# Patient Record
Sex: FEMALE | Race: WHITE | NOT HISPANIC OR LATINO | Employment: FULL TIME | URBAN - METROPOLITAN AREA
[De-identification: names, ages, dates, MRNs, and addresses within clinical notes are randomized per-mention and may not be internally consistent; named-entity substitution may affect disease eponyms.]

---

## 2017-01-20 ENCOUNTER — ALLSCRIPTS OFFICE VISIT (OUTPATIENT)
Dept: OTHER | Facility: OTHER | Age: 57
End: 2017-01-20

## 2017-06-02 ENCOUNTER — APPOINTMENT (OUTPATIENT)
Dept: LAB | Facility: CLINIC | Age: 57
End: 2017-06-02
Payer: COMMERCIAL

## 2017-06-02 ENCOUNTER — TRANSCRIBE ORDERS (OUTPATIENT)
Dept: LAB | Facility: CLINIC | Age: 57
End: 2017-06-02

## 2017-06-02 DIAGNOSIS — Z00.8 HEALTH EXAMINATION IN POPULATION SURVEY: Primary | ICD-10-CM

## 2017-06-02 DIAGNOSIS — Z00.8 HEALTH EXAMINATION IN POPULATION SURVEY: ICD-10-CM

## 2017-06-02 LAB
CHOLEST SERPL-MCNC: 197 MG/DL (ref 50–200)
EST. AVERAGE GLUCOSE BLD GHB EST-MCNC: 117 MG/DL
HBA1C MFR BLD: 5.7 % (ref 4.2–6.3)
HDLC SERPL-MCNC: 48 MG/DL (ref 40–60)
LDLC SERPL CALC-MCNC: 107 MG/DL (ref 0–100)
TRIGL SERPL-MCNC: 210 MG/DL

## 2017-06-02 PROCEDURE — 83036 HEMOGLOBIN GLYCOSYLATED A1C: CPT

## 2017-06-02 PROCEDURE — 36415 COLL VENOUS BLD VENIPUNCTURE: CPT

## 2017-06-02 PROCEDURE — 80061 LIPID PANEL: CPT

## 2017-07-26 ENCOUNTER — ALLSCRIPTS OFFICE VISIT (OUTPATIENT)
Dept: OTHER | Facility: OTHER | Age: 57
End: 2017-07-26

## 2018-01-12 VITALS
HEART RATE: 60 BPM | OXYGEN SATURATION: 98 % | BODY MASS INDEX: 25.56 KG/M2 | WEIGHT: 135.38 LBS | RESPIRATION RATE: 16 BRPM | TEMPERATURE: 97.6 F | SYSTOLIC BLOOD PRESSURE: 124 MMHG | HEIGHT: 61 IN | DIASTOLIC BLOOD PRESSURE: 82 MMHG

## 2018-01-14 VITALS
HEIGHT: 61 IN | BODY MASS INDEX: 25.54 KG/M2 | WEIGHT: 135.25 LBS | SYSTOLIC BLOOD PRESSURE: 138 MMHG | DIASTOLIC BLOOD PRESSURE: 68 MMHG | TEMPERATURE: 98.6 F | RESPIRATION RATE: 16 BRPM | HEART RATE: 76 BPM | OXYGEN SATURATION: 98 %

## 2018-01-15 NOTE — RESULT NOTES
Message    Liver enzymes and pancreatic enzymes are normal     Called pt and left message  ph         Verified Results  (1) HEPATIC FUNCTION PANEL 20Dec2016 08:02AM Marely Door   TW Order Number: [id number]     Test Name Result Flag Reference   ALBUMIN 4 1 g/dL  3 5-5 0   - Patient Instructions: This is a non fasting blood test  Please drink two glasses of water the morning of test     - Patient Instructions: This is a non fasting blood test  Please drink two glasses of water the morning of test    ALK PHOSPHATAS 100 U/L     ALT (SGPT) 16 U/L  12-78   AST(SGOT) 16 U/L  5-45   BILI, DIRECT 0 10 mg/dL  0 00-0 20   BILI, TOTAL 0 50 mg/dL  0 20-1 00   TOTAL PROTEIN 7 8 g/dL  6 4-8 2     (1) AMYLASE 20Dec2016 08:02AM Marely Door   TW Order Number: [id number]     Test Name Result Flag Reference   AMYLASE 64 IU/L     - Patient Instructions: This is a non fasting blood test  Please drink two glasses of water the morning of test      (1) LIPASE 20Dec2016 08:02AM Marely Door   TW Order Number: [id number]     Test Name Result Flag Reference   LIPASE 339 u/L     - Patient Instructions:  This is a non fasting blood test  Please drink two glasses of water the morning of test

## 2018-01-17 NOTE — RESULT NOTES
Message   shoulder x-ray results are back and show arthritis of right shoulder near rotator cuff area(acromioclavicular joint)  Recommend physical therapy but if shoulder is not improving, would like Zina De Anda to see orthopedics     Verified Results  * XR SHOULDER 2+ VIEW RIGHT [id number] 02:44PM [facility] [place]   TW Order Number: [id number]     Test Name Result Flag Reference   XR SHOULDER 2+ VW RIGHT (Report)     RIGHT SHOULDER     INDICATION: Neck and right shoulder pain     COMPARISON: None     VIEWS: 3; 3 images     FINDINGS:     There is no acute fracture or dislocation  There is osteoarthritis of the right acromioclavicular joint with joint space narrowing and mild marginal spurring  There is minimal spurring associated with the inferior margin of the glenohumeral joint without significant narrowing     No lytic or blastic lesions are seen  Soft tissues are unremarkable  IMPRESSION:     Osteoarthritis of the right acromioclavicular joint  Minimal glenohumeral joint spurring   No acute osseous abnormality       Workstation performed: [id number]     Signed by:   Martha Dunn MD   12/21/16

## 2018-01-18 NOTE — MISCELLANEOUS
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

## 2018-03-28 DIAGNOSIS — R00.0 TACHYCARDIA: Primary | ICD-10-CM

## 2018-03-28 PROBLEM — F41.9 ANXIETY: Status: ACTIVE | Noted: 2017-01-20

## 2018-03-28 RX ORDER — PANTOPRAZOLE SODIUM 40 MG/1
TABLET, DELAYED RELEASE ORAL
COMMUNITY
End: 2018-07-03 | Stop reason: SDUPTHER

## 2018-03-28 RX ORDER — CHOLECALCIFEROL (VITAMIN D3) 125 MCG
1 CAPSULE ORAL DAILY
COMMUNITY
End: 2020-09-17 | Stop reason: ALTCHOICE

## 2018-03-28 RX ORDER — ATENOLOL 25 MG/1
TABLET ORAL
COMMUNITY
End: 2018-03-28 | Stop reason: SDUPTHER

## 2018-03-28 RX ORDER — BIOTIN 1 MG
1 TABLET ORAL DAILY
COMMUNITY
End: 2020-03-20 | Stop reason: ALTCHOICE

## 2018-03-29 RX ORDER — ATENOLOL 25 MG/1
12.5 TABLET ORAL DAILY
Qty: 45 TABLET | Refills: 1 | Status: SHIPPED | OUTPATIENT
Start: 2018-03-29 | End: 2018-09-24 | Stop reason: SDUPTHER

## 2018-03-29 NOTE — TELEPHONE ENCOUNTER
Pt called  Back   Would like her atenolol sent to [facility] at [facility]

## 2018-07-03 DIAGNOSIS — K21.9 GASTROESOPHAGEAL REFLUX DISEASE, ESOPHAGITIS PRESENCE NOT SPECIFIED: Primary | ICD-10-CM

## 2018-07-03 RX ORDER — PANTOPRAZOLE SODIUM 40 MG/1
40 TABLET, DELAYED RELEASE ORAL DAILY
Qty: 28 TABLET | Refills: 1 | Status: SHIPPED | OUTPATIENT
Start: 2018-07-03 | End: 2018-07-27

## 2018-07-17 ENCOUNTER — TRANSCRIBE ORDERS (OUTPATIENT)
Dept: LAB | Facility: AMBULARY SURGERY CENTER | Age: 58
End: 2018-07-17

## 2018-07-17 ENCOUNTER — APPOINTMENT (OUTPATIENT)
Dept: LAB | Facility: AMBULARY SURGERY CENTER | Age: 58
End: 2018-07-17
Payer: COMMERCIAL

## 2018-07-17 DIAGNOSIS — Z00.8 HEALTH EXAMINATION IN POPULATION SURVEY: Primary | ICD-10-CM

## 2018-07-17 DIAGNOSIS — Z00.8 HEALTH EXAMINATION IN POPULATION SURVEY: ICD-10-CM

## 2018-07-17 LAB
CHOLEST SERPL-MCNC: 201 MG/DL (ref 50–200)
EST. AVERAGE GLUCOSE BLD GHB EST-MCNC: 111 MG/DL
HBA1C MFR BLD: 5.5 % (ref 4.2–6.3)
HDLC SERPL-MCNC: 40 MG/DL (ref 40–60)
LDLC SERPL CALC-MCNC: 107 MG/DL (ref 0–100)
NONHDLC SERPL-MCNC: 161 MG/DL
TRIGL SERPL-MCNC: 269 MG/DL

## 2018-07-17 PROCEDURE — 36415 COLL VENOUS BLD VENIPUNCTURE: CPT

## 2018-07-17 PROCEDURE — 80061 LIPID PANEL: CPT

## 2018-07-17 PROCEDURE — 83036 HEMOGLOBIN GLYCOSYLATED A1C: CPT

## 2018-07-27 ENCOUNTER — OFFICE VISIT (OUTPATIENT)
Dept: FAMILY MEDICINE CLINIC | Facility: CLINIC | Age: 58
End: 2018-07-27
Payer: COMMERCIAL

## 2018-07-27 VITALS
DIASTOLIC BLOOD PRESSURE: 70 MMHG | RESPIRATION RATE: 14 BRPM | BODY MASS INDEX: 23.93 KG/M2 | SYSTOLIC BLOOD PRESSURE: 130 MMHG | HEART RATE: 46 BPM | WEIGHT: 140.2 LBS | HEIGHT: 64 IN

## 2018-07-27 DIAGNOSIS — Z00.00 ENCOUNTER FOR WELLNESS EXAMINATION: Primary | ICD-10-CM

## 2018-07-27 DIAGNOSIS — R00.0 TACHYCARDIA: ICD-10-CM

## 2018-07-27 DIAGNOSIS — R00.2 PALPITATION: ICD-10-CM

## 2018-07-27 DIAGNOSIS — K21.9 GASTROESOPHAGEAL REFLUX DISEASE WITHOUT ESOPHAGITIS: ICD-10-CM

## 2018-07-27 PROCEDURE — 93000 ELECTROCARDIOGRAM COMPLETE: CPT | Performed by: NURSE PRACTITIONER

## 2018-07-27 PROCEDURE — 99386 PREV VISIT NEW AGE 40-64: CPT | Performed by: NURSE PRACTITIONER

## 2018-07-27 RX ORDER — RABEPRAZOLE SODIUM 20 MG/1
20 TABLET, DELAYED RELEASE ORAL DAILY
Qty: 90 TABLET | Refills: 1 | Status: SHIPPED | OUTPATIENT
Start: 2018-07-27 | End: 2019-01-30 | Stop reason: SDUPTHER

## 2018-07-27 NOTE — PROGRESS NOTES
Assessment/Plan     Healthy female exam   Wellness exam       Tachycardia:  Ekg, echo, labs, 48 hour holter, refer to cardiology    Palpitations[de-identified] cont meds as ordered, as above    GERD:  D/c protonix and start aciphex as ordered    2  Patient Counseling:  --Nutrition: Stressed importance of moderation in sodium/caffeine intake, saturated fat and cholesterol, caloric balance, sufficient intake of fresh fruits, vegetables, fiber, calcium, iron, and 1 mg of folate supplement per day (for females capable of pregnancy)  --Discussed the issue of estrogen replacement, calcium supplement, and the daily use of baby aspirin  --Exercise: Stressed the importance of regular exercise  --Substance Abuse: Discussed cessation/primary prevention of tobacco, alcohol, or other drug use; driving or other dangerous activities under the influence; availability of treatment for abuse  --Sexuality: Discussed sexually transmitted diseases, partner selection, use of condoms, avoidance of unintended pregnancy  and contraceptive alternatives  --Injury prevention: Discussed safety belts, safety helmets, smoke detector, smoking near bedding or upholstery  --Dental health: Discussed importance of regular tooth brushing, flossing, and dental visits  --Immunizations reviewed  --Discussed benefits of screening colonoscopy  --After hours service discussed with patient    3  Discussed the patient's BMI with her  The BMI is in the acceptable range  4  Follow up in one year  Carolee     Elizabet Wilder is a 60 y o  female and is here for a comprehensive physical exam  The patient reports problems - palpitations  Do you take any herbs or supplements that were not prescribed by a doctor? no  Are you taking calcium supplements? no  Are you taking aspirin daily?  no     History:    Last pap date: not needed, total hysterectomy    : 3  Para: 2    The following portions of the patient's history were reviewed and updated as appropriate: allergies, current medications, past family history, past medical history, past social history, past surgical history and problem list     Review of Systems  Do you have pain that bothers you in your daily life? no  Constitutional: negative  Eyes: negative  Ears, nose, mouth, throat, and face: negative  Respiratory: negative  Cardiovascular: positive for palpitations  Gastrointestinal: negative  Genitourinary:negative  Integument/breast: negative  Hematologic/lymphatic: negative  Musculoskeletal:negative  Neurological: negative  Behavioral/Psych: negative  Endocrine: negative  Allergic/Immunologic: negative      Objective     /70   Pulse (!) 46   Resp 14   Ht 5' 4" (1 626 m)   Wt 63 6 kg (140 lb 3 2 oz)   BMI 24 07 kg/m²   Family History   Problem Relation Age of Onset    Anxiety disorder Mother         Anxiety    Depression Mother     Anxiety disorder Sister         Anxiety    Depression Sister     Anxiety disorder Daughter         Anxiety    Depression Daughter     Heart disease Maternal Aunt     Heart disease Maternal Uncle      Past Medical History:   Diagnosis Date    Adjustment disorder with anxiety     last assessed - [id number]    History of total hysterectomy     Ingrown toenail     last assessed - [id number]     Social History     Social History    Marital status: /Civil Union     Spouse name: N/A    Number of children: N/A    Years of education: N/A     Occupational History     - [employer]      Social History Main Topics    Smoking status: Never Smoker    Smokeless tobacco: Never Used    Alcohol use Yes      Comment: occasional alcohol use    Drug use: No    Sexual activity: Yes     Partners: Male     Other Topics Concern    Not on file     Social History Narrative    Coffee           Current Outpatient Prescriptions:     atenolol (TENORMIN) 25 mg tablet, Take 0 5 tablets (12 5 mg total) by mouth daily, Disp: 45 tablet, Rfl: 1    Biotin 1000 MCG tablet, Take 1 tablet by mouth daily, Disp: , Rfl:     Cholecalciferol (VITAMIN D3) 2000 units TABS, Take 1 capsule by mouth daily, Disp: , Rfl:     RABEprazole (ACIPHEX) 20 MG tablet, Take 1 tablet (20 mg total) by mouth daily, Disp: 90 tablet, Rfl: 1  No Known Allergies  General Appearance:    Alert, cooperative, no distress, appears stated age   Head:    Normocephalic, without obvious abnormality, atraumatic   Eyes:    PERRL, conjunctiva/corneas clear, EOM's intact, fundi     benign, both eyes   Ears:    Normal TM's and external ear canals, both ears   Nose:   Nares normal, septum midline, mucosa normal, no drainage    or sinus tenderness   Throat:   Lips, mucosa, and tongue normal; teeth and gums normal   Neck:   Supple, symmetrical, trachea midline, no adenopathy;     thyroid:  no enlargement/tenderness/nodules; no carotid    bruit or JVD   Back:     Symmetric, no curvature, ROM normal, no CVA tenderness   Lungs:     Clear to auscultation bilaterally, respirations unlabored   Chest Wall:    No tenderness or deformity    Heart:    Regular rate and rhythm, S1 and S2 normal, no murmur, rub   or gallop   Breast Exam:    No tenderness, masses, or nipple abnormality   Abdomen:     Soft, non-tender, bowel sounds active all four quadrants,     no masses, no organomegaly   Genitalia:    Normal female without lesion, discharge or tenderness   Rectal:    Normal tone, no masses or tenderness; guaiac negative stool   Extremities:   Extremities normal, atraumatic, no cyanosis or edema   Pulses:   2+ and symmetric all extremities   Skin:   Skin color, texture, turgor normal, no rashes or lesions   Lymph nodes:   Cervical, supraclavicular, and axillary nodes normal   Neurologic:   CNII-XII intact, normal strength, sensation and reflexes     throughout

## 2018-08-29 ENCOUNTER — APPOINTMENT (OUTPATIENT)
Dept: LAB | Facility: AMBULARY SURGERY CENTER | Age: 58
End: 2018-08-29
Payer: COMMERCIAL

## 2018-08-29 DIAGNOSIS — R00.0 TACHYCARDIA: ICD-10-CM

## 2018-08-29 DIAGNOSIS — K21.9 GASTROESOPHAGEAL REFLUX DISEASE WITHOUT ESOPHAGITIS: ICD-10-CM

## 2018-08-29 DIAGNOSIS — R00.2 PALPITATION: ICD-10-CM

## 2018-08-29 LAB
ALBUMIN SERPL BCP-MCNC: 3.9 G/DL (ref 3.5–5)
ALP SERPL-CCNC: 101 U/L (ref 46–116)
ALT SERPL W P-5'-P-CCNC: 27 U/L (ref 12–78)
ANION GAP SERPL CALCULATED.3IONS-SCNC: 7 MMOL/L (ref 4–13)
AST SERPL W P-5'-P-CCNC: 22 U/L (ref 5–45)
BASOPHILS # BLD AUTO: 0.08 THOUSANDS/ΜL (ref 0–0.1)
BASOPHILS NFR BLD AUTO: 1 % (ref 0–1)
BILIRUB SERPL-MCNC: 0.7 MG/DL (ref 0.2–1)
BUN SERPL-MCNC: 11 MG/DL (ref 5–25)
CALCIUM SERPL-MCNC: 9.2 MG/DL (ref 8.3–10.1)
CHLORIDE SERPL-SCNC: 100 MMOL/L (ref 100–108)
CO2 SERPL-SCNC: 29 MMOL/L (ref 21–32)
CREAT SERPL-MCNC: 1.02 MG/DL (ref 0.6–1.3)
EOSINOPHIL # BLD AUTO: 0.46 THOUSAND/ΜL (ref 0–0.61)
EOSINOPHIL NFR BLD AUTO: 6 % (ref 0–6)
ERYTHROCYTE [DISTWIDTH] IN BLOOD BY AUTOMATED COUNT: 15.2 % (ref 11.6–15.1)
GFR SERPL CREATININE-BSD FRML MDRD: 60 ML/MIN/1.73SQ M
GLUCOSE P FAST SERPL-MCNC: 104 MG/DL (ref 65–99)
HCT VFR BLD AUTO: 40.9 % (ref 34.8–46.1)
HGB BLD-MCNC: 12.3 G/DL (ref 11.5–15.4)
IMM GRANULOCYTES # BLD AUTO: 0.02 THOUSAND/UL (ref 0–0.2)
IMM GRANULOCYTES NFR BLD AUTO: 0 % (ref 0–2)
LYMPHOCYTES # BLD AUTO: 2.01 THOUSANDS/ΜL (ref 0.6–4.47)
LYMPHOCYTES NFR BLD AUTO: 25 % (ref 14–44)
MCH RBC QN AUTO: 20.6 PG (ref 26.8–34.3)
MCHC RBC AUTO-ENTMCNC: 30.1 G/DL (ref 31.4–37.4)
MCV RBC AUTO: 68 FL (ref 82–98)
MONOCYTES # BLD AUTO: 0.77 THOUSAND/ΜL (ref 0.17–1.22)
MONOCYTES NFR BLD AUTO: 10 % (ref 4–12)
NEUTROPHILS # BLD AUTO: 4.69 THOUSANDS/ΜL (ref 1.85–7.62)
NEUTS SEG NFR BLD AUTO: 58 % (ref 43–75)
NRBC BLD AUTO-RTO: 0 /100 WBCS
PLATELET # BLD AUTO: 391 THOUSANDS/UL (ref 149–390)
PMV BLD AUTO: 9.9 FL (ref 8.9–12.7)
POTASSIUM SERPL-SCNC: 4.4 MMOL/L (ref 3.5–5.3)
PROT SERPL-MCNC: 7.8 G/DL (ref 6.4–8.2)
RBC # BLD AUTO: 5.98 MILLION/UL (ref 3.81–5.12)
SODIUM SERPL-SCNC: 136 MMOL/L (ref 136–145)
TSH SERPL DL<=0.05 MIU/L-ACNC: 2.29 UIU/ML (ref 0.36–3.74)
WBC # BLD AUTO: 8.03 THOUSAND/UL (ref 4.31–10.16)

## 2018-08-29 PROCEDURE — 80053 COMPREHEN METABOLIC PANEL: CPT

## 2018-08-29 PROCEDURE — 85025 COMPLETE CBC W/AUTO DIFF WBC: CPT

## 2018-08-29 PROCEDURE — 84443 ASSAY THYROID STIM HORMONE: CPT

## 2018-08-29 PROCEDURE — 36415 COLL VENOUS BLD VENIPUNCTURE: CPT

## 2018-09-24 DIAGNOSIS — R00.0 TACHYCARDIA: ICD-10-CM

## 2018-09-24 RX ORDER — ATENOLOL 25 MG/1
12.5 TABLET ORAL DAILY
Qty: 45 TABLET | Refills: 5 | Status: SHIPPED | OUTPATIENT
Start: 2018-09-24 | End: 2019-11-13 | Stop reason: SDUPTHER

## 2018-09-27 ENCOUNTER — HOSPITAL ENCOUNTER (OUTPATIENT)
Dept: NON INVASIVE DIAGNOSTICS | Facility: CLINIC | Age: 58
Discharge: HOME | End: 2018-09-27
Payer: COMMERCIAL

## 2018-09-27 DIAGNOSIS — R00.2 PALPITATION: ICD-10-CM

## 2018-09-27 DIAGNOSIS — R00.0 TACHYCARDIA: ICD-10-CM

## 2018-09-27 PROCEDURE — 93306 TTE W/DOPPLER COMPLETE: CPT | Performed by: INTERNAL MEDICINE

## 2018-09-27 PROCEDURE — 93306 TTE W/DOPPLER COMPLETE: CPT

## 2018-10-05 ENCOUNTER — OFFICE VISIT (OUTPATIENT)
Dept: FAMILY MEDICINE CLINIC | Facility: CLINIC | Age: 58
End: 2018-10-05
Payer: COMMERCIAL

## 2018-10-05 VITALS
DIASTOLIC BLOOD PRESSURE: 70 MMHG | BODY MASS INDEX: 23.49 KG/M2 | SYSTOLIC BLOOD PRESSURE: 120 MMHG | RESPIRATION RATE: 12 BRPM | OXYGEN SATURATION: 98 % | HEIGHT: 64 IN | WEIGHT: 137.6 LBS | HEART RATE: 52 BPM

## 2018-10-05 DIAGNOSIS — R00.0 TACHYCARDIA: ICD-10-CM

## 2018-10-05 DIAGNOSIS — L98.9 SCALP LESION: Primary | ICD-10-CM

## 2018-10-05 DIAGNOSIS — Z23 NEED FOR INFLUENZA VACCINATION: ICD-10-CM

## 2018-10-05 DIAGNOSIS — K21.9 GASTROESOPHAGEAL REFLUX DISEASE WITHOUT ESOPHAGITIS: ICD-10-CM

## 2018-10-05 PROCEDURE — 90682 RIV4 VACC RECOMBINANT DNA IM: CPT

## 2018-10-05 PROCEDURE — 1036F TOBACCO NON-USER: CPT | Performed by: NURSE PRACTITIONER

## 2018-10-05 PROCEDURE — 99214 OFFICE O/P EST MOD 30 MIN: CPT | Performed by: NURSE PRACTITIONER

## 2018-10-05 PROCEDURE — 90471 IMMUNIZATION ADMIN: CPT

## 2018-10-05 NOTE — PROGRESS NOTES
Assessment/Plan:           Problem List Items Addressed This Visit        Digestive    Gastroesophageal reflux disease     Stable  Cont current meds              Musculoskeletal and Integument    Scalp lesion - Primary    Relevant Orders    Ambulatory referral to Dermatology       Other    Tachycardia     Cont current meds             Other Visit Diagnoses     Need for influenza vaccination        Relevant Orders    influenza vaccine, [id number], quadrivalent, recombinant, PF, 0 5 mL, for patients 18 yr+ (FLUBLOK) (Completed)            Subjective:      Patient ID: Sera Myers is a 60 y o  female      Here for f/u to palpitations and labs  Feeling well  Did not get holter done due to no recent palpitations  Will get done if they restart  Reviewed labs and echo          The following portions of the patient's history were reviewed and updated as appropriate: allergies, current medications, past family history, past medical history, past social history, past surgical history and problem list     Review of Systems      Objective:      /70 (BP Location: Left arm, Patient Position: Sitting, Cuff Size: Standard)   Pulse (!) 52   Resp 12   Ht 5' 4" (1 626 m)   Wt 62 4 kg (137 lb 9 6 oz)   SpO2 98%   BMI 23 62 kg/m²     Family History   Problem Relation Age of Onset    Anxiety disorder Mother         Anxiety    Depression Mother     Anxiety disorder Sister         Anxiety    Depression Sister     Anxiety disorder Daughter         Anxiety    Depression Daughter     Heart disease Maternal Aunt     Heart disease Maternal Uncle      Past Medical History:   Diagnosis Date    Adjustment disorder with anxiety     last assessed - [id number]    History of total hysterectomy     Ingrown toenail     last assessed - [id number]     Social History     Social History    Marital status: /Civil Union     Spouse name: N/A    Number of children: N/A    Years of education: N/A     Occupational History     - [employer]      Social History Main Topics    Smoking status: Never Smoker    Smokeless tobacco: Never Used    Alcohol use Yes      Comment: occasional alcohol use    Drug use: No    Sexual activity: Yes     Partners: Male     Other Topics Concern    Not on file     Social History Narrative    Coffee           Current Outpatient Prescriptions:     atenolol (TENORMIN) 25 mg tablet, Take 0 5 tablets (12 5 mg total) by mouth daily, Disp: 45 tablet, Rfl: 5    Biotin 1000 MCG tablet, Take 1 tablet by mouth daily, Disp: , Rfl:     Cholecalciferol (VITAMIN D3) 2000 units TABS, Take 1 capsule by mouth daily, Disp: , Rfl:     RABEprazole (ACIPHEX) 20 MG tablet, Take 1 tablet (20 mg total) by mouth daily, Disp: 90 tablet, Rfl: 1  No Known Allergies     Physical Exam   Constitutional: She is oriented to person, place, and time  She appears well-developed and well-nourished  HENT:   Head: Normocephalic  Right Ear: External ear normal    Left Ear: External ear normal    Nose: Nose normal    Mouth/Throat: Oropharynx is clear and moist    Eyes: Conjunctivae are normal    Neck: Normal range of motion  Neck supple  No thyromegaly present  Cardiovascular: Normal rate, regular rhythm and normal heart sounds  Pulmonary/Chest: Effort normal and breath sounds normal    Abdominal: Soft  Bowel sounds are normal    Musculoskeletal: Normal range of motion  Neurological: She is alert and oriented to person, place, and time  Skin: Skin is warm and dry  Lesion and rash noted  Mobile 3mm firm non tender lesion on scalp     Psychiatric: She has a normal mood and affect  Her behavior is normal  Judgment and thought content normal    Nursing note and vitals reviewed

## 2018-10-23 DIAGNOSIS — Z12.31 SCREENING MAMMOGRAM, ENCOUNTER FOR: Primary | ICD-10-CM

## 2018-11-30 DIAGNOSIS — F41.9 ANXIETY: Primary | ICD-10-CM

## 2018-11-30 NOTE — TELEPHONE ENCOUNTER
Patient is feeling anxiety  Not sleeping, agitated  , tearing up at work  Stress at work      She would to know if you could please send something to [facility]    In the past she has taken Xanax with improvement      Please advise

## 2018-12-02 RX ORDER — ALPRAZOLAM 0.25 MG/1
0.25 TABLET ORAL
Qty: 20 TABLET | Refills: 0 | Status: SHIPPED | OUTPATIENT
Start: 2018-12-02 | End: 2018-12-28 | Stop reason: SDUPTHER

## 2018-12-12 ENCOUNTER — HOSPITAL ENCOUNTER (OUTPATIENT)
Dept: MAMMOGRAPHY | Facility: MEDICAL CENTER | Age: 58
Discharge: HOME | End: 2018-12-12
Payer: COMMERCIAL

## 2018-12-12 VITALS — HEIGHT: 64 IN | WEIGHT: 137 LBS | BODY MASS INDEX: 23.39 KG/M2

## 2018-12-12 DIAGNOSIS — Z12.31 SCREENING MAMMOGRAM, ENCOUNTER FOR: ICD-10-CM

## 2018-12-12 PROCEDURE — 77063 BREAST TOMOSYNTHESIS BI: CPT

## 2018-12-12 PROCEDURE — 77067 SCR MAMMO BI INCL CAD: CPT

## 2018-12-28 DIAGNOSIS — F41.9 ANXIETY: ICD-10-CM

## 2018-12-28 RX ORDER — ALPRAZOLAM 0.25 MG/1
0.25 TABLET ORAL
Qty: 20 TABLET | Refills: 0 | OUTPATIENT
Start: 2018-12-28 | End: 2019-01-22 | Stop reason: SDUPTHER

## 2019-01-22 DIAGNOSIS — F41.9 ANXIETY: ICD-10-CM

## 2019-01-22 RX ORDER — ALPRAZOLAM 0.25 MG/1
0.25 TABLET ORAL
Qty: 20 TABLET | Refills: 0 | Status: SHIPPED | OUTPATIENT
Start: 2019-01-22 | End: 2019-02-19 | Stop reason: SDUPTHER

## 2019-01-30 DIAGNOSIS — K21.9 GASTROESOPHAGEAL REFLUX DISEASE WITHOUT ESOPHAGITIS: ICD-10-CM

## 2019-01-30 RX ORDER — RABEPRAZOLE SODIUM 20 MG/1
TABLET, DELAYED RELEASE ORAL
Qty: 90 TABLET | Refills: 3 | Status: SHIPPED | OUTPATIENT
Start: 2019-01-30 | End: 2019-05-24

## 2019-01-30 RX ORDER — RABEPRAZOLE SODIUM 20 MG/1
20 TABLET, DELAYED RELEASE ORAL DAILY
Qty: 90 TABLET | Refills: 3 | Status: SHIPPED | OUTPATIENT
Start: 2019-01-30 | End: 2019-07-10

## 2019-01-30 NOTE — TELEPHONE ENCOUNTER
From: Corazon Hull  Sent: 1/30/2019 9:59 AM EST  Subject: Medication Renewal Request    Corazon Hull would like a refill of the following medications:     RABEprazole (ACIPHEX) 20 MG tablet KATIANA Orosco]    [facility]    Comment:

## 2019-02-01 DIAGNOSIS — K21.9 GASTROESOPHAGEAL REFLUX DISEASE WITHOUT ESOPHAGITIS: ICD-10-CM

## 2019-02-01 DIAGNOSIS — K21.9 GASTROESOPHAGEAL REFLUX DISEASE WITHOUT ESOPHAGITIS: Primary | ICD-10-CM

## 2019-02-01 RX ORDER — OMEPRAZOLE 40 MG/1
40 CAPSULE, DELAYED RELEASE ORAL DAILY
Qty: 90 CAPSULE | Refills: 1 | Status: SHIPPED | OUTPATIENT
Start: 2019-02-01 | End: 2019-02-01 | Stop reason: SDUPTHER

## 2019-02-01 RX ORDER — OMEPRAZOLE 40 MG/1
40 CAPSULE, DELAYED RELEASE ORAL DAILY
Qty: 90 CAPSULE | Refills: 1 | Status: SHIPPED | OUTPATIENT
Start: 2019-02-01 | End: 2019-07-30 | Stop reason: SDUPTHER

## 2019-02-07 ENCOUNTER — OFFICE VISIT (OUTPATIENT)
Dept: FAMILY MEDICINE CLINIC | Facility: CLINIC | Age: 59
End: 2019-02-07
Payer: COMMERCIAL

## 2019-02-07 VITALS
SYSTOLIC BLOOD PRESSURE: 136 MMHG | WEIGHT: 138.6 LBS | BODY MASS INDEX: 23.66 KG/M2 | RESPIRATION RATE: 12 BRPM | OXYGEN SATURATION: 93 % | HEART RATE: 58 BPM | HEIGHT: 64 IN | DIASTOLIC BLOOD PRESSURE: 90 MMHG

## 2019-02-07 DIAGNOSIS — M25.512 ACUTE PAIN OF LEFT SHOULDER: Primary | ICD-10-CM

## 2019-02-07 DIAGNOSIS — R20.0 NUMBNESS AND TINGLING IN LEFT ARM: ICD-10-CM

## 2019-02-07 DIAGNOSIS — R20.2 NUMBNESS AND TINGLING IN LEFT ARM: ICD-10-CM

## 2019-02-07 PROCEDURE — 99213 OFFICE O/P EST LOW 20 MIN: CPT | Performed by: NURSE PRACTITIONER

## 2019-02-07 NOTE — PROGRESS NOTES
Assessment/Plan:           Problem List Items Addressed This Visit     None      Visit Diagnoses     Acute pain of left shoulder    -  Primary    Relevant Orders    Ambulatory referral to Orthopedic Surgery    XR shoulder 2+ vw left    Numbness and tingling in left arm        Relevant Orders    Ambulatory referral to Orthopedic Surgery    XR shoulder 2+ vw left            Subjective:      Patient ID: Rudi Cummings is a 60 y o  female  Here for c/o left upper arm numbness and tingling radiating down to her fingers  Started one month ago with left scapula pain , then left shoulder pain  Able to move left shoulder normally  No known trauma  No new exercising  Full rom  Unknown reason for this  Cannot understand reason for this change  Happens daily  Took no meds for this  Right shoulder with "spurs"          The following portions of the patient's history were reviewed and updated as appropriate: allergies, current medications, past family history, past medical history, past social history, past surgical history and problem list     Review of Systems   Constitutional: Negative for fatigue and fever  Respiratory: Negative for cough, shortness of breath and wheezing  Cardiovascular: Negative for chest pain and palpitations  Musculoskeletal: Negative for back pain, gait problem, joint swelling, myalgias and neck pain  Skin: Negative for rash  Neurological: Negative for dizziness, light-headedness and headaches  Hematological: Negative for adenopathy  Psychiatric/Behavioral: Negative for dysphoric mood and sleep disturbance  The patient is not nervous/anxious            Objective:      /90 (BP Location: Left arm, Patient Position: Sitting, Cuff Size: Standard)   Pulse 58   Resp 12   Ht 5' 4" (1 626 m)   Wt 62 9 kg (138 lb 9 6 oz)   SpO2 93%   BMI 23 79 kg/m²     Family History   Problem Relation Age of Onset    Anxiety disorder Mother         Anxiety    Depression Mother     Anxiety disorder Sister         Anxiety    Depression Sister     Anxiety disorder Daughter         Anxiety    Depression Daughter     Heart disease Maternal Aunt     Heart disease Maternal Uncle      Past Medical History:   Diagnosis Date    Adjustment disorder with anxiety     last assessed - [id number]    History of total hysterectomy     Ingrown toenail     last assessed - [id number]     Social History     Social History    Marital status: /Civil Union     Spouse name: N/A    Number of children: N/A    Years of education: N/A     Occupational History     - [employer]      Social History Main Topics    Smoking status: Never Smoker    Smokeless tobacco: Never Used    Alcohol use Yes      Comment: occasional alcohol use    Drug use: No    Sexual activity: Yes     Partners: Male     Other Topics Concern    Not on file     Social History Narrative    Coffee           Current Outpatient Prescriptions:     ALPRAZolam (XANAX) 0 25 mg tablet, Take 1 tablet (0 25 mg total) by mouth daily at bedtime as needed for anxiety, Disp: 20 tablet, Rfl: 0    atenolol (TENORMIN) 25 mg tablet, Take 0 5 tablets (12 5 mg total) by mouth daily, Disp: 45 tablet, Rfl: 5    Biotin 1000 MCG tablet, Take 1 tablet by mouth daily, Disp: , Rfl:     Cholecalciferol (VITAMIN D3) 2000 units TABS, Take 1 capsule by mouth daily, Disp: , Rfl:     omeprazole (PriLOSEC) 40 MG capsule, Take 1 capsule (40 mg total) by mouth daily for 90 days, Disp: 90 capsule, Rfl: 1    RABEprazole (ACIPHEX) 20 MG tablet, TAKE ONE TABLET BY MOUTH EVERY DAY, Disp: 90 tablet, Rfl: 3    RABEprazole (ACIPHEX) 20 MG tablet, Take 1 tablet (20 mg total) by mouth daily, Disp: 90 tablet, Rfl: 3  No Known Allergies     Physical Exam   Constitutional: She is oriented to person, place, and time  She appears well-developed and well-nourished  Eyes: Conjunctivae are normal    Neck: Normal range of motion  Neck supple  No thyromegaly present  Cardiovascular: Normal rate, regular rhythm and normal heart sounds  Pulmonary/Chest: Effort normal and breath sounds normal    Musculoskeletal: Normal range of motion  Left shoulder: Normal    Neg tinels left  Neg crossarm  Neg arc  Neg neers  Neg scratch     Neurological: She is alert and oriented to person, place, and time  Skin: Skin is warm and dry  Psychiatric: She has a normal mood and affect  Her behavior is normal  Judgment and thought content normal    Nursing note and vitals reviewed

## 2019-02-13 ENCOUNTER — APPOINTMENT (OUTPATIENT)
Dept: RADIOLOGY | Facility: CLINIC | Age: 59
End: 2019-02-13
Payer: COMMERCIAL

## 2019-02-13 DIAGNOSIS — M25.512 ACUTE PAIN OF LEFT SHOULDER: ICD-10-CM

## 2019-02-13 DIAGNOSIS — R20.0 NUMBNESS AND TINGLING IN LEFT ARM: ICD-10-CM

## 2019-02-13 DIAGNOSIS — R20.2 NUMBNESS AND TINGLING IN LEFT ARM: ICD-10-CM

## 2019-02-13 PROCEDURE — 73030 X-RAY EXAM OF SHOULDER: CPT

## 2019-02-19 DIAGNOSIS — F41.9 ANXIETY: ICD-10-CM

## 2019-02-19 RX ORDER — ALPRAZOLAM 0.25 MG/1
0.25 TABLET ORAL
Qty: 20 TABLET | Refills: 0 | Status: SHIPPED | OUTPATIENT
Start: 2019-02-19 | End: 2019-03-19 | Stop reason: SDUPTHER

## 2019-03-08 ENCOUNTER — OFFICE VISIT (OUTPATIENT)
Dept: OBGYN CLINIC | Facility: HOSPITAL | Age: 59
End: 2019-03-08
Payer: COMMERCIAL

## 2019-03-08 VITALS
HEIGHT: 64 IN | BODY MASS INDEX: 23.56 KG/M2 | DIASTOLIC BLOOD PRESSURE: 79 MMHG | SYSTOLIC BLOOD PRESSURE: 153 MMHG | WEIGHT: 138 LBS | HEART RATE: 55 BPM

## 2019-03-08 DIAGNOSIS — M62.838 TRAPEZIUS MUSCLE SPASM: Primary | ICD-10-CM

## 2019-03-08 DIAGNOSIS — R20.0 NUMBNESS AND TINGLING IN LEFT ARM: ICD-10-CM

## 2019-03-08 DIAGNOSIS — M25.512 ACUTE PAIN OF LEFT SHOULDER: ICD-10-CM

## 2019-03-08 DIAGNOSIS — R20.2 NUMBNESS AND TINGLING IN LEFT ARM: ICD-10-CM

## 2019-03-08 PROCEDURE — 99243 OFF/OP CNSLTJ NEW/EST LOW 30: CPT | Performed by: ORTHOPAEDIC SURGERY

## 2019-03-08 RX ORDER — MELOXICAM 15 MG/1
15 TABLET ORAL DAILY
Qty: 30 TABLET | Refills: 2 | Status: SHIPPED | OUTPATIENT
Start: 2019-03-08 | End: 2019-07-10

## 2019-03-08 NOTE — PATIENT INSTRUCTIONS
Plan :  I told the patient I think that most of her problem started in her  left shoulder blade and that pain is  radiating down her arm  It could also irritate the nerves as they pas through this area  I do not think that she has more distal  disease now  For that reason I showed her a strong home exercise program emphasizing trap stretching and flexibility only  She tried heat  Which made it worse and in general I use ice anyway and I like ice in a large trash bag for 15 minutes 4 times a day if needed for pain or swelling  I placed her on Mobic (meloxicam) 15 mg 1 tablet once daily after breakfast to decrease both pain and inflammation  She was given strict instructions to stop this medicine immediately if she develops any heartburn, nausea, vomiting, or diarrhea    I want to see her back again in 6 weeks for clinical re-evaluation and further treatment as indicated

## 2019-03-08 NOTE — PROGRESS NOTES
Chief Complaint   Patient presents with    Left Shoulder - Pain           Assessment:  Trapezius muscle spasm-left side    Plan :  I told the patient I think that most of her problem started in her  left shoulder blade and that pain is  radiating down her arm  It could also irritate the nerves as they pas through this area  I do not think that she has more distal  disease now  For that reason I showed her a strong home exercise program emphasizing trap stretching and flexibility only  She tried heat  Which made it worse and in general I use ice anyway and I like ice in a large trash bag for 15 minutes 4 times a day if needed for pain or swelling  I placed her on Mobic (meloxicam) 15 mg 1 tablet once daily after breakfast to decrease both pain and inflammation  She was given strict instructions to stop this medicine immediately if she develops any heartburn, nausea, vomiting, or diarrhea  I want to see her back again in 6 weeks for clinical re-evaluation and further treatment as indicated    HPI:   This is a RHD 60 y o  white female presenting today for orthopedic consultation referred by her PCP  This is regarding her LUE pain for about one month  This began about a month ago with pain in the shoulder/scapula that has since progressed to the hand  She complains of painful spasms in the left forearm with numbness in the index and long finger  She also mentions chronic neck pain  This does not wake her from sleeping  She has not have previous issues with this arm  There have been no treatment attempts to date for this issue  She has a history of GERD, tachycardia, and anxiety which may influence the treatment and prognosis of today's complaint       PMHx:         Past Medical History:   Diagnosis Date    Adjustment disorder with anxiety     last assessed - [id number]    History of total hysterectomy     Ingrown toenail     last assessed - [id number]       Past Surgical History:   Procedure Laterality Date    APPENDECTOMY      GALLBLADDER SURGERY      OOPHORECTOMY      TONSILLECTOMY AND ADENOIDECTOMY      TOTAL ABDOMINAL HYSTERECTOMY         Family History   Problem Relation Age of Onset    Anxiety disorder Mother         Anxiety    Depression Mother     Anxiety disorder Sister         Anxiety    Depression Sister     Anxiety disorder Daughter         Anxiety    Depression Daughter     Heart disease Maternal Aunt     Heart disease Maternal Uncle        Social History     Socioeconomic History    Marital status: /Civil Union     Spouse name: Not on file    Number of children: Not on file    Years of education: Not on file    Highest education level: Not on file   Occupational History    Occupation:  - [employer]   Social Needs    Financial resource strain: Not on file    Food insecurity:     Worry: Not on file     Inability: Not on file    Transportation needs:     Medical: Not on file     Non-medical: Not on file   Tobacco Use    Smoking status: Never Smoker    Smokeless tobacco: Never Used   Substance and Sexual Activity    Alcohol use: Yes     Comment: occasional alcohol use    Drug use: No    Sexual activity: Yes     Partners: Male   Lifestyle    Physical activity:     Days per week: Not on file     Minutes per session: Not on file    Stress: Not on file   Relationships    Social connections:     Talks on phone: Not on file     Gets together: Not on file     Attends [employer] service: Not on file     Active member of club or organization: Not on file     Attends meetings of clubs or organizations: Not on file     Relationship status: Not on file    Intimate partner violence:     Fear of current or ex partner: Not on file     Emotionally abused: Not on file     Physically abused: Not on file     Forced sexual activity: Not on file   Other Topics Concern    Not on file   Social History Narrative    Coffee       Current Outpatient Medications   Medication Sig Dispense Refill    ALPRAZolam (XANAX) 0 25 mg tablet Take 1 tablet (0 25 mg total) by mouth daily at bedtime as needed for anxiety 20 tablet 0    atenolol (TENORMIN) 25 mg tablet Take 0 5 tablets (12 5 mg total) by mouth daily 45 tablet 5    Biotin 1000 MCG tablet Take 1 tablet by mouth daily      Cholecalciferol (VITAMIN D3) 2000 units TABS Take 1 capsule by mouth daily      omeprazole (PriLOSEC) 40 MG capsule Take 1 capsule (40 mg total) by mouth daily for 90 days 90 capsule 1    RABEprazole (ACIPHEX) 20 MG tablet TAKE ONE TABLET BY MOUTH EVERY DAY 90 tablet 3    RABEprazole (ACIPHEX) 20 MG tablet Take 1 tablet (20 mg total) by mouth daily 90 tablet 3     No current facility-administered medications for this visit  Allergies: Patient has no known allergies  ROS:  Positive for anxiety and orthopedic complaints as noted above  The remaining 10/12 systems on the intake sheet that I reviewed were negative  PE:  /79   Pulse 55   Ht 5' 4" (1 626 m)   Wt 62 6 kg (138 lb)   BMI 23 69 kg/m²   Constitutional: The patient was  oriented to person, place, and time  Well-developed and well-nourished  In no acute distress  HEENT: Vision intact  Hearing normal  Swallowing normal   Head: Normocephalic  Cardiovascular: Intact distal pulses  Pulse regular  Pulmonary/Chest: Effort normal  No respiratory distress  Neurological: Alert and oriented to person, place, and time  Skin: Skin is warm  Psychiatric: Normal mood and affect  Ortho Exam:  On today's exam of the left forearm and hand, there was no swelling, ecchymosis, redness or signs of infection  The skin was warm, dry and well perfused  She was tender to palpation over the left trapezius  She had normal shoulder, elbow and wrist motion compared bilaterally  Strength of the left wrist was 5/5 with flexion, extension, ulnar and radial deviation  Key pinch  was 5/5 bilaterally  There was a positive Phalen's but negative Tinel's   1+ biceps, triceps, brachioradialis reflexes which was equal bilaterally  She has normal sensation to light pin prick with brisk capillary refill to all 5 fingers of that left hand  2+ distal radial pulse  There was no antecubital adenopathy or cellulitis noted  Studies reviewed:  I personally reviewed left shoulder x-rays, as well as the radiologist's report  There is no fracture, dislocation, degenerative change  There is no soft tissue calcification adjacent greater tuberosity  The glenohumeral joint remains well preserved      Scribe Attestation    I,:   Luigi Elias MA am acting as a scribe while in the presence of the attending physician :        I,:   Janett Estes MD personally performed the services described in this documentation    as scribed in my presence :

## 2019-03-08 NOTE — LETTER
March 8, 2019     Jeanne Cowden, [address] [facility]  [id number]    Patient: Brenda Bowden   YOB: 1960   Date of Visit: 3/8/2019       Dear Ms Rios:    Thank you for referring Brenda Bowden to me for evaluation  Below are my notes for this consultation  If you have questions, please do not hesitate to call me  I look forward to following your patient along with you           Sincerely,        Stan Long MD        CC: No Recipients

## 2019-03-18 DIAGNOSIS — F41.9 ANXIETY: ICD-10-CM

## 2019-03-18 RX ORDER — ALPRAZOLAM 0.25 MG/1
0.25 TABLET ORAL
Qty: 20 TABLET | Refills: 0 | Status: CANCELLED | OUTPATIENT
Start: 2019-03-18

## 2019-03-19 DIAGNOSIS — F41.9 ANXIETY: ICD-10-CM

## 2019-03-19 RX ORDER — ALPRAZOLAM 0.25 MG/1
0.25 TABLET ORAL
Qty: 20 TABLET | Refills: 0 | Status: SHIPPED | OUTPATIENT
Start: 2019-03-19 | End: 2019-04-11 | Stop reason: SDUPTHER

## 2019-04-11 DIAGNOSIS — F41.9 ANXIETY: ICD-10-CM

## 2019-04-11 RX ORDER — ALPRAZOLAM 0.25 MG/1
0.25 TABLET ORAL
Qty: 20 TABLET | Refills: 0 | Status: SHIPPED | OUTPATIENT
Start: 2019-04-11 | End: 2019-05-10 | Stop reason: SDUPTHER

## 2019-04-12 DIAGNOSIS — F41.9 ANXIETY: ICD-10-CM

## 2019-04-12 RX ORDER — ALPRAZOLAM 0.25 MG/1
0.25 TABLET ORAL
Qty: 20 TABLET | Refills: 0 | OUTPATIENT
Start: 2019-04-12 | End: 2019-05-02

## 2019-04-12 RX ORDER — ALPRAZOLAM 0.25 MG/1
0.25 TABLET ORAL
Qty: 20 TABLET | Refills: 0 | OUTPATIENT
Start: 2019-04-12

## 2019-05-10 DIAGNOSIS — F41.9 ANXIETY: ICD-10-CM

## 2019-05-10 RX ORDER — ALPRAZOLAM 0.25 MG/1
0.25 TABLET ORAL
Qty: 20 TABLET | Refills: 0 | Status: SHIPPED | OUTPATIENT
Start: 2019-05-10 | End: 2019-05-13 | Stop reason: SDUPTHER

## 2019-05-13 DIAGNOSIS — F41.9 ANXIETY: ICD-10-CM

## 2019-05-13 RX ORDER — ALPRAZOLAM 0.25 MG/1
0.25 TABLET ORAL
Qty: 20 TABLET | Refills: 0 | Status: SHIPPED | OUTPATIENT
Start: 2019-05-13 | End: 2019-06-10 | Stop reason: SDUPTHER

## 2019-05-24 ENCOUNTER — OFFICE VISIT (OUTPATIENT)
Dept: FAMILY MEDICINE CLINIC | Facility: CLINIC | Age: 59
End: 2019-05-24
Payer: COMMERCIAL

## 2019-05-24 VITALS
WEIGHT: 138 LBS | BODY MASS INDEX: 23.56 KG/M2 | DIASTOLIC BLOOD PRESSURE: 70 MMHG | SYSTOLIC BLOOD PRESSURE: 110 MMHG | HEIGHT: 64 IN | OXYGEN SATURATION: 98 % | HEART RATE: 60 BPM | RESPIRATION RATE: 12 BRPM

## 2019-05-24 DIAGNOSIS — F41.9 ANXIETY: ICD-10-CM

## 2019-05-24 DIAGNOSIS — K21.9 GASTROESOPHAGEAL REFLUX DISEASE WITHOUT ESOPHAGITIS: Primary | ICD-10-CM

## 2019-05-24 DIAGNOSIS — Z11.59 ENCOUNTER FOR HEPATITIS C SCREENING TEST FOR LOW RISK PATIENT: ICD-10-CM

## 2019-05-24 DIAGNOSIS — N95.2 VAGINAL ATROPHY: ICD-10-CM

## 2019-05-24 PROCEDURE — 1036F TOBACCO NON-USER: CPT | Performed by: NURSE PRACTITIONER

## 2019-05-24 PROCEDURE — 3008F BODY MASS INDEX DOCD: CPT | Performed by: NURSE PRACTITIONER

## 2019-05-24 PROCEDURE — 99214 OFFICE O/P EST MOD 30 MIN: CPT | Performed by: NURSE PRACTITIONER

## 2019-06-08 ENCOUNTER — APPOINTMENT (OUTPATIENT)
Dept: LAB | Age: 59
End: 2019-06-08
Payer: COMMERCIAL

## 2019-06-08 DIAGNOSIS — K21.9 GASTROESOPHAGEAL REFLUX DISEASE WITHOUT ESOPHAGITIS: ICD-10-CM

## 2019-06-08 DIAGNOSIS — F41.9 ANXIETY: ICD-10-CM

## 2019-06-08 DIAGNOSIS — Z11.59 ENCOUNTER FOR HEPATITIS C SCREENING TEST FOR LOW RISK PATIENT: ICD-10-CM

## 2019-06-08 DIAGNOSIS — N95.2 VAGINAL ATROPHY: ICD-10-CM

## 2019-06-08 LAB
ALBUMIN SERPL BCP-MCNC: 4 G/DL (ref 3.5–5)
ALP SERPL-CCNC: 97 U/L (ref 46–116)
ALT SERPL W P-5'-P-CCNC: 28 U/L (ref 12–78)
ANION GAP SERPL CALCULATED.3IONS-SCNC: 3 MMOL/L (ref 4–13)
AST SERPL W P-5'-P-CCNC: 16 U/L (ref 5–45)
BASOPHILS # BLD AUTO: 0.09 THOUSANDS/ΜL (ref 0–0.1)
BASOPHILS NFR BLD AUTO: 1 % (ref 0–1)
BILIRUB SERPL-MCNC: 0.43 MG/DL (ref 0.2–1)
BUN SERPL-MCNC: 13 MG/DL (ref 5–25)
CALCIUM SERPL-MCNC: 9.2 MG/DL (ref 8.3–10.1)
CHLORIDE SERPL-SCNC: 102 MMOL/L (ref 100–108)
CHOLEST SERPL-MCNC: 188 MG/DL (ref 50–200)
CO2 SERPL-SCNC: 30 MMOL/L (ref 21–32)
CREAT SERPL-MCNC: 1 MG/DL (ref 0.6–1.3)
EOSINOPHIL # BLD AUTO: 0.52 THOUSAND/ΜL (ref 0–0.61)
EOSINOPHIL NFR BLD AUTO: 6 % (ref 0–6)
ERYTHROCYTE [DISTWIDTH] IN BLOOD BY AUTOMATED COUNT: 16.3 % (ref 11.6–15.1)
GFR SERPL CREATININE-BSD FRML MDRD: 61 ML/MIN/1.73SQ M
GLUCOSE P FAST SERPL-MCNC: 91 MG/DL (ref 65–99)
HCT VFR BLD AUTO: 41.4 % (ref 34.8–46.1)
HDLC SERPL-MCNC: 40 MG/DL (ref 40–60)
HGB BLD-MCNC: 12.3 G/DL (ref 11.5–15.4)
IMM GRANULOCYTES # BLD AUTO: 0.04 THOUSAND/UL (ref 0–0.2)
IMM GRANULOCYTES NFR BLD AUTO: 1 % (ref 0–2)
LDLC SERPL CALC-MCNC: 100 MG/DL (ref 0–100)
LYMPHOCYTES # BLD AUTO: 2.36 THOUSANDS/ΜL (ref 0.6–4.47)
LYMPHOCYTES NFR BLD AUTO: 28 % (ref 14–44)
MCH RBC QN AUTO: 20.2 PG (ref 26.8–34.3)
MCHC RBC AUTO-ENTMCNC: 29.7 G/DL (ref 31.4–37.4)
MCV RBC AUTO: 68 FL (ref 82–98)
MONOCYTES # BLD AUTO: 0.74 THOUSAND/ΜL (ref 0.17–1.22)
MONOCYTES NFR BLD AUTO: 9 % (ref 4–12)
NEUTROPHILS # BLD AUTO: 4.77 THOUSANDS/ΜL (ref 1.85–7.62)
NEUTS SEG NFR BLD AUTO: 55 % (ref 43–75)
NRBC BLD AUTO-RTO: 0 /100 WBCS
PLATELET # BLD AUTO: 356 THOUSANDS/UL (ref 149–390)
PMV BLD AUTO: 9.7 FL (ref 8.9–12.7)
POTASSIUM SERPL-SCNC: 4.4 MMOL/L (ref 3.5–5.3)
PROT SERPL-MCNC: 7.7 G/DL (ref 6.4–8.2)
RBC # BLD AUTO: 6.08 MILLION/UL (ref 3.81–5.12)
SODIUM SERPL-SCNC: 135 MMOL/L (ref 136–145)
TRIGL SERPL-MCNC: 239 MG/DL
TSH SERPL DL<=0.05 MIU/L-ACNC: 1.66 UIU/ML (ref 0.36–3.74)
WBC # BLD AUTO: 8.52 THOUSAND/UL (ref 4.31–10.16)

## 2019-06-08 PROCEDURE — 85025 COMPLETE CBC W/AUTO DIFF WBC: CPT

## 2019-06-08 PROCEDURE — 80053 COMPREHEN METABOLIC PANEL: CPT

## 2019-06-08 PROCEDURE — 36415 COLL VENOUS BLD VENIPUNCTURE: CPT

## 2019-06-08 PROCEDURE — 86803 HEPATITIS C AB TEST: CPT

## 2019-06-08 PROCEDURE — 80061 LIPID PANEL: CPT

## 2019-06-08 PROCEDURE — 84443 ASSAY THYROID STIM HORMONE: CPT

## 2019-06-09 DIAGNOSIS — F41.9 ANXIETY: ICD-10-CM

## 2019-06-09 LAB — HCV AB SER QL: NORMAL

## 2019-06-09 RX ORDER — ALPRAZOLAM 0.25 MG/1
0.25 TABLET ORAL
Qty: 20 TABLET | Refills: 0 | Status: CANCELLED | OUTPATIENT
Start: 2019-06-09

## 2019-06-10 DIAGNOSIS — F41.9 ANXIETY: ICD-10-CM

## 2019-06-10 RX ORDER — ALPRAZOLAM 0.25 MG/1
0.25 TABLET ORAL
Qty: 20 TABLET | Refills: 0 | Status: SHIPPED | OUTPATIENT
Start: 2019-06-10 | End: 2019-07-05 | Stop reason: SDUPTHER

## 2019-07-05 DIAGNOSIS — F41.9 ANXIETY: ICD-10-CM

## 2019-07-05 RX ORDER — ALPRAZOLAM 0.25 MG/1
0.25 TABLET ORAL
Qty: 20 TABLET | Refills: 0 | Status: SHIPPED | OUTPATIENT
Start: 2019-07-05 | End: 2019-08-05 | Stop reason: SDUPTHER

## 2019-07-10 ENCOUNTER — OFFICE VISIT (OUTPATIENT)
Dept: URGENT CARE | Age: 59
End: 2019-07-10
Payer: COMMERCIAL

## 2019-07-10 ENCOUNTER — APPOINTMENT (OUTPATIENT)
Dept: RADIOLOGY | Age: 59
End: 2019-07-10
Payer: COMMERCIAL

## 2019-07-10 VITALS
SYSTOLIC BLOOD PRESSURE: 140 MMHG | OXYGEN SATURATION: 97 % | HEIGHT: 62 IN | BODY MASS INDEX: 25.4 KG/M2 | DIASTOLIC BLOOD PRESSURE: 84 MMHG | HEART RATE: 52 BPM | RESPIRATION RATE: 18 BRPM | WEIGHT: 138 LBS | TEMPERATURE: 98.2 F

## 2019-07-10 DIAGNOSIS — S89.92XA INJURY OF LEFT KNEE, INITIAL ENCOUNTER: Primary | ICD-10-CM

## 2019-07-10 DIAGNOSIS — S80.212A ABRASION OF LEFT KNEE, INITIAL ENCOUNTER: ICD-10-CM

## 2019-07-10 DIAGNOSIS — S80.02XA CONTUSION OF LEFT KNEE, INITIAL ENCOUNTER: ICD-10-CM

## 2019-07-10 DIAGNOSIS — S89.92XA INJURY OF LEFT KNEE, INITIAL ENCOUNTER: ICD-10-CM

## 2019-07-10 PROCEDURE — S9088 SERVICES PROVIDED IN URGENT: HCPCS | Performed by: NURSE PRACTITIONER

## 2019-07-10 PROCEDURE — 73564 X-RAY EXAM KNEE 4 OR MORE: CPT

## 2019-07-10 PROCEDURE — 99213 OFFICE O/P EST LOW 20 MIN: CPT | Performed by: NURSE PRACTITIONER

## 2019-07-10 NOTE — PROGRESS NOTES
NAME: Anel Rangel is a 60 y o  female  : 1960    MRN: [id number]      Assessment and Plan   Injury of left knee, initial encounter [S89 [id number]]  1  Injury of left knee, initial encounter  XR knee 4+ vw left injury    Elastic Bandages & Supports (ELASTIC BANDAGE 3") MISC   2  Contusion of left knee, initial encounter  Elastic Bandages & Supports (ELASTIC BANDAGE 3") MISC   3  Abrasion of left knee, initial encounter  Elastic Bandages & Supports (ELASTIC BANDAGE 3") MISC       Chilango Vaughan was seen today for knee pain  Diagnoses and all orders for this visit:    Injury of left knee, initial encounter  -     XR knee 4+ vw left injury; Future  -     Elastic Bandages & Supports (ELASTIC BANDAGE 3") MISC; by Does not apply route once for 1 dose Apply to the left knee prior to leaving the office  Contusion of left knee, initial encounter  -     Elastic Bandages & Supports (ELASTIC BANDAGE 3") MISC; by Does not apply route once for 1 dose Apply to the left knee prior to leaving the office  Abrasion of left knee, initial encounter  -     Elastic Bandages & Supports (ELASTIC BANDAGE 3") MISC; by Does not apply route once for 1 dose Apply to the left knee prior to leaving the office  ace wrap applied by me in the office, pt tolerated well  Patient Instructions   Patient Instructions   Follow up with pcp   Take med as directed, tylenol motrin or aleve  dont sleep with the ace wrap on the knee  Elevate the left leg  If symptoms worsen go to the ER  Continue topical antibiotic ointment to the left knee for care of the abrasion  Xray done and discussed with pt, no obvious fx or dislocations noted  Symptoms continue will f/u with pcp or ortho    Make an appt with PCP or ortho for follow if symptoms dont improve  Knee Pain   WHAT YOU NEED TO KNOW:   Knee pain may start suddenly, or it may be a long-term problem  You may have pain on the side, front, or back of your knee   You may have knee stiffness and swelling  You may hear popping sounds or feel like your knee is giving way or locking up as you walk  You may feel pain when you sit, stand, walk, or climb up and down stairs  Knee pain can be caused by conditions such as obesity, inflammation, or strains or tears in ligaments or tendons  DISCHARGE INSTRUCTIONS:   Follow up with your healthcare provider within 24 hours or as directed: You may need follow-up treatments, such as steroid injections to decrease pain  Write down your questions so you remember to ask them during your visits  Self-care:   · Rest  your knee so it can heal  Limit activities that increase your pain  · Ice  can help reduce swelling  Wrap ice in a towel and put it on your knee for as long and as often as directed  · Compression  with a brace or bandage can help reduce swelling  Use a brace or bandage only as directed  · Elevation  helps decrease pain and swelling  Elevate your knee while you are sitting or lying down  Prop your leg on pillows to keep your knee above the level of your heart  Medicines:   · NSAIDs  help decrease swelling and pain or fever  This medicine is available with or without a doctor's order  NSAIDs can cause stomach bleeding or kidney problems in certain people  If you take blood thinner medicine, always ask your healthcare provider if NSAIDs are safe for you  Always read the medicine label and follow directions  · Acetaminophen  decreases pain and fever  It is available without a doctor's order  Ask how much to take and when to take it  Follow directions  Acetaminophen can cause liver damage if not taken correctly  · Take your medicine as directed  Contact your healthcare provider if you think your medicine is not helping or if you have side effects  Tell him or her if you are allergic to any medicine  Keep a list of the medicines, vitamins, and herbs you take  Include the amounts, and when and why you take them   Bring the list or the pill bottles to follow-up visits  Carry your medicine list with you in case of an emergency  Exercise as directed: You may need to see a physical therapist or do recommended exercises to improve movement and decrease your pain  You may be directed to walk, swim, or ride a bike  Follow your exercise plan exactly as directed to avoid further injury  Contact your healthcare provider if:   · You have questions or concerns about your condition or care  Return to the emergency department if:   · Your pain is worse, even after treatment  · You cannot bend or straighten your leg completely  · The swelling around your knee does not go down even with treatment  · Your knee is painful and hot to the touch  © 2017 [address] Information is for End User's use only and may not be sold, redistributed or otherwise used for commercial purposes  All illustrations and images included in CareNotes® are the copyrighted property of A D A M [employer] Inc  or Anoop Curran  The above information is an  only  It is not intended as medical advice for individual conditions or treatments  Talk to your doctor, nurse or pharmacist before following any medical regimen to see if it is safe and effective for you  Proceed to ER if symptoms worsen  Chief Complaint     Chief Complaint   Patient presents with    Knee Pain     Pt states she fell Saturday onto her left knee when she missed a step  Pt c/o throbbing, pins & needles left knee pain and has some abrasions in the area  Pain is worse with pressure  History of Present Illness     Patient is a 60year old female here today with left knee pain  Patient has had left knee pain after she has fallen up 1 concrete step on Saturday at home  She was wearing flip-flops when the tip of her flip-flop was stuck on the concrete step and landed on the left knee    Patient has no history knee pain the past however does have a history of falls like this yearly around this time  She has no numbness or tingling to the left leg and has been putting ice and taking ibuprofen for the pain  She has no numbness or tingling  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 sits mainly  She has pain when going up and down steps and when going to stand  X-ray will be ordered to RO any fractures however if symptoms continue patient will follow her family doctor Ortho for ongoing symptoms  Review of Systems   Review of Systems   Musculoskeletal: Positive for arthralgias ( left knee), gait problem ( walks with a limp) and joint swelling (Left knee)  Skin: Positive for wound ( abrasion to the left knee)  Current Medications       Current Outpatient Medications:     ALPRAZolam (XANAX) 0 25 mg tablet, Take 1 tablet (0 25 mg total) by mouth daily at bedtime as needed for anxiety, Disp: 20 tablet, Rfl: 0    atenolol (TENORMIN) 25 mg tablet, Take 0 5 tablets (12 5 mg total) by mouth daily, Disp: 45 tablet, Rfl: 5    Biotin 1000 MCG tablet, Take 1 tablet by mouth daily, Disp: , Rfl:     Cholecalciferol (VITAMIN D3) 2000 units TABS, Take 1 capsule by mouth daily, Disp: , Rfl:     Elastic Bandages & Supports (ELASTIC BANDAGE 3") MISC, by Does not apply route once for 1 dose Apply to the left knee prior to leaving the office  , Disp: 1 each, Rfl: 0    omeprazole (PriLOSEC) 40 MG capsule, Take 1 capsule (40 mg total) by mouth daily for 90 days, Disp: 90 capsule, Rfl: 1    Current Allergies     Allergies as of 07/10/2019    (No Known Allergies)              Past Medical History:   Diagnosis Date    Adjustment disorder with anxiety     last assessed - [id number]    History of total hysterectomy     Ingrown toenail     last assessed - [id number]       Past Surgical History:   Procedure Laterality Date    APPENDECTOMY      GALLBLADDER SURGERY      OOPHORECTOMY      TONSILLECTOMY AND ADENOIDECTOMY      TOTAL ABDOMINAL HYSTERECTOMY Family History   Problem Relation Age of Onset    Anxiety disorder Mother         Anxiety    Depression Mother     Anxiety disorder Sister         Anxiety    Depression Sister     Anxiety disorder Daughter         Anxiety    Depression Daughter     Heart disease Maternal Aunt     Heart disease Maternal Uncle          Medications have been verified  The following portions of the patient's history were reviewed and updated as appropriate: allergies, current medications, past family history, past medical history, past social history, past surgical history and problem list     Objective   /84   Pulse (!) 52   Temp 98 2 °F (36 8 °C)   Resp 18   Ht 5' 1 5" (1 562 m)   Wt 62 6 kg (138 lb)   SpO2 97%   BMI 25 65 kg/m²      Physical Exam     Physical Exam   Constitutional: She appears well-developed  Musculoskeletal: Normal range of motion  Left knee: She exhibits swelling and erythema  She exhibits no ecchymosis          Legs:      KATIANA Avina

## 2019-07-10 NOTE — PATIENT INSTRUCTIONS
Follow up with pcp   Take med as directed, tylenol motrin or aleve  dont sleep with the ace wrap on the knee  Elevate the left leg  If symptoms worsen go to the ER  Continue topical antibiotic ointment to the left knee for care of the abrasion  Xray done and discussed with pt, no obvious fx or dislocations noted  Symptoms continue will f/u with pcp or ortho    Make an appt with PCP or ortho for follow if symptoms dont improve  Knee Pain   WHAT YOU NEED TO KNOW:   Knee pain may start suddenly, or it may be a long-term problem  You may have pain on the side, front, or back of your knee  You may have knee stiffness and swelling  You may hear popping sounds or feel like your knee is giving way or locking up as you walk  You may feel pain when you sit, stand, walk, or climb up and down stairs  Knee pain can be caused by conditions such as obesity, inflammation, or strains or tears in ligaments or tendons  DISCHARGE INSTRUCTIONS:   Follow up with your healthcare provider within 24 hours or as directed: You may need follow-up treatments, such as steroid injections to decrease pain  Write down your questions so you remember to ask them during your visits  Self-care:   · Rest  your knee so it can heal  Limit activities that increase your pain  · Ice  can help reduce swelling  Wrap ice in a towel and put it on your knee for as long and as often as directed  · Compression  with a brace or bandage can help reduce swelling  Use a brace or bandage only as directed  · Elevation  helps decrease pain and swelling  Elevate your knee while you are sitting or lying down  Prop your leg on pillows to keep your knee above the level of your heart  Medicines:   · NSAIDs  help decrease swelling and pain or fever  This medicine is available with or without a doctor's order  NSAIDs can cause stomach bleeding or kidney problems in certain people   If you take blood thinner medicine, always ask your healthcare provider if NSAIDs are safe for you  Always read the medicine label and follow directions  · Acetaminophen  decreases pain and fever  It is available without a doctor's order  Ask how much to take and when to take it  Follow directions  Acetaminophen can cause liver damage if not taken correctly  · Take your medicine as directed  Contact your healthcare provider if you think your medicine is not helping or if you have side effects  Tell him or her if you are allergic to any medicine  Keep a list of the medicines, vitamins, and herbs you take  Include the amounts, and when and why you take them  Bring the list or the pill bottles to follow-up visits  Carry your medicine list with you in case of an emergency  Exercise as directed: You may need to see a physical therapist or do recommended exercises to improve movement and decrease your pain  You may be directed to walk, swim, or ride a bike  Follow your exercise plan exactly as directed to avoid further injury  Contact your healthcare provider if:   · You have questions or concerns about your condition or care  Return to the emergency department if:   · Your pain is worse, even after treatment  · You cannot bend or straighten your leg completely  · The swelling around your knee does not go down even with treatment  · Your knee is painful and hot to the touch  © 2017 [address] Information is for End User's use only and may not be sold, redistributed or otherwise used for commercial purposes  All illustrations and images included in CareNotes® are the copyrighted property of A D A M , Inc  or Anoop Curran  The above information is an  only  It is not intended as medical advice for individual conditions or treatments  Talk to your doctor, nurse or pharmacist before following any medical regimen to see if it is safe and effective for you

## 2019-07-30 DIAGNOSIS — K21.9 GASTROESOPHAGEAL REFLUX DISEASE WITHOUT ESOPHAGITIS: ICD-10-CM

## 2019-07-30 RX ORDER — OMEPRAZOLE 40 MG/1
40 CAPSULE, DELAYED RELEASE ORAL DAILY
Qty: 90 CAPSULE | Refills: 3 | Status: SHIPPED | OUTPATIENT
Start: 2019-07-30 | End: 2020-01-16 | Stop reason: SDUPTHER

## 2019-08-05 DIAGNOSIS — F41.9 ANXIETY: ICD-10-CM

## 2019-08-07 RX ORDER — ALPRAZOLAM 0.25 MG/1
0.25 TABLET ORAL
Qty: 20 TABLET | Refills: 0 | Status: SHIPPED | OUTPATIENT
Start: 2019-08-07 | End: 2019-08-26 | Stop reason: SDUPTHER

## 2019-08-25 DIAGNOSIS — F41.9 ANXIETY: ICD-10-CM

## 2019-08-25 RX ORDER — ALPRAZOLAM 0.25 MG/1
0.25 TABLET ORAL
Qty: 20 TABLET | Refills: 0 | Status: CANCELLED | OUTPATIENT
Start: 2019-08-25

## 2019-08-26 DIAGNOSIS — F41.9 ANXIETY: ICD-10-CM

## 2019-08-26 RX ORDER — ALPRAZOLAM 0.25 MG/1
0.25 TABLET ORAL
Qty: 20 TABLET | Refills: 0 | Status: SHIPPED | OUTPATIENT
Start: 2019-08-26 | End: 2019-09-23 | Stop reason: SDUPTHER

## 2019-09-22 DIAGNOSIS — F41.9 ANXIETY: ICD-10-CM

## 2019-09-22 RX ORDER — ALPRAZOLAM 0.25 MG/1
0.25 TABLET ORAL
Qty: 20 TABLET | Refills: 0 | Status: CANCELLED | OUTPATIENT
Start: 2019-09-22

## 2019-09-23 DIAGNOSIS — F41.9 ANXIETY: ICD-10-CM

## 2019-09-23 RX ORDER — ALPRAZOLAM 0.25 MG/1
0.25 TABLET ORAL
Qty: 20 TABLET | Refills: 0 | Status: SHIPPED | OUTPATIENT
Start: 2019-09-23 | End: 2019-10-18 | Stop reason: SDUPTHER

## 2019-10-18 DIAGNOSIS — F41.9 ANXIETY: ICD-10-CM

## 2019-10-19 RX ORDER — ALPRAZOLAM 0.25 MG/1
0.25 TABLET ORAL
Qty: 20 TABLET | Refills: 0 | Status: SHIPPED | OUTPATIENT
Start: 2019-10-19 | End: 2019-11-13 | Stop reason: SDUPTHER

## 2019-10-19 RX ORDER — ALPRAZOLAM 0.25 MG/1
0.25 TABLET ORAL
Qty: 20 TABLET | Refills: 0 | OUTPATIENT
Start: 2019-10-19

## 2019-11-13 DIAGNOSIS — R00.0 TACHYCARDIA: ICD-10-CM

## 2019-11-13 DIAGNOSIS — F41.9 ANXIETY: ICD-10-CM

## 2019-11-13 RX ORDER — ATENOLOL 25 MG/1
12.5 TABLET ORAL DAILY
Qty: 45 TABLET | Refills: 5 | Status: SHIPPED | OUTPATIENT
Start: 2019-11-13 | End: 2019-12-18 | Stop reason: SDUPTHER

## 2019-11-13 RX ORDER — ATENOLOL 25 MG/1
12.5 TABLET ORAL DAILY
Qty: 45 TABLET | Refills: 0 | Status: SHIPPED | OUTPATIENT
Start: 2019-11-13 | End: 2020-05-24 | Stop reason: CLARIF

## 2019-11-13 RX ORDER — ALPRAZOLAM 0.25 MG/1
0.25 TABLET ORAL
Qty: 20 TABLET | Refills: 0 | Status: SHIPPED | OUTPATIENT
Start: 2019-11-13 | End: 2019-12-15 | Stop reason: SDUPTHER

## 2019-12-15 DIAGNOSIS — F41.9 ANXIETY: ICD-10-CM

## 2019-12-16 DIAGNOSIS — F41.9 ANXIETY: ICD-10-CM

## 2019-12-16 RX ORDER — ALPRAZOLAM 0.25 MG/1
0.25 TABLET ORAL
Qty: 20 TABLET | Refills: 0 | Status: SHIPPED | OUTPATIENT
Start: 2019-12-16 | End: 2020-01-16 | Stop reason: SDUPTHER

## 2019-12-16 RX ORDER — ALPRAZOLAM 0.25 MG/1
0.25 TABLET ORAL
Qty: 20 TABLET | Refills: 0 | Status: SHIPPED | OUTPATIENT
Start: 2019-12-16 | End: 2020-04-06 | Stop reason: SDUPTHER

## 2019-12-16 RX ORDER — ALPRAZOLAM 0.25 MG/1
0.25 TABLET ORAL
Qty: 20 TABLET | Refills: 0 | OUTPATIENT
Start: 2019-12-16

## 2019-12-18 DIAGNOSIS — R00.0 TACHYCARDIA: ICD-10-CM

## 2019-12-18 RX ORDER — ATENOLOL 25 MG/1
12.5 TABLET ORAL DAILY
Qty: 45 TABLET | Refills: 5 | Status: SHIPPED | OUTPATIENT
Start: 2019-12-18 | End: 2020-03-16 | Stop reason: SDUPTHER

## 2020-01-02 ENCOUNTER — TELEPHONE (OUTPATIENT)
Dept: FAMILY MEDICINE CLINIC | Facility: CLINIC | Age: 60
End: 2020-01-02

## 2020-01-02 ENCOUNTER — OFFICE VISIT (OUTPATIENT)
Dept: FAMILY MEDICINE CLINIC | Facility: CLINIC | Age: 60
End: 2020-01-02
Payer: COMMERCIAL

## 2020-01-02 VITALS
BODY MASS INDEX: 24.59 KG/M2 | OXYGEN SATURATION: 98 % | HEIGHT: 62 IN | RESPIRATION RATE: 18 BRPM | WEIGHT: 133.6 LBS | DIASTOLIC BLOOD PRESSURE: 80 MMHG | SYSTOLIC BLOOD PRESSURE: 122 MMHG | HEART RATE: 66 BPM | TEMPERATURE: 98.7 F

## 2020-01-02 DIAGNOSIS — J06.9 ACUTE UPPER RESPIRATORY INFECTION: Primary | ICD-10-CM

## 2020-01-02 PROCEDURE — 99213 OFFICE O/P EST LOW 20 MIN: CPT | Performed by: NURSE PRACTITIONER

## 2020-01-02 RX ORDER — PROMETHAZINE HYDROCHLORIDE AND CODEINE PHOSPHATE 6.25; 1 MG/5ML; MG/5ML
5 SYRUP ORAL EVERY 4 HOURS PRN
Qty: 120 ML | Refills: 0 | Status: SHIPPED | OUTPATIENT
Start: 2020-01-02 | End: 2020-03-20 | Stop reason: ALTCHOICE

## 2020-01-02 RX ORDER — AZITHROMYCIN 250 MG/1
TABLET, FILM COATED ORAL
Qty: 6 TABLET | Refills: 0 | Status: SHIPPED | OUTPATIENT
Start: 2020-01-02 | End: 2020-01-06

## 2020-01-02 NOTE — TELEPHONE ENCOUNTER
Yas Sweeney has congestion in head, throat, etc    Is there any place you can add her in? Thank you!

## 2020-01-02 NOTE — PROGRESS NOTES
Assessment/Plan:           Problem List Items Addressed This Visit     None      Visit Diagnoses     Acute upper respiratory infection    -  Primary    Relevant Medications    promethazine-codeine (PHENERGAN WITH CODEINE) 6 25-10 mg/5 mL syrup    azithromycin (ZITHROMAX) 250 mg tablet            Subjective:      Patient ID: Chris Barlow is a 60 y o  female  Here for 3 week hx of illness  Pnd, congestion  Now with cough  otc tried allergy meds, claritin d  Did help a little        The following portions of the patient's history were reviewed and updated as appropriate: allergies, current medications, past family history, past medical history, past social history, past surgical history and problem list     Review of Systems   Constitutional: Positive for fatigue and fever  HENT: Positive for congestion, postnasal drip and rhinorrhea  Respiratory: Negative for cough, shortness of breath and wheezing  Cardiovascular: Negative for chest pain and palpitations  Gastrointestinal: Negative for constipation and diarrhea  Genitourinary: Negative for dysuria and hematuria  Musculoskeletal: Negative for arthralgias and myalgias  Skin: Negative for rash  Neurological: Negative for dizziness, light-headedness and headaches  Hematological: Negative for adenopathy  Psychiatric/Behavioral: Negative for dysphoric mood and sleep disturbance  The patient is not nervous/anxious            Objective:      /80   Pulse 66   Temp 98 7 °F (37 1 °C) (Tympanic)   Resp 18   Ht 5' 1 5" (1 562 m)   Wt 60 6 kg (133 lb 9 6 oz)   SpO2 98%   BMI 24 83 kg/m²       Family History   Problem Relation Age of Onset    Anxiety disorder Mother         Anxiety    Depression Mother     Anxiety disorder Sister         Anxiety    Depression Sister     Anxiety disorder Daughter         Anxiety    Depression Daughter     Heart disease Maternal Aunt     Heart disease Maternal Uncle      Past Medical History: Diagnosis Date    Adjustment disorder with anxiety     last assessed - [id number]    History of total hysterectomy     Ingrown toenail     last assessed - [id number]     Social History     Socioeconomic History    Marital status: /Civil Union     Spouse name: Not on file    Number of children: Not on file    Years of education: Not on file    Highest education level: Not on file   Occupational History    Occupation:  - [employer]   Social Needs    Financial resource strain: Not on file    Food insecurity:     Worry: Not on file     Inability: Not on file    Transportation needs:     Medical: Not on file     Non-medical: Not on file   Tobacco Use    Smoking status: Never Smoker    Smokeless tobacco: Never Used   Substance and Sexual Activity    Alcohol use: Yes     Comment: occasional alcohol use    Drug use: No    Sexual activity: Yes     Partners: Male   Lifestyle    Physical activity:     Days per week: Not on file     Minutes per session: Not on file    Stress: Not on file   Relationships    Social connections:     Talks on phone: Not on file     Gets together: Not on file     Attends Zoroastrianism service: Not on file     Active member of club or organization: Not on file     Attends meetings of clubs or organizations: Not on file     Relationship status: Not on file    Intimate partner violence:     Fear of current or ex partner: Not on file     Emotionally abused: Not on file     Physically abused: Not on file     Forced sexual activity: Not on file   Other Topics Concern    Not on file   Social History Narrative    Coffee       Current Outpatient Medications:     ALPRAZolam (XANAX) 0 25 mg tablet, Take 1 tablet (0 25 mg total) by mouth daily at bedtime as needed for anxiety, Disp: 20 tablet, Rfl: 0    ALPRAZolam (XANAX) 0 25 mg tablet, Take 1 tablet (0 25 mg total) by mouth daily at bedtime as needed for anxiety, Disp: 20 tablet, Rfl: 0    atenolol (TENORMIN) 25 mg tablet, Take 0 5 tablets (12 5 mg total) by mouth daily, Disp: 45 tablet, Rfl: 0    atenolol (TENORMIN) 25 mg tablet, Take 0 5 tablets (12 5 mg total) by mouth daily, Disp: 45 tablet, Rfl: 5    Biotin 1000 MCG tablet, Take 1 tablet by mouth daily, Disp: , Rfl:     Cholecalciferol (VITAMIN D3) 2000 units TABS, Take 1 capsule by mouth daily, Disp: , Rfl:     omeprazole (PriLOSEC) 40 MG capsule, Take 1 capsule (40 mg total) by mouth daily for 112 days, Disp: 90 capsule, Rfl: 3  No Known Allergies        Physical Exam   Constitutional: She is oriented to person, place, and time  She appears well-developed and well-nourished  HENT:   Head: Normocephalic  Right Ear: External ear normal    Left Ear: External ear normal    Nose: Mucosal edema and rhinorrhea present  Mouth/Throat: Uvula is midline and mucous membranes are normal  Posterior oropharyngeal erythema present  Eyes: Conjunctivae are normal    Neck: Normal range of motion  Neck supple  No thyromegaly present  Cardiovascular: Normal rate, regular rhythm and normal heart sounds  Pulmonary/Chest: Effort normal and breath sounds normal    Abdominal: Soft  Bowel sounds are normal    Musculoskeletal: Normal range of motion  Neurological: She is alert and oriented to person, place, and time  Skin: Skin is warm and dry  Capillary refill takes less than 2 seconds  Psychiatric: She has a normal mood and affect  Her behavior is normal  Judgment and thought content normal    Nursing note and vitals reviewed  BMI Counseling: Body mass index is 24 83 kg/m²   The BMI is within normal limits

## 2020-01-16 DIAGNOSIS — F41.9 ANXIETY: ICD-10-CM

## 2020-01-16 DIAGNOSIS — K21.9 GASTROESOPHAGEAL REFLUX DISEASE WITHOUT ESOPHAGITIS: ICD-10-CM

## 2020-01-16 RX ORDER — ALPRAZOLAM 0.25 MG/1
0.25 TABLET ORAL
Qty: 20 TABLET | Refills: 0 | Status: SHIPPED | OUTPATIENT
Start: 2020-01-16 | End: 2020-02-12 | Stop reason: SDUPTHER

## 2020-01-16 RX ORDER — OMEPRAZOLE 40 MG/1
40 CAPSULE, DELAYED RELEASE ORAL DAILY
Qty: 90 CAPSULE | Refills: 3 | Status: SHIPPED | OUTPATIENT
Start: 2020-01-16 | End: 2021-02-02 | Stop reason: SDUPTHER

## 2020-02-12 DIAGNOSIS — F41.9 ANXIETY: ICD-10-CM

## 2020-02-12 RX ORDER — ALPRAZOLAM 0.25 MG/1
0.25 TABLET ORAL
Qty: 20 TABLET | Refills: 0 | OUTPATIENT
Start: 2020-02-12

## 2020-02-12 RX ORDER — ALPRAZOLAM 0.25 MG/1
0.25 TABLET ORAL
Qty: 20 TABLET | Refills: 0 | Status: SHIPPED | OUTPATIENT
Start: 2020-02-12 | End: 2020-03-09 | Stop reason: SDUPTHER

## 2020-02-24 DIAGNOSIS — Z12.31 SCREENING MAMMOGRAM, ENCOUNTER FOR: Primary | ICD-10-CM

## 2020-03-08 DIAGNOSIS — F41.9 ANXIETY: ICD-10-CM

## 2020-03-09 DIAGNOSIS — F41.9 ANXIETY: ICD-10-CM

## 2020-03-09 RX ORDER — ALPRAZOLAM 0.25 MG/1
0.25 TABLET ORAL
Qty: 20 TABLET | Refills: 0 | Status: SHIPPED | OUTPATIENT
Start: 2020-03-09 | End: 2020-04-04 | Stop reason: SDUPTHER

## 2020-03-09 RX ORDER — ALPRAZOLAM 0.25 MG/1
0.25 TABLET ORAL
Qty: 20 TABLET | Refills: 0 | OUTPATIENT
Start: 2020-03-09

## 2020-03-09 NOTE — TELEPHONE ENCOUNTER
Medication:  PDMP   02/13/2020  1  02/12/2020  ALPRAZOLAM 0 25 MG TABLET  20 0  20  DO MEN       Active agreement on file -Yes

## 2020-03-15 DIAGNOSIS — R00.0 TACHYCARDIA: ICD-10-CM

## 2020-03-16 DIAGNOSIS — R00.0 TACHYCARDIA: ICD-10-CM

## 2020-03-16 RX ORDER — ATENOLOL 25 MG/1
12.5 TABLET ORAL DAILY
Qty: 45 TABLET | Refills: 0 | OUTPATIENT
Start: 2020-03-16

## 2020-03-16 RX ORDER — ATENOLOL 25 MG/1
12.5 TABLET ORAL DAILY
Qty: 45 TABLET | Refills: 5 | Status: SHIPPED | OUTPATIENT
Start: 2020-03-16 | End: 2020-06-15 | Stop reason: SDUPTHER

## 2020-03-20 ENCOUNTER — ANNUAL EXAM (OUTPATIENT)
Dept: OBGYN CLINIC | Facility: CLINIC | Age: 60
End: 2020-03-20
Payer: COMMERCIAL

## 2020-03-20 VITALS
WEIGHT: 138 LBS | BODY MASS INDEX: 25.4 KG/M2 | DIASTOLIC BLOOD PRESSURE: 82 MMHG | SYSTOLIC BLOOD PRESSURE: 140 MMHG | HEIGHT: 62 IN

## 2020-03-20 DIAGNOSIS — M85.9 LOW BONE DENSITY: ICD-10-CM

## 2020-03-20 DIAGNOSIS — Z01.419 WOMEN'S ANNUAL ROUTINE GYNECOLOGICAL EXAMINATION: Primary | ICD-10-CM

## 2020-03-20 PROCEDURE — 99396 PREV VISIT EST AGE 40-64: CPT | Performed by: OBSTETRICS & GYNECOLOGY

## 2020-03-20 NOTE — PROGRESS NOTES
ASSESSMENT & PLAN: Linda Woodruff is a 60 y o   with normal gynecologic exam     1   Routine well woman exam done today  2    Pap and HPV:Pap with HPV was not done today  Current ASCCP Guidelines reviewed  History hysterectomy for benign reasons  Paps no longer indicated  3  Mammogram ordered  Recommend yearly mammography  4   Colonoscopy recommended, and scheduled  5  Low bone density-dexa ordered  6  The patient is not sexually active  7  The following were reviewed in today's visit: breast self exam, mammography screening ordered, use and side effects of HRT, menopause, osteoporosis, exercise and healthy diet  8  Patient to return to office in 12 months for annual      All questions have been answered to her satisfaction  CC:  Annual Gynecologic Examination    HPI: Linda Woodruff is a 60 y o  [place] who presents for annual gynecologic examination  She has the following concerns:  None    Health Maintenance:    She exercises 1 days per week  She wears her seatbelt routinely  She does perform regular monthly self breast exams  She feels safe at home  Patients does follow a regular diet  Past Medical History:   Diagnosis Date    Adjustment disorder with anxiety     last assessed - 2016    History of total hysterectomy     Ingrown toenail     last assessed -        Past Surgical History:   Procedure Laterality Date    APPENDECTOMY      GALLBLADDER SURGERY      OOPHORECTOMY      TONSILLECTOMY AND ADENOIDECTOMY      TOTAL ABDOMINAL HYSTERECTOMY         Past OB/Gyn History:   No LMP recorded  Patient has had a hysterectomy  Menstrual History:  OB History        3    Para   2    Term   2       0    AB   1    Living           SAB   1    TAB        Ectopic        Multiple        Live Births   2                  Menstrual history: Patient is post menopausal    History of sexually transmitted infection No  Patient is not currently sexually active  heterosexual Birth control: postmenopausal      Family History   Problem Relation Age of Onset    Anxiety disorder Mother         Anxiety    Depression Mother     Anxiety disorder Sister         Anxiety    Depression Sister     Anxiety disorder Daughter         Anxiety    Depression Daughter     Heart disease Maternal Aunt     Heart disease Maternal Uncle        Social History:  Social History     Socioeconomic History    Marital status: /Civil Union     Spouse name: Not on file    Number of children: Not on file    Years of education: Not on file    Highest education level: Not on file   Occupational History    Occupation:  - [employer]   Social Needs    Financial resource strain: Not on file    Food insecurity:     Worry: Not on file     Inability: Not on file    Transportation needs:     Medical: Not on file     Non-medical: Not on file   Tobacco Use    Smoking status: Never Smoker    Smokeless tobacco: Never Used   Substance and Sexual Activity    Alcohol use: Yes     Comment: occasional alcohol use    Drug use: No    Sexual activity: Yes     Partners: Male   Lifestyle    Physical activity:     Days per week: Not on file     Minutes per session: Not on file    Stress: Not on file   Relationships    Social connections:     Talks on phone: Not on file     Gets together: Not on file     Attends Lutheran service: Not on file     Active member of club or organization: Not on file     Attends meetings of clubs or organizations: Not on file     Relationship status: Not on file    Intimate partner violence:     Fear of current or ex partner: Not on file     Emotionally abused: Not on file     Physically abused: Not on file     Forced sexual activity: Not on file   Other Topics Concern    Not on file   Social History Narrative    Coffee     Presently lives with her   Patient is     Patient is currently employed   No Known Allergies    Current Outpatient Medications:     ALPRAZolam (XANAX) 0 25 mg tablet, Take 1 tablet (0 25 mg total) by mouth daily at bedtime as needed for anxiety, Disp: 20 tablet, Rfl: 0    ALPRAZolam (XANAX) 0 25 mg tablet, Take 1 tablet (0 25 mg total) by mouth daily at bedtime as needed for anxiety, Disp: 20 tablet, Rfl: 0    atenolol (TENORMIN) 25 mg tablet, Take 0 5 tablets (12 5 mg total) by mouth daily, Disp: 45 tablet, Rfl: 0    atenolol (TENORMIN) 25 mg tablet, Take 0 5 tablets (12 5 mg total) by mouth daily, Disp: 45 tablet, Rfl: 5    Cholecalciferol (VITAMIN D3) 2000 units TABS, Take 1 capsule by mouth daily, Disp: , Rfl:     omeprazole (PriLOSEC) 40 MG capsule, Take 1 capsule (40 mg total) by mouth daily, Disp: 90 capsule, Rfl: 3    Review of Systems:  Review of Systems   Constitutional: Negative  HENT: Negative  Respiratory: Negative  Cardiovascular: Negative  Gastrointestinal: Negative  Genitourinary: Negative  Neurological: Negative  Psychiatric/Behavioral: Negative  Physical Exam:  /82 (BP Location: Right arm, Patient Position: Sitting, Cuff Size: Standard)   Ht 5' 2" (1 575 m)   Wt 62 6 kg (138 lb)   BMI 25 24 kg/m²    Physical Exam   Constitutional: She is oriented to person, place, and time  She appears well-developed and well-nourished  No distress  Genitourinary: Vagina normal  There is no rash, tenderness, lesion or injury on the right labia  There is no rash, tenderness, lesion or injury on the left labia  Vagina exhibits no rugosity  No erythema, tenderness or bleeding in the vagina  No vaginal discharge found  Right adnexum does not display mass, does not display tenderness and does not display fullness  Left adnexum does not display mass, does not display tenderness and does not display fullness  Genitourinary Comments: No bladder tenderness  Urethral meatus midline, no masses  Absent uterus and cervix  Cuff intact  No palpable masses  No lesions visualized   No bladder tenderness  HENT:   Head: Normocephalic and atraumatic  Nose: Nose normal    Eyes: Pupils are equal, round, and reactive to light  Conjunctivae and EOM are normal  No scleral icterus  Neck: Normal range of motion  Neck supple  Cardiovascular: Normal rate, regular rhythm and normal heart sounds  No murmur heard  Pulmonary/Chest: Effort normal and breath sounds normal  No stridor  No respiratory distress  She has no wheezes  Right breast exhibits no inverted nipple, no mass, no nipple discharge, no skin change and no tenderness  Left breast exhibits no inverted nipple, no mass, no nipple discharge, no skin change and no tenderness  Abdominal: Soft  She exhibits no distension  There is no tenderness  There is no guarding  Neurological: She is alert and oriented to person, place, and time  Skin: Skin is warm and dry  She is not diaphoretic  No erythema  No pallor  Nursing note and vitals reviewed

## 2020-04-04 DIAGNOSIS — F41.9 ANXIETY: ICD-10-CM

## 2020-04-05 DIAGNOSIS — F41.9 ANXIETY: ICD-10-CM

## 2020-04-06 DIAGNOSIS — F41.9 ANXIETY: ICD-10-CM

## 2020-04-07 RX ORDER — ALPRAZOLAM 0.25 MG/1
0.25 TABLET ORAL
Qty: 20 TABLET | Refills: 0 | Status: SHIPPED | OUTPATIENT
Start: 2020-04-07 | End: 2020-05-06 | Stop reason: SDUPTHER

## 2020-04-07 RX ORDER — ALPRAZOLAM 0.25 MG/1
0.25 TABLET ORAL
Qty: 20 TABLET | Refills: 0 | OUTPATIENT
Start: 2020-04-07

## 2020-04-07 RX ORDER — ALPRAZOLAM 0.25 MG/1
0.25 TABLET ORAL
Qty: 20 TABLET | Refills: 0 | Status: SHIPPED | OUTPATIENT
Start: 2020-04-07 | End: 2020-05-24 | Stop reason: CLARIF

## 2020-05-06 DIAGNOSIS — F41.9 ANXIETY: ICD-10-CM

## 2020-05-06 RX ORDER — ALPRAZOLAM 0.25 MG/1
0.25 TABLET ORAL
Qty: 20 TABLET | Refills: 0 | Status: SHIPPED | OUTPATIENT
Start: 2020-05-06 | End: 2020-06-07 | Stop reason: SDUPTHER

## 2020-05-24 ENCOUNTER — APPOINTMENT (EMERGENCY)
Dept: CT IMAGING | Facility: HOSPITAL | Age: 60
DRG: 392 | End: 2020-05-24
Payer: COMMERCIAL

## 2020-05-24 ENCOUNTER — HOSPITAL ENCOUNTER (INPATIENT)
Facility: HOSPITAL | Age: 60
LOS: 2 days | Discharge: HOME | DRG: 392 | End: 2020-05-26
Attending: EMERGENCY MEDICINE | Admitting: INTERNAL MEDICINE
Payer: COMMERCIAL

## 2020-05-24 DIAGNOSIS — K57.80 DIVERTICULITIS OF INTESTINE WITH ABSCESS: Primary | ICD-10-CM

## 2020-05-24 DIAGNOSIS — K57.20 DIVERTICULITIS OF LARGE INTESTINE WITH ABSCESS WITHOUT BLEEDING: ICD-10-CM

## 2020-05-24 PROBLEM — D35.00 ADRENAL ADENOMA: Status: ACTIVE | Noted: 2020-05-24

## 2020-05-24 LAB
ALBUMIN SERPL BCP-MCNC: 3.7 G/DL (ref 3.5–5)
ALP SERPL-CCNC: 164 U/L (ref 46–116)
ALT SERPL W P-5'-P-CCNC: 31 U/L (ref 12–78)
ANION GAP SERPL CALCULATED.3IONS-SCNC: 5 MMOL/L (ref 4–13)
AST SERPL W P-5'-P-CCNC: 28 U/L (ref 5–45)
BACTERIA UR QL AUTO: ABNORMAL /HPF
BASOPHILS # BLD AUTO: 0.07 THOUSANDS/ΜL (ref 0–0.1)
BASOPHILS NFR BLD AUTO: 1 % (ref 0–1)
BILIRUB SERPL-MCNC: 0.48 MG/DL (ref 0.2–1)
BILIRUB UR QL STRIP: NEGATIVE
BUN SERPL-MCNC: 13 MG/DL (ref 5–25)
CALCIUM SERPL-MCNC: 9.1 MG/DL (ref 8.3–10.1)
CHLORIDE SERPL-SCNC: 101 MMOL/L (ref 100–108)
CLARITY UR: CLEAR
CO2 SERPL-SCNC: 30 MMOL/L (ref 21–32)
COLOR UR: YELLOW
CREAT SERPL-MCNC: 1.04 MG/DL (ref 0.6–1.3)
EOSINOPHIL # BLD AUTO: 0.34 THOUSAND/ΜL (ref 0–0.61)
EOSINOPHIL NFR BLD AUTO: 4 % (ref 0–6)
ERYTHROCYTE [DISTWIDTH] IN BLOOD BY AUTOMATED COUNT: 15.5 % (ref 11.6–15.1)
GFR SERPL CREATININE-BSD FRML MDRD: 58 ML/MIN/1.73SQ M
GLUCOSE SERPL-MCNC: 91 MG/DL (ref 65–140)
GLUCOSE UR STRIP-MCNC: NEGATIVE MG/DL
HCT VFR BLD AUTO: 38.4 % (ref 34.8–46.1)
HGB BLD-MCNC: 11.5 G/DL (ref 11.5–15.4)
HGB UR QL STRIP.AUTO: ABNORMAL
IMM GRANULOCYTES # BLD AUTO: 0.02 THOUSAND/UL (ref 0–0.2)
IMM GRANULOCYTES NFR BLD AUTO: 0 % (ref 0–2)
KETONES UR STRIP-MCNC: NEGATIVE MG/DL
LEUKOCYTE ESTERASE UR QL STRIP: NEGATIVE
LIPASE SERPL-CCNC: 238 U/L (ref 73–393)
LYMPHOCYTES # BLD AUTO: 2.29 THOUSANDS/ΜL (ref 0.6–4.47)
LYMPHOCYTES NFR BLD AUTO: 26 % (ref 14–44)
MCH RBC QN AUTO: 20.3 PG (ref 26.8–34.3)
MCHC RBC AUTO-ENTMCNC: 29.9 G/DL (ref 31.4–37.4)
MCV RBC AUTO: 68 FL (ref 82–98)
MONOCYTES # BLD AUTO: 0.86 THOUSAND/ΜL (ref 0.17–1.22)
MONOCYTES NFR BLD AUTO: 10 % (ref 4–12)
NEUTROPHILS # BLD AUTO: 5.16 THOUSANDS/ΜL (ref 1.85–7.62)
NEUTS SEG NFR BLD AUTO: 59 % (ref 43–75)
NITRITE UR QL STRIP: NEGATIVE
NON-SQ EPI CELLS URNS QL MICRO: ABNORMAL /HPF
NRBC BLD AUTO-RTO: 0 /100 WBCS
PH UR STRIP.AUTO: 7 [PH] (ref 4.5–8)
PLATELET # BLD AUTO: 328 THOUSANDS/UL (ref 149–390)
PMV BLD AUTO: 8.7 FL (ref 8.9–12.7)
POTASSIUM SERPL-SCNC: 4.2 MMOL/L (ref 3.5–5.3)
PROT SERPL-MCNC: 8 G/DL (ref 6.4–8.2)
PROT UR STRIP-MCNC: NEGATIVE MG/DL
RBC # BLD AUTO: 5.66 MILLION/UL (ref 3.81–5.12)
RBC #/AREA URNS AUTO: ABNORMAL /HPF
SODIUM SERPL-SCNC: 136 MMOL/L (ref 136–145)
SP GR UR STRIP.AUTO: 1.01 (ref 1–1.03)
UROBILINOGEN UR QL STRIP.AUTO: 1 E.U./DL
WBC # BLD AUTO: 8.74 THOUSAND/UL (ref 4.31–10.16)
WBC #/AREA URNS AUTO: ABNORMAL /HPF

## 2020-05-24 PROCEDURE — 83690 ASSAY OF LIPASE: CPT | Performed by: EMERGENCY MEDICINE

## 2020-05-24 PROCEDURE — 96374 THER/PROPH/DIAG INJ IV PUSH: CPT

## 2020-05-24 PROCEDURE — 99285 EMERGENCY DEPT VISIT HI MDM: CPT | Performed by: EMERGENCY MEDICINE

## 2020-05-24 PROCEDURE — 74177 CT ABD & PELVIS W/CONTRAST: CPT

## 2020-05-24 PROCEDURE — 36415 COLL VENOUS BLD VENIPUNCTURE: CPT | Performed by: EMERGENCY MEDICINE

## 2020-05-24 PROCEDURE — 80053 COMPREHEN METABOLIC PANEL: CPT | Performed by: EMERGENCY MEDICINE

## 2020-05-24 PROCEDURE — 81001 URINALYSIS AUTO W/SCOPE: CPT

## 2020-05-24 PROCEDURE — 85025 COMPLETE CBC W/AUTO DIFF WBC: CPT | Performed by: EMERGENCY MEDICINE

## 2020-05-24 PROCEDURE — 99223 1ST HOSP IP/OBS HIGH 75: CPT | Performed by: PHYSICIAN ASSISTANT

## 2020-05-24 PROCEDURE — 99285 EMERGENCY DEPT VISIT HI MDM: CPT

## 2020-05-24 RX ORDER — PANTOPRAZOLE SODIUM 40 MG/1
40 TABLET, DELAYED RELEASE ORAL
Status: DISCONTINUED | OUTPATIENT
Start: 2020-05-25 | End: 2020-05-26 | Stop reason: HOSPADM

## 2020-05-24 RX ORDER — SODIUM CHLORIDE 9 MG/ML
125 INJECTION, SOLUTION INTRAVENOUS CONTINUOUS
Status: DISCONTINUED | OUTPATIENT
Start: 2020-05-24 | End: 2020-05-26

## 2020-05-24 RX ORDER — ATENOLOL 25 MG/1
12.5 TABLET ORAL DAILY
Status: DISCONTINUED | OUTPATIENT
Start: 2020-05-24 | End: 2020-05-26 | Stop reason: HOSPADM

## 2020-05-24 RX ORDER — ALPRAZOLAM 0.25 MG/1
0.25 TABLET ORAL
Status: DISCONTINUED | OUTPATIENT
Start: 2020-05-24 | End: 2020-05-26 | Stop reason: HOSPADM

## 2020-05-24 RX ORDER — ONDANSETRON 2 MG/ML
4 INJECTION INTRAMUSCULAR; INTRAVENOUS EVERY 6 HOURS PRN
Status: DISCONTINUED | OUTPATIENT
Start: 2020-05-24 | End: 2020-05-26 | Stop reason: HOSPADM

## 2020-05-24 RX ORDER — MELATONIN
2000 DAILY
Status: DISCONTINUED | OUTPATIENT
Start: 2020-05-24 | End: 2020-05-26 | Stop reason: HOSPADM

## 2020-05-24 RX ORDER — MORPHINE SULFATE 4 MG/ML
4 INJECTION, SOLUTION INTRAMUSCULAR; INTRAVENOUS ONCE
Status: COMPLETED | OUTPATIENT
Start: 2020-05-24 | End: 2020-05-24

## 2020-05-24 RX ORDER — ACETAMINOPHEN 325 MG/1
650 TABLET ORAL EVERY 6 HOURS PRN
Status: DISCONTINUED | OUTPATIENT
Start: 2020-05-24 | End: 2020-05-26 | Stop reason: HOSPADM

## 2020-05-24 RX ORDER — OXYCODONE HYDROCHLORIDE 10 MG/1
10 TABLET ORAL EVERY 4 HOURS PRN
Status: DISCONTINUED | OUTPATIENT
Start: 2020-05-24 | End: 2020-05-26 | Stop reason: HOSPADM

## 2020-05-24 RX ORDER — MORPHINE SULFATE 4 MG/ML
4 INJECTION, SOLUTION INTRAMUSCULAR; INTRAVENOUS EVERY 4 HOURS PRN
Status: DISCONTINUED | OUTPATIENT
Start: 2020-05-24 | End: 2020-05-26 | Stop reason: HOSPADM

## 2020-05-24 RX ORDER — OXYCODONE HYDROCHLORIDE 5 MG/1
5 TABLET ORAL EVERY 4 HOURS PRN
Status: DISCONTINUED | OUTPATIENT
Start: 2020-05-24 | End: 2020-05-26 | Stop reason: HOSPADM

## 2020-05-24 RX ADMIN — OXYCODONE HYDROCHLORIDE 10 MG: 10 TABLET ORAL at 22:53

## 2020-05-24 RX ADMIN — IOHEXOL 100 ML: 350 INJECTION, SOLUTION INTRAVENOUS at 13:59

## 2020-05-24 RX ADMIN — CEFTRIAXONE 2000 MG: 2 INJECTION, POWDER, FOR SOLUTION INTRAMUSCULAR; INTRAVENOUS at 18:14

## 2020-05-24 RX ADMIN — MORPHINE SULFATE 4 MG: 4 INJECTION INTRAVENOUS at 13:45

## 2020-05-24 RX ADMIN — SODIUM CHLORIDE 125 ML/HR: 0.9 INJECTION, SOLUTION INTRAVENOUS at 17:05

## 2020-05-24 RX ADMIN — METRONIDAZOLE 500 MG: 500 INJECTION, SOLUTION INTRAVENOUS at 17:06

## 2020-05-24 RX ADMIN — OXYCODONE HYDROCHLORIDE 5 MG: 5 TABLET ORAL at 17:17

## 2020-05-25 LAB
ANION GAP SERPL CALCULATED.3IONS-SCNC: 8 MMOL/L (ref 4–13)
BASOPHILS # BLD AUTO: 0.05 THOUSANDS/ΜL (ref 0–0.1)
BASOPHILS NFR BLD AUTO: 1 % (ref 0–1)
BUN SERPL-MCNC: 9 MG/DL (ref 5–25)
CALCIUM SERPL-MCNC: 8.6 MG/DL (ref 8.3–10.1)
CHLORIDE SERPL-SCNC: 104 MMOL/L (ref 100–108)
CO2 SERPL-SCNC: 28 MMOL/L (ref 21–32)
CREAT SERPL-MCNC: 0.81 MG/DL (ref 0.6–1.3)
EOSINOPHIL # BLD AUTO: 0.32 THOUSAND/ΜL (ref 0–0.61)
EOSINOPHIL NFR BLD AUTO: 5 % (ref 0–6)
ERYTHROCYTE [DISTWIDTH] IN BLOOD BY AUTOMATED COUNT: 15.1 % (ref 11.6–15.1)
GFR SERPL CREATININE-BSD FRML MDRD: 79 ML/MIN/1.73SQ M
GLUCOSE SERPL-MCNC: 94 MG/DL (ref 65–140)
HCT VFR BLD AUTO: 33.3 % (ref 34.8–46.1)
HGB BLD-MCNC: 10.2 G/DL (ref 11.5–15.4)
IMM GRANULOCYTES # BLD AUTO: 0.02 THOUSAND/UL (ref 0–0.2)
IMM GRANULOCYTES NFR BLD AUTO: 0 % (ref 0–2)
LYMPHOCYTES # BLD AUTO: 1.49 THOUSANDS/ΜL (ref 0.6–4.47)
LYMPHOCYTES NFR BLD AUTO: 25 % (ref 14–44)
MCH RBC QN AUTO: 20.8 PG (ref 26.8–34.3)
MCHC RBC AUTO-ENTMCNC: 30.6 G/DL (ref 31.4–37.4)
MCV RBC AUTO: 68 FL (ref 82–98)
MONOCYTES # BLD AUTO: 0.67 THOUSAND/ΜL (ref 0.17–1.22)
MONOCYTES NFR BLD AUTO: 11 % (ref 4–12)
NEUTROPHILS # BLD AUTO: 3.39 THOUSANDS/ΜL (ref 1.85–7.62)
NEUTS SEG NFR BLD AUTO: 58 % (ref 43–75)
NRBC BLD AUTO-RTO: 0 /100 WBCS
PLATELET # BLD AUTO: 284 THOUSANDS/UL (ref 149–390)
PMV BLD AUTO: 9.4 FL (ref 8.9–12.7)
POTASSIUM SERPL-SCNC: 4 MMOL/L (ref 3.5–5.3)
RBC # BLD AUTO: 4.9 MILLION/UL (ref 3.81–5.12)
SODIUM SERPL-SCNC: 140 MMOL/L (ref 136–145)
WBC # BLD AUTO: 5.94 THOUSAND/UL (ref 4.31–10.16)

## 2020-05-25 PROCEDURE — 80048 BASIC METABOLIC PNL TOTAL CA: CPT | Performed by: PHYSICIAN ASSISTANT

## 2020-05-25 PROCEDURE — 99254 IP/OBS CNSLTJ NEW/EST MOD 60: CPT | Performed by: SURGERY

## 2020-05-25 PROCEDURE — 99232 SBSQ HOSP IP/OBS MODERATE 35: CPT | Performed by: INTERNAL MEDICINE

## 2020-05-25 PROCEDURE — 85025 COMPLETE CBC W/AUTO DIFF WBC: CPT | Performed by: PHYSICIAN ASSISTANT

## 2020-05-25 RX ORDER — METOCLOPRAMIDE HYDROCHLORIDE 5 MG/ML
10 INJECTION INTRAMUSCULAR; INTRAVENOUS EVERY 6 HOURS PRN
Status: DISCONTINUED | OUTPATIENT
Start: 2020-05-25 | End: 2020-05-26 | Stop reason: HOSPADM

## 2020-05-25 RX ADMIN — METRONIDAZOLE 500 MG: 500 INJECTION, SOLUTION INTRAVENOUS at 16:34

## 2020-05-25 RX ADMIN — OXYCODONE HYDROCHLORIDE 5 MG: 5 TABLET ORAL at 08:50

## 2020-05-25 RX ADMIN — METRONIDAZOLE 500 MG: 500 INJECTION, SOLUTION INTRAVENOUS at 08:10

## 2020-05-25 RX ADMIN — ATENOLOL 12.5 MG: 25 TABLET ORAL at 08:11

## 2020-05-25 RX ADMIN — SODIUM CHLORIDE 125 ML/HR: 0.9 INJECTION, SOLUTION INTRAVENOUS at 02:36

## 2020-05-25 RX ADMIN — ONDANSETRON 4 MG: 2 INJECTION INTRAMUSCULAR; INTRAVENOUS at 08:08

## 2020-05-25 RX ADMIN — CEFTRIAXONE 2000 MG: 2 INJECTION, POWDER, FOR SOLUTION INTRAMUSCULAR; INTRAVENOUS at 17:26

## 2020-05-25 RX ADMIN — ONDANSETRON 4 MG: 2 INJECTION INTRAMUSCULAR; INTRAVENOUS at 16:36

## 2020-05-25 RX ADMIN — ACETAMINOPHEN 650 MG: 325 TABLET, FILM COATED ORAL at 19:28

## 2020-05-25 RX ADMIN — METRONIDAZOLE 500 MG: 500 INJECTION, SOLUTION INTRAVENOUS at 00:26

## 2020-05-25 RX ADMIN — ENOXAPARIN SODIUM 40 MG: 40 INJECTION SUBCUTANEOUS at 08:47

## 2020-05-25 RX ADMIN — SODIUM CHLORIDE 125 ML/HR: 0.9 INJECTION, SOLUTION INTRAVENOUS at 19:27

## 2020-05-25 RX ADMIN — PANTOPRAZOLE SODIUM 40 MG: 40 TABLET, DELAYED RELEASE ORAL at 05:07

## 2020-05-26 VITALS
TEMPERATURE: 96.9 F | DIASTOLIC BLOOD PRESSURE: 78 MMHG | SYSTOLIC BLOOD PRESSURE: 157 MMHG | BODY MASS INDEX: 24.42 KG/M2 | WEIGHT: 132.72 LBS | HEART RATE: 61 BPM | RESPIRATION RATE: 20 BRPM | OXYGEN SATURATION: 98 % | HEIGHT: 62 IN

## 2020-05-26 LAB
ANION GAP SERPL CALCULATED.3IONS-SCNC: 9 MMOL/L (ref 4–13)
BASOPHILS # BLD AUTO: 0.05 THOUSANDS/ΜL (ref 0–0.1)
BASOPHILS NFR BLD AUTO: 1 % (ref 0–1)
BUN SERPL-MCNC: 6 MG/DL (ref 5–25)
CALCIUM SERPL-MCNC: 8.6 MG/DL (ref 8.3–10.1)
CHLORIDE SERPL-SCNC: 107 MMOL/L (ref 100–108)
CO2 SERPL-SCNC: 24 MMOL/L (ref 21–32)
CORTIS SERPL-MCNC: 10.6 UG/DL
CREAT SERPL-MCNC: 0.78 MG/DL (ref 0.6–1.3)
EOSINOPHIL # BLD AUTO: 0.26 THOUSAND/ΜL (ref 0–0.61)
EOSINOPHIL NFR BLD AUTO: 5 % (ref 0–6)
ERYTHROCYTE [DISTWIDTH] IN BLOOD BY AUTOMATED COUNT: 15.3 % (ref 11.6–15.1)
GFR SERPL CREATININE-BSD FRML MDRD: 82 ML/MIN/1.73SQ M
GLUCOSE SERPL-MCNC: 95 MG/DL (ref 65–140)
HCT VFR BLD AUTO: 33 % (ref 34.8–46.1)
HGB BLD-MCNC: 9.9 G/DL (ref 11.5–15.4)
IMM GRANULOCYTES # BLD AUTO: 0.02 THOUSAND/UL (ref 0–0.2)
IMM GRANULOCYTES NFR BLD AUTO: 0 % (ref 0–2)
LYMPHOCYTES # BLD AUTO: 1.53 THOUSANDS/ΜL (ref 0.6–4.47)
LYMPHOCYTES NFR BLD AUTO: 28 % (ref 14–44)
MCH RBC QN AUTO: 20.6 PG (ref 26.8–34.3)
MCHC RBC AUTO-ENTMCNC: 30 G/DL (ref 31.4–37.4)
MCV RBC AUTO: 69 FL (ref 82–98)
MONOCYTES # BLD AUTO: 0.55 THOUSAND/ΜL (ref 0.17–1.22)
MONOCYTES NFR BLD AUTO: 10 % (ref 4–12)
NEUTROPHILS # BLD AUTO: 2.99 THOUSANDS/ΜL (ref 1.85–7.62)
NEUTS SEG NFR BLD AUTO: 56 % (ref 43–75)
NRBC BLD AUTO-RTO: 0 /100 WBCS
PLATELET # BLD AUTO: 293 THOUSANDS/UL (ref 149–390)
PMV BLD AUTO: 9.2 FL (ref 8.9–12.7)
POTASSIUM SERPL-SCNC: 4.1 MMOL/L (ref 3.5–5.3)
RBC # BLD AUTO: 4.81 MILLION/UL (ref 3.81–5.12)
SODIUM SERPL-SCNC: 140 MMOL/L (ref 136–145)
WBC # BLD AUTO: 5.4 THOUSAND/UL (ref 4.31–10.16)

## 2020-05-26 PROCEDURE — 80048 BASIC METABOLIC PNL TOTAL CA: CPT | Performed by: PHYSICIAN ASSISTANT

## 2020-05-26 PROCEDURE — 85025 COMPLETE CBC W/AUTO DIFF WBC: CPT | Performed by: PHYSICIAN ASSISTANT

## 2020-05-26 PROCEDURE — 82533 TOTAL CORTISOL: CPT | Performed by: PHYSICIAN ASSISTANT

## 2020-05-26 PROCEDURE — 83835 ASSAY OF METANEPHRINES: CPT | Performed by: PHYSICIAN ASSISTANT

## 2020-05-26 PROCEDURE — 99239 HOSP IP/OBS DSCHRG MGMT >30: CPT | Performed by: INTERNAL MEDICINE

## 2020-05-26 PROCEDURE — 99232 SBSQ HOSP IP/OBS MODERATE 35: CPT | Performed by: PHYSICIAN ASSISTANT

## 2020-05-26 RX ORDER — AMOXICILLIN AND CLAVULANATE POTASSIUM 875; 125 MG/1; MG/1
1 TABLET, FILM COATED ORAL EVERY 12 HOURS SCHEDULED
Qty: 16 TABLET | Refills: 0 | Status: SHIPPED | OUTPATIENT
Start: 2020-05-26 | End: 2020-06-03

## 2020-05-26 RX ORDER — AMOXICILLIN AND CLAVULANATE POTASSIUM 875; 125 MG/1; MG/1
1 TABLET, FILM COATED ORAL EVERY 12 HOURS SCHEDULED
Status: DISCONTINUED | OUTPATIENT
Start: 2020-05-26 | End: 2020-05-26 | Stop reason: HOSPADM

## 2020-05-26 RX ADMIN — ENOXAPARIN SODIUM 40 MG: 40 INJECTION SUBCUTANEOUS at 08:16

## 2020-05-26 RX ADMIN — PANTOPRAZOLE SODIUM 40 MG: 40 TABLET, DELAYED RELEASE ORAL at 05:15

## 2020-05-26 RX ADMIN — VITAMIN D, TAB 1000IU (100/BT) 2000 UNITS: 25 TAB at 08:16

## 2020-05-26 RX ADMIN — ATENOLOL 12.5 MG: 25 TABLET ORAL at 08:16

## 2020-05-26 RX ADMIN — METRONIDAZOLE 500 MG: 500 INJECTION, SOLUTION INTRAVENOUS at 00:14

## 2020-05-28 ENCOUNTER — TRANSITIONAL CARE MANAGEMENT (OUTPATIENT)
Dept: FAMILY MEDICINE CLINIC | Facility: CLINIC | Age: 60
End: 2020-05-28

## 2020-06-01 LAB
METANEPH FREE SERPL-MCNC: 13 PG/ML (ref 0–62)
NORMETANEPHRINE SERPL-MCNC: 144 PG/ML (ref 0–145)

## 2020-06-03 ENCOUNTER — OFFICE VISIT (OUTPATIENT)
Dept: FAMILY MEDICINE CLINIC | Facility: CLINIC | Age: 60
End: 2020-06-03
Payer: COMMERCIAL

## 2020-06-03 DIAGNOSIS — K57.20 DIVERTICULITIS OF LARGE INTESTINE WITH ABSCESS WITHOUT BLEEDING: Primary | ICD-10-CM

## 2020-06-03 PROCEDURE — 99495 TRANSJ CARE MGMT MOD F2F 14D: CPT | Performed by: NURSE PRACTITIONER

## 2020-06-03 RX ORDER — MELOXICAM 15 MG/1
TABLET ORAL DAILY
COMMUNITY
End: 2021-03-12

## 2020-06-07 DIAGNOSIS — F41.9 ANXIETY: ICD-10-CM

## 2020-06-08 RX ORDER — ALPRAZOLAM 0.25 MG/1
0.25 TABLET ORAL
Qty: 20 TABLET | Refills: 0 | Status: SHIPPED | OUTPATIENT
Start: 2020-06-08 | End: 2020-07-09 | Stop reason: SDUPTHER

## 2020-06-08 RX ORDER — ALPRAZOLAM 0.25 MG/1
0.25 TABLET ORAL
Qty: 20 TABLET | Refills: 0 | OUTPATIENT
Start: 2020-06-08

## 2020-06-10 ENCOUNTER — OFFICE VISIT (OUTPATIENT)
Dept: GASTROENTEROLOGY | Facility: AMBULARY SURGERY CENTER | Age: 60
End: 2020-06-10
Payer: COMMERCIAL

## 2020-06-10 ENCOUNTER — HOSPITAL ENCOUNTER (OUTPATIENT)
Dept: MAMMOGRAPHY | Facility: MEDICAL CENTER | Age: 60
Discharge: HOME | End: 2020-06-10
Payer: COMMERCIAL

## 2020-06-10 VITALS
BODY MASS INDEX: 25.11 KG/M2 | WEIGHT: 133 LBS | TEMPERATURE: 98.1 F | HEIGHT: 61 IN | HEART RATE: 62 BPM | RESPIRATION RATE: 16 BRPM

## 2020-06-10 VITALS — WEIGHT: 133 LBS | BODY MASS INDEX: 25.11 KG/M2 | HEIGHT: 61 IN

## 2020-06-10 DIAGNOSIS — Z12.31 SCREENING MAMMOGRAM, ENCOUNTER FOR: ICD-10-CM

## 2020-06-10 DIAGNOSIS — R63.4 WEIGHT LOSS: ICD-10-CM

## 2020-06-10 DIAGNOSIS — K57.90 DIVERTICULAR DISEASE: Primary | ICD-10-CM

## 2020-06-10 DIAGNOSIS — K21.9 GASTROESOPHAGEAL REFLUX DISEASE, ESOPHAGITIS PRESENCE NOT SPECIFIED: ICD-10-CM

## 2020-06-10 DIAGNOSIS — R63.0 POOR APPETITE: ICD-10-CM

## 2020-06-10 DIAGNOSIS — R68.81 EARLY SATIETY: ICD-10-CM

## 2020-06-10 PROCEDURE — 99203 OFFICE O/P NEW LOW 30 MIN: CPT | Performed by: INTERNAL MEDICINE

## 2020-06-10 PROCEDURE — 1111F DSCHRG MED/CURRENT MED MERGE: CPT | Performed by: INTERNAL MEDICINE

## 2020-06-10 PROCEDURE — 77063 BREAST TOMOSYNTHESIS BI: CPT

## 2020-06-10 PROCEDURE — 77067 SCR MAMMO BI INCL CAD: CPT

## 2020-06-10 PROCEDURE — 1036F TOBACCO NON-USER: CPT | Performed by: INTERNAL MEDICINE

## 2020-06-10 RX ORDER — FAMOTIDINE 20 MG/1
20 TABLET, FILM COATED ORAL 2 TIMES DAILY
Qty: 60 TABLET | Refills: 2 | Status: SHIPPED | OUTPATIENT
Start: 2020-06-10 | End: 2021-05-07

## 2020-06-15 DIAGNOSIS — R00.0 TACHYCARDIA: ICD-10-CM

## 2020-06-15 NOTE — TELEPHONE ENCOUNTER
Requested medication(s) are due for refill today: Yes  Patient has already received a courtesy refill: No  Other reason request has been forwarded to provider:    Per Protocol

## 2020-06-16 RX ORDER — ATENOLOL 25 MG/1
12.5 TABLET ORAL DAILY
Qty: 45 TABLET | Refills: 5 | Status: SHIPPED | OUTPATIENT
Start: 2020-06-16 | End: 2021-02-15 | Stop reason: SDUPTHER

## 2020-07-01 ENCOUNTER — OFFICE VISIT (OUTPATIENT)
Dept: FAMILY MEDICINE CLINIC | Facility: CLINIC | Age: 60
End: 2020-07-01
Payer: COMMERCIAL

## 2020-07-01 VITALS
OXYGEN SATURATION: 98 % | WEIGHT: 133.4 LBS | DIASTOLIC BLOOD PRESSURE: 78 MMHG | RESPIRATION RATE: 16 BRPM | SYSTOLIC BLOOD PRESSURE: 120 MMHG | TEMPERATURE: 98.4 F | HEIGHT: 61 IN | HEART RATE: 62 BPM | BODY MASS INDEX: 25.19 KG/M2

## 2020-07-01 DIAGNOSIS — Z00.00 ANNUAL PHYSICAL EXAM: Primary | ICD-10-CM

## 2020-07-01 DIAGNOSIS — Z13.220 SCREENING, LIPID: ICD-10-CM

## 2020-07-01 DIAGNOSIS — K57.20 DIVERTICULITIS OF LARGE INTESTINE WITH ABSCESS WITHOUT BLEEDING: ICD-10-CM

## 2020-07-01 DIAGNOSIS — Z13.29 SCREENING FOR THYROID DISORDER: ICD-10-CM

## 2020-07-01 PROCEDURE — 99396 PREV VISIT EST AGE 40-64: CPT | Performed by: NURSE PRACTITIONER

## 2020-07-01 RX ORDER — CIPROFLOXACIN 500 MG/1
500 TABLET, FILM COATED ORAL EVERY 12 HOURS SCHEDULED
Qty: 20 TABLET | Refills: 0 | Status: SHIPPED | OUTPATIENT
Start: 2020-07-01 | End: 2020-07-11

## 2020-07-01 NOTE — PROGRESS NOTES
[address] [facility]    NAME: Jimmy Cooper  AGE: 63 y o  SEX: female  : 1960     DATE: 2020     Assessment and Plan:     Problem List Items Addressed This Visit        Digestive    Diverticulitis of large intestine with abscess without bleeding    Relevant Medications    ciprofloxacin (CIPRO) 500 mg tablet      Other Visit Diagnoses     Annual physical exam    -  Primary    Screening, lipid        Relevant Orders    Lipid Panel with Direct LDL reflex    Screening for thyroid disorder        Relevant Orders    TSH, 3rd generation with Free T4 reflex          Immunizations and preventive care screenings were discussed with patient today  Appropriate education was printed on patient's after visit summary  Counseling:  Alcohol/drug use: discussed moderation in alcohol intake, the recommendations for healthy alcohol use, and avoidance of illicit drug use  Dental Health: discussed importance of regular tooth brushing, flossing, and dental visits  Injury prevention: discussed safety/seat belts, safety helmets, smoke detectors, carbon dioxide detectors, and smoking near bedding or upholstery  Sexual health: discussed sexually transmitted diseases, partner selection, use of condoms, avoidance of unintended pregnancy, and contraceptive alternatives  · Exercise: the importance of regular exercise/physical activity was discussed  Recommend exercise 3-5 times per week for at least 30 minutes  BMI Counseling: Body mass index is 25 21 kg/m²  The BMI is above normal  Nutrition recommendations include decreasing portion sizes, encouraging healthy choices of fruits and vegetables, decreasing fast food intake, consuming healthier snacks, limiting drinks that contain sugar, moderation in carbohydrate intake, increasing intake of lean protein, reducing intake of saturated and trans fat and reducing intake of cholesterol   Exercise recommendations include moderate physical activity 150 minutes/week, exercising 3-5 times per week and strength training exercises  No pharmacotherapy was ordered  No follow-ups on file  Chief Complaint:     Chief Complaint   Patient presents with    Annual Exam      History of Present Illness:     Adult Annual Physical   Patient here for a comprehensive physical exam  The patient reports no problems  Diet and Physical Activity  · Diet/Nutrition: well balanced diet  · Exercise: 1-2 times a week on average  Depression Screening  PHQ-9 Depression Screening    PHQ-9:    Frequency of the following problems over the past two weeks:            General Health  · Sleep: sleeps well  · Hearing: normal - bilateral   · Vision: no vision problems  · Dental: regular dental visits  /GYN Health  · Patient is: postmenopausal  ·      Review of Systems:     Review of Systems   Constitutional: Negative for fatigue and fever  HENT: Negative for congestion and postnasal drip  Eyes: Negative for photophobia and visual disturbance  Respiratory: Negative for cough and shortness of breath  Cardiovascular: Negative for chest pain and palpitations  Gastrointestinal: Negative for constipation and diarrhea  Genitourinary: Negative for dysuria and frequency  Musculoskeletal: Negative for arthralgias and myalgias  Skin: Negative for rash  Neurological: Negative for dizziness and headaches  Hematological: Negative for adenopathy  Psychiatric/Behavioral: Negative for decreased concentration, dysphoric mood and sleep disturbance  The patient is not nervous/anxious         Past Medical History:     Past Medical History:   Diagnosis Date    Adjustment disorder with anxiety     last assessed - 01Sep2016    History of total hysterectomy     Ingrown toenail     last assessed - [id number]      Past Surgical History:     Past Surgical History:   Procedure Laterality Date    APPENDECTOMY      GALLBLADDER SURGERY      OOPHORECTOMY      TONSILLECTOMY AND ADENOIDECTOMY      TOTAL ABDOMINAL HYSTERECTOMY        Social History:     E-Cigarette/Vaping    E-Cigarette Use Never User      E-Cigarette/Vaping Substances    Nicotine No     THC No     CBD No     Flavoring No     Other No     Unknown No      Social History     Socioeconomic History    Marital status: /Civil Union     Spouse name: Not on file    Number of children: Not on file    Years of education: Not on file    Highest education level: Not on file   Occupational History    Occupation:  - [employer]   Social Needs    Financial resource strain: Not on file    Food insecurity:     Worry: Not on file     Inability: Not on file    Transportation needs:     Medical: Not on file     Non-medical: Not on file   Tobacco Use    Smoking status: Never Smoker    Smokeless tobacco: Never Used   Substance and Sexual Activity    Alcohol use: Yes     Comment: occasional alcohol use    Drug use: No    Sexual activity: Yes     Partners: Male   Lifestyle    Physical activity:     Days per week: Not on file     Minutes per session: Not on file    Stress: Not on file   Relationships    Social connections:     Talks on phone: Not on file     Gets together: Not on file     Attends Hindu service: Not on file     Active member of club or organization: Not on file     Attends meetings of clubs or organizations: Not on file     Relationship status: Not on file    Intimate partner violence:     Fear of current or ex partner: Not on file     Emotionally abused: Not on file     Physically abused: Not on file     Forced sexual activity: Not on file   Other Topics Concern    Not on file   Social History Narrative    Coffee      Family History:     Family History   Problem Relation Age of Onset    Anxiety disorder Mother         Anxiety    Depression Mother     Anxiety disorder Sister         Anxiety    Depression Sister    Wash Caller Anxiety disorder Daughter         Anxiety    Depression Daughter     Heart disease Maternal Aunt     Heart disease Maternal Uncle     No Known Problems Sister     No Known Problems Sister     No Known Problems Sister       Current Medications:     Current Outpatient Medications   Medication Sig Dispense Refill    ALPRAZolam (XANAX) 0 25 mg tablet Take 1 tablet (0 25 mg total) by mouth daily at bedtime as needed for anxiety 20 tablet 0    atenolol (TENORMIN) 25 mg tablet Take 0 5 tablets (12 5 mg total) by mouth daily 45 tablet 5    Cholecalciferol (VITAMIN D3) 2000 units TABS Take 1 capsule by mouth daily      ciprofloxacin (CIPRO) 500 mg tablet Take 1 tablet (500 mg total) by mouth every 12 (twelve) hours for 10 days 20 tablet 0    famotidine (PEPCID) 20 mg tablet Take 1 tablet (20 mg total) by mouth 2 (two) times a day (Patient not taking: Reported on 7/1/2020) 60 tablet 2    meloxicam (MOBIC) 15 mg tablet Take by mouth Daily      Na Sulfate-K Sulfate-Mg Sulf 17 5-3 13-1 6 GM/177ML SOLN Take 2 Bottles by mouth once for 1 dose 2 Bottle 0    omeprazole (PriLOSEC) 40 MG capsule Take 1 capsule (40 mg total) by mouth daily 90 capsule 3     No current facility-administered medications for this visit         Allergies:     No Known Allergies   Physical Exam:     /78 (BP Location: Left arm, Patient Position: Sitting, Cuff Size: Standard)   Pulse 62   Temp 98 4 °F (36 9 °C) (Tympanic)   Resp 16   Ht 5' 1" (1 549 m)   Wt 60 5 kg (133 lb 6 4 oz)   SpO2 98%   BMI 25 21 kg/m²       Family History   Problem Relation Age of Onset    Anxiety disorder Mother         Anxiety    Depression Mother     Anxiety disorder Sister         Anxiety    Depression Sister     Anxiety disorder Daughter         Anxiety    Depression Daughter     Heart disease Maternal Aunt     Heart disease Maternal Uncle     No Known Problems Sister     No Known Problems Sister     No Known Problems Sister      Past Medical History:   Diagnosis Date    Adjustment disorder with anxiety     last assessed - [id number]    History of total hysterectomy     Ingrown toenail     last assessed - [id number]     Social History     Socioeconomic History    Marital status: /Civil Union     Spouse name: Not on file    Number of children: Not on file    Years of education: Not on file    Highest education level: Not on file   Occupational History    Occupation:  - [employer]   Social Needs    Financial resource strain: Not on file    Food insecurity:     Worry: Not on file     Inability: Not on file    Transportation needs:     Medical: Not on file     Non-medical: Not on file   Tobacco Use    Smoking status: Never Smoker    Smokeless tobacco: Never Used   Substance and Sexual Activity    Alcohol use: Yes     Comment: occasional alcohol use    Drug use: No    Sexual activity: Yes     Partners: Male   Lifestyle    Physical activity:     Days per week: Not on file     Minutes per session: Not on file    Stress: Not on file   Relationships    Social connections:     Talks on phone: Not on file     Gets together: Not on file     Attends Religion service: Not on file     Active member of club or organization: Not on file     Attends meetings of clubs or organizations: Not on file     Relationship status: Not on file    Intimate partner violence:     Fear of current or ex partner: Not on file     Emotionally abused: Not on file     Physically abused: Not on file     Forced sexual activity: Not on file   Other Topics Concern    Not on file   Social History Narrative    Coffee       Current Outpatient Medications:     ALPRAZolam (XANAX) 0 25 mg tablet, Take 1 tablet (0 25 mg total) by mouth daily at bedtime as needed for anxiety, Disp: 20 tablet, Rfl: 0    atenolol (TENORMIN) 25 mg tablet, Take 0 5 tablets (12 5 mg total) by mouth daily, Disp: 45 tablet, Rfl: 5    Cholecalciferol (VITAMIN D3) 2000 units TABS, Take 1 capsule by mouth daily, Disp: , Rfl:     ciprofloxacin (CIPRO) 500 mg tablet, Take 1 tablet (500 mg total) by mouth every 12 (twelve) hours for 10 days, Disp: 20 tablet, Rfl: 0    famotidine (PEPCID) 20 mg tablet, Take 1 tablet (20 mg total) by mouth 2 (two) times a day (Patient not taking: Reported on 7/1/2020), Disp: 60 tablet, Rfl: 2    meloxicam (MOBIC) 15 mg tablet, Take by mouth Daily, Disp: , Rfl:     Na Sulfate-K Sulfate-Mg Sulf 17 5-3 13-1 6 GM/177ML SOLN, Take 2 Bottles by mouth once for 1 dose, Disp: 2 Bottle, Rfl: 0    omeprazole (PriLOSEC) 40 MG capsule, Take 1 capsule (40 mg total) by mouth daily, Disp: 90 capsule, Rfl: 3  No Known Allergies  Physical Exam   Constitutional: She is oriented to person, place, and time  She appears well-developed and well-nourished  HENT:   Head: Normocephalic and atraumatic  Right Ear: External ear normal    Left Ear: External ear normal    Nose: Nose normal    Mouth/Throat: Oropharynx is clear and moist    Eyes: Pupils are equal, round, and reactive to light  Conjunctivae and EOM are normal    Neck: Normal range of motion  Neck supple  No thyromegaly present  Cardiovascular: Normal rate, regular rhythm, normal heart sounds and intact distal pulses  Pulmonary/Chest: Effort normal and breath sounds normal    Abdominal: Soft  Bowel sounds are normal    Musculoskeletal: Normal range of motion  Neurological: She is alert and oriented to person, place, and time  Skin: Skin is warm and dry  Capillary refill takes less than 2 seconds  Psychiatric: She has a normal mood and affect  Her behavior is normal  Judgment and thought content normal    Nursing note and vitals reviewed        Gopi Bahena, [address] [facility], [address]

## 2020-07-01 NOTE — PATIENT INSTRUCTIONS
Wellness Visit for Adults   AMBULATORY CARE:   A wellness visit  is when you see your healthcare provider to get screened for health problems  You can also get advice on how to stay healthy  Write down your questions so you remember to ask them  Ask your healthcare provider how often you should have a wellness visit  What happens at a wellness visit:  Your healthcare provider will ask about your health, and your family history of health problems  This includes high blood pressure, heart disease, and cancer  He or she will ask if you have symptoms that concern you, if you smoke, and about your mood  You may also be asked about your intake of medicines, supplements, food, and alcohol  Any of the following may be done:  · Your weight  will be checked  Your height may also be checked so your body mass index (BMI) can be calculated  Your BMI shows if you are at a healthy weight  · Your blood pressure  and heart rate will be checked  Your temperature may also be checked  · Blood and urine tests  may be done  Blood tests may be done to check your cholesterol levels  Abnormal cholesterol levels increase your risk for heart disease and stroke  You may also need a blood or urine test to check for diabetes if you are at increased risk  Urine tests may be done to look for signs of an infection or kidney disease  · A physical exam  includes checking your heartbeat and lungs with a stethoscope  Your healthcare provider may also check your skin to look for sun damage  · Screening tests  may be recommended  A screening test is done to check for diseases that may not cause symptoms  The screening tests you may need depend on your age, gender, family history, and lifestyle habits  For example, colorectal screening may be recommended if you are 48years old or older  Screening tests you need if you are a woman:   · A Pap smear  is used to screen for cervical cancer   Pap smears are usually done every 3 to 5 years depending on your age  You may need them more often if you have had abnormal Pap smear test results in the past  Ask your healthcare provider how often you should have a Pap smear  · A mammogram  is an x-ray of your breasts to screen for breast cancer  Experts recommend mammograms every 2 years starting at age 48 years  You may need a mammogram at age 52 years or younger if you have an increased risk for breast cancer  Talk to your healthcare provider about when you should start having mammograms and how often you need them  Vaccines you may need:   · Get an influenza vaccine  every year  The influenza vaccine protects you from the flu  Several types of viruses cause the flu  The viruses change over time, so new vaccines are made each year  · Get a tetanus-diphtheria (Td) booster vaccine  every 10 years  This vaccine protects you against tetanus and diphtheria  Tetanus is a severe infection that may cause painful muscle spasms and lockjaw  Diphtheria is a severe bacterial infection that causes a thick covering in the back of your mouth and throat  · Get a human papillomavirus (HPV) vaccine  if you are female and aged 23 to 32 or male 23 to 24 and never received it  This vaccine protects you from HPV infection  HPV is the most common infection spread by sexual contact  HPV may also cause vaginal, penile, and anal cancers  · Get a pneumococcal vaccine  if you are aged 72 years or older  The pneumococcal vaccine is an injection given to protect you from pneumococcal disease  Pneumococcal disease is an infection caused by pneumococcal bacteria  The infection may cause pneumonia, meningitis, or an ear infection  · Get a shingles vaccine  if you are aged 61 or older, even if you have had shingles before  The shingles vaccine is an injection to protect you from the varicella-zoster virus  This is the same virus that causes chickenpox   Shingles is a painful rash that develops in people who had chickenpox or have been exposed to the virus  How to eat healthy:  My Plate is a model for planning healthy meals  It shows the types and amounts of foods that should go on your plate  Fruits and vegetables make up about half of your plate, and grains and protein make up the other half  A serving of dairy is included on the side of your plate  The amount of calories and serving sizes you need depends on your age, gender, weight, and height  Examples of healthy foods are listed below:  · Eat a variety of vegetables  such as dark green, red, and orange vegetables  You can also include canned vegetables low in sodium (salt) and frozen vegetables without added butter or sauces  · Eat a variety of fresh fruits , canned fruit in 100% juice, frozen fruit, and dried fruit  · Include whole grains  At least half of the grains you eat should be whole grains  Examples include whole-wheat bread, wheat pasta, brown rice, and whole-grain cereals such as oatmeal     · Eat a variety of protein foods such as seafood (fish and shellfish), lean meat, and poultry without skin (turkey and chicken)  Examples of lean meats include pork leg, shoulder, or tenderloin, and beef round, sirloin, tenderloin, and extra lean ground beef  Other protein foods include eggs and egg substitutes, beans, peas, soy products, nuts, and seeds  · Choose low-fat dairy products such as skim or 1% milk or low-fat yogurt, cheese, and cottage cheese  · Limit unhealthy fats  such as butter, hard margarine, and shortening  Exercise:  Exercise at least 30 minutes per day on most days of the week  Some examples of exercise include walking, biking, dancing, and swimming  You can also fit in more physical activity by taking the stairs instead of the elevator or parking farther away from stores  Include muscle strengthening activities 2 days each week  Regular exercise provides many health benefits   It helps you manage your weight, and decreases your risk for type 2 diabetes, heart disease, stroke, and high blood pressure  Exercise can also help improve your mood  Ask your healthcare provider about the best exercise plan for you  General health and safety guidelines:   · Do not smoke  Nicotine and other chemicals in cigarettes and cigars can cause lung damage  Ask your healthcare provider for information if you currently smoke and need help to quit  E-cigarettes or smokeless tobacco still contain nicotine  Talk to your healthcare provider before you use these products  · Limit alcohol  A drink of alcohol is 12 ounces of beer, 5 ounces of wine, or 1½ ounces of liquor  · Lose weight, if needed  Being overweight increases your risk of certain health conditions  These include heart disease, high blood pressure, type 2 diabetes, and certain types of cancer  · Protect your skin  Do not sunbathe or use tanning beds  Use sunscreen with a SPF 15 or higher  Apply sunscreen at least 15 minutes before you go outside  Reapply sunscreen every 2 hours  Wear protective clothing, hats, and sunglasses when you are outside  · Drive safely  Always wear your seatbelt  Make sure everyone in your car wears a seatbelt  A seatbelt can save your life if you are in an accident  Do not use your cell phone when you are driving  This could distract you and cause an accident  Pull over if you need to make a call or send a text message  · Practice safe sex  Use latex condoms if are sexually active and have more than one partner  Your healthcare provider may recommend screening tests for sexually transmitted infections (STIs)  · Wear helmets, lifejackets, and protective gear  Always wear a helmet when you ride a bike or motorcycle, go skiing, or play sports that could cause a head injury  Wear protective equipment when you play sports  Wear a lifejacket when you are on a boat or doing water sports    © 2017 [employer] Information is for End User's use only and may not be sold, redistributed or otherwise used for commercial purposes  All illustrations and images included in CareNotes® are the copyrighted property of [employer]  or Anoop Curran  The above information is an  only  It is not intended as medical advice for individual conditions or treatments  Talk to your doctor, nurse or pharmacist before following any medical regimen to see if it is safe and effective for you  Weight Management   AMBULATORY CARE:   Why it is important to manage your weight:  Being overweight increases your risk of health conditions such as heart disease, high blood pressure, type 2 diabetes, and certain types of cancer  It can also increase your risk for osteoarthritis, sleep apnea, and other respiratory problems  Aim for a slow, steady weight loss  Even a small amount of weight loss can lower your risk of health problems  How to lose weight safely:  A safe and healthy way to lose weight is to eat fewer calories and get regular exercise  You can lose up about 1 pound a week by decreasing the number of calories you eat by 500 calories each day  You can decrease calories by eating smaller portion sizes or by cutting out high-calorie foods  Read labels to find out how many calories are in the foods you eat  You can also burn calories with exercise such as walking, swimming, or biking  You will be more likely to keep weight off if you make these changes part of your lifestyle  Healthy meal plan for weight management:  A healthy meal plan includes a variety of foods, contains fewer calories, and helps you stay healthy  A healthy meal plan includes the following:  · Eat whole-grain foods more often  A healthy meal plan should contain fiber  Fiber is the part of grains, fruits, and vegetables that is not broken down by your body  Whole-grain foods are healthy and provide extra fiber in your diet   Some examples of whole-grain foods are whole-wheat breads and pastas, oatmeal, brown rice, and bulgur  · Eat a variety of vegetables every day  Include dark, leafy greens such as spinach, kale, primo greens, and mustard greens  Eat yellow and orange vegetables such as carrots, sweet potatoes, and winter squash  · Eat a variety of fruits every day  Choose fresh or canned fruit (canned in its own juice or light syrup) instead of juice  Fruit juice has very little or no fiber  · Eat low-fat dairy foods  Drink fat-free (skim) milk or 1% milk  Eat fat-free yogurt and low-fat cottage cheese  Try low-fat cheeses such as mozzarella and other reduced-fat cheeses  · Choose meat and other protein foods that are low in fat  Choose beans or other legumes such as split peas or lentils  Choose fish, skinless poultry (chicken or turkey), or lean cuts of red meat (beef or pork)  Before you cook meat or poultry, cut off any visible fat  · Use less fat and oil  Try baking foods instead of frying them  Add less fat, such as margarine, sour cream, regular salad dressing and mayonnaise to foods  Eat fewer high-fat foods  Some examples of high-fat foods include french fries, doughnuts, ice cream, and cakes  · Eat fewer sweets  Limit foods and drinks that are high in sugar  This includes candy, cookies, regular soda, and sweetened drinks  Ways to decrease calories:   · Eat smaller portions  ¨ Use a small plate with smaller servings  ¨ Do not eat second helpings  ¨ When you eat at a restaurant, ask for a box and place half of your meal in the box before you eat  ¨ Share an entrée with someone else  · Replace high-calorie snacks with healthy, low-calorie snacks  ¨ Choose fresh fruit, vegetables, fat-free rice cakes, or air-popped popcorn instead of potato chips, nuts, or chocolate  ¨ Choose water or calorie-free drinks instead of soda or sweetened drinks  · Eat regular meals  Skipping meals can lead to overeating later in the day   Eat a healthy snack in place of a meal if you do not have time to eat a regular meal      · Do not shop for groceries when you are hungry  You may be more likely to make unhealthy food choices  Take a grocery list of healthy foods and shop after you have eaten  Exercise:  Exercise at least 30 minutes per day on most days of the week  Some examples of exercise include walking, biking, dancing, and swimming  You can also fit in more physical activity by taking the stairs instead of the elevator or parking farther away from stores  Ask your healthcare provider about the best exercise plan for you  Other things to consider as you try to lose weight:   · Be aware of situations that may give you the urge to overeat, such as eating while watching television  Find ways to avoid these situations  For example, read a book, go for a walk, or do crafts  · Meet with a weight loss support group or friends who are also trying to lose weight  This may help you stay motivated to continue working on your weight loss goals  © 2017 [employer] Information is for End User's use only and may not be sold, redistributed or otherwise used for commercial purposes  All illustrations and images included in CareNotes® are the copyrighted property of [employer]  or Anoop Curran  The above information is an  only  It is not intended as medical advice for individual conditions or treatments  Talk to your doctor, nurse or pharmacist before following any medical regimen to see if it is safe and effective for you

## 2020-07-08 ENCOUNTER — TELEPHONE (OUTPATIENT)
Dept: DERMATOLOGY | Facility: CLINIC | Age: 60
End: 2020-07-08

## 2020-07-08 NOTE — TELEPHONE ENCOUNTER
Patient called to schedule an appointment for skin tag removal and for a price estimate  Patient is aware it can be $150 00 for procedure  Patient is aware if she has other concerns patient may need to pay for her copay for the visit encounter   Patient understands and she will like to follow up after for a regular OV with a female dermatologist

## 2020-07-09 DIAGNOSIS — F41.9 ANXIETY: ICD-10-CM

## 2020-07-09 RX ORDER — ALPRAZOLAM 0.25 MG/1
0.25 TABLET ORAL
Qty: 20 TABLET | Refills: 0 | Status: SHIPPED | OUTPATIENT
Start: 2020-07-09 | End: 2020-08-10 | Stop reason: SDUPTHER

## 2020-08-01 ENCOUNTER — TELEPHONE (OUTPATIENT)
Dept: GASTROENTEROLOGY | Facility: AMBULARY SURGERY CENTER | Age: 60
End: 2020-08-01

## 2020-08-01 NOTE — TELEPHONE ENCOUNTER
----- Message from Lynne Arechiga MD sent at 6/22/2020  2:02 PM EDT -----  Schedule EGD and colonoscopy in the next 6 weeks       Thank you,  Nirmala Ellis

## 2020-08-06 ENCOUNTER — COSMETIC (OUTPATIENT)
Dept: DERMATOLOGY | Facility: CLINIC | Age: 60
End: 2020-08-06

## 2020-08-06 VITALS — WEIGHT: 131 LBS | TEMPERATURE: 97.5 F | HEIGHT: 61 IN | BODY MASS INDEX: 24.73 KG/M2

## 2020-08-06 DIAGNOSIS — L91.8 ACROCHORDON: Primary | ICD-10-CM

## 2020-08-06 PROCEDURE — 11200 RMVL SKIN TAGS UP TO&INC 15: CPT | Performed by: STUDENT IN AN ORGANIZED HEALTH CARE EDUCATION/TRAINING PROGRAM

## 2020-08-06 NOTE — PROGRESS NOTES
[facility]at[facility] Note     Patient Name: Salvador Bridges  Encounter Date: 8/6/2020     Have you been cared for by a [facility] Dermatologist in the last 3 years and, if so, which one? No    · Have you traveled outside of the [address] [place] [address] in the past 3 months or outside of the [facility] area in the last 2 weeks? No     May we call your Preferred Phone number to discuss your specific medical information? Yes     May we leave a detailed message that includes your specific medical information? Yes      Today's Chief Concerns:   Concern #1:  Skin Tag Removal   Concern #2:      Past Medical History:  Have you personally ever had or currently have any of the following? · Skin cancer (such as Melanoma, Basal Cell Carcinoma, Squamous Cell Carcinoma? (If Yes, please provide more detail)- No  · Eczema: No  · Psoriasis: No  · HIV/AIDS: No  · Hepatitis B or C: No  · Tuberculosis: No  · Systemic Immunosuppression such as Diabetes, Biologic or Immunotherapy, Chemotherapy, Organ Transplantation, Bone Marrow Transplantation (If YES, please provide more detail): No  · Radiation Treatment (If YES, please provide more detail): No  · Any other major medical conditions/concerns? (If Yes, which types)- No    Social History:     What is/was your primary occupation?      What are your hobbies/past-times? Family History:  Have any of your "first degree relatives" (parent, brother, sister, or child) had any of the following       · Skin cancer such as Melanoma or Merkel Cell Carcinoma or Pancreatic Cancer? No  · Eczema, Asthma, Hay Fever or Seasonal Allergies: No  · Psoriasis or Psoriatic Arthritis: No  · Do any other medical conditions seem to run in your family? If Yes, what condition and which relatives?   No    Current Medications:   (please update all dermatological medications before printing patient's AVS!)      Current Outpatient Medications:    ALPRAZolam (XANAX) 0 25 mg tablet, Take 1 tablet (0 25 mg total) by mouth daily at bedtime as needed for anxiety, Disp: 20 tablet, Rfl: 0    atenolol (TENORMIN) 25 mg tablet, Take 0 5 tablets (12 5 mg total) by mouth daily, Disp: 45 tablet, Rfl: 5    famotidine (PEPCID) 20 mg tablet, Take 1 tablet (20 mg total) by mouth 2 (two) times a day, Disp: 60 tablet, Rfl: 2    Cholecalciferol (VITAMIN D3) 2000 units TABS, Take 1 capsule by mouth daily, Disp: , Rfl:     meloxicam (MOBIC) 15 mg tablet, Take by mouth Daily, Disp: , Rfl:     Na Sulfate-K Sulfate-Mg Sulf 17 5-3 13-1 6 GM/177ML SOLN, Take 2 Bottles by mouth once for 1 dose, Disp: 2 Bottle, Rfl: 0    omeprazole (PriLOSEC) 40 MG capsule, Take 1 capsule (40 mg total) by mouth daily, Disp: 90 capsule, Rfl: 3      Review of Systems:  Have you recently had or currently have any of the following? If YES, what are you doing for the problem? · Fever, chills or unintended weight loss: No  · Sudden loss or change in your vision: No  · Nausea, vomiting or blood in your stool: No  · Painful or swollen joints: No  · Wheezing or cough: No  · Changing mole or non-healing wound: No  · Nosebleeds: No  · Excessive sweating: No  · Easy or prolonged bleeding? No  · Over the last 2 weeks, how often have you been bothered by the following problems? · Taking little interest or pleasure in doing things: 1 - Not at All  · Feeling down, depressed, or hopeless: 1 - Not at All  · Rapid heartbeat with epinephrine:  No    · FEMALES ONLY:    · Are you pregnant or planning to become pregnant? No  · Are you currently or planning to be nursing or breast feeding? No    · Any known allergies? · No Known Allergies      Physical Exam:     Was a chaperone (Derm Clinical Assistant) present throughout the entire Physical Exam? Yes     Did the Dermatology Team specifically  the patient on the importance of a Full Skin Exam to be sure that nothing is missed clinically?  Yes}  o Did the patient ultimately request or accept a Full Skin Exam? No  o Did the patient specifically refuse to have the areas "under-the-bra" examined by the Dermatologist? No  o Did the patient specifically refuse to have the areas "under-the-underwear" examined by the Dermatologist? No    CONSTITUTIONAL:   Vitals:    20 0811   Temp: 97 5 °F (36 4 °C)   Weight: 59 4 kg (131 lb)   Height: 5' 1" (1 549 m)       PSYCH: Normal mood and affect  EYES: Normal conjunctiva  ENT: Normal lips and oral mucosa  CARDIOVASCULAR: No edema  RESPIRATORY: Normal respirations  HEME/LYMPH/IMMUNO:  No ostensible subQ swelling except as noted below in "ASSESSMENT AND PLAN BY DIAGNOSIS"    SKIN:  FULL ORGAN SYSTEM EXAM   Hair, Face Normal except as noted below in Assessment   Neck Normal except as noted below in Assessment           Upper chest Viewed areas Normal except as noted below in Assessment                            Assessment and Plan by Diagnosis:    History of Present Condition:     Duration:  How long has this been an issue for you?    o  years   Location Affected:  Where on the body is this affecting you?    o  Neck, shoulder   Quality:  Is there any bleeding, pain, itch, burning/irritation, or redness associated with the skin lesion?    o  Denies   Severity:  Describe any bleeding, pain, itch, burning/irritation, or redness on a scale of 1 to 10 (with 10 being the worst)  o  N/A   Timing:  Does this condition seem to be there pretty constantly or do you notice it more at specific times throughout the day?    o  Denies   Context:  Have you ever noticed that this condition seems to be associated with specific activities you do?    o  Denies   Modifying Factors:    o Anything that seems to make the condition worse?    -  Denies  o What have you tried to do to make the condition better? -  Denies   Associated Signs and Symptoms:  Does this skin lesion seem to be associated with any of the followin  JHON ("SKIN TAG")    Physical Exam:   Anatomic Location Affected: Anterior and posterior neck and upper chest   Morphological Description:  Minute <1 mm brown papules   Pertinent Positives:   Pertinent Negatives: No Lymphadenopathy    Additional History of Present Condition:  Patient states she has had skin tags on her neck for years that she would like removed  Assessment and Plan:  Based on a thorough discussion of this condition and the management approach to it (including a comprehensive discussion of the known risks, side effects and potential benefits of treatment), the patient (family) agrees to implement the following specific plan:   Patient chose to have minute skin tags removed in office   Skin tags removed via snip removal with scissors   Pt understands this is cosmetic procedure  Discussed risks of infection, scarring  Discussed we will not be sending for pathology given cosmetic nature and fact that lesions are pathognomic for skin tags clinically  Pt signed consent   Wound care extensively discussed, including minimizing scar formation and how to stop bleeding, if occurs   Instructed on how to keep clean to prevent infection    Skin tags are common, soft, harmless skin lesions that are also called, in the appropriate settings, papillomas, fibroepithelial polyps, and soft fibromas  They are made up of loosely arranged collagen fibers and blood vessels surrounded by a thickened or thinned-out epidermis  Skin tags tend to develop in both men and women as we grow older  They are usually found on the skin folds (neck, armpits, groin)  It is not known what specifically causes skin tags    Certain factors, though, do appear to play a role:   Chaffing and irritation from skin rubbing together   High levels of growth factors (as seen, for example, in pregnancy or in acromegaly/gigantism)   Insulin resistance   Human papillomavirus (wart virus)    We discussed that most skin tags do not need to be treated unless they are specifically causing the patient physical distress or limitation or pose a risk for a larger problem such as an infection that forms secondary to excoriation or chronic irritation  We had a thorough discussion of treatment options and specific risks (including that any procedural treatment may not be covered by insurance and would then be the patient's responsibility) and benefits/alternatives including but not limited to the following:     Snip removal with scissors         S: The patient complains of asymptomatic skin tags on the anterior and posterior neck  These are irritated by clothing, jewelry and rubbing  O: Patient appears well  Several benign skin tags are noted on the anterior and posterior neck  A: Skin tags - more than 10 removed    P: Skin tags are snipped off using Betadine for cleansing and sterile iris scissors  Local anesthesia was Lidocaine with Epinephrine used  These pathognomonic lesions are not sent for pathology        Scribe Attestation    I,:   Dayana Rock am acting as a scribe while in the presence of the attending physician :        I,:   Sathish Ritchie MD personally performed the services described in this documentation    as scribed in my presence :

## 2020-08-06 NOTE — PATIENT INSTRUCTIONS
1  ACROCHORDON ("SKIN TAG")    Assessment and Plan:  Based on a thorough discussion of this condition and the management approach to it (including a comprehensive discussion of the known risks, side effects and potential benefits of treatment), the patient (family) agrees to implement the following specific plan:   Patient chose to have skin tags removed in office   Skin tags removed via snip excision with scissors   Skin tags not sent for pathology  Skin tags are common, soft, harmless skin lesions that are also called, in the appropriate settings, papillomas, fibroepithelial polyps, and soft fibromas  They are made up of loosely arranged collagen fibers and blood vessels surrounded by a thickened or thinned-out epidermis  Skin tags tend to develop in both men and women as we grow older  They are usually found on the skin folds (neck, armpits, groin)  It is not known what specifically causes skin tags  Certain factors, though, do appear to play a role:   Chaffing and irritation from skin rubbing together   High levels of growth factors (as seen, for example, in pregnancy or in acromegaly/gigantism)   Insulin resistance   Human papillomavirus (wart virus)    We discussed that most skin tags do not need to be treated unless they are specifically causing the patient physical distress or limitation or pose a risk for a larger problem such as an infection that forms secondary to excoriation or chronic irritation  We had a thorough discussion of treatment options and specific risks (including that any procedural treatment may not be covered by insurance and would then be the patient's responsibility) and benefits/alternatives including but not limited to the following:     Surgical excision (snip excision with scissors)        S: The patient complains of symptomatic skin tags on the anterior and posterior neck  These are irritated by clothing, jewelry and rubbing  O: Patient appears well  Several benign skin tags are noted on the anterior and posterior neck  A: Skin tags - more than 10 removed    P: Skin tags are snipped off using Betadine for cleansing and sterile iris scissors  Local anesthesia was Lidocaine with Epinephrine used  These pathognomonic lesions are not sent for pathology

## 2020-08-10 ENCOUNTER — PREP FOR PROCEDURE (OUTPATIENT)
Dept: GASTROENTEROLOGY | Facility: CLINIC | Age: 60
End: 2020-08-10

## 2020-08-10 DIAGNOSIS — K57.90 DIVERTICULAR DISEASE: Primary | ICD-10-CM

## 2020-08-10 DIAGNOSIS — R68.81 EARLY SATIETY: ICD-10-CM

## 2020-08-10 DIAGNOSIS — K21.9 GASTROESOPHAGEAL REFLUX DISEASE, ESOPHAGITIS PRESENCE NOT SPECIFIED: ICD-10-CM

## 2020-08-10 DIAGNOSIS — F41.9 ANXIETY: ICD-10-CM

## 2020-08-10 DIAGNOSIS — R63.0 POOR APPETITE: ICD-10-CM

## 2020-08-10 DIAGNOSIS — R63.4 WEIGHT LOSS: ICD-10-CM

## 2020-08-10 RX ORDER — ALPRAZOLAM 0.25 MG/1
0.25 TABLET ORAL
Qty: 20 TABLET | Refills: 0 | Status: SHIPPED | OUTPATIENT
Start: 2020-08-10 | End: 2020-09-09 | Stop reason: SDUPTHER

## 2020-08-12 DIAGNOSIS — R10.32 LEFT LOWER QUADRANT ABDOMINAL PAIN: Primary | ICD-10-CM

## 2020-08-26 ENCOUNTER — HOSPITAL ENCOUNTER (OUTPATIENT)
Dept: CT IMAGING | Facility: HOSPITAL | Age: 60
Discharge: HOME | End: 2020-08-26
Payer: COMMERCIAL

## 2020-08-26 DIAGNOSIS — R10.32 LEFT LOWER QUADRANT ABDOMINAL PAIN: ICD-10-CM

## 2020-08-26 PROCEDURE — G1004 CDSM NDSC: HCPCS

## 2020-08-26 PROCEDURE — 74177 CT ABD & PELVIS W/CONTRAST: CPT

## 2020-08-26 RX ADMIN — IOHEXOL 100 ML: 350 INJECTION, SOLUTION INTRAVENOUS at 16:47

## 2020-09-09 DIAGNOSIS — F41.9 ANXIETY: ICD-10-CM

## 2020-09-09 RX ORDER — ALPRAZOLAM 0.25 MG/1
0.25 TABLET ORAL
Qty: 20 TABLET | Refills: 0 | Status: SHIPPED | OUTPATIENT
Start: 2020-09-09 | End: 2020-10-07 | Stop reason: SDUPTHER

## 2020-09-16 ENCOUNTER — APPOINTMENT (OUTPATIENT)
Dept: LAB | Facility: AMBULARY SURGERY CENTER | Age: 60
End: 2020-09-16
Payer: COMMERCIAL

## 2020-09-16 ENCOUNTER — ANESTHESIA EVENT (OUTPATIENT)
Dept: GASTROENTEROLOGY | Facility: AMBULARY SURGERY CENTER | Age: 60
End: 2020-09-16

## 2020-09-16 DIAGNOSIS — Z13.29 SCREENING FOR THYROID DISORDER: ICD-10-CM

## 2020-09-16 DIAGNOSIS — Z13.220 SCREENING, LIPID: ICD-10-CM

## 2020-09-16 LAB
CHOLEST SERPL-MCNC: 250 MG/DL (ref 50–200)
HDLC SERPL-MCNC: 55 MG/DL
LDLC SERPL CALC-MCNC: 153 MG/DL (ref 0–100)
TRIGL SERPL-MCNC: 211 MG/DL
TSH SERPL DL<=0.05 MIU/L-ACNC: 1.57 UIU/ML (ref 0.36–3.74)

## 2020-09-16 PROCEDURE — 36415 COLL VENOUS BLD VENIPUNCTURE: CPT

## 2020-09-16 PROCEDURE — 80061 LIPID PANEL: CPT

## 2020-09-16 PROCEDURE — 84443 ASSAY THYROID STIM HORMONE: CPT

## 2020-09-17 ENCOUNTER — HOSPITAL ENCOUNTER (OUTPATIENT)
Dept: GASTROENTEROLOGY | Facility: AMBULARY SURGERY CENTER | Age: 60
Setting detail: OUTPATIENT SURGERY
Discharge: HOME | End: 2020-09-17
Attending: INTERNAL MEDICINE | Admitting: INTERNAL MEDICINE
Payer: COMMERCIAL

## 2020-09-17 ENCOUNTER — ANESTHESIA (OUTPATIENT)
Dept: GASTROENTEROLOGY | Facility: AMBULARY SURGERY CENTER | Age: 60
End: 2020-09-17

## 2020-09-17 VITALS
RESPIRATION RATE: 20 BRPM | BODY MASS INDEX: 23.6 KG/M2 | SYSTOLIC BLOOD PRESSURE: 136 MMHG | HEART RATE: 57 BPM | DIASTOLIC BLOOD PRESSURE: 65 MMHG | OXYGEN SATURATION: 100 % | TEMPERATURE: 98.7 F | WEIGHT: 125 LBS | HEIGHT: 61 IN

## 2020-09-17 VITALS — HEART RATE: 63 BPM

## 2020-09-17 DIAGNOSIS — R63.4 WEIGHT LOSS: ICD-10-CM

## 2020-09-17 DIAGNOSIS — R63.0 POOR APPETITE: ICD-10-CM

## 2020-09-17 DIAGNOSIS — K57.90 DIVERTICULAR DISEASE: ICD-10-CM

## 2020-09-17 DIAGNOSIS — K21.9 GASTROESOPHAGEAL REFLUX DISEASE, ESOPHAGITIS PRESENCE NOT SPECIFIED: ICD-10-CM

## 2020-09-17 DIAGNOSIS — R68.81 EARLY SATIETY: ICD-10-CM

## 2020-09-17 PROBLEM — Z90.710 HISTORY OF HYSTERECTOMY: Status: ACTIVE | Noted: 2020-09-17

## 2020-09-17 PROCEDURE — 45380 COLONOSCOPY AND BIOPSY: CPT | Performed by: INTERNAL MEDICINE

## 2020-09-17 PROCEDURE — NC001 PR NO CHARGE: Performed by: INTERNAL MEDICINE

## 2020-09-17 PROCEDURE — 88305 TISSUE EXAM BY PATHOLOGIST: CPT | Performed by: PATHOLOGY

## 2020-09-17 PROCEDURE — 43239 EGD BIOPSY SINGLE/MULTIPLE: CPT | Performed by: INTERNAL MEDICINE

## 2020-09-17 RX ORDER — LIDOCAINE HYDROCHLORIDE 10 MG/ML
INJECTION, SOLUTION EPIDURAL; INFILTRATION; INTRACAUDAL; PERINEURAL AS NEEDED
Status: DISCONTINUED | OUTPATIENT
Start: 2020-09-17 | End: 2020-09-17

## 2020-09-17 RX ORDER — PROPOFOL 10 MG/ML
INJECTION, EMULSION INTRAVENOUS AS NEEDED
Status: DISCONTINUED | OUTPATIENT
Start: 2020-09-17 | End: 2020-09-17

## 2020-09-17 RX ORDER — PROPOFOL 10 MG/ML
INJECTION, EMULSION INTRAVENOUS CONTINUOUS PRN
Status: DISCONTINUED | OUTPATIENT
Start: 2020-09-17 | End: 2020-09-17

## 2020-09-17 RX ORDER — SODIUM CHLORIDE, SODIUM LACTATE, POTASSIUM CHLORIDE, CALCIUM CHLORIDE 600; 310; 30; 20 MG/100ML; MG/100ML; MG/100ML; MG/100ML
100 INJECTION, SOLUTION INTRAVENOUS CONTINUOUS
Status: DISCONTINUED | OUTPATIENT
Start: 2020-09-17 | End: 2020-09-21 | Stop reason: HOSPADM

## 2020-09-17 RX ADMIN — PROPOFOL 100 MCG/KG/MIN: 10 INJECTION, EMULSION INTRAVENOUS at 09:56

## 2020-09-17 RX ADMIN — PROPOFOL 20 MG: 10 INJECTION, EMULSION INTRAVENOUS at 09:54

## 2020-09-17 RX ADMIN — SODIUM CHLORIDE, SODIUM LACTATE, POTASSIUM CHLORIDE, AND CALCIUM CHLORIDE: .6; .31; .03; .02 INJECTION, SOLUTION INTRAVENOUS at 09:35

## 2020-09-17 RX ADMIN — PROPOFOL 50 MG: 10 INJECTION, EMULSION INTRAVENOUS at 10:00

## 2020-09-17 RX ADMIN — PROPOFOL 80 MG: 10 INJECTION, EMULSION INTRAVENOUS at 09:47

## 2020-09-17 RX ADMIN — PROPOFOL 50 MG: 10 INJECTION, EMULSION INTRAVENOUS at 09:50

## 2020-09-17 RX ADMIN — LIDOCAINE HYDROCHLORIDE 50 MG: 10 INJECTION, SOLUTION EPIDURAL; INFILTRATION; INTRACAUDAL; PERINEURAL at 09:47

## 2020-09-17 NOTE — ANESTHESIA PREPROCEDURE EVALUATION
Procedure:  COLONOSCOPY  EGD    Relevant Problems   ANESTHESIA (within normal limits)      GI/HEPATIC   (+) Gastroesophageal reflux disease      GYN   (+) History of hysterectomy      NEURO/PSYCH   (+) Anxiety      PULMONARY   (-) Sleep apnea   (-) Smoking   (-) URI (upper respiratory infection)      Other   (+) Diverticulitis of large intestine with abscess without bleeding (5/2020)   (+) Tachycardia (on atenolol)      Physical Exam    Airway    Mallampati score: II  TM Distance: >3 FB  Neck ROM: full     Dental   Comment: Upper incisor crown that has fallen out several times - just recently replaced and pt reports secure  Partials removed,     Cardiovascular      Pulmonary      Other Findings       Lab Results   Component Value Date    WBC 5 40 05/26/2020    HGB 9 9 (L) 05/26/2020     05/26/2020     Lab Results   Component Value Date    SODIUM 140 05/26/2020    K 4 1 05/26/2020    BUN 6 05/26/2020    CREATININE 0 78 05/26/2020    EGFR 82 05/26/2020    GLUCOSE 84 09/22/2015     Anesthesia Plan  ASA Score- 2     Anesthesia Type- IV sedation with anesthesia with ASA Monitors  Additional Monitors:   Airway Plan:           Plan Factors-Exercise tolerance (METS): >4 METS  Chart reviewed  Existing labs reviewed  Patient summary reviewed  Patient is not a current smoker  Induction- intravenous  Postoperative Plan-     Informed Consent- Anesthetic plan and risks discussed with patient  I personally reviewed this patient with the CRNA  Discussed and agreed on the Anesthesia Plan with the GISEL Powell

## 2020-09-17 NOTE — H&P
History and Physical - [facility]  Becca Mendenhall 63 y o  female MRN: [id number]                  HPI: Becca Mendenhall is a 63y o  year old female who presents for EGD and colonoscopy for early satiety, weight loss, poor appetite, and diverticulitis  REVIEW OF SYSTEMS: Per the HPI, and otherwise unremarkable      Historical Information   Past Medical History:   Diagnosis Date    Adjustment disorder with anxiety     last assessed - [id number]    Anxiety     Diverticulitis     History of total hysterectomy     History of transfusion     Hypertension     Ingrown toenail     last assessed - [id number]     Past Surgical History:   Procedure Laterality Date    APPENDECTOMY      CHOLECYSTECTOMY      COLONOSCOPY      GALLBLADDER SURGERY      OOPHORECTOMY Bilateral     TONSILLECTOMY AND ADENOIDECTOMY      TOTAL ABDOMINAL HYSTERECTOMY       Social History   Social History     Substance and Sexual Activity   Alcohol Use Yes    Frequency: Monthly or less    Drinks per session: 1 or 2    Comment: occasional alcohol use     Social History     Substance and Sexual Activity   Drug Use No     Social History     Tobacco Use   Smoking Status Never Smoker   Smokeless Tobacco Never Used     Family History   Problem Relation Age of Onset    Anxiety disorder Mother         Anxiety    Depression Mother     Anxiety disorder Sister         Anxiety    Depression Sister     Anxiety disorder Daughter         Anxiety    Depression Daughter     Heart disease Maternal Aunt     Heart disease Maternal Uncle     No Known Problems Sister     No Known Problems Sister     No Known Problems Sister        Meds/Allergies     (Not in a hospital admission)      No Known Allergies    Objective     /77   Pulse 67   Temp 97 7 °F (36 5 °C) (Temporal)   Resp 22   Ht 5' 1" (1 549 m)   Wt 56 7 kg (125 lb)   SpO2 97%   BMI 23 62 kg/m²       PHYSICAL EXAM    Gen: NAD  CV: RRR  CHEST: Clear  ABD: soft, NT/ND  EXT: no edema      ASSESSMENT/PLAN:  This is a 63y o  year old female here for  EGD and colonoscopy for early satiety, weight loss, poor appetite, and diverticulitis, and she is stable and optimized for her procedure      Alysha Mtz MD

## 2020-09-17 NOTE — ANESTHESIA POSTPROCEDURE EVALUATION
Post-Op Assessment Note    CV Status:  Stable    Pain management: adequate     Mental Status:  Alert and awake   Hydration Status:  Euvolemic   PONV Controlled:  Controlled   Airway Patency:  Patent      Post Op Vitals Reviewed: Yes      Staff: CRNA         No complications documented      BP   124/75   Temp (P) 98 9 °F (37 2 °C) (09/17/20 1026)    Pulse (P) 65 (09/17/20 1026)   Resp (!) (P) 24 (09/17/20 1026)    SpO2 (P) 100 % (09/17/20 1026)

## 2020-09-30 DIAGNOSIS — R68.81 EARLY SATIETY: Primary | ICD-10-CM

## 2020-10-02 ENCOUNTER — TELEPHONE (OUTPATIENT)
Dept: GASTROENTEROLOGY | Facility: AMBULARY SURGERY CENTER | Age: 60
End: 2020-10-02

## 2020-10-07 DIAGNOSIS — F41.9 ANXIETY: ICD-10-CM

## 2020-10-07 RX ORDER — ALPRAZOLAM 0.25 MG/1
0.25 TABLET ORAL
Qty: 20 TABLET | Refills: 0 | Status: SHIPPED | OUTPATIENT
Start: 2020-10-07 | End: 2020-11-05 | Stop reason: SDUPTHER

## 2020-11-05 DIAGNOSIS — F41.9 ANXIETY: ICD-10-CM

## 2020-11-06 DIAGNOSIS — F41.9 ANXIETY: ICD-10-CM

## 2020-11-06 RX ORDER — ALPRAZOLAM 0.25 MG/1
0.25 TABLET ORAL
Qty: 20 TABLET | Refills: 0 | Status: SHIPPED | OUTPATIENT
Start: 2020-11-06 | End: 2020-12-07 | Stop reason: SDUPTHER

## 2020-11-06 RX ORDER — ALPRAZOLAM 0.25 MG/1
0.25 TABLET ORAL
Qty: 20 TABLET | Refills: 0 | OUTPATIENT
Start: 2020-11-06

## 2020-12-04 ENCOUNTER — TELEPHONE (OUTPATIENT)
Dept: GASTROENTEROLOGY | Facility: AMBULARY SURGERY CENTER | Age: 60
End: 2020-12-04

## 2020-12-04 DIAGNOSIS — K58.0 IRRITABLE BOWEL SYNDROME WITH DIARRHEA: Primary | ICD-10-CM

## 2020-12-07 DIAGNOSIS — F41.9 ANXIETY: ICD-10-CM

## 2020-12-08 RX ORDER — ALPRAZOLAM 0.25 MG/1
0.25 TABLET ORAL
Qty: 20 TABLET | Refills: 0 | Status: SHIPPED | OUTPATIENT
Start: 2020-12-08 | End: 2021-01-06 | Stop reason: SDUPTHER

## 2020-12-14 DIAGNOSIS — K58.0 IRRITABLE BOWEL SYNDROME WITH DIARRHEA: ICD-10-CM

## 2020-12-21 ENCOUNTER — TELEPHONE (OUTPATIENT)
Dept: DERMATOLOGY | Facility: CLINIC | Age: 60
End: 2020-12-21

## 2020-12-23 ENCOUNTER — IMMUNIZATIONS (OUTPATIENT)
Dept: FAMILY MEDICINE CLINIC | Facility: HOSPITAL | Age: 60
End: 2020-12-23
Payer: COMMERCIAL

## 2020-12-23 DIAGNOSIS — Z23 ENCOUNTER FOR IMMUNIZATION: ICD-10-CM

## 2020-12-23 PROCEDURE — 91300 SARS-COV-2 / COVID-19 MRNA VACCINE (PFIZER-BIONTECH) 30 MCG: CPT

## 2020-12-23 PROCEDURE — 0001A SARS-COV-2 / COVID-19 MRNA VACCINE (PFIZER-BIONTECH) 30 MCG: CPT

## 2021-01-06 DIAGNOSIS — F41.9 ANXIETY: ICD-10-CM

## 2021-01-06 RX ORDER — ALPRAZOLAM 0.25 MG/1
0.25 TABLET ORAL
Qty: 20 TABLET | Refills: 0 | Status: SHIPPED | OUTPATIENT
Start: 2021-01-06 | End: 2021-02-02 | Stop reason: SDUPTHER

## 2021-01-14 ENCOUNTER — IMMUNIZATIONS (OUTPATIENT)
Dept: FAMILY MEDICINE CLINIC | Facility: HOSPITAL | Age: 61
End: 2021-01-14

## 2021-01-14 DIAGNOSIS — Z23 ENCOUNTER FOR IMMUNIZATION: Primary | ICD-10-CM

## 2021-01-14 PROCEDURE — 0002A SARS-COV-2 / COVID-19 MRNA VACCINE (PFIZER-BIONTECH) 30 MCG: CPT

## 2021-01-14 PROCEDURE — 91300 SARS-COV-2 / COVID-19 MRNA VACCINE (PFIZER-BIONTECH) 30 MCG: CPT

## 2021-02-02 DIAGNOSIS — K21.9 GASTROESOPHAGEAL REFLUX DISEASE WITHOUT ESOPHAGITIS: ICD-10-CM

## 2021-02-02 DIAGNOSIS — F41.9 ANXIETY: ICD-10-CM

## 2021-02-03 DIAGNOSIS — F41.9 ANXIETY: ICD-10-CM

## 2021-02-03 DIAGNOSIS — K21.9 GASTROESOPHAGEAL REFLUX DISEASE WITHOUT ESOPHAGITIS: ICD-10-CM

## 2021-02-03 RX ORDER — ALPRAZOLAM 0.25 MG/1
0.25 TABLET ORAL
Qty: 20 TABLET | Refills: 0 | Status: SHIPPED | OUTPATIENT
Start: 2021-02-03 | End: 2021-03-04 | Stop reason: SDUPTHER

## 2021-02-03 RX ORDER — ALPRAZOLAM 0.25 MG/1
0.25 TABLET ORAL
Qty: 20 TABLET | Refills: 1 | Status: SHIPPED | OUTPATIENT
Start: 2021-02-03 | End: 2021-04-04 | Stop reason: SDUPTHER

## 2021-02-03 RX ORDER — OMEPRAZOLE 40 MG/1
40 CAPSULE, DELAYED RELEASE ORAL DAILY
Qty: 90 CAPSULE | Refills: 0 | Status: SHIPPED | OUTPATIENT
Start: 2021-02-03 | End: 2021-04-30 | Stop reason: SDUPTHER

## 2021-02-03 RX ORDER — OMEPRAZOLE 40 MG/1
40 CAPSULE, DELAYED RELEASE ORAL DAILY
Qty: 90 CAPSULE | Refills: 3 | Status: SHIPPED | OUTPATIENT
Start: 2021-02-03 | End: 2021-03-16

## 2021-02-03 NOTE — TELEPHONE ENCOUNTER
Medication:  PDMP   01/06/2021  1  01/06/2021  ALPRAZOLAM 0 25 MG TABLET  20 0  20  DO MEN       Active agreement on file -No

## 2021-02-15 DIAGNOSIS — R00.0 TACHYCARDIA: ICD-10-CM

## 2021-02-15 RX ORDER — ATENOLOL 25 MG/1
12.5 TABLET ORAL DAILY
Qty: 45 TABLET | Refills: 3 | Status: SHIPPED | OUTPATIENT
Start: 2021-02-15 | End: 2021-03-12 | Stop reason: SDUPTHER

## 2021-02-22 ENCOUNTER — TELEPHONE (OUTPATIENT)
Dept: DERMATOLOGY | Facility: CLINIC | Age: 61
End: 2021-02-22

## 2021-02-22 NOTE — TELEPHONE ENCOUNTER
Letter was sent to patient advising she was due to be seen from the free employee screening from 12/21/2020

## 2021-03-04 DIAGNOSIS — F41.9 ANXIETY: ICD-10-CM

## 2021-03-04 RX ORDER — ALPRAZOLAM 0.25 MG/1
0.25 TABLET ORAL
Qty: 20 TABLET | Refills: 0 | Status: SHIPPED | OUTPATIENT
Start: 2021-03-04 | End: 2021-03-16

## 2021-03-04 RX ORDER — ALPRAZOLAM 0.25 MG/1
0.25 TABLET ORAL
Qty: 20 TABLET | Refills: 0 | OUTPATIENT
Start: 2021-03-04

## 2021-03-04 NOTE — TELEPHONE ENCOUNTER
Medication:  PDMP   Active agreement on file -No      02/03/2021  1  02/03/2021  ALPRAZOLAM 0 25 MG TABLET  20 0  20  DO MEN

## 2021-03-10 DIAGNOSIS — N95.2 VAGINAL ATROPHY: Primary | ICD-10-CM

## 2021-03-11 ENCOUNTER — TELEPHONE (OUTPATIENT)
Dept: OBGYN CLINIC | Facility: CLINIC | Age: 61
End: 2021-03-11

## 2021-03-11 NOTE — TELEPHONE ENCOUNTER
Pharmacy calling for clarification on premarin cream  Advised per Dr Nilesh Stern she should use twice a week  Verbalized understanding

## 2021-03-12 ENCOUNTER — OFFICE VISIT (OUTPATIENT)
Dept: FAMILY MEDICINE CLINIC | Facility: CLINIC | Age: 61
End: 2021-03-12
Payer: COMMERCIAL

## 2021-03-12 VITALS
TEMPERATURE: 98.7 F | BODY MASS INDEX: 25.75 KG/M2 | WEIGHT: 136.4 LBS | DIASTOLIC BLOOD PRESSURE: 92 MMHG | SYSTOLIC BLOOD PRESSURE: 172 MMHG | HEART RATE: 64 BPM | RESPIRATION RATE: 16 BRPM | OXYGEN SATURATION: 94 % | HEIGHT: 61 IN

## 2021-03-12 DIAGNOSIS — R00.0 TACHYCARDIA: ICD-10-CM

## 2021-03-12 DIAGNOSIS — E27.8 ADRENAL NODULE (HCC): ICD-10-CM

## 2021-03-12 DIAGNOSIS — I10 HYPERTENSION, UNSPECIFIED TYPE: Primary | ICD-10-CM

## 2021-03-12 DIAGNOSIS — G89.29 CHRONIC PAIN OF LEFT THUMB: ICD-10-CM

## 2021-03-12 DIAGNOSIS — M79.645 CHRONIC PAIN OF LEFT THUMB: ICD-10-CM

## 2021-03-12 PROBLEM — E27.9 ADRENAL NODULE (HCC): Status: ACTIVE | Noted: 2021-03-12

## 2021-03-12 PROCEDURE — 99214 OFFICE O/P EST MOD 30 MIN: CPT | Performed by: NURSE PRACTITIONER

## 2021-03-12 RX ORDER — ATENOLOL 50 MG/1
50 TABLET ORAL DAILY
Qty: 90 TABLET | Refills: 1 | Status: SHIPPED | OUTPATIENT
Start: 2021-03-12 | End: 2021-07-15 | Stop reason: SDUPTHER

## 2021-03-12 RX ORDER — ATENOLOL 50 MG/1
50 TABLET ORAL DAILY
Qty: 90 TABLET | Refills: 1 | Status: SHIPPED | OUTPATIENT
Start: 2021-03-12 | End: 2021-03-12 | Stop reason: SDUPTHER

## 2021-03-12 NOTE — PROGRESS NOTES
FAMILY PRACTICE OFFICE VISIT       NAME: Brittney Sanchez  AGE: 63 y o  SEX: female       : 1960        MRN: [id number]    DATE: 3/16/2021  TIME: 6:43 AM    Assessment and Plan   1  Hypertension, unspecified type  -     atenolol (TENORMIN) 50 mg tablet; Take 1 tablet (50 mg total) by mouth daily  -     Comprehensive metabolic panel; Future  -     Aldosterone/Renin Ratio; Future  -     TSH, 3rd generation with Free T4 reflex; Future  -     Metanephrine, Fractionated Plasma Free; Future  -     Catecholamines, fractionated, plasma; Future  -     Ambulatory referral to Nephrology; Future    2  Tachycardia  -     atenolol (TENORMIN) 50 mg tablet; Take 1 tablet (50 mg total) by mouth daily    3  Chronic pain of left thumb  -     Ambulatory referral to Orthopedic Surgery; Future    4  Adrenal nodule (HCC)  -     Comprehensive metabolic panel; Future  -     Aldosterone/Renin Ratio; Future  -     TSH, 3rd generation with Free T4 reflex; Future  -     Metanephrine, Fractionated Plasma Free; Future  -     Catecholamines, fractionated, plasma; Future  -     Ambulatory referral to Nephrology; Future                 Chief Complaint     Chief Complaint   Patient presents with    Abdominal Cramping     PT is having abdominal cramsp and leg and hang pain  PT stated she extremely hot    Leg Pain    Hand Pain       History of Present Illness   Brittney Sanchez is a 63y o -year-old female who is here for c/o left thumb pain  Started months ago, not resolving  Having leg pain on and off  bp up and down per patient  Adrenal nodules have been seen on imaging in past      Review of Systems   Review of Systems   Constitutional: Negative for fatigue and fever  Respiratory: Negative for cough and shortness of breath  Cardiovascular: Negative for chest pain and palpitations  Gastrointestinal: Positive for abdominal distention and abdominal pain  Negative for constipation, diarrhea, nausea and vomiting     Genitourinary: Negative for dysuria, flank pain and frequency  Musculoskeletal: Positive for arthralgias and myalgias  Skin: Negative for rash  Neurological: Negative for dizziness and headaches  Hematological: Negative for adenopathy  Psychiatric/Behavioral: Negative for dysphoric mood and sleep disturbance  The patient is not nervous/anxious          Active Problem List     Patient Active Problem List   Diagnosis    Tachycardia    Anxiety    Gastroesophageal reflux disease    Dense breast tissue    Vaginal atrophy    Scalp lesion    Low bone density    Diverticulitis of large intestine with abscess without bleeding    Adrenal adenoma    History of hysterectomy    Hypertension    Chronic pain of left thumb    Adrenal nodule (HCC)         Past Medical History:  Past Medical History:   Diagnosis Date    Adjustment disorder with anxiety     last assessed - [id number]    Anemia     Anxiety     Diverticulitis     History of total hysterectomy     History of transfusion     Hypertension     Ingrown toenail     last assessed - [id number]    Thalassemia        Past Surgical History:  Past Surgical History:   Procedure Laterality Date    APPENDECTOMY      CHOLECYSTECTOMY      COLONOSCOPY      GALLBLADDER SURGERY      OOPHORECTOMY Bilateral     TONSILLECTOMY AND ADENOIDECTOMY      TOTAL ABDOMINAL HYSTERECTOMY         Family History:  Family History   Problem Relation Age of Onset    Anxiety disorder Mother         Anxiety    Depression Mother     Anxiety disorder Sister         Anxiety    Depression Sister     Anxiety disorder Daughter         Anxiety    Depression Daughter     Heart disease Maternal Aunt     Heart disease Maternal Uncle     No Known Problems Sister     No Known Problems Sister     No Known Problems Sister        Social History:  Social History     Socioeconomic History    Marital status: /Civil Union     Spouse name: Not on file    Number of children: Not on file    Years of education: Not on file    Highest education level: Not on file   Occupational History    Occupation:  - [employer]   Social Needs    Financial resource strain: Not on file    Food insecurity     Worry: Not on file     Inability: Not on file   [employer] needs     Medical: Not on file     Non-medical: Not on file   Tobacco Use    Smoking status: Never Smoker    Smokeless tobacco: Never Used   Substance and Sexual Activity    Alcohol use: Yes     Frequency: Monthly or less     Drinks per session: 1 or 2     Comment: occasional alcohol use    Drug use: No    Sexual activity: Yes     Partners: Male   Lifestyle    Physical activity     Days per week: Not on file     Minutes per session: Not on file    Stress: Not on file   Relationships    Social connections     Talks on phone: Not on file     Gets together: Not on file     Attends Taoist service: Not on file     Active member of club or organization: Not on file     Attends meetings of clubs or organizations: Not on file     Relationship status: Not on file    Intimate partner violence     Fear of current or ex partner: Not on file     Emotionally abused: Not on file     Physically abused: Not on file     Forced sexual activity: Not on file   Other Topics Concern    Not on file   Social History Narrative    Coffee       Objective     Vitals:    03/12/21 1316   BP: (!) 172/92   Pulse: 64   Resp: 16   Temp: 98 7 °F (37 1 °C)   SpO2: 94%     Wt Readings from Last 3 Encounters:   03/12/21 61 9 kg (136 lb 6 4 oz)   09/17/20 56 7 kg (125 lb)   08/06/20 59 4 kg (131 lb)       Physical Exam  Vitals signs and nursing note reviewed  Constitutional:       Appearance: Normal appearance  HENT:      Head: Normocephalic and atraumatic  Eyes:      Conjunctiva/sclera: Conjunctivae normal    Neck:      Musculoskeletal: Normal range of motion and neck supple  Cardiovascular:      Rate and Rhythm: Normal rate and regular rhythm  Heart sounds: Normal heart sounds  Pulmonary:      Effort: Pulmonary effort is normal       Breath sounds: Normal breath sounds  Abdominal:      General: Bowel sounds are normal       Palpations: Abdomen is soft  Musculoskeletal: Normal range of motion  Skin:     General: Skin is warm and dry  Capillary Refill: Capillary refill takes less than 2 seconds  Neurological:      General: No focal deficit present  Mental Status: She is alert and oriented to person, place, and time  Psychiatric:         Mood and Affect: Mood normal          Behavior: Behavior normal          Thought Content:  Thought content normal          Judgment: Judgment normal          Pertinent Laboratory/Diagnostic Studies:  Lab Results   Component Value Date    GLUCOSE 84 09/22/2015    BUN 6 05/26/2020    CREATININE 0 78 05/26/2020    CALCIUM 8 6 05/26/2020     09/22/2015    K 4 1 05/26/2020    CO2 24 05/26/2020     05/26/2020     Lab Results   Component Value Date    ALT 31 05/24/2020    AST 28 05/24/2020    ALKPHOS 164 (H) 05/24/2020    BILITOT 0 69 09/22/2015       Lab Results   Component Value Date    WBC 5 40 05/26/2020    HGB 9 9 (L) 05/26/2020    HCT 33 0 (L) 05/26/2020    MCV 69 (L) 05/26/2020     05/26/2020       No results found for: TSH    Lab Results   Component Value Date    CHOL 169 09/22/2015     Lab Results   Component Value Date    TRIG 211 (H) 09/16/2020     Lab Results   Component Value Date    HDL 55 09/16/2020     Lab Results   Component Value Date    LDLCALC 153 (H) 09/16/2020     Lab Results   Component Value Date    HGBA1C 5 5 07/17/2018       Results for orders placed or performed during the hospital encounter of 09/17/20   Tissue Exam   Result Value Ref Range    Case Report       Surgical Pathology Report                         Case: [id number]                                   Authorizing Provider:  Hilary Wharton MD  Collected:           09/17/2020 4750 Ordering Location:     [facility]        Received:            09/17/2020 [facility]                                                           Pathologist:           Tonya Hernandez MD                                                                 Specimens:   A) - Duodenum                                                                                       B) - Stomach, gastric                                                                               C) - Polyp, Colorectal, rectum, cold forceps                                               Final Diagnosis       A  Duodenum,  biopsy:  - Benign duodenal mucosa without specific histopathologic abnormality   - No intraepithelial lymphocytosis and no villous blunting   - No epithelial dysplasia and no evidence of malignancy  B   Stomach, biopsy:  - Gastric oxyntic mucosa with no significant pathologic alteration   - Gastric antral mucosa and adjacent small intestinal mucosa, See note  - Negative for dysplasia or carcinoma  - No Helicobacter pylori is identified on H&E stained slide  Note: The findings favor antral-duodenal transitional mucosa, however, intestinal metaplasia can't be rule out, clinical and endoscopic correlation is suggested  C  Rectum, biopsy:  - Colonic mucosa with no pathologic alteration   - Negative for acute and chronic colitis  - No evidence of lymphocytic or collagenous colitis  Interpretation performed at [facility], [address] [place] [address], [place], [address] [facility]          Additional Information       All reported additional testing was performed with appropriately reactive controls    These tests were developed and their performance characteristics determined by [facility] or appropriate performing facility, though some tests may be performed on tissues which have not been validated for performance characteristics (such as staining performed on alcohol exposed cell blocks and decalcified tissues)  Results should be interpreted with caution and in the context of the patients clinical condition  These tests may not be cleared or approved by the [place]  Food and Drug Administration, though the FDA has determined that such clearance or approval is not necessary  These tests are used for clinical purposes and they should not be regarded as investigational or for research  This laboratory has been approved by [facility], designated as a high-complexity laboratory and is qualified to perform these tests  Lorri Lawler Description       A  The specimen is received in formalin, labeled with the patient's name and hospital number, and is designated "duodenum rule out celiac  Specimen consists of 3 pieces of tan-white friable soft tissue fragments with measurements ranging from 0 3 -0 5 cm in greatest dimensions  Entirely submitted  Screened cassette  B  The specimen is received in formalin, labeled with the patient's name and hospital number, and is designated "stomach gastric, rule out H pylori  Specimen consists of 3 pieces of tan-white soft tissue fragments each measuring 0 5 cm in greatest dimensions  Entirely submitted  Screened cassette  C  The specimen is received in formalin, labeled with the patient's name and hospital number, and is designated "rectum, cold forceps  Specimen consists 4 pieces of tan-white soft tissue fragments with measurements ranging from 0 2-0 4 cm in greatest dimensions  Entirely submitted  Screened cassette  Note: The estimated total formalin fixation time based upon information provided by the submitting clinician and the standard processing schedule is under 72 hours      CHunter           Clinical Information R/o celiac        Orders Placed This Encounter   Procedures    Comprehensive metabolic panel    Aldosterone/Renin Ratio    TSH, 3rd generation with Free T4 reflex    Metanephrine, Fractionated Plasma Free  Catecholamines, fractionated, plasma    Ambulatory referral to Orthopedic Surgery    Ambulatory referral to Nephrology       ALLERGIES:  No Known Allergies    Current Medications     Current Outpatient Medications   Medication Sig Dispense Refill    ALPRAZolam (XANAX) 0 25 mg tablet Take 1 tablet (0 25 mg total) by mouth daily at bedtime as needed for anxiety 20 tablet 1    atenolol (TENORMIN) 50 mg tablet Take 1 tablet (50 mg total) by mouth daily 90 tablet 1    conjugated estrogens (PREMARIN) vaginal cream Insert 0 5 g into the vagina daily 30 g 3    famotidine (PEPCID) 20 mg tablet Take 1 tablet (20 mg total) by mouth 2 (two) times a day 60 tablet 2    omeprazole (PriLOSEC) 40 MG capsule Take 1 capsule (40 mg total) by mouth daily 90 capsule 0     No current facility-administered medications for this visit  Health Maintenance     Health Maintenance   Topic Date Due    DTaP,Tdap,and Td Vaccines (1 - Tdap) 04/28/1980    Influenza Vaccine (1) 09/01/2020    BMI: Followup Plan  07/02/2021    HIV Screening  01/03/2022 (Originally 4/28/1975)    Annual Physical  07/01/2021    Depression Screening PHQ  03/12/2022    BMI: Adult  03/12/2022    MAMMOGRAM  06/10/2022    Colonoscopy Surveillance  09/17/2025    Colorectal Cancer Screening  09/17/2030    Hepatitis C Screening  Completed    COVID-19 Vaccine  Completed    Pneumococcal Vaccine: Pediatrics (0 to 5 Years) and At-Risk Patients (6 to 59 Years)  Aged Out    HIB Vaccine  Aged Out    Hepatitis B Vaccine  Aged Out    IPV Vaccine  Aged Out    Hepatitis A Vaccine  Aged Out    Meningococcal ACWY Vaccine  Aged Out    HPV Vaccine  Aged [employer] History   Administered Date(s) Administered    Influenza, recombinant, quadrivalent,injectable, preservative free 10/05/2018    SARS-CoV-2 / COVID-19 mRNA IM ([employer]) 12/23/2020, 01/14/2021     BMI Counseling: Body mass index is 25 77 kg/m²   The BMI is above normal  Nutrition recommendations include decreasing portion sizes, encouraging healthy choices of fruits and vegetables, decreasing fast food intake, consuming healthier snacks, limiting drinks that contain sugar, moderation in carbohydrate intake, increasing intake of lean protein, reducing intake of saturated and trans fat and reducing intake of cholesterol  Exercise recommendations include moderate physical activity 150 minutes/week, exercising 3-5 times per week and strength training exercises  No pharmacotherapy was ordered             KATIANA Fontana

## 2021-03-16 PROBLEM — L98.9 SCALP LESION: Status: RESOLVED | Noted: 2018-10-05 | Resolved: 2021-03-16

## 2021-04-04 DIAGNOSIS — F41.9 ANXIETY: ICD-10-CM

## 2021-04-05 RX ORDER — ALPRAZOLAM 0.25 MG/1
0.25 TABLET ORAL
Qty: 20 TABLET | Refills: 0 | Status: SHIPPED | OUTPATIENT
Start: 2021-04-05 | End: 2021-05-04 | Stop reason: SDUPTHER

## 2021-04-05 NOTE — TELEPHONE ENCOUNTER
Medication:  PDMP   Active agreement on file -No      03/05/2021  1  03/04/2021  ALPRAZOLAM 0 25 MG TABLET  20 0  20  DO MEN

## 2021-04-12 ENCOUNTER — TELEPHONE (OUTPATIENT)
Dept: OBGYN CLINIC | Facility: CLINIC | Age: 61
End: 2021-04-12

## 2021-04-29 ENCOUNTER — CONSULT (OUTPATIENT)
Dept: NEPHROLOGY | Facility: CLINIC | Age: 61
End: 2021-04-29
Payer: COMMERCIAL

## 2021-04-29 VITALS
SYSTOLIC BLOOD PRESSURE: 139 MMHG | WEIGHT: 136.5 LBS | HEIGHT: 61 IN | HEART RATE: 80 BPM | BODY MASS INDEX: 25.77 KG/M2 | DIASTOLIC BLOOD PRESSURE: 80 MMHG

## 2021-04-29 DIAGNOSIS — D35.00 ADRENAL ADENOMA, UNSPECIFIED LATERALITY: ICD-10-CM

## 2021-04-29 DIAGNOSIS — E27.8 ADRENAL NODULE (HCC): ICD-10-CM

## 2021-04-29 DIAGNOSIS — I10 HYPERTENSION, UNSPECIFIED TYPE: Primary | ICD-10-CM

## 2021-04-29 PROCEDURE — 99244 OFF/OP CNSLTJ NEW/EST MOD 40: CPT | Performed by: INTERNAL MEDICINE

## 2021-04-29 NOTE — PROGRESS NOTES
OFFICE CONSULT - Nephrology   Theo Chris 57 y o  female MRN: [id number]        ASSESSMENT and PLAN:  Nichelle Magaña was seen today for consult and chronic kidney disease  Diagnoses and all orders for this visit:    Hypertension, unspecified type  -     Ambulatory referral to Nephrology  -     CBC; Future  -     Urinalysis with microscopic; Future  -     Microalbumin / creatinine urine ratio; Future    Adrenal nodule ([address] )  -     Ambulatory referral to Nephrology    Adrenal adenoma, unspecified laterality         This is a 65-year-old lady with history hypertension, palpitations, diverticulosis with episode of diverticulitis on 05/2020, during that admission CT scan of the chest abdomen and pelvis showed small nonspecific bilateral adrenal nodules likely representing adenomas  patient was referred to our office for evaluation of hypertension  1  Hypertension, blood pressure today in the office acceptable, noted she is on atenolol 50 mg daily  Long discussion with patient about importance of following a low-salt diet as well as to avoid NSAIDs  Noted that she does not follow a strict low-salt diet and also that she is taking ibuprofen/ Tylenol on a daily basis for chronic abdominal pain  recommend to check her blood pressure at home for the next 2 weeks and send blood pressure log  Primary care doctor ordered some workup for adrenal adenoma  Will check urinalysis with urine microalbumin to creatinine ratio   will follow results     2  Small nonspecific bilateral adrenal nodules likely representing adenoma based on CT scan of May/2020 and August/2020     3  Diverticulosis  4  Mild anemia based on last blood test on 2020, with repeat CBC now  5  Palpitations, currently on atenolol, will defer further investigation to primary care doctor      Patient Instructions   As we discussed in the office visit, your blood pressure today is acceptable    Please check your blood pressure at home at least 3 times a week for the next couple of weeks and keep a log  It is very important to follow low-salt diet  Continue with regular physical activity  Avoid NSAIDs as much as possible (no ibuprofen, Motrin, Advil, Aleve, naproxen)  Okay to take Tylenol or paracetamol or acetaminophen as needed for pain  I want you to have repeat blood and urine test, will contact you with the results  Continue regular follow-up primary care doctor  I would like to see you back in the office in 4-6 months to based on the results  HPI:  Corazon Hull is a 57 y  o female who was referred by KATIANA Orosco for evaluation of Consult and Chronic Kidney Disease    this is a patient with history of hypertension, palpitations, diverticulosis, found to have bilaterally small adrenal adenoma based on a CT scan 2020 while hospitalized with abdominal pain secondary to diverticulitis  Patient recently saw primary care doctor, blood pressure was elevated, patient was referred to Nephrology for further recommendation  Today in general patient is doing okay  Currently denies any complaints  She reports palpitation for several years, usually notices it at the end of the day when she is resting  She also complains of chronic epigastric pain as well as her lower quadrant abdominal pain for over a year for which she takes Tylenol and ibuprofen almost on a daily basis  Currently denies any chest pain, shortness of breath, no leg edema  Denies any urinary problems, no burning sensation, no gross hematuria  Denies family history of kidney disease  Strong family history hypertension on mother, sister  denies tobacco abuse  Denies following a low-salt diet    I personally spent over half of a total 48 minutes face to face with the patient in counseling and discussion and/or coordination of care as described above      ROS: All the systems were reviewed and were negative except as documented on the HPI  Allergies: Patient has no known allergies  Medications:   Current Outpatient Medications:     ALPRAZolam (XANAX) 0 25 mg tablet, Take 1 tablet (0 25 mg total) by mouth daily at bedtime as needed for anxiety, Disp: 20 tablet, Rfl: 0    atenolol (TENORMIN) 50 mg tablet, Take 1 tablet (50 mg total) by mouth daily, Disp: 90 tablet, Rfl: 1    conjugated estrogens (PREMARIN) vaginal cream, Insert 0 5 g into the vagina daily, Disp: 30 g, Rfl: 3    omeprazole (PriLOSEC) 40 MG capsule, Take 1 capsule (40 mg total) by mouth daily, Disp: 90 capsule, Rfl: 0    famotidine (PEPCID) 20 mg tablet, Take 1 tablet (20 mg total) by mouth 2 (two) times a day (Patient not taking: Reported on 4/29/2021), Disp: 60 tablet, Rfl: 2    Past Medical History:   Diagnosis Date    Adjustment disorder with anxiety     last assessed - [id number]    Anemia     Anxiety     Diverticulitis     GERD (gastroesophageal reflux disease) 2000    History of total hysterectomy     History of transfusion     Hypertension     Hypertension 3/12/2021    Ingrown toenail     last assessed - [id number]    Thalassemia      Past Surgical History:   Procedure Laterality Date    APPENDECTOMY      CHOLECYSTECTOMY      COLONOSCOPY      GALLBLADDER SURGERY      OOPHORECTOMY Bilateral     TONSILLECTOMY AND ADENOIDECTOMY      TOTAL ABDOMINAL HYSTERECTOMY       Family History   Problem Relation Age of Onset    Anxiety disorder Mother         Anxiety    Depression Mother     Anxiety disorder Sister         Anxiety    Depression Sister     Anxiety disorder Daughter         Anxiety    Depression Daughter     Heart disease Maternal Aunt     Heart disease Maternal Uncle     Cancer Sister         Melanoma    No Known Problems Sister     No Known Problems Sister       reports that she has never smoked  She has never used smokeless tobacco  She reports current alcohol use of about 1 0 standard drinks of alcohol per week   She reports that she does not use drugs  Physical Exam:   Vitals:    04/29/21 1000 04/29/21 1028   BP: 140/80 139/80   BP Location: Right arm Left arm   Patient Position: Sitting Sitting   Cuff Size: Standard Standard   Pulse:  80   Weight: 61 9 kg (136 lb 8 oz)    Height: 5' 1" (1 549 m)      Body mass index is 25 79 kg/m²  General: conscious, cooperative, in not acute distress  Eyes: conjunctivae pink, anicteric sclerae  ENT: lips and mucous membranes moist  Neck: supple, no JVD  Chest: clear breath sounds bilateral, no crackles, ronchus or wheezings  CVS: distinct S1 & S2, normal rate, regular rhythm  Abdomen: soft, non-tender, non-distended, normoactive bowel sounds  Back: no CVA tenderness  Extremities: no edema of both legs  Skin: no rash  Neuro: awake, alert, oriented            Portions of the record may have been created with voice recognition software  Occasional wrong word or "sound a like" substitutions may have occurred due to the inherent limitations of voice recognition software  Read the chart carefully and recognize, using context, where substitutions have occurred  If you have any questions, please contact the dictating provider

## 2021-04-29 NOTE — PATIENT INSTRUCTIONS
As we discussed in the office visit, your blood pressure today is acceptable  Please check your blood pressure at home at least 3 times a week for the next couple of weeks and keep a log  It is very important to follow low-salt diet  Continue with regular physical activity  Avoid NSAIDs as much as possible (no ibuprofen, Motrin, Advil, Aleve, naproxen)  Okay to take Tylenol or paracetamol or acetaminophen as needed for pain  I want you to have repeat blood and urine test, will contact you with the results  Continue regular follow-up primary care doctor  I would like to see you back in the office in 4-6 months to based on the results

## 2021-04-30 ENCOUNTER — APPOINTMENT (OUTPATIENT)
Dept: LAB | Facility: HOSPITAL | Age: 61
End: 2021-04-30
Payer: COMMERCIAL

## 2021-04-30 ENCOUNTER — TRANSCRIBE ORDERS (OUTPATIENT)
Dept: ADMINISTRATIVE | Facility: HOSPITAL | Age: 61
End: 2021-04-30

## 2021-04-30 ENCOUNTER — APPOINTMENT (OUTPATIENT)
Dept: LAB | Facility: HOSPITAL | Age: 61
End: 2021-04-30

## 2021-04-30 DIAGNOSIS — K21.9 GASTROESOPHAGEAL REFLUX DISEASE WITHOUT ESOPHAGITIS: ICD-10-CM

## 2021-04-30 DIAGNOSIS — I10 HYPERTENSION, UNSPECIFIED TYPE: ICD-10-CM

## 2021-04-30 DIAGNOSIS — E27.8 ADRENAL NODULE (HCC): ICD-10-CM

## 2021-04-30 DIAGNOSIS — Z00.8 HEALTH EXAMINATION IN POPULATION SURVEY: ICD-10-CM

## 2021-04-30 DIAGNOSIS — Z00.8 HEALTH EXAMINATION IN POPULATION SURVEY: Primary | ICD-10-CM

## 2021-04-30 LAB
ALBUMIN SERPL BCP-MCNC: 4.1 G/DL (ref 3.5–5)
ALP SERPL-CCNC: 99 U/L (ref 46–116)
ALT SERPL W P-5'-P-CCNC: 21 U/L (ref 12–78)
ANION GAP SERPL CALCULATED.3IONS-SCNC: 8 MMOL/L (ref 4–13)
AST SERPL W P-5'-P-CCNC: 15 U/L (ref 5–45)
BACTERIA UR QL AUTO: ABNORMAL /HPF
BILIRUB SERPL-MCNC: 0.73 MG/DL (ref 0.2–1)
BILIRUB UR QL STRIP: NEGATIVE
BUN SERPL-MCNC: 13 MG/DL (ref 5–25)
CALCIUM SERPL-MCNC: 9.3 MG/DL (ref 8.3–10.1)
CHLORIDE SERPL-SCNC: 102 MMOL/L (ref 100–108)
CHOLEST SERPL-MCNC: 190 MG/DL (ref 50–200)
CLARITY UR: CLEAR
CO2 SERPL-SCNC: 29 MMOL/L (ref 21–32)
COLOR UR: YELLOW
CREAT SERPL-MCNC: 1.02 MG/DL (ref 0.6–1.3)
CREAT UR-MCNC: 159 MG/DL
ERYTHROCYTE [DISTWIDTH] IN BLOOD BY AUTOMATED COUNT: 15 % (ref 11.6–15.1)
EST. AVERAGE GLUCOSE BLD GHB EST-MCNC: 105 MG/DL
GFR SERPL CREATININE-BSD FRML MDRD: 59 ML/MIN/1.73SQ M
GLUCOSE P FAST SERPL-MCNC: 90 MG/DL (ref 65–99)
GLUCOSE UR STRIP-MCNC: NEGATIVE MG/DL
HBA1C MFR BLD: 5.3 %
HCT VFR BLD AUTO: 39.2 % (ref 34.8–46.1)
HDLC SERPL-MCNC: 51 MG/DL
HGB BLD-MCNC: 11.8 G/DL (ref 11.5–15.4)
HGB UR QL STRIP.AUTO: NEGATIVE
KETONES UR STRIP-MCNC: NEGATIVE MG/DL
LDLC SERPL CALC-MCNC: 98 MG/DL (ref 0–100)
LEUKOCYTE ESTERASE UR QL STRIP: NEGATIVE
MCH RBC QN AUTO: 20.7 PG (ref 26.8–34.3)
MCHC RBC AUTO-ENTMCNC: 30.1 G/DL (ref 31.4–37.4)
MCV RBC AUTO: 69 FL (ref 82–98)
MICROALBUMIN UR-MCNC: 6 MG/L (ref 0–20)
MICROALBUMIN/CREAT 24H UR: 4 MG/G CREATININE (ref 0–30)
NITRITE UR QL STRIP: NEGATIVE
NON-SQ EPI CELLS URNS QL MICRO: ABNORMAL /HPF
NONHDLC SERPL-MCNC: 139 MG/DL
PH UR STRIP.AUTO: 5.5 [PH]
PLATELET # BLD AUTO: 341 THOUSANDS/UL (ref 149–390)
PMV BLD AUTO: 9.6 FL (ref 8.9–12.7)
POTASSIUM SERPL-SCNC: 4.2 MMOL/L (ref 3.5–5.3)
PROT SERPL-MCNC: 7.7 G/DL (ref 6.4–8.2)
PROT UR STRIP-MCNC: NEGATIVE MG/DL
RBC # BLD AUTO: 5.69 MILLION/UL (ref 3.81–5.12)
RBC #/AREA URNS AUTO: ABNORMAL /HPF
SODIUM SERPL-SCNC: 139 MMOL/L (ref 136–145)
SP GR UR STRIP.AUTO: 1.02 (ref 1–1.03)
TRIGL SERPL-MCNC: 205 MG/DL
TSH SERPL DL<=0.05 MIU/L-ACNC: 1.14 UIU/ML (ref 0.36–3.74)
UROBILINOGEN UR QL STRIP.AUTO: 0.2 E.U./DL
WBC # BLD AUTO: 6.98 THOUSAND/UL (ref 4.31–10.16)
WBC #/AREA URNS AUTO: ABNORMAL /HPF

## 2021-04-30 PROCEDURE — 36415 COLL VENOUS BLD VENIPUNCTURE: CPT

## 2021-04-30 PROCEDURE — 82088 ASSAY OF ALDOSTERONE: CPT

## 2021-04-30 PROCEDURE — 82570 ASSAY OF URINE CREATININE: CPT

## 2021-04-30 PROCEDURE — 83835 ASSAY OF METANEPHRINES: CPT

## 2021-04-30 PROCEDURE — 85027 COMPLETE CBC AUTOMATED: CPT

## 2021-04-30 PROCEDURE — 82043 UR ALBUMIN QUANTITATIVE: CPT

## 2021-04-30 PROCEDURE — 84244 ASSAY OF RENIN: CPT

## 2021-04-30 PROCEDURE — 81001 URINALYSIS AUTO W/SCOPE: CPT

## 2021-04-30 PROCEDURE — 82384 ASSAY THREE CATECHOLAMINES: CPT

## 2021-04-30 PROCEDURE — 83036 HEMOGLOBIN GLYCOSYLATED A1C: CPT

## 2021-04-30 PROCEDURE — 80061 LIPID PANEL: CPT

## 2021-04-30 PROCEDURE — 84443 ASSAY THYROID STIM HORMONE: CPT

## 2021-04-30 PROCEDURE — 80053 COMPREHEN METABOLIC PANEL: CPT

## 2021-04-30 RX ORDER — OMEPRAZOLE 40 MG/1
40 CAPSULE, DELAYED RELEASE ORAL DAILY
Qty: 90 CAPSULE | Refills: 0 | Status: SHIPPED | OUTPATIENT
Start: 2021-04-30 | End: 2021-07-30 | Stop reason: SDUPTHER

## 2021-05-04 DIAGNOSIS — F41.9 ANXIETY: ICD-10-CM

## 2021-05-04 LAB
DOPAMINE 24H UR-MRATE: 41 PG/ML (ref 0–48)
EPINEPH PLAS-MCNC: <15 PG/ML (ref 0–62)
NOREPINEPH PLAS-MCNC: 1139 PG/ML (ref 0–874)

## 2021-05-04 RX ORDER — ALPRAZOLAM 0.25 MG/1
0.25 TABLET ORAL
Qty: 20 TABLET | Refills: 0 | Status: SHIPPED | OUTPATIENT
Start: 2021-05-04 | End: 2021-06-01 | Stop reason: SDUPTHER

## 2021-05-04 NOTE — TELEPHONE ENCOUNTER
Medication:  PDMP   Active agreement on file -No      04/05/2021  1  04/05/2021  ALPRAZOLAM 0 25 MG TABLET  20 0  20  DO MEN

## 2021-05-05 ENCOUNTER — CONSULT (OUTPATIENT)
Dept: OBGYN CLINIC | Facility: CLINIC | Age: 61
End: 2021-05-05
Payer: COMMERCIAL

## 2021-05-05 ENCOUNTER — APPOINTMENT (OUTPATIENT)
Dept: RADIOLOGY | Facility: AMBULARY SURGERY CENTER | Age: 61
End: 2021-05-05
Attending: SURGERY
Payer: COMMERCIAL

## 2021-05-05 VITALS
HEIGHT: 61 IN | DIASTOLIC BLOOD PRESSURE: 82 MMHG | HEART RATE: 47 BPM | SYSTOLIC BLOOD PRESSURE: 119 MMHG | WEIGHT: 135.6 LBS | BODY MASS INDEX: 25.6 KG/M2

## 2021-05-05 DIAGNOSIS — M18.12 ARTHRITIS OF CARPOMETACARPAL (CMC) JOINT OF LEFT THUMB: Primary | ICD-10-CM

## 2021-05-05 DIAGNOSIS — M79.645 CHRONIC PAIN OF LEFT THUMB: ICD-10-CM

## 2021-05-05 DIAGNOSIS — G89.29 CHRONIC PAIN OF LEFT THUMB: ICD-10-CM

## 2021-05-05 PROCEDURE — 99244 OFF/OP CNSLTJ NEW/EST MOD 40: CPT | Performed by: SURGERY

## 2021-05-05 PROCEDURE — 20600 DRAIN/INJ JOINT/BURSA W/O US: CPT | Performed by: SURGERY

## 2021-05-05 PROCEDURE — 73140 X-RAY EXAM OF FINGER(S): CPT

## 2021-05-05 RX ORDER — BUPIVACAINE HYDROCHLORIDE 2.5 MG/ML
0.5 INJECTION, SOLUTION INFILTRATION; PERINEURAL
Status: COMPLETED | OUTPATIENT
Start: 2021-05-05 | End: 2021-05-05

## 2021-05-05 RX ORDER — LIDOCAINE HYDROCHLORIDE 10 MG/ML
1 INJECTION, SOLUTION INFILTRATION; PERINEURAL
Status: COMPLETED | OUTPATIENT
Start: 2021-05-05 | End: 2021-05-05

## 2021-05-05 RX ORDER — TRIAMCINOLONE ACETONIDE 40 MG/ML
20 INJECTION, SUSPENSION INTRA-ARTICULAR; INTRAMUSCULAR
Status: COMPLETED | OUTPATIENT
Start: 2021-05-05 | End: 2021-05-05

## 2021-05-05 RX ADMIN — BUPIVACAINE HYDROCHLORIDE 0.5 ML: 2.5 INJECTION, SOLUTION INFILTRATION; PERINEURAL at 08:43

## 2021-05-05 RX ADMIN — LIDOCAINE HYDROCHLORIDE 1 ML: 10 INJECTION, SOLUTION INFILTRATION; PERINEURAL at 08:43

## 2021-05-05 RX ADMIN — TRIAMCINOLONE ACETONIDE 20 MG: 40 INJECTION, SUSPENSION INTRA-ARTICULAR; INTRAMUSCULAR at 08:43

## 2021-05-05 NOTE — PROGRESS NOTES
Sarita HONEYCUTT  Attending, Orthopaedic Surgery  Hand, Wrist, and Elbow Surgery  [facility]      ORTHOPAEDIC HAND, WRIST, AND ELBOW OFFICE  VISIT       ASSESSMENT/PLAN:      57 y o  female with left thumb CMC arthritis  The etiology of [facility] arthritis was discussed today along with treatment options  Eulalio Woods was fit with a comfort cool brace, to be worn during the day with activities  Voltaren Gel was prescribed and sent to her pharmacy electronically, to be used up to 4x a day  The decision was made to proceed with an initial left thumb CMC CSI  The CSI was performed in the office without complication  Post injection protocol was discussed  The CSI can be repeated every 3 months as long as it is beneficial for her  Discussed switching steroids if the CSI is not beneficial for her  Follow up in 6 weeks time for re-evaluation  The patient verbalized understanding of exam findings and treatment plan  We engaged in the shared decision-making process and treatment options were discussed at length with the patient  Surgical and conservative management discussed today along with risks and benefits  Diagnoses and all orders for this visit:    Arthritis of carpometacarpal (CMC) joint of left thumb  -     Thumb Cude comf/Cool    Chronic pain of left thumb  -     Ambulatory referral to Orthopedic Surgery  -     XR thumb left first digit-min 2v; Future      Follow Up:  Return in about 6 weeks (around 6/16/2021)  To Do Next Visit:  Re-evaluation of current issue      General Discussions:  [facility] Arthritis: The anatomy and physiology of carpometacarpal joint arthritis was discussed with the patient today in the office  Deterioration of the articular cartilage eventually leads to hypermobility at the thumb [facility] joint, resulting in joint subluxation, osteophyte formation, cystic changes within the trapezium and base of the first metacarpal, as well as subchondral sclerosis    Eventually, pain, limited mobility, and compensatory hyperextension at the metacarpophalangeal joint may develop  While normal activity and usage of the thumb joint may provide a painful experience to the patient, this typically does not result in damage to the thumb or hand  Treatment options include resting thumb spica splints to decreased joint edema, pain, and inflammation  Therapy exercises to strengthen the thenar musculature may relieve pain, but do not alter the overall continued development of osteoarthritis  Oral medications, topical medications, corticosteroid injections may decrease pain and increase overall function  Eventually, approximately 5% of patients may require surgical intervention  ____________________________________________________________________________________________________________________________________________      CHIEF COMPLAINT:  Chief Complaint   Patient presents with    Left Thumb - Pain       SUBJECTIVE:  Marquis Dorman is a 57y o  year old RHD female who presents to the office today for left thumb pain  Bhavesh Tanika was referred to the office for consultation at the request of Kory Martinez  Bhavesh Sagastume notes pain to the base of her left thumb, which started a few months ago  She states that the pain can radiate to the radials aspect of her left wrist  She describes her pain as burning  Bhavesh Husseinon notes her pain will increase with grasping activities  She will take Tylenol on an as needed basis  She denies associated numbness/tingling         Pain/symptom timing:  Worse during the day when active  Pain/symptom context:  Worse with activites and work  Pain/symptom modifying factors:  Rest makes better, activities make worse  Pain/symptom associated signs/symptoms: none    Prior treatment   · NSAIDsYes · Injections Yes   · Bracing/Orthotics Yes    Physical Therapy Yes     I have personally reviewed all the relevant PMH, PSH, SH, FH, Medications and allergies      PAST MEDICAL HISTORY:  Past Medical History:   Diagnosis Date    Adjustment disorder with anxiety     last assessed - [id number]    Anemia     Anxiety     Diverticulitis     GERD (gastroesophageal reflux disease) 2000    History of total hysterectomy     History of transfusion     Hypertension     Hypertension 3/12/2021    Ingrown toenail     last assessed - [id number]    Thalassemia        PAST SURGICAL HISTORY:  Past Surgical History:   Procedure Laterality Date    APPENDECTOMY      CHOLECYSTECTOMY      COLONOSCOPY      GALLBLADDER SURGERY      OOPHORECTOMY Bilateral     TONSILLECTOMY AND ADENOIDECTOMY      TOTAL ABDOMINAL HYSTERECTOMY         FAMILY HISTORY:  Family History   Problem Relation Age of Onset    Anxiety disorder Mother         Anxiety    Depression Mother     Anxiety disorder Sister         Anxiety    Depression Sister     Anxiety disorder Daughter         Anxiety    Depression Daughter     Heart disease Maternal Aunt     Heart disease Maternal Uncle     Cancer Sister         Melanoma    No Known Problems Sister     No Known Problems Sister        SOCIAL HISTORY:  Social History     Tobacco Use    Smoking status: Never Smoker    Smokeless tobacco: Never Used   Substance Use Topics    Alcohol use:  Yes     Alcohol/week: 1 0 standard drinks     Types: 1 Glasses of wine per week     Frequency: Monthly or less     Drinks per session: 1 or 2     Comment: occasional alcohol use    Drug use: No       MEDICATIONS:    Current Outpatient Medications:     ALPRAZolam (XANAX) 0 25 mg tablet, Take 1 tablet (0 25 mg total) by mouth daily at bedtime as needed for anxiety, Disp: 20 tablet, Rfl: 0    atenolol (TENORMIN) 50 mg tablet, Take 1 tablet (50 mg total) by mouth daily, Disp: 90 tablet, Rfl: 1    conjugated estrogens (PREMARIN) vaginal cream, Insert 0 5 g into the vagina daily, Disp: 30 g, Rfl: 3    omeprazole (PriLOSEC) 40 MG capsule, Take 1 capsule (40 mg total) by mouth daily, Disp: 90 capsule, Rfl: 0    famotidine (PEPCID) 20 mg tablet, Take 1 tablet (20 mg total) by mouth 2 (two) times a day (Patient not taking: Reported on 4/29/2021), Disp: 60 tablet, Rfl: 2    ALLERGIES:  No Known Allergies        REVIEW OF SYSTEMS:  Review of Systems   Constitutional: Negative for chills, fever and unexpected weight change  HENT: Negative for hearing loss, nosebleeds and sore throat  Eyes: Negative for pain, redness and visual disturbance  Respiratory: Negative for cough, shortness of breath and wheezing  Cardiovascular: Negative for chest pain, palpitations and leg swelling  Gastrointestinal: Negative for abdominal pain, nausea and vomiting  Endocrine: Negative for polydipsia and polyuria  Genitourinary: Negative for difficulty urinating and hematuria  Musculoskeletal: Positive for arthralgias  Negative for joint swelling and myalgias  Skin: Negative for rash and wound  Neurological: Negative for dizziness, numbness and headaches  Psychiatric/Behavioral: Negative for decreased concentration, dysphoric mood and suicidal ideas  The patient is not nervous/anxious          VITALS:  Vitals:    05/05/21 0801   BP: 119/82   Pulse: (!) 47       LABS:  HgA1c:   Lab Results   Component Value Date    HGBA1C 5 3 04/30/2021     BMP:   Lab Results   Component Value Date    GLUCOSE 84 09/22/2015    CALCIUM 9 3 04/30/2021     09/22/2015    K 4 2 04/30/2021    CO2 29 04/30/2021     04/30/2021    BUN 13 04/30/2021    CREATININE 1 02 04/30/2021       _____________________________________________________  PHYSICAL EXAMINATION:  General: well developed and well nourished, alert, oriented times 3 and appears comfortable  Psychiatric: Normal  HEENT: Normocephalic, Atraumatic Trachea Midline, No torticollis  Pulmonary: No audible wheezing or respiratory distress   Cardiovascular: No pitting edema, 2+ radial pulse   Skin: No masses, erythema, lacerations, fluctation, ulcerations  Neurovascular: Sensation Intact to the Median, Ulnar, Radial Nerve, Motor Intact to the Median, Ulnar, Radial Nerve and Pulses Intact  Musculoskeletal: Normal, except as noted in detailed exam and in HPI  MUSCULOSKELETAL EXAMINATION:    left CMC Exam:  No adduction contracture  No hyperextension deformity of MCP joint  Positive localized tenderness over radial and dorsal aspect of thumb (CMC joint)  Grind test is Positive for pain and Negative for crepitus  Metacarpal load shift test positive   No triggering or tenderness over the A1 pulley  No pain with Finkelsteins maneuver     De Quervain's Tenosynovitis Exam:  left side  Negative tender to palpation over 1st dorsal extensor compartment   Negative palpable nodule  Negative crepitus over 1st dorsal extensor compartment   Negative Finkelstein's test    Negative pain with resisted abduction of the thumb    ___________________________________________________  STUDIES REVIEWED:  I have personally reviewed AP lateral and oblique radiographs of  Left hand   which demonstrate Stage 2- 3 [place] [facility] arthrosis           PROCEDURES PERFORMED:  Small joint arthrocentesis: L thumb CMC  [place] Protocol:  Consent: Verbal consent obtained  Written consent not obtained  Consent given by: patient  Time out: Immediately prior to procedure a "time out" was called to verify the correct patient, procedure, equipment, support staff and site/side marked as required    Site marked: the operative site was marked  Patient identity confirmed: verbally with patient    Supporting Documentation  Indications: pain   Procedure Details  Location: thumb - L thumb CMC  Preparation: Patient was prepped and draped in the usual sterile fashion  Needle size: 25 G  Ultrasound guidance: no  Medications administered: 0 5 mL bupivacaine 0 25 %; 1 mL lidocaine 1 %; 20 mg triamcinolone acetonide 40 mg/mL    Patient tolerance: patient tolerated the procedure well with no immediate complications  Dressing:  Sterile dressing applied           _____________________________________________________      Scribe Attestation    I,:  Kika Metz am acting as a scribe while in the presence of the attending physician :       I,:  David Tavarez MD personally performed the services described in this documentation    as scribed in my presence :

## 2021-05-06 LAB
ALDOST SERPL-MCNC: 3 NG/DL (ref 0–30)
ALDOST/RENIN PLAS-RTO: 3.9 {RATIO} (ref 0–30)
RENIN PLAS-CCNC: 0.78 NG/ML/HR (ref 0.17–5.38)

## 2021-05-07 ENCOUNTER — OFFICE VISIT (OUTPATIENT)
Dept: FAMILY MEDICINE CLINIC | Facility: CLINIC | Age: 61
End: 2021-05-07
Payer: COMMERCIAL

## 2021-05-07 VITALS
DIASTOLIC BLOOD PRESSURE: 80 MMHG | RESPIRATION RATE: 14 BRPM | WEIGHT: 136 LBS | SYSTOLIC BLOOD PRESSURE: 130 MMHG | HEART RATE: 57 BPM | HEIGHT: 61 IN | OXYGEN SATURATION: 98 % | BODY MASS INDEX: 25.68 KG/M2 | TEMPERATURE: 98.3 F

## 2021-05-07 DIAGNOSIS — K21.9 GASTROESOPHAGEAL REFLUX DISEASE, UNSPECIFIED WHETHER ESOPHAGITIS PRESENT: ICD-10-CM

## 2021-05-07 DIAGNOSIS — I10 HYPERTENSION, UNSPECIFIED TYPE: Primary | ICD-10-CM

## 2021-05-07 DIAGNOSIS — M79.645 CHRONIC PAIN OF LEFT THUMB: ICD-10-CM

## 2021-05-07 DIAGNOSIS — G89.29 CHRONIC PAIN OF LEFT THUMB: ICD-10-CM

## 2021-05-07 PROBLEM — K57.20 DIVERTICULITIS OF LARGE INTESTINE WITH ABSCESS WITHOUT BLEEDING: Status: RESOLVED | Noted: 2020-05-24 | Resolved: 2021-05-07

## 2021-05-07 PROBLEM — K57.90 DIVERTICULOSIS: Status: ACTIVE | Noted: 2021-05-07

## 2021-05-07 PROCEDURE — 99213 OFFICE O/P EST LOW 20 MIN: CPT | Performed by: NURSE PRACTITIONER

## 2021-05-07 NOTE — PROGRESS NOTES
FAMILY PRACTICE OFFICE VISIT       NAME: Adele Mitchell  AGE: 57 y o  SEX: female       : 1960        MRN: [id number]    DATE: 2021  TIME: 3:43 PM    Assessment and Plan   1  Hypertension, unspecified type  Assessment & Plan:  Stable  Cont current meds        2  Gastroesophageal reflux disease, unspecified whether esophagitis present  Assessment & Plan:  Stable  Cont current meds        3  Chronic pain of left thumb  Assessment & Plan:  Cont f/u with ortho                 Chief Complaint     Chief Complaint   Patient presents with    Follow-up       History of Present Illness   Adele Mitchell is a 57y o -year-old female who is here for f/u   Sister is passing from melanoma, will  today or tomorrow, very emotional  Saw nephrology, all ok per patient  Continues to have intermittent ayesha horses at night in right calf and thigh  May be related to low back  Otherwise feels well  Labs and testing reviewed with patient  Saw ortho for thumb and given steroid injection, it helped with pain        Review of Systems   Review of Systems   Constitutional: Negative for fatigue and fever  HENT: Negative for congestion, postnasal drip and rhinorrhea  Eyes: Negative for photophobia and visual disturbance  Respiratory: Negative for cough and shortness of breath  Cardiovascular: Negative for chest pain and palpitations  Gastrointestinal: Negative for constipation, diarrhea and nausea  Genitourinary: Negative for dysuria and frequency  Musculoskeletal: Negative for arthralgias and myalgias  Skin: Negative for rash  Neurological: Negative for dizziness, light-headedness and headaches  Hematological: Negative for adenopathy  Psychiatric/Behavioral: Negative for dysphoric mood and sleep disturbance  The patient is not nervous/anxious          Active Problem List     Patient Active Problem List   Diagnosis    Tachycardia    Anxiety    Gastroesophageal reflux disease    Dense breast tissue  Vaginal atrophy    Low bone density    Adrenal adenoma    History of hysterectomy    Hypertension    Chronic pain of left thumb    Adrenal nodule (HCC)    Diverticulosis         Past Medical History:  Past Medical History:   Diagnosis Date    Adjustment disorder with anxiety     last assessed - [id number]    Anemia     Anxiety     Diverticulitis     Diverticulitis of large intestine with abscess without bleeding 5/24/2020    GERD (gastroesophageal reflux disease) 2000    History of total hysterectomy     History of transfusion     Hypertension     Hypertension 3/12/2021    Ingrown toenail     last assessed - [id number]    Thalassemia        Past Surgical History:  Past Surgical History:   Procedure Laterality Date    APPENDECTOMY      CHOLECYSTECTOMY      COLONOSCOPY      GALLBLADDER SURGERY      OOPHORECTOMY Bilateral     TONSILLECTOMY AND ADENOIDECTOMY      TOTAL ABDOMINAL HYSTERECTOMY         Family History:  Family History   Problem Relation Age of Onset    Anxiety disorder Mother         Anxiety    Depression Mother     Anxiety disorder Sister         Anxiety    Depression Sister     Anxiety disorder Daughter         Anxiety    Depression Daughter     Heart disease Maternal Aunt     Heart disease Maternal Uncle     Cancer Sister         Melanoma    No Known Problems Sister     No Known Problems Sister        Social History:  Social History     Socioeconomic History    Marital status: /Civil Union     Spouse name: Not on file    Number of children: Not on file    Years of education: Not on file    Highest education level: Not on file   Occupational History    Occupation:  - [employer]   Social Needs    Financial resource strain: Not on file    Food insecurity     Worry: Not on file     Inability: Not on file    Transportation needs     Medical: Not on file     Non-medical: Not on file   Tobacco Use    Smoking status: Never Smoker    Smokeless tobacco: Never Used   Substance and Sexual Activity    Alcohol use: Yes     Alcohol/week: 1 0 standard drinks     Types: 1 Glasses of wine per week     Frequency: Monthly or less     Drinks per session: 1 or 2     Comment: occasional alcohol use    Drug use: No    Sexual activity: Not Currently     Partners: Male     Comment: Hysterectomy 1988   Lifestyle    Physical activity     Days per week: Not on file     Minutes per session: Not on file    Stress: Not on file   Relationships    Social connections     Talks on phone: Not on file     Gets together: Not on file     Attends Jainism service: Not on file     Active member of club or organization: Not on file     Attends meetings of clubs or organizations: Not on file     Relationship status: Not on file    Intimate partner violence     Fear of current or ex partner: Not on file     Emotionally abused: Not on file     Physically abused: Not on file     Forced sexual activity: Not on file   Other Topics Concern    Not on file   Social History Narrative    Coffee       Objective     Vitals:    05/07/21 1330   BP: 130/80   Pulse: 57   Resp: 14   Temp: 98 3 °F (36 8 °C)   SpO2: 98%     Wt Readings from Last 3 Encounters:   05/07/21 61 7 kg (136 lb)   05/05/21 61 5 kg (135 lb 9 6 oz)   04/29/21 61 9 kg (136 lb 8 oz)       Physical Exam  Vitals signs and nursing note reviewed  Constitutional:       Appearance: Normal appearance  HENT:      Head: Normocephalic and atraumatic  Nose: Nose normal    Eyes:      Conjunctiva/sclera: Conjunctivae normal    Neck:      Musculoskeletal: Normal range of motion and neck supple  Cardiovascular:      Rate and Rhythm: Normal rate and regular rhythm  Pulses: Normal pulses  Heart sounds: Normal heart sounds  Pulmonary:      Effort: Pulmonary effort is normal       Breath sounds: Normal breath sounds  Abdominal:      General: Bowel sounds are normal    Musculoskeletal: Normal range of motion     Skin: General: Skin is warm and dry  Capillary Refill: Capillary refill takes less than 2 seconds  Neurological:      General: No focal deficit present  Mental Status: She is alert and oriented to person, place, and time  Psychiatric:         Mood and Affect: Mood normal          Behavior: Behavior normal          Thought Content:  Thought content normal          Judgment: Judgment normal          Pertinent Laboratory/Diagnostic Studies:  Lab Results   Component Value Date    GLUCOSE 84 09/22/2015    BUN 13 04/30/2021    CREATININE 1 02 04/30/2021    CALCIUM 9 3 04/30/2021     09/22/2015    K 4 2 04/30/2021    CO2 29 04/30/2021     04/30/2021     Lab Results   Component Value Date    ALT 21 04/30/2021    AST 15 04/30/2021    ALKPHOS 99 04/30/2021    BILITOT 0 69 09/22/2015       Lab Results   Component Value Date    WBC 6 98 04/30/2021    HGB 11 8 04/30/2021    HCT 39 2 04/30/2021    MCV 69 (L) 04/30/2021     04/30/2021       No results found for: TSH    Lab Results   Component Value Date    CHOL 169 09/22/2015     Lab Results   Component Value Date    TRIG 205 (H) 04/30/2021     Lab Results   Component Value Date    HDL 51 04/30/2021     Lab Results   Component Value Date    LDLCALC 98 04/30/2021     Lab Results   Component Value Date    HGBA1C 5 3 04/30/2021       Results for orders placed or performed in visit on 04/30/21   Comprehensive metabolic panel   Result Value Ref Range    Sodium 139 136 - 145 mmol/L    Potassium 4 2 3 5 - 5 3 mmol/L    Chloride 102 100 - 108 mmol/L    CO2 29 21 - 32 mmol/L    ANION GAP 8 4 - 13 mmol/L    BUN 13 5 - 25 mg/dL    Creatinine 1 02 0 60 - 1 30 mg/dL    Glucose, Fasting 90 65 - 99 mg/dL    Calcium 9 3 8 3 - 10 1 mg/dL    AST 15 5 - 45 U/L    ALT 21 12 - 78 U/L    Alkaline Phosphatase 99 46 - 116 U/L    Total Protein 7 7 6 4 - 8 2 g/dL    Albumin 4 1 3 5 - 5 0 g/dL    Total Bilirubin 0 73 0 20 - 1 00 mg/dL    eGFR 59 ml/min/1 73sq m   Aldosterone/Renin Ratio   Result Value Ref Range    Renin 0 777 0 167 - 5 380 ng/mL/hr    Aldosterone 3 0 0 0 - 30 0 ng/dL    Aldos/Renin Ratio 3 9 0 0 - 30 0   TSH, 3rd generation with Free T4 reflex   Result Value Ref Range    TSH 3RD GENERATON 1 137 0 358 - 3 740 uIU/mL   Catecholamines, fractionated, plasma   Result Value Ref Range    Norepinephrine Plasma 1139 (H) 0 - 874 pg/mL    Epinephrine Plasma <15 0 - 62 pg/mL    Dopamine Plasma 41 0 - 48 pg/mL   CBC   Result Value Ref Range    WBC 6 98 4 31 - 10 16 Thousand/uL    RBC 5 69 (H) 3 81 - 5 12 Million/uL    Hemoglobin 11 8 11 5 - 15 4 g/dL    Hematocrit 39 2 34 8 - 46 1 %    MCV 69 (L) 82 - 98 fL    MCH 20 7 (L) 26 8 - 34 3 pg    MCHC 30 1 (L) 31 4 - 37 4 g/dL    RDW 15 0 11 6 - 15 1 %    Platelets 096 096 - 719 Thousands/uL    MPV 9 6 8 9 - 12 7 fL   Urinalysis with microscopic   Result Value Ref Range    Clarity, UA Clear     Color, UA Yellow     Specific [place], UA 1 020 1 003 - 1 030    pH, UA 5 5 4 5, 5 0, 5 5, 6 0, 6 5, 7 0, 7 5, 8 0    Glucose, UA Negative Negative mg/dl    Ketones, UA Negative Negative mg/dl    Blood, UA Negative Negative    Protein, UA Negative Negative mg/dl    Nitrite, UA Negative Negative    Bilirubin, UA Negative Negative    Urobilinogen, UA 0 2 0 2, 1 0 E U /dl E U /dl    Leukocytes, UA Negative Negative    WBC, UA 0-1 (A) None Seen, 2-4 /hpf    RBC, UA None Seen None Seen, 2-4 /hpf    Bacteria, UA Occasional None Seen, Occasional /hpf    Epithelial Cells Occasional None Seen, Occasional /hpf   Microalbumin / creatinine urine ratio   Result Value Ref Range    Creatinine, Ur 159 0 mg/dL    Microalbum  ,U,Random 6 0 0 0 - 20 0 mg/L    Microalb Creat Ratio 4 0 - 30 mg/g creatinine       No orders of the defined types were placed in this encounter        ALLERGIES:  No Known Allergies    Current Medications     Current Outpatient Medications   Medication Sig Dispense Refill    ALPRAZolam (XANAX) 0 25 mg tablet Take 1 tablet (0 25 mg total) by mouth daily at bedtime as needed for anxiety 20 tablet 0    atenolol (TENORMIN) 50 mg tablet Take 1 tablet (50 mg total) by mouth daily 90 tablet 1    conjugated estrogens (PREMARIN) vaginal cream Insert 0 5 g into the vagina daily 30 g 3    Diclofenac Sodium (VOLTAREN) 1 % Apply 2 g topically 4 (four) times a day 100 g 2    omeprazole (PriLOSEC) 40 MG capsule Take 1 capsule (40 mg total) by mouth daily 90 capsule 0     No current facility-administered medications for this visit            Health Maintenance     Health Maintenance   Topic Date Due    DTaP,Tdap,and Td Vaccines (1 - Tdap) Never done    Annual Physical  07/01/2021    HIV Screening  01/03/2022 (Originally 4/28/1975)    Influenza Vaccine (Season Ended) 09/01/2021    Depression Screening PHQ  03/12/2022    BMI: Followup Plan  03/16/2022    BMI: Adult  05/07/2022    MAMMOGRAM  06/10/2022    Colonoscopy Surveillance  09/17/2025    Colorectal Cancer Screening  09/17/2030    Hepatitis C Screening  Completed    COVID-19 Vaccine  Completed    Pneumococcal Vaccine: Pediatrics (0 to 5 Years) and At-Risk Patients (6 to 59 Years)  Aged Out    HIB Vaccine  Aged Out    Hepatitis B Vaccine  Aged Out    IPV Vaccine  Aged Out    Hepatitis A Vaccine  Aged Out    Meningococcal ACWY Vaccine  Aged Out    HPV Vaccine  Aged [employer] History   Administered Date(s) Administered    Influenza, recombinant, quadrivalent,injectable, preservative free 10/05/2018    SARS-CoV-2 / COVID-19 mRNA IM ([employer]) 12/23/2020, 01/14/2021          KATIANA Richardson

## 2021-05-08 LAB
METANEPH FREE SERPL-MCNC: 29.2 PG/ML (ref 0–88)
NORMETANEPHRINE SERPL-MCNC: 171.2 PG/ML (ref 0–191.8)

## 2021-05-10 ENCOUNTER — TELEPHONE (OUTPATIENT)
Dept: NEPHROLOGY | Facility: CLINIC | Age: 61
End: 2021-05-10

## 2021-05-10 NOTE — TELEPHONE ENCOUNTER
----- Message from Nellie Zurita MD sent at 5/6/2021  2:53 PM EDT -----  Repeat urine test reviewed and normal   I called patient, no answer, left a message regarding recent renal function test as well as urine test were unremarkable  Workup for secondary hypertension ordered by primary care doctor still in process (metanephrines, aldosterone and renin ratio)  Megan, please contact patient and make sure she received my message    Follow-up in 6 months for blood pressure recheck  Thanks,    I cc message to patient's PCP to keep her updated

## 2021-05-10 NOTE — TELEPHONE ENCOUNTER
I have contacted pt to ensure she received Dr Isidro Old message which she has confirmed  She has no questions regarding her lab results   Pt will follow up in 6 months, she has been placed on the recall list

## 2021-06-01 ENCOUNTER — OFFICE VISIT (OUTPATIENT)
Dept: OBGYN CLINIC | Facility: CLINIC | Age: 61
End: 2021-06-01
Payer: COMMERCIAL

## 2021-06-01 VITALS — BODY MASS INDEX: 25.51 KG/M2 | DIASTOLIC BLOOD PRESSURE: 74 MMHG | SYSTOLIC BLOOD PRESSURE: 130 MMHG | WEIGHT: 135 LBS

## 2021-06-01 DIAGNOSIS — B02.9 HERPES ZOSTER WITHOUT COMPLICATION: Primary | ICD-10-CM

## 2021-06-01 DIAGNOSIS — F41.9 ANXIETY: ICD-10-CM

## 2021-06-01 PROCEDURE — 99213 OFFICE O/P EST LOW 20 MIN: CPT | Performed by: OBSTETRICS & GYNECOLOGY

## 2021-06-01 RX ORDER — VALACYCLOVIR HYDROCHLORIDE 1 G/1
1000 TABLET, FILM COATED ORAL 3 TIMES DAILY
Qty: 21 TABLET | Refills: 0 | Status: SHIPPED | OUTPATIENT
Start: 2021-06-01 | End: 2021-09-29 | Stop reason: ALTCHOICE

## 2021-06-01 RX ORDER — ALPRAZOLAM 0.25 MG/1
0.25 TABLET ORAL
Qty: 20 TABLET | Refills: 0 | Status: SHIPPED | OUTPATIENT
Start: 2021-06-01 | End: 2021-07-02 | Stop reason: SDUPTHER

## 2021-06-01 NOTE — TELEPHONE ENCOUNTER
Medication:  PDMP   Active agreement on file -No      05/04/2021  1  05/04/2021  ALPRAZOLAM 0 25 MG TABLET  20 0  20  DO MEN  [id number]

## 2021-06-04 NOTE — PROGRESS NOTES
Assessment/Plan:  Herpes zoster without complication  -     valACYclovir (VALTREX) 1,000 mg tablet; Take 1 tablet (1,000 mg total) by mouth 3 (three) times a day for 7 days        Subjective:      Patient ID: Cabrera Tellez is a 57 y o  female  HPI  61y P2 presents with 1 week of a rash on her chest   It has been present for about 1 week  It is itchy, and painful while in the shower  It started as blisters  She has been cleaning it with alcohol  It spreads when she itches it  She believes it is improving  She denies a history of chicken poxes and has not had the shingles vaccine  The following portions of the patient's history were reviewed and updated as appropriate: allergies, current medications, past family history, past medical history, past social history, past surgical history and problem list     Review of Systems   Constitutional: Negative  HENT: Negative  Respiratory: Negative  Cardiovascular: Negative  Gastrointestinal: Negative  Genitourinary: Negative  Skin: Positive for rash  Neurological: Negative  Objective:      /74   Wt 61 2 kg (135 lb)   Breastfeeding No   BMI 25 51 kg/m²          Physical Exam  Vitals signs and nursing note reviewed  Constitutional:       Appearance: Normal appearance  She is normal weight  HENT:      Head: Normocephalic and atraumatic  Eyes:      Extraocular Movements: Extraocular movements intact  Conjunctiva/sclera: Conjunctivae normal       Pupils: Pupils are equal, round, and reactive to light  Pulmonary:      Effort: Pulmonary effort is normal    Chest:          Comments: 1cm healing vesicles  Skin:     Findings: Rash present  Neurological:      Mental Status: She is alert

## 2021-06-14 ENCOUNTER — OFFICE VISIT (OUTPATIENT)
Dept: OBGYN CLINIC | Facility: CLINIC | Age: 61
End: 2021-06-14
Payer: COMMERCIAL

## 2021-06-14 VITALS
SYSTOLIC BLOOD PRESSURE: 135 MMHG | DIASTOLIC BLOOD PRESSURE: 84 MMHG | HEIGHT: 61 IN | RESPIRATION RATE: 17 BRPM | WEIGHT: 138 LBS | HEART RATE: 81 BPM | BODY MASS INDEX: 26.06 KG/M2

## 2021-06-14 DIAGNOSIS — M18.12 ARTHRITIS OF CARPOMETACARPAL (CMC) JOINT OF LEFT THUMB: Primary | ICD-10-CM

## 2021-06-14 PROCEDURE — 99213 OFFICE O/P EST LOW 20 MIN: CPT | Performed by: SURGERY

## 2021-06-14 NOTE — PROGRESS NOTES
ASSESSMENT/PLAN:      58 yo female with left CMC arthritis and possibly beginning stages of right CMC arthritis   Patient received a steroid injection into the left [facility] joint at her last visit 6 weeks ago which provided significant relief  Discussed that injections can be done as often as every 3 months but can last longer than that  She may use voltaren gel as needed for intermittent pains, comfort cool brace as needed  We will see her on an as needed basis  She notes some beginning soreness in the right [facility] but does not want an injection at this time  The patient verbalized understanding of exam findings and treatment plan  We engaged in the shared decision-making process and treatment options were discussed at length with the patient  Surgical and conservative management discussed today along with risks and benefits  Diagnoses and all orders for this visit:    Arthritis of carpometacarpal [place]) joint of left thumb      Follow Up:  Return if symptoms worsen or fail to improve  To Do Next Visit:  Re-evaluation of current issue    ____________________________________________________________________________________________________________________________________________      CHIEF COMPLAINT:  Chief Complaint   Patient presents with    Left Hand - Follow-up    Left Thumb - Follow-up       SUBJECTIVE:  Helen Ny is a 57y o  year old RHD female who presents for follow up evaluation of left CMC arthritis  She received an injection at her last visit which did help to provide relief  She denies significant pain since the initial injection but does have occasional twinges  She does not utilize the brace much but does use voltaren gel as needed  She denies paresthesias in the hand, no other complaints at this time  I have personally reviewed all the relevant PMH, PSH, SH, FH, Medications and allergies       PAST MEDICAL HISTORY:  Past Medical History:   Diagnosis Date    Adjustment disorder with anxiety     last assessed - [id number]    Anemia     Anxiety     Diverticulitis     Diverticulitis of large intestine with abscess without bleeding 5/24/2020    GERD (gastroesophageal reflux disease) 2000    History of total hysterectomy     History of transfusion     Hypertension     Hypertension 3/12/2021    Ingrown toenail     last assessed - [id number]    Thalassemia        PAST SURGICAL HISTORY:  Past Surgical History:   Procedure Laterality Date    APPENDECTOMY      CHOLECYSTECTOMY      COLONOSCOPY      GALLBLADDER SURGERY      OOPHORECTOMY Bilateral     TONSILLECTOMY AND ADENOIDECTOMY      TOTAL ABDOMINAL HYSTERECTOMY         FAMILY HISTORY:  Family History   Problem Relation Age of Onset    Anxiety disorder Mother         Anxiety    Depression Mother     Anxiety disorder Sister         Anxiety    Depression Sister     Anxiety disorder Daughter         Anxiety    Depression Daughter     Heart disease Maternal Aunt     Heart disease Maternal Uncle     Cancer Sister         Melanoma    No Known Problems Sister     No Known Problems Sister        SOCIAL HISTORY:  Social History     Tobacco Use    Smoking status: Never Smoker    Smokeless tobacco: Never Used   Vaping Use    Vaping Use: Never used   Substance Use Topics    Alcohol use:  Yes     Alcohol/week: 1 0 standard drinks     Types: 1 Glasses of wine per week     Comment: occasional alcohol use    Drug use: Never       MEDICATIONS:    Current Outpatient Medications:     ALPRAZolam (XANAX) 0 25 mg tablet, Take 1 tablet (0 25 mg total) by mouth daily at bedtime as needed for anxiety, Disp: 20 tablet, Rfl: 0    atenolol (TENORMIN) 50 mg tablet, Take 1 tablet (50 mg total) by mouth daily, Disp: 90 tablet, Rfl: 1    conjugated estrogens (PREMARIN) vaginal cream, Insert 0 5 g into the vagina daily, Disp: 30 g, Rfl: 3    Diclofenac Sodium (VOLTAREN) 1 %, Apply 2 g topically 4 (four) times a day, Disp: 100 g, Rfl: 2    omeprazole (PriLOSEC) 40 MG capsule, Take 1 capsule (40 mg total) by mouth daily, Disp: 90 capsule, Rfl: 0    valACYclovir (VALTREX) 1,000 mg tablet, Take 1 tablet (1,000 mg total) by mouth 3 (three) times a day for 7 days, Disp: 21 tablet, Rfl: 0    ALLERGIES:  No Known Allergies    REVIEW OF SYSTEMS:  Review of Systems   Constitutional: Negative for chills, fatigue and fever  HENT: Negative for hearing loss, nosebleeds and sore throat  Eyes: Negative for redness and visual disturbance  Respiratory: Negative for cough, shortness of breath and wheezing  Cardiovascular: Negative for chest pain, palpitations and leg swelling  Gastrointestinal: Negative for abdominal pain, nausea and vomiting  Endocrine: Negative for polydipsia and polyuria  Genitourinary: Negative for difficulty urinating, dysuria and hematuria  Musculoskeletal: Positive for arthralgias  Negative for joint swelling and myalgias  Skin: Negative for rash and wound  Neurological: Negative for speech difficulty, weakness, numbness and headaches  Psychiatric/Behavioral: Negative for decreased concentration and suicidal ideas  The patient is not nervous/anxious          VITALS:  Vitals:    06/14/21 1641   BP: 135/84   Pulse: 81   Resp: 17       LABS:  HgA1c:   Lab Results   Component Value Date    HGBA1C 5 3 04/30/2021     BMP:   Lab Results   Component Value Date    GLUCOSE 84 09/22/2015    CALCIUM 9 3 04/30/2021     09/22/2015    K 4 2 04/30/2021    CO2 29 04/30/2021     04/30/2021    BUN 13 04/30/2021    CREATININE 1 02 04/30/2021       _____________________________________________________  PHYSICAL EXAMINATION:  General: well developed and well nourished, alert, oriented times 3 and appears comfortable  Psychiatric: Normal  HEENT: Normocephalic, Atraumatic Trachea Midline, No torticollis  Pulmonary: No audible wheezing or respiratory distress   Cardiovascular: No pitting edema, 2+ radial pulse   Skin: No masses, erythema, lacerations, fluctation, ulcerations  Neurovascular: Sensation Intact to the Median, Ulnar, Radial Nerve, Motor Intact to the Median, Ulnar, Radial Nerve and Pulses Intact  Musculoskeletal: Normal, except as noted in detailed exam and in HPI        MUSCULOSKELETAL EXAMINATION:  left CMC Exam:  No adduction contracture  No hyperextension deformity of MCP joint  Mild localized tenderness over radial and dorsal aspect of thumb (CMC joint)  Grind test is Negative for pain and Negative for crepitus  Metacarpal load shift test negative  No triggering or tenderness over the A1 pulley  No pain with Finkelsteins maneuver       ___________________________________________________  STUDIES REVIEWED:  No new studies to review       PROCEDURES PERFORMED:  Procedures  No Procedures performed today    _____________________________________________________    Jacoby Martinez PA-C

## 2021-07-02 DIAGNOSIS — F41.9 ANXIETY: ICD-10-CM

## 2021-07-02 RX ORDER — ALPRAZOLAM 0.25 MG/1
0.25 TABLET ORAL
Qty: 20 TABLET | Refills: 0 | Status: SHIPPED | OUTPATIENT
Start: 2021-07-02 | End: 2021-08-05 | Stop reason: SDUPTHER

## 2021-07-02 NOTE — TELEPHONE ENCOUNTER
Medication:  PDMP   03/05/2021  1  03/04/2021  ALPRAZOLAM 0 25 MG TABLET  20 0  20  DO MEN     Active agreement on file -No

## 2021-07-07 ENCOUNTER — OFFICE VISIT (OUTPATIENT)
Dept: FAMILY MEDICINE CLINIC | Facility: CLINIC | Age: 61
End: 2021-07-07
Payer: COMMERCIAL

## 2021-07-07 VITALS
BODY MASS INDEX: 26.09 KG/M2 | HEART RATE: 47 BPM | RESPIRATION RATE: 16 BRPM | HEIGHT: 61 IN | TEMPERATURE: 97.9 F | DIASTOLIC BLOOD PRESSURE: 60 MMHG | WEIGHT: 138.2 LBS | SYSTOLIC BLOOD PRESSURE: 110 MMHG | OXYGEN SATURATION: 96 %

## 2021-07-07 DIAGNOSIS — Z00.00 ANNUAL PHYSICAL EXAM: Primary | ICD-10-CM

## 2021-07-07 PROCEDURE — 99396 PREV VISIT EST AGE 40-64: CPT | Performed by: NURSE PRACTITIONER

## 2021-07-07 NOTE — PATIENT INSTRUCTIONS

## 2021-07-07 NOTE — PROGRESS NOTES
[address] [facility]    NAME: Janessa Arreola  AGE: 57 y o  SEX: female  : 1960     DATE: 7/10/2021     Assessment and Plan:     Problem List Items Addressed This Visit     None      Visit Diagnoses     Annual physical exam    -  Primary          Immunizations and preventive care screenings were discussed with patient today  Appropriate education was printed on patient's after visit summary  Counseling:  Alcohol/drug use: discussed moderation in alcohol intake, the recommendations for healthy alcohol use, and avoidance of illicit drug use  Dental Health: discussed importance of regular tooth brushing, flossing, and dental visits  Injury prevention: discussed safety/seat belts, safety helmets, smoke detectors, carbon dioxide detectors, and smoking near bedding or upholstery  Sexual health: discussed sexually transmitted diseases, partner selection, use of condoms, avoidance of unintended pregnancy, and contraceptive alternatives  · Exercise: the importance of regular exercise/physical activity was discussed  Recommend exercise 3-5 times per week for at least 30 minutes  Return in about 1 year (around 2022) for Annual physical      Chief Complaint:     Chief Complaint   Patient presents with    Physical Exam      History of Present Illness:     Adult Annual Physical   Patient here for a comprehensive physical exam  The patient reports no problems  Diet and Physical Activity  · Diet/Nutrition: well balanced diet  · Exercise: walking  Depression Screening  PHQ-9 Depression Screening    PHQ-9:   Frequency of the following problems over the past two weeks:           General Health  · Sleep: sleeps poorly  · Hearing: normal - bilateral   · Vision: wears glasses  · Dental: regular dental visits         /GYN Health  · Patient is: postmenopausal  ·      Review of Systems:     Review of Systems   Constitutional: Negative for fatigue and fever  HENT: Negative for congestion and postnasal drip  Eyes: Negative for photophobia and visual disturbance  Respiratory: Negative for cough, shortness of breath and wheezing  Cardiovascular: Negative for chest pain and palpitations  Gastrointestinal: Negative for constipation, diarrhea, nausea and vomiting  Genitourinary: Negative for dysuria and frequency  Musculoskeletal: Negative for arthralgias and myalgias  Skin: Negative for rash  Neurological: Negative for dizziness, light-headedness and headaches  Hematological: Negative for adenopathy  Psychiatric/Behavioral: Negative for decreased concentration and sleep disturbance  The patient is not nervous/anxious  Past Medical History:     Past Medical History:   Diagnosis Date    Adjustment disorder with anxiety     last assessed - [id number]    Anemia     Anxiety     Diverticulitis     Diverticulitis of large intestine with abscess without bleeding 5/24/2020    GERD (gastroesophageal reflux disease) 2000    History of total hysterectomy     History of transfusion     Hypertension     Hypertension 3/12/2021    Ingrown toenail     last assessed - [id number]    Thalassemia       Past Surgical History:     Past Surgical History:   Procedure Laterality Date    APPENDECTOMY      CHOLECYSTECTOMY      COLONOSCOPY      GALLBLADDER SURGERY      OOPHORECTOMY Bilateral     TONSILLECTOMY AND ADENOIDECTOMY      TOTAL ABDOMINAL HYSTERECTOMY        Social History:     Social History     Socioeconomic History    Marital status: /Civil Union     Spouse name: None    Number of children: None    Years of education: None    Highest education level: None   Occupational History    Occupation:  - [employer]   Tobacco Use    Smoking status: Never Smoker    Smokeless tobacco: Never Used   Vaping Use    Vaping Use: Never used   Substance and Sexual Activity    Alcohol use:  Yes Alcohol/week: 1 0 standard drinks     Types: 1 Glasses of wine per week     Comment: occasional alcohol use    Drug use: Never    Sexual activity: Not Currently     Partners: Male     Birth control/protection: Post-menopausal, Surgical     Comment: Hysterectomy 1988   Other Topics Concern    None   Social History Narrative    Coffee     Social Determinants of Health     Financial Resource Strain:     Difficulty of Paying Living Expenses:    Food Insecurity:     Worried About Running Out of Food in the Last Year:     Ran Out of Food in the Last Year:    Transportation Needs:     Lack of Transportation (Medical):      Lack of Transportation (Non-Medical):    Physical Activity:     Days of Exercise per Week:     Minutes of Exercise per Session:    Stress:     Feeling of Stress :    Social Connections:     Frequency of Communication with Friends and Family:     Frequency of Social Gatherings with Friends and Family:     Attends Sabianism Services:     Active Member of Clubs or Organizations:     Attends Club or Organization Meetings:     Marital Status:    Intimate Partner Violence:     Fear of Current or Ex-Partner:     Emotionally Abused:     Physically Abused:     Sexually Abused:       Family History:     Family History   Problem Relation Age of Onset    Anxiety disorder Mother         Anxiety    Depression Mother     Anxiety disorder Sister         Anxiety    Depression Sister     Anxiety disorder Daughter         Anxiety    Depression Daughter     Heart disease Maternal Aunt     Heart disease Maternal Uncle     Cancer Sister         Melanoma    No Known Problems Sister     No Known Problems Sister       Current Medications:     Current Outpatient Medications   Medication Sig Dispense Refill    ALPRAZolam (XANAX) 0 25 mg tablet Take 1 tablet (0 25 mg total) by mouth daily at bedtime as needed for anxiety 20 tablet 0    atenolol (TENORMIN) 50 mg tablet Take 1 tablet (50 mg total) by mouth daily 90 tablet 1    conjugated estrogens (PREMARIN) vaginal cream Insert 0 5 g into the vagina daily 30 g 3    Diclofenac Sodium (VOLTAREN) 1 % Apply 2 g topically 4 (four) times a day 100 g 2    omeprazole (PriLOSEC) 40 MG capsule Take 1 capsule (40 mg total) by mouth daily 90 capsule 0    valACYclovir (VALTREX) 1,000 mg tablet Take 1 tablet (1,000 mg total) by mouth 3 (three) times a day for 7 days (Patient not taking: Reported on 7/7/2021) 21 tablet 0     No current facility-administered medications for this visit  Allergies:     No Known Allergies   Physical Exam:     /60 (BP Location: Left arm)   Pulse (!) 47   Temp 97 9 °F (36 6 °C)   Resp 16   Ht 5' 1" (1 549 m)   Wt 62 7 kg (138 lb 3 2 oz)   SpO2 96%   BMI 26 11 kg/m²     Physical Exam  Vitals and nursing note reviewed  Constitutional:       Appearance: Normal appearance  HENT:      Head: Normocephalic and atraumatic  Right Ear: Tympanic membrane, ear canal and external ear normal       Left Ear: Tympanic membrane, ear canal and external ear normal       Nose: Nose normal       Mouth/Throat:      Mouth: Mucous membranes are moist       Pharynx: Oropharynx is clear  Eyes:      Extraocular Movements: Extraocular movements intact  Conjunctiva/sclera: Conjunctivae normal       Pupils: Pupils are equal, round, and reactive to light  Cardiovascular:      Rate and Rhythm: Normal rate and regular rhythm  Pulses: Normal pulses  Heart sounds: Normal heart sounds  Pulmonary:      Effort: Pulmonary effort is normal       Breath sounds: Normal breath sounds  Abdominal:      General: Bowel sounds are normal       Palpations: Abdomen is soft  Musculoskeletal:         General: Normal range of motion  Cervical back: Normal range of motion and neck supple  Skin:     General: Skin is warm and dry  Capillary Refill: Capillary refill takes less than 2 seconds     Neurological:      General: No focal deficit present  Mental Status: She is alert and oriented to person, place, and time  Psychiatric:         Mood and Affect: Mood normal          Behavior: Behavior normal          Thought Content:  Thought content normal          Judgment: Judgment normal           Irlanda Qureshi, [facility]

## 2021-07-15 DIAGNOSIS — I10 HYPERTENSION, UNSPECIFIED TYPE: ICD-10-CM

## 2021-07-15 DIAGNOSIS — R00.0 TACHYCARDIA: ICD-10-CM

## 2021-07-15 RX ORDER — ATENOLOL 50 MG/1
50 TABLET ORAL DAILY
Qty: 90 TABLET | Refills: 0 | Status: SHIPPED | OUTPATIENT
Start: 2021-07-15 | End: 2021-11-30 | Stop reason: SDUPTHER

## 2021-07-27 ENCOUNTER — APPOINTMENT (OUTPATIENT)
Dept: LAB | Facility: CLINIC | Age: 61
End: 2021-07-27
Payer: COMMERCIAL

## 2021-07-27 ENCOUNTER — APPOINTMENT (OUTPATIENT)
Dept: CT IMAGING | Facility: HOSPITAL | Age: 61
End: 2021-07-27
Payer: COMMERCIAL

## 2021-07-27 ENCOUNTER — HOSPITAL ENCOUNTER (OUTPATIENT)
Dept: CT IMAGING | Facility: HOSPITAL | Age: 61
Discharge: HOME | End: 2021-07-27
Payer: COMMERCIAL

## 2021-07-27 DIAGNOSIS — R10.32 LEFT LOWER QUADRANT ABDOMINAL PAIN: ICD-10-CM

## 2021-07-27 PROCEDURE — G1004 CDSM NDSC: HCPCS

## 2021-07-27 PROCEDURE — 74177 CT ABD & PELVIS W/CONTRAST: CPT

## 2021-07-27 RX ADMIN — IOHEXOL 50 ML: 240 INJECTION, SOLUTION INTRATHECAL; INTRAVASCULAR; INTRAVENOUS; ORAL at 19:34

## 2021-07-27 RX ADMIN — IOHEXOL 100 ML: 350 INJECTION, SOLUTION INTRAVENOUS at 19:35

## 2021-08-04 ENCOUNTER — OFFICE VISIT (OUTPATIENT)
Dept: GASTROENTEROLOGY | Facility: AMBULARY SURGERY CENTER | Age: 61
End: 2021-08-04
Payer: COMMERCIAL

## 2021-08-04 ENCOUNTER — APPOINTMENT (OUTPATIENT)
Dept: LAB | Facility: AMBULARY SURGERY CENTER | Age: 61
End: 2021-08-04
Attending: INTERNAL MEDICINE
Payer: COMMERCIAL

## 2021-08-04 VITALS
SYSTOLIC BLOOD PRESSURE: 138 MMHG | WEIGHT: 135.4 LBS | DIASTOLIC BLOOD PRESSURE: 78 MMHG | BODY MASS INDEX: 25.57 KG/M2 | HEIGHT: 61 IN

## 2021-08-04 DIAGNOSIS — K59.09 OTHER CONSTIPATION: ICD-10-CM

## 2021-08-04 DIAGNOSIS — R10.84 GENERALIZED ABDOMINAL PAIN: ICD-10-CM

## 2021-08-04 DIAGNOSIS — K21.9 GASTROESOPHAGEAL REFLUX DISEASE WITHOUT ESOPHAGITIS: Primary | ICD-10-CM

## 2021-08-04 PROBLEM — K59.00 CONSTIPATION: Status: ACTIVE | Noted: 2021-08-04

## 2021-08-04 LAB
BASOPHILS # BLD AUTO: 0.07 THOUSANDS/ΜL (ref 0–0.1)
BASOPHILS NFR BLD AUTO: 1 % (ref 0–1)
CALCIUM SERPL-MCNC: 9.4 MG/DL (ref 8.3–10.1)
EOSINOPHIL # BLD AUTO: 0.25 THOUSAND/ΜL (ref 0–0.61)
EOSINOPHIL NFR BLD AUTO: 2 % (ref 0–6)
ERYTHROCYTE [DISTWIDTH] IN BLOOD BY AUTOMATED COUNT: 14.8 % (ref 11.6–15.1)
HCT VFR BLD AUTO: 39 % (ref 34.8–46.1)
HGB BLD-MCNC: 11.9 G/DL (ref 11.5–15.4)
IMM GRANULOCYTES # BLD AUTO: 0.09 THOUSAND/UL (ref 0–0.2)
IMM GRANULOCYTES NFR BLD AUTO: 1 % (ref 0–2)
LYMPHOCYTES # BLD AUTO: 2.02 THOUSANDS/ΜL (ref 0.6–4.47)
LYMPHOCYTES NFR BLD AUTO: 14 % (ref 14–44)
MCH RBC QN AUTO: 21.3 PG (ref 26.8–34.3)
MCHC RBC AUTO-ENTMCNC: 30.5 G/DL (ref 31.4–37.4)
MCV RBC AUTO: 70 FL (ref 82–98)
MONOCYTES # BLD AUTO: 1.43 THOUSAND/ΜL (ref 0.17–1.22)
MONOCYTES NFR BLD AUTO: 10 % (ref 4–12)
NEUTROPHILS # BLD AUTO: 10.43 THOUSANDS/ΜL (ref 1.85–7.62)
NEUTS SEG NFR BLD AUTO: 72 % (ref 43–75)
NRBC BLD AUTO-RTO: 0 /100 WBCS
PLATELET # BLD AUTO: 363 THOUSANDS/UL (ref 149–390)
PMV BLD AUTO: 9.8 FL (ref 8.9–12.7)
RBC # BLD AUTO: 5.59 MILLION/UL (ref 3.81–5.12)
TSH SERPL DL<=0.05 MIU/L-ACNC: 1.21 UIU/ML (ref 0.36–3.74)
WBC # BLD AUTO: 14.29 THOUSAND/UL (ref 4.31–10.16)

## 2021-08-04 PROCEDURE — 85025 COMPLETE CBC W/AUTO DIFF WBC: CPT

## 2021-08-04 PROCEDURE — 36415 COLL VENOUS BLD VENIPUNCTURE: CPT

## 2021-08-04 PROCEDURE — 84443 ASSAY THYROID STIM HORMONE: CPT

## 2021-08-04 PROCEDURE — 99213 OFFICE O/P EST LOW 20 MIN: CPT | Performed by: INTERNAL MEDICINE

## 2021-08-04 PROCEDURE — 82310 ASSAY OF CALCIUM: CPT

## 2021-08-04 NOTE — LETTER
August 5, 2021     Prabhakar Alvarez, [address] [address] [address]  [address] [address]    Patient: Eduard Dickens   YOB: 1960   Date of Visit: 8/4/2021       Dear Dr Myles De La Rosa:    Thank you for referring Eduard Dickens to me for evaluation  Below are my notes for this consultation  If you have questions, please do not hesitate to call me  I look forward to following your patient along with you  Sincerely,        Amparo Taylor MD        CC: No Recipients  Amparo Taylor MD  8/5/2021 11:45 AM  Sign when Signing Visit  [facility]  Eduard Gin 57 y o  female MRN: [id number]            Assessment & Plan:    76-year-old female, reports 2 months of worsening abdominal pain, loose stools  Imaging studies recently demonstrated significant fecal loading throughout her colon  1  Loose stools and abdominal pain:  Suspect she has overflow incontinence due to persistent constipation  -we will check laboratory studies including TSH at this time  -she had a recent colonoscopy in September 2020  -recommended a full bowel prep to see if this will help relieve both her symptoms of pain as well as loose stools and then recommended daily MiraLax thereafter to maintain bowel function  -she was instructed to give us call 1 week with follow-up of symptoms, if symptoms persist we will proceed with colonoscopy    2  Early satiety:  Suspect this is secondary to significant constipation, can consider gastric emptying scan if symptoms not improved after bowel regimen noted above    3  Fatigue and weakness:  Possibly secondary to anemia  -we will check CBC and laboratory studies including TSH today        Tessa De La Rosa was seen today for abdominal pain and diarrhea  Diagnoses and all orders for this visit:    Gastroesophageal reflux disease without esophagitis    Generalized abdominal pain    Other constipation  -     TSH, 3rd generation with Free T4 reflex; Future  -     Calcium;  Future  - CBC and differential; Future            _____________________________________________________________        CC:  Abdominal pain and loose stools    HPI:  Artem Melton is a 57 y  o female who is here for abdominal pain and loose stools  As you know this is a 58-year-old female, has history of diverticulitis, had a colonoscopy in September of 2020  Reports for the past 2 months she has had worsening epigastric and lower abdominal pain predominantly left side of abdomen is the addition to this she had which she describes as loose stools  She notes that every time she sits down she has small amount of leakage of loose stools  Which she describes as loose and watery  She denies any significant improvement in her symptoms with passage of the stools  She also reports fullness and early satiety with eating  She has had increasing reflux symptoms with regurgitation and breakthrough reflux  She denies any nausea vomiting  She reports decreased appetite  She denies any change in weight  Patient denies any significant change in medications or new lifestyle changes  Presented to family doctor with similar symptoms, CT scan shows a significant amount of fecal loading  Patient does report increasing fatigue, dyspnea  ROS:  The remainder of the ROS was negative except for the pertinent positives mentioned in HPI  Allergies: Patient has no known allergies      Medications:   Current Outpatient Medications:     ALPRAZolam (XANAX) 0 25 mg tablet, Take 1 tablet (0 25 mg total) by mouth daily at bedtime as needed for anxiety, Disp: 20 tablet, Rfl: 0    atenolol (TENORMIN) 50 mg tablet, Take 1 tablet (50 mg total) by mouth daily, Disp: 90 tablet, Rfl: 0    conjugated estrogens (PREMARIN) vaginal cream, Insert 0 5 g into the vagina daily, Disp: 30 g, Rfl: 3    Diclofenac Sodium (VOLTAREN) 1 %, Apply 2 g topically 4 (four) times a day, Disp: 100 g, Rfl: 2    Magnesium Gluconate (MAGNESIUM 27 PO), Take by mouth, Disp: , Rfl:     omeprazole (PriLOSEC) 40 MG capsule, Take 1 capsule (40 mg total) by mouth daily, Disp: 90 capsule, Rfl: 3    valACYclovir (VALTREX) 1,000 mg tablet, Take 1 tablet (1,000 mg total) by mouth 3 (three) times a day for 7 days, Disp: 21 tablet, Rfl: 0    Past Medical History:   Diagnosis Date    Adjustment disorder with anxiety     last assessed - [id number]    Anemia     Anxiety     Diverticulitis     Diverticulitis of large intestine with abscess without bleeding 5/24/2020    GERD (gastroesophageal reflux disease) 2000    History of total hysterectomy     History of transfusion     Hypertension     Hypertension 3/12/2021    Ingrown toenail     last assessed - [id number]    Thalassemia        Past Surgical History:   Procedure Laterality Date    APPENDECTOMY      CHOLECYSTECTOMY      COLONOSCOPY      GALLBLADDER SURGERY      OOPHORECTOMY Bilateral     TONSILLECTOMY AND ADENOIDECTOMY      TOTAL ABDOMINAL HYSTERECTOMY      UPPER GASTROINTESTINAL ENDOSCOPY         Family History   Problem Relation Age of Onset    Anxiety disorder Mother         Anxiety    Depression Mother     Anxiety disorder Sister         Anxiety    Depression Sister     Anxiety disorder Daughter         Anxiety    Depression Daughter     Heart disease Maternal Aunt     Heart disease Maternal Uncle     Cancer Sister         Melanoma    No Known Problems Sister     No Known Problems Sister         reports that she has never smoked  She has never used smokeless tobacco  She reports current alcohol use of about 1 0 standard drinks of alcohol per week  She reports that she does not use drugs        Physical Exam:    /78 (BP Location: Left arm, Patient Position: Sitting, Cuff Size: Standard)   Ht 5' 1" (1 549 m)   Wt 61 4 kg (135 lb 6 4 oz)   BMI 25 58 kg/m²     Gen: wn/wd, NAD  HEENT: anicteric, MMM, no cervical LAD, conjunctival pallor  CVS: RRR, no m/r/g  CHEST: CTA b/l  ABD: +BS, soft, left-sided abdominal discomfort, no rebound or guarding, no hepatosplenomegaly  EXT: no c/c/e  NEURO: aaox3  SKIN: NO rashes

## 2021-08-04 NOTE — PATIENT INSTRUCTIONS
Use a fleet enema  On Friday and then follow isntructions for bowel prep  Then take 1 capful of miralax daily  If not better next week, call us

## 2021-08-05 DIAGNOSIS — F41.9 ANXIETY: ICD-10-CM

## 2021-08-05 RX ORDER — ALPRAZOLAM 0.25 MG/1
0.25 TABLET ORAL
Qty: 20 TABLET | Refills: 0 | Status: SHIPPED | OUTPATIENT
Start: 2021-08-05 | End: 2021-09-07 | Stop reason: SDUPTHER

## 2021-08-05 NOTE — TELEPHONE ENCOUNTER
Medication:  PDMP   07/07/2021  1  07/02/2021  ALPRAZOLAM 0 25 MG TABLET  18 0  18  TI [facility]     Active agreement on file -No

## 2021-08-05 NOTE — PROGRESS NOTES
[facility]  Cyril Toro 57 y o  female MRN: [id number]            Assessment & Plan:    57-year-old female, reports 2 months of worsening abdominal pain, loose stools  Imaging studies recently demonstrated significant fecal loading throughout her colon  1  Loose stools and abdominal pain:  Suspect she has overflow incontinence due to persistent constipation  -we will check laboratory studies including TSH at this time  -she had a recent colonoscopy in September 2020  -recommended a full bowel prep to see if this will help relieve both her symptoms of pain as well as loose stools and then recommended daily MiraLax thereafter to maintain bowel function  -she was instructed to give us call 1 week with follow-up of symptoms, if symptoms persist we will proceed with colonoscopy    2  Early satiety:  Suspect this is secondary to significant constipation, can consider gastric emptying scan if symptoms not improved after bowel regimen noted above    3  Fatigue and weakness:  Possibly secondary to anemia  -we will check CBC and laboratory studies including TSH today        Sunny Koo was seen today for abdominal pain and diarrhea  Diagnoses and all orders for this visit:    Gastroesophageal reflux disease without esophagitis    Generalized abdominal pain    Other constipation  -     TSH, 3rd generation with Free T4 reflex; Future  -     Calcium; Future  -     CBC and differential; Future            _____________________________________________________________        CC:  Abdominal pain and loose stools    HPI:  Cyril Toro is a 57 y  o female who is here for abdominal pain and loose stools  As you know this is a 57-year-old female, has history of diverticulitis, had a colonoscopy in September of 2020  Reports for the past 2 months she has had worsening epigastric and lower abdominal pain predominantly left side of abdomen is the addition to this she had which she describes as loose stools  She notes that every time she sits down she has small amount of leakage of loose stools  Which she describes as loose and watery  She denies any significant improvement in her symptoms with passage of the stools  She also reports fullness and early satiety with eating  She has had increasing reflux symptoms with regurgitation and breakthrough reflux  She denies any nausea vomiting  She reports decreased appetite  She denies any change in weight  Patient denies any significant change in medications or new lifestyle changes  Presented to family doctor with similar symptoms, CT scan shows a significant amount of fecal loading  Patient does report increasing fatigue, dyspnea  ROS:  The remainder of the ROS was negative except for the pertinent positives mentioned in HPI  Allergies: Patient has no known allergies      Medications:   Current Outpatient Medications:     ALPRAZolam (XANAX) 0 25 mg tablet, Take 1 tablet (0 25 mg total) by mouth daily at bedtime as needed for anxiety, Disp: 20 tablet, Rfl: 0    atenolol (TENORMIN) 50 mg tablet, Take 1 tablet (50 mg total) by mouth daily, Disp: 90 tablet, Rfl: 0    conjugated estrogens (PREMARIN) vaginal cream, Insert 0 5 g into the vagina daily, Disp: 30 g, Rfl: 3    Diclofenac Sodium (VOLTAREN) 1 %, Apply 2 g topically 4 (four) times a day, Disp: 100 g, Rfl: 2    Magnesium Gluconate (MAGNESIUM 27 PO), Take by mouth, Disp: , Rfl:     omeprazole (PriLOSEC) 40 MG capsule, Take 1 capsule (40 mg total) by mouth daily, Disp: 90 capsule, Rfl: 3    valACYclovir (VALTREX) 1,000 mg tablet, Take 1 tablet (1,000 mg total) by mouth 3 (three) times a day for 7 days, Disp: 21 tablet, Rfl: 0    Past Medical History:   Diagnosis Date    Adjustment disorder with anxiety     last assessed - [id number]    Anemia     Anxiety     Diverticulitis     Diverticulitis of large intestine with abscess without bleeding 5/24/2020    GERD (gastroesophageal reflux disease) 2000    History of total hysterectomy     History of transfusion     Hypertension     Hypertension 3/12/2021    Ingrown toenail     last assessed - [id number]    Thalassemia        Past Surgical History:   Procedure Laterality Date    APPENDECTOMY      CHOLECYSTECTOMY      COLONOSCOPY      GALLBLADDER SURGERY      OOPHORECTOMY Bilateral     TONSILLECTOMY AND ADENOIDECTOMY      TOTAL ABDOMINAL HYSTERECTOMY      UPPER GASTROINTESTINAL ENDOSCOPY         Family History   Problem Relation Age of Onset    Anxiety disorder Mother         Anxiety    Depression Mother     Anxiety disorder Sister         Anxiety    Depression Sister     Anxiety disorder Daughter         Anxiety    Depression Daughter     Heart disease Maternal Aunt     Heart disease Maternal Uncle     Cancer Sister         Melanoma    No Known Problems Sister     No Known Problems Sister         reports that she has never smoked  She has never used smokeless tobacco  She reports current alcohol use of about 1 0 standard drinks of alcohol per week  She reports that she does not use drugs        Physical Exam:    /78 (BP Location: Left arm, Patient Position: Sitting, Cuff Size: Standard)   Ht 5' 1" (1 549 m)   Wt 61 4 kg (135 lb 6 4 oz)   BMI 25 58 kg/m²     Gen: wn/wd, NAD  HEENT: anicteric, MMM, no cervical LAD, conjunctival pallor  CVS: RRR, no m/r/g  CHEST: CTA b/l  ABD: +BS, soft, left-sided abdominal discomfort, no rebound or guarding, no hepatosplenomegaly  EXT: no c/c/e  NEURO: aaox3  SKIN: NO rashes

## 2021-08-12 ENCOUNTER — TELEPHONE (OUTPATIENT)
Dept: NEPHROLOGY | Facility: CLINIC | Age: 61
End: 2021-08-12

## 2021-08-17 ENCOUNTER — TELEPHONE (OUTPATIENT)
Dept: GASTROENTEROLOGY | Facility: AMBULARY SURGERY CENTER | Age: 61
End: 2021-08-17

## 2021-08-17 NOTE — TELEPHONE ENCOUNTER
Office visit 8/4/21 Dr Woodall Felt  Hx: constipation, over flow diarrhea, left side abdominal pain, fatigue, gerd   Colonoscopy/EGD: 9/2020    Pt is having LLQ pain that has worsened since doing enema on Sunday  Per last OV pt was to do miralax/dulcolax bowel prep with enema which she completed on 8/8 and produced small amount of liquid stool  Pt then repeated enema on 8/15 and now have LLQ pain as well as midepigastric pain  Pt has been taking ibuprofen 3 tabs BID and taking with food for pain and using miralax 1 capful daily  Denies blood in stool or any other appreciating symptoms  Explained to pt pain could be attributed to ongoing over flow diarrhea and multiple laxative use  More than likely will have to order another bowel prep  Regarding midepigastric pain advised to stop ibuprofen and take tylenol in place of this as well as can use heating pad for LLQ pain  Also advised to increase miralax to BID   Please advise

## 2021-08-17 NOTE — TELEPHONE ENCOUNTER
Pt aware of results, verbalized understanding  Pt is having left lower abdominal pain  The pain is 7 out 10  Pt states she took an enema Sunday because she felt like she constipated and has had continuous pain since  Please advise  Thank you!

## 2021-08-17 NOTE — TELEPHONE ENCOUNTER
----- Message from Pao Atkinson MD sent at 8/10/2021 10:41 AM EDT -----  Please inform the patient that recent laboratory studies showed elevation in her white cell count  Other labs were relatively normal   She does have some microcytosis, small red cells  Thyroid function is normal     Rest of laboratory studies are normal     Please find out if her symptoms have improved at all  Please have a call    If she continues to have symptoms as she discussed in the office

## 2021-08-18 NOTE — TELEPHONE ENCOUNTER
Lauren Pérez MD  You 15 hours ago (6:13 PM)       I tried to call the patient back, received a voicemail        I recommended a trial of Bentyl in the voicemail I have sent a prescription to her pharmacy        Please have her try this 2 to 3 times daily to see if that helps with the pain        If she is still having severe abdominal pain I would like her to get another CT scan which we can order stat as an outpatient        Please have her do liquid diet for now        She may have developed diverticulitis or some other condition which we should re-evaluate for        Additionally if her symptoms continue I would recommend that we proceed with colonoscopy to exclude other significant processes   Please contact the patient tomorrow to re-evaluate

## 2021-08-19 NOTE — TELEPHONE ENCOUNTER
Called pt and LVM stating to call office if continuing to have abdominal pain after trialing bentyl so we can proceed with CT scan/colonoscopy as well as to go over further recs

## 2021-08-22 ENCOUNTER — APPOINTMENT (EMERGENCY)
Dept: CT IMAGING | Facility: HOSPITAL | Age: 61
End: 2021-08-22
Payer: COMMERCIAL

## 2021-08-22 ENCOUNTER — HOSPITAL ENCOUNTER (EMERGENCY)
Facility: HOSPITAL | Age: 61
Discharge: HOME | End: 2021-08-22
Attending: EMERGENCY MEDICINE | Admitting: EMERGENCY MEDICINE
Payer: COMMERCIAL

## 2021-08-22 VITALS
DIASTOLIC BLOOD PRESSURE: 56 MMHG | WEIGHT: 131.84 LBS | HEART RATE: 58 BPM | TEMPERATURE: 98.2 F | RESPIRATION RATE: 18 BRPM | BODY MASS INDEX: 24.91 KG/M2 | SYSTOLIC BLOOD PRESSURE: 119 MMHG | OXYGEN SATURATION: 100 %

## 2021-08-22 DIAGNOSIS — K57.92 DIVERTICULITIS: Primary | ICD-10-CM

## 2021-08-22 LAB
ALBUMIN SERPL BCP-MCNC: 3.7 G/DL (ref 3.5–5)
ALP SERPL-CCNC: 162 U/L (ref 46–116)
ALT SERPL W P-5'-P-CCNC: 26 U/L (ref 12–78)
ANION GAP SERPL CALCULATED.3IONS-SCNC: 6 MMOL/L (ref 4–13)
AST SERPL W P-5'-P-CCNC: 21 U/L (ref 5–45)
BACTERIA UR QL AUTO: ABNORMAL /HPF
BASOPHILS # BLD AUTO: 0.08 THOUSANDS/ΜL (ref 0–0.1)
BASOPHILS NFR BLD AUTO: 1 % (ref 0–1)
BILIRUB SERPL-MCNC: 0.98 MG/DL (ref 0.2–1)
BILIRUB UR QL STRIP: NEGATIVE
BUN SERPL-MCNC: 12 MG/DL (ref 5–25)
CALCIUM SERPL-MCNC: 9.4 MG/DL (ref 8.3–10.1)
CHLORIDE SERPL-SCNC: 98 MMOL/L (ref 100–108)
CLARITY UR: CLEAR
CO2 SERPL-SCNC: 30 MMOL/L (ref 21–32)
COLOR UR: YELLOW
CREAT SERPL-MCNC: 0.99 MG/DL (ref 0.6–1.3)
EOSINOPHIL # BLD AUTO: 0.13 THOUSAND/ΜL (ref 0–0.61)
EOSINOPHIL NFR BLD AUTO: 1 % (ref 0–6)
ERYTHROCYTE [DISTWIDTH] IN BLOOD BY AUTOMATED COUNT: 14.2 % (ref 11.6–15.1)
GFR SERPL CREATININE-BSD FRML MDRD: 61 ML/MIN/1.73SQ M
GLUCOSE SERPL-MCNC: 91 MG/DL (ref 65–140)
GLUCOSE UR STRIP-MCNC: NEGATIVE MG/DL
HCT VFR BLD AUTO: 37.2 % (ref 34.8–46.1)
HGB BLD-MCNC: 11.4 G/DL (ref 11.5–15.4)
HGB UR QL STRIP.AUTO: ABNORMAL
IMM GRANULOCYTES # BLD AUTO: 0.06 THOUSAND/UL (ref 0–0.2)
IMM GRANULOCYTES NFR BLD AUTO: 0 % (ref 0–2)
KETONES UR STRIP-MCNC: NEGATIVE MG/DL
LEUKOCYTE ESTERASE UR QL STRIP: NEGATIVE
LIPASE SERPL-CCNC: 161 U/L (ref 73–393)
LYMPHOCYTES # BLD AUTO: 2.09 THOUSANDS/ΜL (ref 0.6–4.47)
LYMPHOCYTES NFR BLD AUTO: 14 % (ref 14–44)
MCH RBC QN AUTO: 21.1 PG (ref 26.8–34.3)
MCHC RBC AUTO-ENTMCNC: 30.6 G/DL (ref 31.4–37.4)
MCV RBC AUTO: 69 FL (ref 82–98)
MONOCYTES # BLD AUTO: 1.89 THOUSAND/ΜL (ref 0.17–1.22)
MONOCYTES NFR BLD AUTO: 13 % (ref 4–12)
NEUTROPHILS # BLD AUTO: 10.78 THOUSANDS/ΜL (ref 1.85–7.62)
NEUTS SEG NFR BLD AUTO: 71 % (ref 43–75)
NITRITE UR QL STRIP: NEGATIVE
NON-SQ EPI CELLS URNS QL MICRO: ABNORMAL /HPF
NRBC BLD AUTO-RTO: 0 /100 WBCS
PH UR STRIP.AUTO: 6.5 [PH] (ref 4.5–8)
PLATELET # BLD AUTO: 365 THOUSANDS/UL (ref 149–390)
PMV BLD AUTO: 9.3 FL (ref 8.9–12.7)
POTASSIUM SERPL-SCNC: 4.5 MMOL/L (ref 3.5–5.3)
PROT SERPL-MCNC: 8.1 G/DL (ref 6.4–8.2)
PROT UR STRIP-MCNC: NEGATIVE MG/DL
RBC # BLD AUTO: 5.41 MILLION/UL (ref 3.81–5.12)
RBC #/AREA URNS AUTO: ABNORMAL /HPF
SODIUM SERPL-SCNC: 134 MMOL/L (ref 136–145)
SP GR UR STRIP.AUTO: 1.01 (ref 1–1.03)
UROBILINOGEN UR QL STRIP.AUTO: 1 E.U./DL
WBC # BLD AUTO: 15.03 THOUSAND/UL (ref 4.31–10.16)
WBC #/AREA URNS AUTO: ABNORMAL /HPF

## 2021-08-22 PROCEDURE — 96374 THER/PROPH/DIAG INJ IV PUSH: CPT

## 2021-08-22 PROCEDURE — 85025 COMPLETE CBC W/AUTO DIFF WBC: CPT | Performed by: PHYSICIAN ASSISTANT

## 2021-08-22 PROCEDURE — 99284 EMERGENCY DEPT VISIT MOD MDM: CPT | Performed by: PHYSICIAN ASSISTANT

## 2021-08-22 PROCEDURE — 96375 TX/PRO/DX INJ NEW DRUG ADDON: CPT

## 2021-08-22 PROCEDURE — 99284 EMERGENCY DEPT VISIT MOD MDM: CPT

## 2021-08-22 PROCEDURE — 36415 COLL VENOUS BLD VENIPUNCTURE: CPT | Performed by: PHYSICIAN ASSISTANT

## 2021-08-22 PROCEDURE — 83690 ASSAY OF LIPASE: CPT | Performed by: PHYSICIAN ASSISTANT

## 2021-08-22 PROCEDURE — 96361 HYDRATE IV INFUSION ADD-ON: CPT

## 2021-08-22 PROCEDURE — 81001 URINALYSIS AUTO W/SCOPE: CPT

## 2021-08-22 PROCEDURE — G1004 CDSM NDSC: HCPCS

## 2021-08-22 PROCEDURE — 74177 CT ABD & PELVIS W/CONTRAST: CPT

## 2021-08-22 PROCEDURE — 80053 COMPREHEN METABOLIC PANEL: CPT | Performed by: PHYSICIAN ASSISTANT

## 2021-08-22 RX ORDER — KETOROLAC TROMETHAMINE 30 MG/ML
15 INJECTION, SOLUTION INTRAMUSCULAR; INTRAVENOUS ONCE
Status: COMPLETED | OUTPATIENT
Start: 2021-08-22 | End: 2021-08-22

## 2021-08-22 RX ORDER — ONDANSETRON 4 MG/1
4 TABLET, ORALLY DISINTEGRATING ORAL EVERY 6 HOURS PRN
Qty: 20 TABLET | Refills: 0 | Status: SHIPPED | OUTPATIENT
Start: 2021-08-22 | End: 2022-05-24

## 2021-08-22 RX ORDER — AMOXICILLIN AND CLAVULANATE POTASSIUM 875; 125 MG/1; MG/1
1 TABLET, FILM COATED ORAL EVERY 12 HOURS
Qty: 14 TABLET | Refills: 0 | Status: SHIPPED | OUTPATIENT
Start: 2021-08-22 | End: 2021-08-22 | Stop reason: SDUPTHER

## 2021-08-22 RX ORDER — OXYCODONE HYDROCHLORIDE AND ACETAMINOPHEN 5; 325 MG/1; MG/1
1 TABLET ORAL EVERY 6 HOURS PRN
Qty: 8 TABLET | Refills: 0 | Status: SHIPPED | OUTPATIENT
Start: 2021-08-22 | End: 2021-09-29 | Stop reason: ALTCHOICE

## 2021-08-22 RX ORDER — AMOXICILLIN AND CLAVULANATE POTASSIUM 875; 125 MG/1; MG/1
1 TABLET, FILM COATED ORAL ONCE
Status: COMPLETED | OUTPATIENT
Start: 2021-08-22 | End: 2021-08-22

## 2021-08-22 RX ORDER — AMOXICILLIN AND CLAVULANATE POTASSIUM 875; 125 MG/1; MG/1
1 TABLET, FILM COATED ORAL EVERY 12 HOURS
Qty: 14 TABLET | Refills: 0 | Status: SHIPPED | OUTPATIENT
Start: 2021-08-22 | End: 2021-08-29

## 2021-08-22 RX ORDER — OXYCODONE HYDROCHLORIDE AND ACETAMINOPHEN 5; 325 MG/1; MG/1
1 TABLET ORAL EVERY 6 HOURS PRN
Qty: 8 TABLET | Refills: 0 | Status: SHIPPED | OUTPATIENT
Start: 2021-08-22 | End: 2021-08-22 | Stop reason: SDUPTHER

## 2021-08-22 RX ORDER — MORPHINE SULFATE 4 MG/ML
4 INJECTION, SOLUTION INTRAMUSCULAR; INTRAVENOUS ONCE
Status: COMPLETED | OUTPATIENT
Start: 2021-08-22 | End: 2021-08-22

## 2021-08-22 RX ORDER — ONDANSETRON 4 MG/1
4 TABLET, ORALLY DISINTEGRATING ORAL EVERY 6 HOURS PRN
Qty: 20 TABLET | Refills: 0 | Status: SHIPPED | OUTPATIENT
Start: 2021-08-22 | End: 2021-08-22 | Stop reason: SDUPTHER

## 2021-08-22 RX ADMIN — AMOXICILLIN AND CLAVULANATE POTASSIUM 1 TABLET: 875; 125 TABLET, FILM COATED ORAL at 15:14

## 2021-08-22 RX ADMIN — SODIUM CHLORIDE 1000 ML: 0.9 INJECTION, SOLUTION INTRAVENOUS at 13:23

## 2021-08-22 RX ADMIN — MORPHINE SULFATE 4 MG: 4 INJECTION INTRAVENOUS at 13:23

## 2021-08-22 RX ADMIN — IOHEXOL 100 ML: 350 INJECTION, SOLUTION INTRAVENOUS at 14:29

## 2021-08-22 RX ADMIN — KETOROLAC TROMETHAMINE 15 MG: 30 INJECTION, SOLUTION INTRAMUSCULAR; INTRAVENOUS at 15:14

## 2021-08-22 NOTE — Clinical Note
Brian Harrell was seen and treated in our emergency department on 8/22/2021  Diagnosis:     Roxane Sotelo    She may return on this date: 08/24/2021         If you have any questions or concerns, please don't hesitate to call        Grace Larios PA-C    ______________________________           _______________          _______________  Hospital Representative                              Date                                Time

## 2021-08-23 NOTE — ED PROVIDER NOTES
History  Chief Complaint   Patient presents with    Abdominal Pain     Reports lower abdominal pain more prominent in LLQ with nausea since yesterday  Hx of diverticulitis, last in May  Also reports she had a ct scan approx  1 month ago which showed stool, currently on bowel regimen  Last BM 2 days ago which she reports was diarrhea  Ramy Braxton is a 58 yo F, history of appendectomy, cholecystectomy, presenting with left lower abdominal pain for the past one day  She reports she has also had the sensation of bloating  Reports loose stool over the past few days  Denies blood in stool or melena  Denies fevers/chills  No nausea/vomiting  She reports symptoms feel somewhat similar to previous episode of diverticulitis  History provided by:  Patient   used: No    Abdominal Pain  Pain location:  LLQ  Pain radiates to:  Does not radiate  Duration:  1 day  Timing:  Constant  Progression:  Worsening  Chronicity:  Recurrent  Context: not alcohol use, not diet changes, not sick contacts, not suspicious food intake and not trauma    Relieved by:  None tried  Worsened by:  Palpation and movement  Ineffective treatments:  None tried  Associated symptoms: diarrhea    Associated symptoms: no chest pain, no chills, no constipation, no cough, no dysuria, no fever, no hematuria, no melena, no nausea, no shortness of breath, no sore throat and no vomiting    Risk factors: multiple surgeries        Prior to Admission Medications   Prescriptions Last Dose Informant Patient Reported? Taking?    ALPRAZolam (XANAX) 0 25 mg tablet 8/21/2021 at 2200  No Yes   Sig: Take 1 tablet (0 25 mg total) by mouth daily at bedtime as needed for anxiety   Diclofenac Sodium (VOLTAREN) 1 % 8/20/2021 Self No No   Sig: Apply 2 g topically 4 (four) times a day   Magnesium Gluconate (MAGNESIUM 27 PO) 8/20/2021 Self Yes No   Sig: Take by mouth   atenolol (TENORMIN) 50 mg tablet 8/22/2021 at 0900 Self No Yes   Sig: Take 1 tablet (50 mg total) by mouth daily   conjugated estrogens (PREMARIN) vaginal cream More than a month at Unknown time Self No No   Sig: Insert 0 5 g into the vagina daily   dicyclomine (BENTYL) 10 mg capsule 8/22/2021 at 0900  No Yes   Sig: Take 1 capsule (10 mg total) by mouth 3 (three) times a day as needed (abd pain)   omeprazole (PriLOSEC) 40 MG capsule 8/22/2021 at 0900 Self No Yes   Sig: Take 1 capsule (40 mg total) by mouth daily   valACYclovir (VALTREX) 1,000 mg tablet  Self No No   Sig: Take 1 tablet (1,000 mg total) by mouth 3 (three) times a day for 7 days      Facility-Administered Medications: None       Past Medical History:   Diagnosis Date    Adjustment disorder with anxiety     last assessed - [id number]    Anemia     Anxiety     Diverticulitis     Diverticulitis of large intestine with abscess without bleeding 5/24/2020    GERD (gastroesophageal reflux disease) 2000    History of total hysterectomy     History of transfusion     Hypertension     Hypertension 3/12/2021    Ingrown toenail     last assessed - [id number]    Thalassemia        Past Surgical History:   Procedure Laterality Date    APPENDECTOMY      CHOLECYSTECTOMY      COLONOSCOPY      GALLBLADDER SURGERY      OOPHORECTOMY Bilateral     TONSILLECTOMY AND ADENOIDECTOMY      TOTAL ABDOMINAL HYSTERECTOMY      UPPER GASTROINTESTINAL ENDOSCOPY         Family History   Problem Relation Age of Onset    Anxiety disorder Mother         Anxiety    Depression Mother     Anxiety disorder Sister         Anxiety    Depression Sister     Anxiety disorder Daughter         Anxiety    Depression Daughter     Heart disease Maternal Aunt     Heart disease Maternal Uncle     Cancer Sister         Melanoma    No Known Problems Sister     No Known Problems Sister      I have reviewed and agree with the history as documented      E-Cigarette/Vaping    E-Cigarette Use Never User      E-Cigarette/Vaping Substances    Nicotine No     THC No     CBD No     Flavoring No     Other No     Unknown No      Social History     Tobacco Use    Smoking status: Never Smoker    Smokeless tobacco: Never Used   Vaping Use    Vaping Use: Never used   Substance Use Topics    Alcohol use: Yes     Alcohol/week: 1 0 standard drinks     Types: 1 Glasses of wine per week     Comment: occasional alcohol use    Drug use: Never       Review of Systems   Constitutional: Negative for chills and fever  HENT: Negative for congestion, rhinorrhea and sore throat  Eyes: Negative for pain and visual disturbance  Respiratory: Negative for cough, shortness of breath and wheezing  Cardiovascular: Negative for chest pain and palpitations  Gastrointestinal: Positive for abdominal pain and diarrhea  Negative for blood in stool, constipation, melena, nausea and vomiting  Genitourinary: Negative for dysuria, frequency, hematuria and urgency  Musculoskeletal: Negative for back pain, neck pain and neck stiffness  Skin: Negative for rash and wound  Neurological: Negative for dizziness, weakness, light-headedness and numbness  Physical Exam  Physical Exam  Constitutional:       General: She is not in acute distress  Appearance: She is well-developed  She is not diaphoretic  HENT:      Head: Normocephalic and atraumatic  Right Ear: External ear normal       Left Ear: External ear normal    Eyes:      Conjunctiva/sclera: Conjunctivae normal       Pupils: Pupils are equal, round, and reactive to light  Cardiovascular:      Rate and Rhythm: Normal rate and regular rhythm  Heart sounds: Normal heart sounds  No murmur heard  No friction rub  No gallop  Pulmonary:      Effort: Pulmonary effort is normal  No respiratory distress  Breath sounds: Normal breath sounds  No wheezing  Abdominal:      General: There is no distension  Palpations: Abdomen is soft  Tenderness: There is abdominal tenderness in the left lower quadrant   There is no right CVA tenderness, left CVA tenderness, guarding or rebound  Negative signs include Caicedo's sign, Rovsing's sign, McBurney's sign, psoas sign and obturator sign  Musculoskeletal:      Cervical back: Normal range of motion and neck supple  Lymphadenopathy:      Cervical: No cervical adenopathy  Skin:     General: Skin is warm and dry  Capillary Refill: Capillary refill takes less than 2 seconds  Findings: No erythema or rash  Neurological:      Mental Status: She is alert and oriented to person, place, and time  Motor: No abnormal muscle tone  Coordination: Coordination normal    Psychiatric:         Behavior: Behavior normal          Thought Content:  Thought content normal          Judgment: Judgment normal          Vital Signs  ED Triage Vitals   Temperature Pulse Respirations Blood Pressure SpO2   08/22/21 1212 08/22/21 1212 08/22/21 1212 08/22/21 1212 08/22/21 1212   98 2 °F (36 8 °C) (!) 50 16 149/69 99 %      Temp Source Heart Rate Source Patient Position - Orthostatic VS BP Location FiO2 (%)   08/22/21 1212 08/22/21 1432 08/22/21 1432 08/22/21 1432 --   Oral Monitor Lying Right arm       Pain Score       08/22/21 1212       Worst Possible Pain           Vitals:    08/22/21 1212 08/22/21 1432   BP: 149/69 119/56   Pulse: (!) 50 58   Patient Position - Orthostatic VS:  Lying         Visual Acuity      ED Medications  Medications   sodium chloride 0 9 % bolus 1,000 mL (0 mL Intravenous Stopped 8/22/21 1514)   morphine (PF) 4 mg/mL injection 4 mg (4 mg Intravenous Given 8/22/21 1323)   iohexol (OMNIPAQUE) 350 MG/ML injection (SINGLE-DOSE) 100 mL (100 mL Intravenous Given 8/22/21 1429)   amoxicillin-clavulanate (AUGMENTIN) 875-125 mg per tablet 1 tablet (1 tablet Oral Given 8/22/21 1514)   ketorolac (TORADOL) injection 15 mg (15 mg Intravenous Given 8/22/21 1514)       Diagnostic Studies  Results Reviewed     Procedure Component Value Units Date/Time    Comprehensive metabolic panel [[id number]]  (Abnormal) Collected: 08/22/21 1322    Lab Status: Final result Specimen: Blood from Arm, Right Updated: 08/22/21 1344     Sodium 134 mmol/L      Potassium 4 5 mmol/L      Chloride 98 mmol/L      CO2 30 mmol/L      ANION GAP 6 mmol/L      BUN 12 mg/dL      Creatinine 0 99 mg/dL      Glucose 91 mg/dL      Calcium 9 4 mg/dL      AST 21 U/L      ALT 26 U/L      Alkaline Phosphatase 162 U/L      Total Protein 8 1 g/dL      Albumin 3 7 g/dL      Total Bilirubin 0 98 mg/dL      eGFR 61 ml/min/1 73sq m     Narrative:      National Kidney Disease Foundation guidelines for Chronic Kidney Disease (CKD):     Stage 1 with normal or high GFR (GFR > 90 mL/min/1 73 square meters)    Stage 2 Mild CKD (GFR = 60-89 mL/min/1 73 square meters)    Stage 3A Moderate CKD (GFR = 45-59 mL/min/1 73 square meters)    Stage 3B Moderate CKD (GFR = 30-44 mL/min/1 73 square meters)    Stage 4 Severe CKD (GFR = 15-29 mL/min/1 73 square meters)    Stage 5 End Stage CKD (GFR <15 mL/min/1 73 square meters)  Note: GFR calculation is accurate only with a steady state creatinine    Lipase [[id number]]  (Normal) Collected: 08/22/21 1322    Lab Status: Final result Specimen: Blood from Arm, Right Updated: 08/22/21 1344     Lipase 161 u/L     CBC and differential [[id number]]  (Abnormal) Collected: 08/22/21 1322    Lab Status: Final result Specimen: Blood from Arm, Right Updated: 08/22/21 1331     WBC 15 03 Thousand/uL      RBC 5 41 Million/uL      Hemoglobin 11 4 g/dL      Hematocrit 37 2 %      MCV 69 fL      MCH 21 1 pg      MCHC 30 6 g/dL      RDW 14 2 %      MPV 9 3 fL      Platelets 280 Thousands/uL      nRBC 0 /100 WBCs      Neutrophils Relative 71 %      Immat GRANS % 0 %      Lymphocytes Relative 14 %      Monocytes Relative 13 %      Eosinophils Relative 1 %      Basophils Relative 1 %      Neutrophils Absolute 10 78 Thousands/µL      Immature Grans Absolute 0 06 Thousand/uL      Lymphocytes Absolute 2 09 Thousands/µL Monocytes Absolute 1 89 Thousand/µL      Eosinophils Absolute 0 13 Thousand/µL      Basophils Absolute 0 08 Thousands/µL     Urine Microscopic [[id number]]  (Abnormal) Collected: 08/22/21 1246    Lab Status: Final result Specimen: Urine, Clean Catch Updated: 08/22/21 1322     RBC, UA 0-1 /hpf      WBC, UA 0-1 /hpf      Epithelial Cells Occasional /hpf      Bacteria, UA Occasional /hpf     Urine Macroscopic, POC [[id number]]  (Abnormal) Collected: 08/22/21 1246    Lab Status: Final result Specimen: Urine Updated: 08/22/21 1248     Color, UA Yellow     Clarity, UA Clear     pH, UA 6 5     Leukocytes, UA Negative     Nitrite, UA Negative     Protein, UA Negative mg/dl      Glucose, UA Negative mg/dl      Ketones, UA Negative mg/dl      Urobilinogen, UA 1 0 E U /dl      Bilirubin, UA Negative     Blood, UA Trace     Specific Leonard, UA 1 010    Narrative:      CLINITEK RESULT                 CT abdomen pelvis with contrast   Final Result by Jerald Lipscomb MD (08/22 1445)      Short segment sigmoid thickening with pericolonic inflammatory changes most consistent with appearance of acute uncomplicated diverticulitis  Stable bilateral adrenal nodules likely represent benign adenomata  The study was marked in [facility] for immediate notification  Workstation performed: [id number]                    Procedures  Procedures         ED Course  ED Course as of Aug 23 1834   Mayelin Smalls Aug 22, 2021   1340 WBC(!): 15 03                             SBIRT 20yo+      Most Recent Value   SBIRT (25 yo +)   In order to provide better care to our patients, we are screening all of our patients for alcohol and drug use  Would it be okay to ask you these screening questions? Yes Filed at: 08/22/2021 1242   Initial Alcohol Screen: US AUDIT-C    1  How often do you have a drink containing alcohol?  0 Filed at: 08/22/2021 1242   2  How many drinks containing alcohol do you have on a typical day you are drinking?    0 Filed at: 08/22/2021 1242   3a  Male UNDER 65: How often do you have five or more drinks on one occasion? 0 Filed at: 08/22/2021 1242   3b  FEMALE Any Age, or MALE 65+: How often do you have 4 or more drinks on one occassion? 0 Filed at: 08/22/2021 1242   Audit-C Score  0 Filed at: 08/22/2021 1242   JOVANY: How many times in the past year have you    Used an illegal drug or used a prescription medication for non-medical reasons? Never Filed at: 08/22/2021 1242                    MDM  Number of Diagnoses or Management Options  Diverticulitis  Diagnosis management comments: Left lower abdominal pain, diarrhea over the past day similar to previous episode of diverticulitis  Left lower abd TTP on exam without peritoneal signs  Patient is otherwise afebrile, well appearing, and in no acute distress  Will check abdominal labs, urine dip, check CT abd/pelvis with contrast for acute inflammatory intra-abdominal pathology  Will provide morphine for pain, IV fluids, reassess  Amount and/or Complexity of Data Reviewed  Clinical lab tests: reviewed and ordered  Tests in the radiology section of CPT®: ordered and reviewed    Patient Progress  Patient progress: improved      Disposition  Final diagnoses:   Diverticulitis     Time reflects when diagnosis was documented in both MDM as applicable and the Disposition within this note     Time User Action Codes Description Comment    8/22/2021  3:38 PM Jaziel Mckeon Add [K57 92] Diverticulitis       ED Disposition     ED Disposition Condition Date/Time Comment    Discharge Stable Sun Aug 22, 2021  3:38 PM Robert Bhatia discharge to home/self care              Follow-up Information     Follow up With Specialties Details Why Contact Info Additional Information    Alen Rubio, [address], Internal Medicine, Nurse Practitioner Schedule an appointment as soon as possible for a visit   [address] [place] [address] [phone]       Elizabeth [address]  [facility] Emergency Department Emergency Medicine  If symptoms worsen [place] [id number]  [address] [facility] Emergency Department, [address] , [place], [place], [address]          Discharge Medication List as of 8/22/2021  3:59 PM      CONTINUE these medications which have CHANGED    Details   amoxicillin-clavulanate (AUGMENTIN) 875-125 mg per tablet Take 1 tablet by mouth every 12 (twelve) hours for 7 days, Starting Sun 8/22/2021, Until Sun 8/29/2021, Normal      ondansetron (ZOFRAN-ODT) 4 mg disintegrating tablet Take 1 tablet (4 mg total) by mouth every 6 (six) hours as needed for nausea or vomiting, Starting Sun 8/22/2021, Normal      oxyCODONE-acetaminophen (PERCOCET) 5-325 mg per tablet Take 1 tablet by mouth every 6 (six) hours as needed for severe pain for up to 8 dosesMax Daily Amount: 4 tablets, Starting Sun 8/22/2021, Normal         CONTINUE these medications which have NOT CHANGED    Details   ALPRAZolam (XANAX) 0 25 mg tablet Take 1 tablet (0 25 mg total) by mouth daily at bedtime as needed for anxiety, Starting Thu 8/5/2021, Normal      atenolol (TENORMIN) 50 mg tablet Take 1 tablet (50 mg total) by mouth daily, Starting Thu 7/15/2021, Normal      dicyclomine (BENTYL) 10 mg capsule Take 1 capsule (10 mg total) by mouth 3 (three) times a day as needed (abd pain), Starting Tue 8/17/2021, Normal      omeprazole (PriLOSEC) 40 MG capsule Take 1 capsule (40 mg total) by mouth daily, Starting Fri 7/30/2021, Until Fri 11/19/2021, Normal      conjugated estrogens (PREMARIN) vaginal cream Insert 0 5 g into the vagina daily, Starting Wed 3/10/2021, Normal      Diclofenac Sodium (VOLTAREN) 1 % Apply 2 g topically 4 (four) times a day, Starting Wed 5/5/2021, Normal      Magnesium Gluconate (MAGNESIUM 27 PO) Take by mouth, Historical Med      valACYclovir (VALTREX) 1,000 mg tablet Take 1 tablet (1,000 mg total) by mouth 3 (three) times a day for 7 days, Starting Tue 6/1/2021, Until Wed 8/4/2021, Normal           No discharge procedures on file      PDMP Review       Value Time User    PDMP Reviewed  Yes 11/6/2020 11:50 AM Anne Marie Magaña, [facility]           ED Provider  Electronically Signed by           Humberto Gavin PA-C  08/23/21 [id number]

## 2021-08-24 ENCOUNTER — TELEPHONE (OUTPATIENT)
Dept: FAMILY MEDICINE CLINIC | Facility: CLINIC | Age: 61
End: 2021-08-24

## 2021-08-24 NOTE — TELEPHONE ENCOUNTER
Left another message to schedule pt's follow up with Dr Gaby Silverman   This is the 2nd attempt a letter will be sent out

## 2021-08-24 NOTE — TELEPHONE ENCOUNTER
Patient called and stated that she was in the ER on 8/22 for a diverticulitis flare up and she needs an Er follow up she tried GI and she said they had noting in the next month or two   Please advise

## 2021-09-02 ENCOUNTER — OFFICE VISIT (OUTPATIENT)
Dept: FAMILY MEDICINE CLINIC | Facility: CLINIC | Age: 61
End: 2021-09-02
Payer: COMMERCIAL

## 2021-09-02 VITALS
BODY MASS INDEX: 24.7 KG/M2 | HEIGHT: 61 IN | DIASTOLIC BLOOD PRESSURE: 70 MMHG | TEMPERATURE: 97.4 F | HEART RATE: 46 BPM | WEIGHT: 130.8 LBS | RESPIRATION RATE: 12 BRPM | OXYGEN SATURATION: 100 % | SYSTOLIC BLOOD PRESSURE: 126 MMHG

## 2021-09-02 DIAGNOSIS — K57.92 DIVERTICULITIS: Primary | ICD-10-CM

## 2021-09-02 PROCEDURE — 99213 OFFICE O/P EST LOW 20 MIN: CPT | Performed by: NURSE PRACTITIONER

## 2021-09-02 RX ORDER — CIPROFLOXACIN 500 MG/1
500 TABLET, FILM COATED ORAL EVERY 12 HOURS SCHEDULED
Qty: 20 TABLET | Refills: 0 | Status: SHIPPED | OUTPATIENT
Start: 2021-09-02 | End: 2021-09-12

## 2021-09-02 RX ORDER — METRONIDAZOLE 500 MG/1
500 TABLET ORAL EVERY 8 HOURS SCHEDULED
Qty: 30 TABLET | Refills: 0 | Status: SHIPPED | OUTPATIENT
Start: 2021-09-02 | End: 2021-09-02 | Stop reason: SDUPTHER

## 2021-09-02 RX ORDER — CIPROFLOXACIN 500 MG/1
500 TABLET, FILM COATED ORAL EVERY 12 HOURS SCHEDULED
Qty: 20 TABLET | Refills: 0 | Status: SHIPPED | OUTPATIENT
Start: 2021-09-02 | End: 2021-09-02 | Stop reason: SDUPTHER

## 2021-09-02 RX ORDER — METRONIDAZOLE 500 MG/1
500 TABLET ORAL EVERY 8 HOURS SCHEDULED
Qty: 30 TABLET | Refills: 0 | Status: SHIPPED | OUTPATIENT
Start: 2021-09-02 | End: 2021-09-12

## 2021-09-02 NOTE — PROGRESS NOTES
FAMILY PRACTICE OFFICE VISIT       NAME: Elizabet Wilder  AGE: 57 y o  SEX: female       : 1960        MRN: [id number]    DATE: 2021  TIME: 10:41 AM    Assessment and Plan   1  Diverticulitis  -     metroNIDAZOLE (FLAGYL) 500 mg tablet; Take 1 tablet (500 mg total) by mouth every 8 (eight) hours for 10 days  -     ciprofloxacin (CIPRO) 500 mg tablet; Take 1 tablet (500 mg total) by mouth every 12 (twelve) hours for 10 days       Handout on low residue diet for now then when better high fiber diet             Chief Complaint     Chief Complaint   Patient presents with    Follow-up     ER follow up for diverticulitis       History of Present Illness   Elizabet Wilder is a 57y o -year-old female who is here for ER follow up for acute episode of diverticulitis  Had episode one year ago  Was unable to get in touch with GI  Was given antibiotics  Not any better, finished them  No fever or chills  LLQ abd pain still  See CT scan      Review of Systems   Review of Systems   Constitutional: Positive for fatigue  Negative for diaphoresis and fever  HENT: Negative for congestion and postnasal drip  Eyes: Negative for photophobia and visual disturbance  Respiratory: Negative for cough and shortness of breath  Cardiovascular: Negative for chest pain and palpitations  Gastrointestinal: Positive for abdominal pain and diarrhea  Negative for constipation  Genitourinary: Negative for dysuria and frequency  Musculoskeletal: Negative for arthralgias and myalgias  Neurological: Negative for dizziness and headaches  Hematological: Negative for adenopathy  Psychiatric/Behavioral: Negative for dysphoric mood  The patient is not nervous/anxious          Active Problem List     Patient Active Problem List   Diagnosis    Tachycardia    Anxiety    Gastroesophageal reflux disease    Dense breast tissue    Vaginal atrophy    Low bone density    Adrenal adenoma    History of hysterectomy    Hypertension  Chronic pain of left thumb    Adrenal nodule (HCC)    Diverticulosis    Generalized abdominal pain    Constipation    Diverticulitis         Past Medical History:  Past Medical History:   Diagnosis Date    Adjustment disorder with anxiety     last assessed - [id number]    Anemia     Anxiety     Diverticulitis     Diverticulitis of large intestine with abscess without bleeding 5/24/2020    GERD (gastroesophageal reflux disease) 2000    History of total hysterectomy     History of transfusion     Hypertension     Hypertension 3/12/2021    Ingrown toenail     last assessed - [id number]    Thalassemia        Past Surgical History:  Past Surgical History:   Procedure Laterality Date    APPENDECTOMY      CHOLECYSTECTOMY      COLONOSCOPY      GALLBLADDER SURGERY      OOPHORECTOMY Bilateral     TONSILLECTOMY AND ADENOIDECTOMY      TOTAL ABDOMINAL HYSTERECTOMY      UPPER GASTROINTESTINAL ENDOSCOPY         Family History:  Family History   Problem Relation Age of Onset    Anxiety disorder Mother         Anxiety    Depression Mother     Anxiety disorder Sister         Anxiety    Depression Sister     Anxiety disorder Daughter         Anxiety    Depression Daughter     Heart disease Maternal Aunt     Heart disease Maternal Uncle     Cancer Sister         Melanoma    No Known Problems Sister     No Known Problems Sister        Social History:  Social History     Socioeconomic History    Marital status: /Civil Union     Spouse name: Not on file    Number of children: Not on file    Years of education: Not on file    Highest education level: Not on file   Occupational History    Occupation:  - [employer]   Tobacco Use    Smoking status: Never Smoker    Smokeless tobacco: Never Used   Vaping Use    Vaping Use: Never used   Substance and Sexual Activity    Alcohol use:  Yes     Alcohol/week: 1 0 standard drinks     Types: 1 Glasses of wine per week     Comment: occasional alcohol use    Drug use: Never    Sexual activity: Not Currently     Partners: Male     Birth control/protection: Post-menopausal, Surgical     Comment: Hysterectomy 1988   Other Topics Concern    Not on file   Social History Narrative    Coffee     Social Determinants of Health     Financial Resource Strain:     Difficulty of Paying Living Expenses:    Food Insecurity:     Worried About Running Out of Food in the Last Year:     Ran Out of Food in the Last Year:    Transportation Needs:     Lack of Transportation (Medical):  Lack of Transportation (Non-Medical):    Physical Activity:     Days of Exercise per Week:     Minutes of Exercise per Session:    Stress:     Feeling of Stress :    Social Connections:     Frequency of Communication with Friends and Family:     Frequency of Social Gatherings with Friends and Family:     Attends Oriental orthodox Services:     Active Member of Clubs or Organizations:     Attends Club or Organization Meetings:     Marital Status:    Intimate Partner Violence:     Fear of Current or Ex-Partner:     Emotionally Abused:     Physically Abused:     Sexually Abused:        Objective     Vitals:    09/02/21 0903   BP: 126/70   Pulse: (!) 46   Resp: 12   Temp: (!) 97 4 °F (36 3 °C)   SpO2: 100%     Wt Readings from Last 3 Encounters:   09/02/21 59 3 kg (130 lb 12 8 oz)   08/22/21 59 8 kg (131 lb 13 4 oz)   08/04/21 61 4 kg (135 lb 6 4 oz)       Physical Exam  Vitals and nursing note reviewed  Constitutional:       Appearance: Normal appearance  HENT:      Head: Normocephalic  Eyes:      Conjunctiva/sclera: Conjunctivae normal    Cardiovascular:      Rate and Rhythm: Normal rate and regular rhythm  Heart sounds: Normal heart sounds  Pulmonary:      Effort: Pulmonary effort is normal       Breath sounds: Normal breath sounds  Abdominal:      General: Abdomen is flat  Bowel sounds are normal       Palpations: Abdomen is soft  Tenderness:  There is abdominal tenderness in the left lower quadrant  Musculoskeletal:         General: Normal range of motion  Cervical back: Normal range of motion  Skin:     General: Skin is dry  Neurological:      Mental Status: She is alert and oriented to person, place, and time     Psychiatric:         Mood and Affect: Mood normal          Behavior: Behavior normal          Pertinent Laboratory/Diagnostic Studies:  Lab Results   Component Value Date    GLUCOSE 84 09/22/2015    BUN 12 08/22/2021    CREATININE 0 99 08/22/2021    CALCIUM 9 4 08/22/2021     09/22/2015    K 4 5 08/22/2021    CO2 30 08/22/2021    CL 98 (L) 08/22/2021     Lab Results   Component Value Date    ALT 26 08/22/2021    AST 21 08/22/2021    ALKPHOS 162 (H) 08/22/2021    BILITOT 0 69 09/22/2015       Lab Results   Component Value Date    WBC 15 03 (H) 08/22/2021    HGB 11 4 (L) 08/22/2021    HCT 37 2 08/22/2021    MCV 69 (L) 08/22/2021     08/22/2021       No results found for: TSH    Lab Results   Component Value Date    CHOL 169 09/22/2015     Lab Results   Component Value Date    TRIG 205 (H) 04/30/2021     Lab Results   Component Value Date    HDL 51 04/30/2021     Lab Results   Component Value Date    LDLCALC 98 04/30/2021     Lab Results   Component Value Date    HGBA1C 5 3 04/30/2021       Results for orders placed or performed during the hospital encounter of 08/22/21   Urine Microscopic   Result Value Ref Range    RBC, UA 0-1 (A) None Seen, 2-4 /hpf    WBC, UA 0-1 (A) None Seen, 2-4, 5-60 /hpf    Epithelial Cells Occasional None Seen, Occasional /hpf    Bacteria, UA Occasional None Seen, Occasional /hpf   CBC and differential   Result Value Ref Range    WBC 15 03 (H) 4 31 - 10 16 Thousand/uL    RBC 5 41 (H) 3 81 - 5 12 Million/uL    Hemoglobin 11 4 (L) 11 5 - 15 4 g/dL    Hematocrit 37 2 34 8 - 46 1 %    MCV 69 (L) 82 - 98 fL    MCH 21 1 (L) 26 8 - 34 3 pg    MCHC 30 6 (L) 31 4 - 37 4 g/dL    RDW 14 2 11 6 - 15 1 %    MPV 9 3 8 9 - 12 7 fL    Platelets 899 793 - 893 Thousands/uL    nRBC 0 /100 WBCs    Neutrophils Relative 71 43 - 75 %    Immat GRANS % 0 0 - 2 %    Lymphocytes Relative 14 14 - 44 %    Monocytes Relative 13 (H) 4 - 12 %    Eosinophils Relative 1 0 - 6 %    Basophils Relative 1 0 - 1 %    Neutrophils Absolute 10 78 (H) 1 85 - 7 62 Thousands/µL    Immature Grans Absolute 0 06 0 00 - 0 20 Thousand/uL    Lymphocytes Absolute 2 09 0 60 - 4 47 Thousands/µL    Monocytes Absolute 1 89 (H) 0 17 - 1 22 Thousand/µL    Eosinophils Absolute 0 13 0 00 - 0 61 Thousand/µL    Basophils Absolute 0 08 0 00 - 0 10 Thousands/µL   Comprehensive metabolic panel   Result Value Ref Range    Sodium 134 (L) 136 - 145 mmol/L    Potassium 4 5 3 5 - 5 3 mmol/L    Chloride 98 (L) 100 - 108 mmol/L    CO2 30 21 - 32 mmol/L    ANION GAP 6 4 - 13 mmol/L    BUN 12 5 - 25 mg/dL    Creatinine 0 99 0 60 - 1 30 mg/dL    Glucose 91 65 - 140 mg/dL    Calcium 9 4 8 3 - 10 1 mg/dL    AST 21 5 - 45 U/L    ALT 26 12 - 78 U/L    Alkaline Phosphatase 162 (H) 46 - 116 U/L    Total Protein 8 1 6 4 - 8 2 g/dL    Albumin 3 7 3 5 - 5 0 g/dL    Total Bilirubin 0 98 0 20 - 1 00 mg/dL    eGFR 61 ml/min/1 73sq m   Lipase   Result Value Ref Range    Lipase 161 73 - 393 u/L   Urine Macroscopic, POC   Result Value Ref Range    Color, UA Yellow     Clarity, UA Clear     pH, UA 6 5 4 5 - 8 0    Leukocytes, UA Negative Negative    Nitrite, UA Negative Negative    Protein, UA Negative Negative mg/dl    Glucose, UA Negative Negative mg/dl    Ketones, UA Negative Negative mg/dl    Urobilinogen, UA 1 0 0 2, 1 0 E U /dl E U /dl    Bilirubin, UA Negative Negative    Blood, UA Trace (A) Negative    Specific Gravity, UA 1 010 1 003 - 1 030       No orders of the defined types were placed in this encounter        ALLERGIES:  No Known Allergies    Current Medications     Current Outpatient Medications   Medication Sig Dispense Refill    ALPRAZolam (XANAX) 0 25 mg tablet Take 1 tablet (0 25 mg total) by mouth daily at bedtime as needed for anxiety 20 tablet 0    atenolol (TENORMIN) 50 mg tablet Take 1 tablet (50 mg total) by mouth daily 90 tablet 0    conjugated estrogens (PREMARIN) vaginal cream Insert 0 5 g into the vagina daily 30 g 3    Diclofenac Sodium (VOLTAREN) 1 % Apply 2 g topically 4 (four) times a day 100 g 2    dicyclomine (BENTYL) 10 mg capsule Take 1 capsule (10 mg total) by mouth 3 (three) times a day as needed (abd pain) 90 capsule 3    Magnesium Gluconate (MAGNESIUM 27 PO) Take by mouth      omeprazole (PriLOSEC) 40 MG capsule Take 1 capsule (40 mg total) by mouth daily 90 capsule 3    ondansetron (ZOFRAN-ODT) 4 mg disintegrating tablet Take 1 tablet (4 mg total) by mouth every 6 (six) hours as needed for nausea or vomiting 20 tablet 0    oxyCODONE-acetaminophen (PERCOCET) 5-325 mg per tablet Take 1 tablet by mouth every 6 (six) hours as needed for severe pain for up to 8 dosesMax Daily Amount: 4 tablets 8 tablet 0    ciprofloxacin (CIPRO) 500 mg tablet Take 1 tablet (500 mg total) by mouth every 12 (twelve) hours for 10 days 20 tablet 0    metroNIDAZOLE (FLAGYL) 500 mg tablet Take 1 tablet (500 mg total) by mouth every 8 (eight) hours for 10 days 30 tablet 0    valACYclovir (VALTREX) 1,000 mg tablet Take 1 tablet (1,000 mg total) by mouth 3 (three) times a day for 7 days (Patient not taking: Reported on 9/2/2021) 21 tablet 0     No current facility-administered medications for this visit           Health Maintenance     Health Maintenance   Topic Date Due    Influenza Vaccine (1) 09/01/2021    HIV Screening  01/03/2022 (Originally 4/28/1975)    DTaP,Tdap,and Td Vaccines (1 - Tdap) 07/07/2022 (Originally 4/28/1980)    Depression Screening PHQ  03/12/2022    Breast Cancer Screening: Mammogram  06/10/2022    Annual Physical  07/07/2022    BMI: Adult  09/02/2022    Colorectal Cancer Screening  09/17/2025    Hepatitis C Screening  Completed    COVID-19 Vaccine  Completed  Pneumococcal Vaccine: Pediatrics (0 to 5 Years) and At-Risk Patients (6 to 59 Years)  Aged Out    HIB Vaccine  Aged Out    Hepatitis B Vaccine  Aged Out    IPV Vaccine  Aged Out    Hepatitis A Vaccine  Aged Out    Meningococcal ACWY Vaccine  Aged Out    HPV Vaccine  Aged [employer] History   Administered Date(s) Administered    Influenza, recombinant, quadrivalent,injectable, preservative free 10/05/2018    SARS-CoV-2 / COVID-19 mRNA IM ([employer]) 12/23/2020, 01/14/2021          KATIANA Hoffman

## 2021-09-07 DIAGNOSIS — F41.9 ANXIETY: ICD-10-CM

## 2021-09-07 RX ORDER — ALPRAZOLAM 0.25 MG/1
0.25 TABLET ORAL
Qty: 20 TABLET | Refills: 0 | Status: SHIPPED | OUTPATIENT
Start: 2021-09-07 | End: 2021-10-08 | Stop reason: SDUPTHER

## 2021-09-07 NOTE — TELEPHONE ENCOUNTER
Medication:  PDMP   08/06/2021  1  08/05/2021  ALPRAZOLAM 0 25 MG TABLET  20 0  20  DO MEN     Active agreement on file -No

## 2021-09-16 ENCOUNTER — APPOINTMENT (OUTPATIENT)
Dept: RADIOLOGY | Facility: AMBULARY SURGERY CENTER | Age: 61
End: 2021-09-16
Attending: SURGERY
Payer: COMMERCIAL

## 2021-09-16 ENCOUNTER — OFFICE VISIT (OUTPATIENT)
Dept: OBGYN CLINIC | Facility: CLINIC | Age: 61
End: 2021-09-16
Payer: COMMERCIAL

## 2021-09-16 VITALS — BODY MASS INDEX: 25.11 KG/M2 | WEIGHT: 133 LBS | HEIGHT: 61 IN

## 2021-09-16 DIAGNOSIS — M79.644 PAIN OF RIGHT THUMB: ICD-10-CM

## 2021-09-16 DIAGNOSIS — M18.11 ARTHRITIS OF CARPOMETACARPAL (CMC) JOINT OF RIGHT THUMB: ICD-10-CM

## 2021-09-16 DIAGNOSIS — M79.644 PAIN OF RIGHT THUMB: Primary | ICD-10-CM

## 2021-09-16 DIAGNOSIS — M18.12 ARTHRITIS OF CARPOMETACARPAL (CMC) JOINT OF LEFT THUMB: ICD-10-CM

## 2021-09-16 PROCEDURE — 20600 DRAIN/INJ JOINT/BURSA W/O US: CPT | Performed by: SURGERY

## 2021-09-16 PROCEDURE — 99214 OFFICE O/P EST MOD 30 MIN: CPT | Performed by: SURGERY

## 2021-09-16 PROCEDURE — 73140 X-RAY EXAM OF FINGER(S): CPT

## 2021-09-16 RX ORDER — LIDOCAINE HYDROCHLORIDE 10 MG/ML
1 INJECTION, SOLUTION INFILTRATION; PERINEURAL
Status: COMPLETED | OUTPATIENT
Start: 2021-09-16 | End: 2021-09-16

## 2021-09-16 RX ORDER — TRIAMCINOLONE ACETONIDE 40 MG/ML
20 INJECTION, SUSPENSION INTRA-ARTICULAR; INTRAMUSCULAR
Status: COMPLETED | OUTPATIENT
Start: 2021-09-16 | End: 2021-09-16

## 2021-09-16 RX ADMIN — LIDOCAINE HYDROCHLORIDE 1 ML: 10 INJECTION, SOLUTION INFILTRATION; PERINEURAL at 15:28

## 2021-09-16 RX ADMIN — TRIAMCINOLONE ACETONIDE 20 MG: 40 INJECTION, SUSPENSION INTRA-ARTICULAR; INTRAMUSCULAR at 15:28

## 2021-09-16 NOTE — PROGRESS NOTES
ASSESSMENT/PLAN:      57year old female here for her bilateral thumb CMC joint pain and arthritis  New x-rays of the right thumb were obtained today and reviewed with the patient  Nonoperative treatments were discussed with the patient that include a localized cortisone injections into bilateral thumb CMC joints  Patient wished to pursue with both injections today, which she tolerated well  She can use the thumb comfort cool splint as needed for her activities  Encouraged the patient to take over-the-counter anti-inflammatories and Tylenol as needed for pain discomfort  We will follow the patient as needed  The patient verbalized understanding of exam findings and treatment plan  We engaged in the shared decision-making process and treatment options were discussed at length with the patient  Surgical and conservative management discussed today along with risks and benefits  Diagnoses and all orders for this visit:    Pain of right thumb  -     XR thumb right first digit-min 2v; Future    Arthritis of carpometacarpal [place]) joint of left thumb    Arthritis of carpometacarpal (CMC) joint of right thumb    Other orders  -     Small joint arthrocentesis  -     Small joint arthrocentesis        Follow Up:  Return in about 3 months (around 12/16/2021) for bilateral thumbs  To Do Next Visit:  Re-evaluation of current issue    ____________________________________________________________________________________________________________________________________________      CHIEF COMPLAINT:  Chief Complaint   Patient presents with    Left Hand - Follow-up, Pain    Right Hand - Pain       SUBJECTIVE:  Dawna Nageotte is a 57y o  year old RHD female who presents  Today for her left thumb CMC joint osteoarthritis  At her last visit a thumb CMC joint injection was administered today gave her significant relief for the 3 months   She does wear the thumb comfort cool when she can, but it's difficult due to sanitizing her hands at work constantly  She notes that the right thumb is bothering her as well  I have personally reviewed all the relevant PMH, PSH, SH, FH, Medications and allergies  PAST MEDICAL HISTORY:  Past Medical History:   Diagnosis Date    Adjustment disorder with anxiety     last assessed - [id number]    Anemia     Anxiety     Diverticulitis     Diverticulitis of large intestine with abscess without bleeding 5/24/2020    GERD (gastroesophageal reflux disease) 2000    History of total hysterectomy     History of transfusion     Hypertension     Hypertension 3/12/2021    Ingrown toenail     last assessed - [id number]    Thalassemia        PAST SURGICAL HISTORY:  Past Surgical History:   Procedure Laterality Date    APPENDECTOMY      CHOLECYSTECTOMY      COLONOSCOPY      GALLBLADDER SURGERY      OOPHORECTOMY Bilateral     TONSILLECTOMY AND ADENOIDECTOMY      TOTAL ABDOMINAL HYSTERECTOMY      UPPER GASTROINTESTINAL ENDOSCOPY         FAMILY HISTORY:  Family History   Problem Relation Age of Onset    Anxiety disorder Mother         Anxiety    Depression Mother     Anxiety disorder Sister         Anxiety    Depression Sister     Anxiety disorder Daughter         Anxiety    Depression Daughter     Heart disease Maternal Aunt     Heart disease Maternal Uncle     Cancer Sister         Melanoma    No Known Problems Sister     No Known Problems Sister        SOCIAL HISTORY:  Social History     Tobacco Use    Smoking status: Never Smoker    Smokeless tobacco: Never Used   Vaping Use    Vaping Use: Never used   Substance Use Topics    Alcohol use:  Yes     Alcohol/week: 1 0 standard drinks     Types: 1 Glasses of wine per week     Comment: occasional alcohol use    Drug use: Never       MEDICATIONS:    Current Outpatient Medications:     ALPRAZolam (XANAX) 0 25 mg tablet, Take 1 tablet (0 25 mg total) by mouth daily at bedtime as needed for anxiety, Disp: 20 tablet, Rfl: 0    atenolol (TENORMIN) 50 mg tablet, Take 1 tablet (50 mg total) by mouth daily, Disp: 90 tablet, Rfl: 0    conjugated estrogens (PREMARIN) vaginal cream, Insert 0 5 g into the vagina daily, Disp: 30 g, Rfl: 3    Diclofenac Sodium (VOLTAREN) 1 %, Apply 2 g topically 4 (four) times a day, Disp: 100 g, Rfl: 2    dicyclomine (BENTYL) 10 mg capsule, Take 1 capsule (10 mg total) by mouth 3 (three) times a day as needed (abd pain), Disp: 90 capsule, Rfl: 3    Magnesium Gluconate (MAGNESIUM 27 PO), Take by mouth, Disp: , Rfl:     omeprazole (PriLOSEC) 40 MG capsule, Take 1 capsule (40 mg total) by mouth daily, Disp: 90 capsule, Rfl: 3    ondansetron (ZOFRAN-ODT) 4 mg disintegrating tablet, Take 1 tablet (4 mg total) by mouth every 6 (six) hours as needed for nausea or vomiting, Disp: 20 tablet, Rfl: 0    oxyCODONE-acetaminophen (PERCOCET) 5-325 mg per tablet, Take 1 tablet by mouth every 6 (six) hours as needed for severe pain for up to 8 dosesMax Daily Amount: 4 tablets (Patient not taking: Reported on 9/16/2021), Disp: 8 tablet, Rfl: 0    valACYclovir (VALTREX) 1,000 mg tablet, Take 1 tablet (1,000 mg total) by mouth 3 (three) times a day for 7 days (Patient not taking: Reported on 9/2/2021), Disp: 21 tablet, Rfl: 0    ALLERGIES:  No Known Allergies    REVIEW OF SYSTEMS:  Review of Systems   Constitutional: Negative for chills, fever and unexpected weight change  HENT: Negative for hearing loss, nosebleeds and sore throat  Eyes: Negative for pain, redness and visual disturbance  Respiratory: Negative for cough, shortness of breath and wheezing  Cardiovascular: Negative for chest pain, palpitations and leg swelling  Gastrointestinal: Negative for abdominal pain and nausea  Genitourinary: Negative for dyspareunia, dysuria and frequency  Skin: Negative for rash and wound  Neurological: Negative for dizziness, numbness and headaches     Psychiatric/Behavioral: Negative for decreased concentration and suicidal ideas  The patient is not nervous/anxious  VITALS:  There were no vitals filed for this visit  LABS:  HgA1c:   Lab Results   Component Value Date    HGBA1C 5 3 04/30/2021     BMP:   Lab Results   Component Value Date    GLUCOSE 84 09/22/2015    CALCIUM 9 4 08/22/2021     09/22/2015    K 4 5 08/22/2021    CO2 30 08/22/2021    CL 98 (L) 08/22/2021    BUN 12 08/22/2021    CREATININE 0 99 08/22/2021       _____________________________________________________  PHYSICAL EXAMINATION:  General: well developed and well nourished, alert, oriented times 3 and appears comfortable  Psychiatric: Normal  HEENT: Normocephalic, Atraumatic Trachea Midline, No torticollis  Pulmonary: No audible wheezing or respiratory distress   Cardiovascular: No pitting edema, 2+ radial pulse   Abdominal/GI: abdomen non tender, non distended   Skin: No masses, erythema, lacerations, fluctation, ulcerations  Neurovascular: Sensation Intact to the Median, Ulnar, Radial Nerve, Motor Intact to the Median, Ulnar, Radial Nerve and Pulses Intact  Musculoskeletal: Normal, except as noted in detailed exam and in HPI        MUSCULOSKELETAL EXAMINATION:    left CMC Exam:  No adduction contracture  No hyperextension deformity of MCP joint  Positive localized tenderness over radial and dorsal aspect of thumb (CMC joint)  Grind test is Positive for pain and Positive for crepitus  Metacarpal load shift test Positive  No triggering or tenderness over the A1 pulley  Negative pain with Finkelsteins maneuver       right CMC Exam:  No adduction contracture  No hyperextension deformity of MCP joint  Positive localized tenderness over radial and dorsal aspect of thumb (CMC joint)  Grind test is Negative for pain and Negative for crepitus  Metacarpal load shift test negative  No triggering or tenderness over the A1 pulley  Negative pain with Finkelsteins maneuver     ___________________________________________________  STUDIES REVIEWED:  I have personally reviewed AP lateral and oblique radiographs of right thumb 2 views which demonstrate Moderate CMC arthrosis      PROCEDURES PERFORMED:  Small joint arthrocentesis: L thumb CMC  [place] Protocol:  Consent: Verbal consent obtained  Risks and benefits: risks, benefits and alternatives were discussed  Consent given by: patient  Time out: Immediately prior to procedure a "time out" was called to verify the correct patient, procedure, equipment, support staff and site/side marked as required  Timeout called at: 9/16/2021 3:26 PM   Patient understanding: patient states understanding of the procedure being performed  Site marked: the operative site was marked  Patient identity confirmed: verbally with patient    Supporting Documentation  Indications: pain   Procedure Details  Location: thumb - L thumb CMC  Preparation: Patient was prepped and draped in the usual sterile fashion  Needle size: 25 G  Ultrasound guidance: no  Approach: volar  Medications administered: 1 mL lidocaine 1 %; 20 mg triamcinolone acetonide 40 mg/mL    Patient tolerance: patient tolerated the procedure well with no immediate complications  Dressing:  Sterile dressing applied    Small joint arthrocentesis: R thumb CMC  [place] Protocol:  Consent: Verbal consent obtained  Risks and benefits: risks, benefits and alternatives were discussed  Consent given by: patient  Time out: Immediately prior to procedure a "time out" was called to verify the correct patient, procedure, equipment, support staff and site/side marked as required    Timeout called at: 9/16/2021 3:26 PM   Patient understanding: patient states understanding of the procedure being performed  Site marked: the operative site was marked  Patient identity confirmed: verbally with patient    Supporting Documentation  Indications: pain   Procedure Details  Location: thumb - R thumb CMC  Preparation: Patient was prepped and draped in the usual sterile fashion  Needle size: 25 G  Ultrasound guidance: no  Approach: volar  Medications administered: 1 mL lidocaine 1 %; 20 mg triamcinolone acetonide 40 mg/mL    Patient tolerance: patient tolerated the procedure well with no immediate complications  Dressing:  Sterile dressing applied        _____________________________________________________      Scribe Attestation    I,:  Sun Chirinos am acting as a scribe while in the presence of the attending physician :       I,:  Evelyn Robles MD personally performed the services described in this documentation    as scribed in my presence :

## 2021-09-28 ENCOUNTER — HOSPITAL ENCOUNTER (OUTPATIENT)
Dept: CT IMAGING | Facility: HOSPITAL | Age: 61
Discharge: HOME | End: 2021-09-28
Payer: COMMERCIAL

## 2021-09-28 DIAGNOSIS — R10.32 LEFT LOWER QUADRANT ABDOMINAL PAIN: ICD-10-CM

## 2021-09-28 PROCEDURE — 74177 CT ABD & PELVIS W/CONTRAST: CPT

## 2021-09-28 PROCEDURE — G1004 CDSM NDSC: HCPCS

## 2021-09-28 RX ADMIN — IOHEXOL 100 ML: 350 INJECTION, SOLUTION INTRAVENOUS at 20:04

## 2021-09-29 ENCOUNTER — HOSPITAL ENCOUNTER (INPATIENT)
Facility: HOSPITAL | Age: 61
LOS: 3 days | Discharge: HOME | DRG: 392 | End: 2021-10-02
Attending: EMERGENCY MEDICINE
Payer: COMMERCIAL

## 2021-09-29 DIAGNOSIS — K57.92 ACUTE DIVERTICULITIS: Primary | ICD-10-CM

## 2021-09-29 DIAGNOSIS — K57.92 DIVERTICULITIS: ICD-10-CM

## 2021-09-29 LAB
ALBUMIN SERPL BCP-MCNC: 3.8 G/DL (ref 3.5–5)
ALP SERPL-CCNC: 130 U/L (ref 46–116)
ALT SERPL W P-5'-P-CCNC: 20 U/L (ref 12–78)
ANION GAP SERPL CALCULATED.3IONS-SCNC: 9 MMOL/L (ref 4–13)
AST SERPL W P-5'-P-CCNC: 16 U/L (ref 5–45)
BACTERIA UR QL AUTO: ABNORMAL /HPF
BASOPHILS # BLD AUTO: 0.07 THOUSANDS/ΜL (ref 0–0.1)
BASOPHILS NFR BLD AUTO: 1 % (ref 0–1)
BILIRUB SERPL-MCNC: 0.9 MG/DL (ref 0.2–1)
BILIRUB UR QL STRIP: NEGATIVE
BUN SERPL-MCNC: 11 MG/DL (ref 5–25)
CALCIUM SERPL-MCNC: 9.1 MG/DL (ref 8.3–10.1)
CHLORIDE SERPL-SCNC: 99 MMOL/L (ref 100–108)
CLARITY UR: CLEAR
CO2 SERPL-SCNC: 28 MMOL/L (ref 21–32)
COLOR UR: YELLOW
CREAT SERPL-MCNC: 1.15 MG/DL (ref 0.6–1.3)
EOSINOPHIL # BLD AUTO: 0.3 THOUSAND/ΜL (ref 0–0.61)
EOSINOPHIL NFR BLD AUTO: 3 % (ref 0–6)
ERYTHROCYTE [DISTWIDTH] IN BLOOD BY AUTOMATED COUNT: 14.5 % (ref 11.6–15.1)
GFR SERPL CREATININE-BSD FRML MDRD: 51 ML/MIN/1.73SQ M
GLUCOSE SERPL-MCNC: 101 MG/DL (ref 65–140)
GLUCOSE UR STRIP-MCNC: NEGATIVE MG/DL
HCT VFR BLD AUTO: 36.8 % (ref 34.8–46.1)
HGB BLD-MCNC: 11.3 G/DL (ref 11.5–15.4)
HGB UR QL STRIP.AUTO: NEGATIVE
IMM GRANULOCYTES # BLD AUTO: 0.04 THOUSAND/UL (ref 0–0.2)
IMM GRANULOCYTES NFR BLD AUTO: 0 % (ref 0–2)
KETONES UR STRIP-MCNC: NEGATIVE MG/DL
LEUKOCYTE ESTERASE UR QL STRIP: ABNORMAL
LYMPHOCYTES # BLD AUTO: 1.7 THOUSANDS/ΜL (ref 0.6–4.47)
LYMPHOCYTES NFR BLD AUTO: 17 % (ref 14–44)
MCH RBC QN AUTO: 20.6 PG (ref 26.8–34.3)
MCHC RBC AUTO-ENTMCNC: 30.7 G/DL (ref 31.4–37.4)
MCV RBC AUTO: 67 FL (ref 82–98)
MONOCYTES # BLD AUTO: 1.14 THOUSAND/ΜL (ref 0.17–1.22)
MONOCYTES NFR BLD AUTO: 11 % (ref 4–12)
MUCOUS THREADS UR QL AUTO: ABNORMAL
NEUTROPHILS # BLD AUTO: 6.91 THOUSANDS/ΜL (ref 1.85–7.62)
NEUTS SEG NFR BLD AUTO: 68 % (ref 43–75)
NITRITE UR QL STRIP: NEGATIVE
NON-SQ EPI CELLS URNS QL MICRO: ABNORMAL /HPF
NRBC BLD AUTO-RTO: 0 /100 WBCS
PH UR STRIP.AUTO: 5.5 [PH]
PLATELET # BLD AUTO: 324 THOUSANDS/UL (ref 149–390)
PMV BLD AUTO: 8.9 FL (ref 8.9–12.7)
POTASSIUM SERPL-SCNC: 4 MMOL/L (ref 3.5–5.3)
PROT SERPL-MCNC: 7.6 G/DL (ref 6.4–8.2)
PROT UR STRIP-MCNC: NEGATIVE MG/DL
RBC # BLD AUTO: 5.48 MILLION/UL (ref 3.81–5.12)
RBC #/AREA URNS AUTO: ABNORMAL /HPF
SODIUM SERPL-SCNC: 136 MMOL/L (ref 136–145)
SP GR UR STRIP.AUTO: 1.01 (ref 1–1.03)
UROBILINOGEN UR QL STRIP.AUTO: 0.2 E.U./DL
WBC # BLD AUTO: 10.16 THOUSAND/UL (ref 4.31–10.16)
WBC #/AREA URNS AUTO: ABNORMAL /HPF

## 2021-09-29 PROCEDURE — 96365 THER/PROPH/DIAG IV INF INIT: CPT

## 2021-09-29 PROCEDURE — 99285 EMERGENCY DEPT VISIT HI MDM: CPT | Performed by: PHYSICIAN ASSISTANT

## 2021-09-29 PROCEDURE — 99285 EMERGENCY DEPT VISIT HI MDM: CPT

## 2021-09-29 PROCEDURE — 99223 1ST HOSP IP/OBS HIGH 75: CPT | Performed by: INTERNAL MEDICINE

## 2021-09-29 PROCEDURE — 36415 COLL VENOUS BLD VENIPUNCTURE: CPT | Performed by: PHYSICIAN ASSISTANT

## 2021-09-29 PROCEDURE — 87040 BLOOD CULTURE FOR BACTERIA: CPT | Performed by: PHYSICIAN ASSISTANT

## 2021-09-29 PROCEDURE — 99223 1ST HOSP IP/OBS HIGH 75: CPT | Performed by: STUDENT IN AN ORGANIZED HEALTH CARE EDUCATION/TRAINING PROGRAM

## 2021-09-29 PROCEDURE — 81001 URINALYSIS AUTO W/SCOPE: CPT | Performed by: PHYSICIAN ASSISTANT

## 2021-09-29 PROCEDURE — 85025 COMPLETE CBC W/AUTO DIFF WBC: CPT | Performed by: PHYSICIAN ASSISTANT

## 2021-09-29 PROCEDURE — 96375 TX/PRO/DX INJ NEW DRUG ADDON: CPT

## 2021-09-29 PROCEDURE — 80053 COMPREHEN METABOLIC PANEL: CPT | Performed by: PHYSICIAN ASSISTANT

## 2021-09-29 RX ORDER — OXYCODONE HYDROCHLORIDE 5 MG/1
5 TABLET ORAL EVERY 6 HOURS PRN
Status: DISCONTINUED | OUTPATIENT
Start: 2021-09-29 | End: 2021-09-30

## 2021-09-29 RX ORDER — MORPHINE SULFATE 4 MG/ML
4 INJECTION, SOLUTION INTRAMUSCULAR; INTRAVENOUS ONCE
Status: COMPLETED | OUTPATIENT
Start: 2021-09-29 | End: 2021-09-29

## 2021-09-29 RX ORDER — SODIUM CHLORIDE 9 MG/ML
100 INJECTION, SOLUTION INTRAVENOUS CONTINUOUS
Status: DISCONTINUED | OUTPATIENT
Start: 2021-09-29 | End: 2021-10-02 | Stop reason: HOSPADM

## 2021-09-29 RX ORDER — CEFAZOLIN SODIUM 2 G/50ML
2000 SOLUTION INTRAVENOUS ONCE
Status: COMPLETED | OUTPATIENT
Start: 2021-09-29 | End: 2021-09-29

## 2021-09-29 RX ORDER — DICYCLOMINE HYDROCHLORIDE 10 MG/1
10 CAPSULE ORAL 3 TIMES DAILY PRN
Status: DISCONTINUED | OUTPATIENT
Start: 2021-09-29 | End: 2021-10-02 | Stop reason: HOSPADM

## 2021-09-29 RX ORDER — ATENOLOL 50 MG/1
50 TABLET ORAL DAILY
Status: DISCONTINUED | OUTPATIENT
Start: 2021-09-29 | End: 2021-10-02 | Stop reason: HOSPADM

## 2021-09-29 RX ORDER — ACETAMINOPHEN 325 MG/1
650 TABLET ORAL EVERY 6 HOURS PRN
Status: DISCONTINUED | OUTPATIENT
Start: 2021-09-29 | End: 2021-10-02 | Stop reason: HOSPADM

## 2021-09-29 RX ORDER — ALPRAZOLAM 0.25 MG/1
0.25 TABLET ORAL
Status: DISCONTINUED | OUTPATIENT
Start: 2021-09-29 | End: 2021-10-02 | Stop reason: HOSPADM

## 2021-09-29 RX ORDER — ONDANSETRON 2 MG/ML
4 INJECTION INTRAMUSCULAR; INTRAVENOUS EVERY 6 HOURS PRN
Status: DISCONTINUED | OUTPATIENT
Start: 2021-09-29 | End: 2021-10-02 | Stop reason: HOSPADM

## 2021-09-29 RX ORDER — MAGNESIUM HYDROXIDE/ALUMINUM HYDROXICE/SIMETHICONE 120; 1200; 1200 MG/30ML; MG/30ML; MG/30ML
30 SUSPENSION ORAL EVERY 6 HOURS PRN
Status: DISCONTINUED | OUTPATIENT
Start: 2021-09-29 | End: 2021-10-02 | Stop reason: HOSPADM

## 2021-09-29 RX ORDER — ONDANSETRON 2 MG/ML
4 INJECTION INTRAMUSCULAR; INTRAVENOUS ONCE
Status: COMPLETED | OUTPATIENT
Start: 2021-09-29 | End: 2021-09-29

## 2021-09-29 RX ADMIN — Medication 1000 MG: at 14:36

## 2021-09-29 RX ADMIN — ONDANSETRON 4 MG: 2 INJECTION INTRAMUSCULAR; INTRAVENOUS at 18:06

## 2021-09-29 RX ADMIN — MORPHINE SULFATE 4 MG: 4 INJECTION INTRAVENOUS at 12:27

## 2021-09-29 RX ADMIN — MORPHINE SULFATE 2 MG: 2 INJECTION, SOLUTION INTRAMUSCULAR; INTRAVENOUS at 18:06

## 2021-09-29 RX ADMIN — SODIUM CHLORIDE 100 ML/HR: 0.9 INJECTION, SOLUTION INTRAVENOUS at 20:56

## 2021-09-29 RX ADMIN — CEFAZOLIN SODIUM 2000 MG: 2 SOLUTION INTRAVENOUS at 11:50

## 2021-09-29 RX ADMIN — METRONIDAZOLE 500 MG: 500 INJECTION, SOLUTION INTRAVENOUS at 11:50

## 2021-09-29 RX ADMIN — METRONIDAZOLE 500 MG: 500 INJECTION, SOLUTION INTRAVENOUS at 20:25

## 2021-09-29 RX ADMIN — ENOXAPARIN SODIUM 40 MG: 40 INJECTION SUBCUTANEOUS at 14:34

## 2021-09-29 RX ADMIN — ONDANSETRON 4 MG: 2 INJECTION INTRAMUSCULAR; INTRAVENOUS at 12:26

## 2021-09-30 LAB
ANION GAP SERPL CALCULATED.3IONS-SCNC: 4 MMOL/L (ref 4–13)
BASOPHILS # BLD AUTO: 0.03 THOUSANDS/ΜL (ref 0–0.1)
BASOPHILS NFR BLD AUTO: 0 % (ref 0–1)
BUN SERPL-MCNC: 10 MG/DL (ref 5–25)
CALCIUM SERPL-MCNC: 8.4 MG/DL (ref 8.3–10.1)
CHLORIDE SERPL-SCNC: 102 MMOL/L (ref 100–108)
CO2 SERPL-SCNC: 30 MMOL/L (ref 21–32)
CREAT SERPL-MCNC: 0.85 MG/DL (ref 0.6–1.3)
EOSINOPHIL # BLD AUTO: 0.2 THOUSAND/ΜL (ref 0–0.61)
EOSINOPHIL NFR BLD AUTO: 2 % (ref 0–6)
ERYTHROCYTE [DISTWIDTH] IN BLOOD BY AUTOMATED COUNT: 14.5 % (ref 11.6–15.1)
GFR SERPL CREATININE-BSD FRML MDRD: 74 ML/MIN/1.73SQ M
GLUCOSE SERPL-MCNC: 101 MG/DL (ref 65–140)
HCT VFR BLD AUTO: 35.9 % (ref 34.8–46.1)
HGB BLD-MCNC: 10.6 G/DL (ref 11.5–15.4)
IMM GRANULOCYTES # BLD AUTO: 0.04 THOUSAND/UL (ref 0–0.2)
IMM GRANULOCYTES NFR BLD AUTO: 0 % (ref 0–2)
LYMPHOCYTES # BLD AUTO: 1.1 THOUSANDS/ΜL (ref 0.6–4.47)
LYMPHOCYTES NFR BLD AUTO: 11 % (ref 14–44)
MCH RBC QN AUTO: 20.4 PG (ref 26.8–34.3)
MCHC RBC AUTO-ENTMCNC: 29.5 G/DL (ref 31.4–37.4)
MCV RBC AUTO: 69 FL (ref 82–98)
MONOCYTES # BLD AUTO: 0.98 THOUSAND/ΜL (ref 0.17–1.22)
MONOCYTES NFR BLD AUTO: 9 % (ref 4–12)
NEUTROPHILS # BLD AUTO: 8.14 THOUSANDS/ΜL (ref 1.85–7.62)
NEUTS SEG NFR BLD AUTO: 78 % (ref 43–75)
NRBC BLD AUTO-RTO: 0 /100 WBCS
PLATELET # BLD AUTO: 265 THOUSANDS/UL (ref 149–390)
PMV BLD AUTO: 8.9 FL (ref 8.9–12.7)
POTASSIUM SERPL-SCNC: 4.4 MMOL/L (ref 3.5–5.3)
RBC # BLD AUTO: 5.2 MILLION/UL (ref 3.81–5.12)
SODIUM SERPL-SCNC: 136 MMOL/L (ref 136–145)
WBC # BLD AUTO: 10.49 THOUSAND/UL (ref 4.31–10.16)

## 2021-09-30 PROCEDURE — 99232 SBSQ HOSP IP/OBS MODERATE 35: CPT | Performed by: INTERNAL MEDICINE

## 2021-09-30 PROCEDURE — 85025 COMPLETE CBC W/AUTO DIFF WBC: CPT | Performed by: STUDENT IN AN ORGANIZED HEALTH CARE EDUCATION/TRAINING PROGRAM

## 2021-09-30 PROCEDURE — 80048 BASIC METABOLIC PNL TOTAL CA: CPT | Performed by: STUDENT IN AN ORGANIZED HEALTH CARE EDUCATION/TRAINING PROGRAM

## 2021-09-30 PROCEDURE — NC001 PR NO CHARGE: Performed by: SURGERY

## 2021-09-30 RX ORDER — POLYETHYLENE GLYCOL 3350 17 G/17G
17 POWDER, FOR SOLUTION ORAL DAILY PRN
Status: DISCONTINUED | OUTPATIENT
Start: 2021-09-30 | End: 2021-10-02 | Stop reason: HOSPADM

## 2021-09-30 RX ORDER — OXYCODONE HYDROCHLORIDE 5 MG/1
5 TABLET ORAL EVERY 4 HOURS PRN
Status: DISCONTINUED | OUTPATIENT
Start: 2021-09-30 | End: 2021-10-02 | Stop reason: HOSPADM

## 2021-09-30 RX ADMIN — ONDANSETRON 4 MG: 2 INJECTION INTRAMUSCULAR; INTRAVENOUS at 13:05

## 2021-09-30 RX ADMIN — OXYCODONE HYDROCHLORIDE 5 MG: 5 TABLET ORAL at 03:47

## 2021-09-30 RX ADMIN — ONDANSETRON 4 MG: 2 INJECTION INTRAMUSCULAR; INTRAVENOUS at 03:44

## 2021-09-30 RX ADMIN — OXYCODONE HYDROCHLORIDE 5 MG: 5 TABLET ORAL at 18:10

## 2021-09-30 RX ADMIN — MORPHINE SULFATE 2 MG: 2 INJECTION, SOLUTION INTRAMUSCULAR; INTRAVENOUS at 00:53

## 2021-09-30 RX ADMIN — SODIUM CHLORIDE 100 ML/HR: 0.9 INJECTION, SOLUTION INTRAVENOUS at 18:10

## 2021-09-30 RX ADMIN — ONDANSETRON 4 MG: 2 INJECTION INTRAMUSCULAR; INTRAVENOUS at 19:08

## 2021-09-30 RX ADMIN — METRONIDAZOLE 500 MG: 500 INJECTION, SOLUTION INTRAVENOUS at 11:18

## 2021-09-30 RX ADMIN — METRONIDAZOLE 500 MG: 500 INJECTION, SOLUTION INTRAVENOUS at 03:34

## 2021-09-30 RX ADMIN — SODIUM CHLORIDE 100 ML/HR: 0.9 INJECTION, SOLUTION INTRAVENOUS at 06:40

## 2021-09-30 RX ADMIN — Medication 1000 MG: at 13:00

## 2021-09-30 RX ADMIN — DICYCLOMINE HYDROCHLORIDE 10 MG: 10 CAPSULE ORAL at 00:53

## 2021-09-30 RX ADMIN — OXYCODONE HYDROCHLORIDE 5 MG: 5 TABLET ORAL at 13:07

## 2021-09-30 RX ADMIN — METRONIDAZOLE 500 MG: 500 INJECTION, SOLUTION INTRAVENOUS at 19:08

## 2021-09-30 RX ADMIN — ENOXAPARIN SODIUM 40 MG: 40 INJECTION SUBCUTANEOUS at 08:06

## 2021-09-30 RX ADMIN — OXYCODONE HYDROCHLORIDE 5 MG: 5 TABLET ORAL at 08:42

## 2021-10-01 LAB
ANION GAP SERPL CALCULATED.3IONS-SCNC: 9 MMOL/L (ref 4–13)
BASOPHILS # BLD AUTO: 0.05 THOUSANDS/ΜL (ref 0–0.1)
BASOPHILS NFR BLD AUTO: 1 % (ref 0–1)
BUN SERPL-MCNC: 8 MG/DL (ref 5–25)
CALCIUM SERPL-MCNC: 8.3 MG/DL (ref 8.3–10.1)
CHLORIDE SERPL-SCNC: 103 MMOL/L (ref 100–108)
CO2 SERPL-SCNC: 25 MMOL/L (ref 21–32)
CREAT SERPL-MCNC: 0.75 MG/DL (ref 0.6–1.3)
EOSINOPHIL # BLD AUTO: 0.2 THOUSAND/ΜL (ref 0–0.61)
EOSINOPHIL NFR BLD AUTO: 3 % (ref 0–6)
ERYTHROCYTE [DISTWIDTH] IN BLOOD BY AUTOMATED COUNT: 14.3 % (ref 11.6–15.1)
FERRITIN SERPL-MCNC: 226 NG/ML (ref 8–388)
GFR SERPL CREATININE-BSD FRML MDRD: 86 ML/MIN/1.73SQ M
GLUCOSE SERPL-MCNC: 81 MG/DL (ref 65–140)
HCT VFR BLD AUTO: 34.2 % (ref 34.8–46.1)
HGB BLD-MCNC: 10.3 G/DL (ref 11.5–15.4)
IMM GRANULOCYTES # BLD AUTO: 0.04 THOUSAND/UL (ref 0–0.2)
IMM GRANULOCYTES NFR BLD AUTO: 1 % (ref 0–2)
IRON SATN MFR SERPL: 15 % (ref 15–50)
IRON SERPL-MCNC: 46 UG/DL (ref 50–170)
LYMPHOCYTES # BLD AUTO: 1.27 THOUSANDS/ΜL (ref 0.6–4.47)
LYMPHOCYTES NFR BLD AUTO: 16 % (ref 14–44)
MCH RBC QN AUTO: 20.4 PG (ref 26.8–34.3)
MCHC RBC AUTO-ENTMCNC: 30.1 G/DL (ref 31.4–37.4)
MCV RBC AUTO: 68 FL (ref 82–98)
MONOCYTES # BLD AUTO: 0.83 THOUSAND/ΜL (ref 0.17–1.22)
MONOCYTES NFR BLD AUTO: 10 % (ref 4–12)
NEUTROPHILS # BLD AUTO: 5.59 THOUSANDS/ΜL (ref 1.85–7.62)
NEUTS SEG NFR BLD AUTO: 69 % (ref 43–75)
NRBC BLD AUTO-RTO: 0 /100 WBCS
PLATELET # BLD AUTO: 285 THOUSANDS/UL (ref 149–390)
PMV BLD AUTO: 9 FL (ref 8.9–12.7)
POTASSIUM SERPL-SCNC: 4.3 MMOL/L (ref 3.5–5.3)
RBC # BLD AUTO: 5.04 MILLION/UL (ref 3.81–5.12)
SODIUM SERPL-SCNC: 137 MMOL/L (ref 136–145)
TIBC SERPL-MCNC: 304 UG/DL (ref 250–450)
WBC # BLD AUTO: 7.98 THOUSAND/UL (ref 4.31–10.16)

## 2021-10-01 PROCEDURE — 99232 SBSQ HOSP IP/OBS MODERATE 35: CPT | Performed by: SURGERY

## 2021-10-01 PROCEDURE — 83540 ASSAY OF IRON: CPT | Performed by: INTERNAL MEDICINE

## 2021-10-01 PROCEDURE — 99232 SBSQ HOSP IP/OBS MODERATE 35: CPT | Performed by: INTERNAL MEDICINE

## 2021-10-01 PROCEDURE — 85025 COMPLETE CBC W/AUTO DIFF WBC: CPT | Performed by: INTERNAL MEDICINE

## 2021-10-01 PROCEDURE — 83550 IRON BINDING TEST: CPT | Performed by: INTERNAL MEDICINE

## 2021-10-01 PROCEDURE — 82728 ASSAY OF FERRITIN: CPT | Performed by: INTERNAL MEDICINE

## 2021-10-01 PROCEDURE — 80048 BASIC METABOLIC PNL TOTAL CA: CPT | Performed by: INTERNAL MEDICINE

## 2021-10-01 RX ADMIN — OXYCODONE HYDROCHLORIDE 5 MG: 5 TABLET ORAL at 19:09

## 2021-10-01 RX ADMIN — SODIUM CHLORIDE 100 ML/HR: 0.9 INJECTION, SOLUTION INTRAVENOUS at 04:36

## 2021-10-01 RX ADMIN — OXYCODONE HYDROCHLORIDE 5 MG: 5 TABLET ORAL at 04:35

## 2021-10-01 RX ADMIN — DICYCLOMINE HYDROCHLORIDE 10 MG: 10 CAPSULE ORAL at 10:56

## 2021-10-01 RX ADMIN — METRONIDAZOLE 500 MG: 500 INJECTION, SOLUTION INTRAVENOUS at 19:09

## 2021-10-01 RX ADMIN — SODIUM CHLORIDE 100 ML/HR: 0.9 INJECTION, SOLUTION INTRAVENOUS at 16:08

## 2021-10-01 RX ADMIN — ENOXAPARIN SODIUM 40 MG: 40 INJECTION SUBCUTANEOUS at 08:33

## 2021-10-01 RX ADMIN — OXYCODONE HYDROCHLORIDE 5 MG: 5 TABLET ORAL at 10:54

## 2021-10-01 RX ADMIN — Medication 1000 MG: at 13:01

## 2021-10-01 RX ADMIN — ATENOLOL 50 MG: 50 TABLET ORAL at 08:33

## 2021-10-01 RX ADMIN — METRONIDAZOLE 500 MG: 500 INJECTION, SOLUTION INTRAVENOUS at 04:37

## 2021-10-01 RX ADMIN — METRONIDAZOLE 500 MG: 500 INJECTION, SOLUTION INTRAVENOUS at 11:36

## 2021-10-02 VITALS
TEMPERATURE: 98.6 F | OXYGEN SATURATION: 97 % | HEART RATE: 48 BPM | RESPIRATION RATE: 17 BRPM | DIASTOLIC BLOOD PRESSURE: 64 MMHG | SYSTOLIC BLOOD PRESSURE: 142 MMHG | BODY MASS INDEX: 23.62 KG/M2 | WEIGHT: 125 LBS

## 2021-10-02 PROBLEM — D50.9 MICROCYTIC ANEMIA: Status: ACTIVE | Noted: 2021-10-02

## 2021-10-02 LAB
ANION GAP SERPL CALCULATED.3IONS-SCNC: 8 MMOL/L (ref 4–13)
BASOPHILS # BLD AUTO: 0.05 THOUSANDS/ΜL (ref 0–0.1)
BASOPHILS NFR BLD AUTO: 1 % (ref 0–1)
BUN SERPL-MCNC: 10 MG/DL (ref 5–25)
CALCIUM SERPL-MCNC: 8.7 MG/DL (ref 8.3–10.1)
CHLORIDE SERPL-SCNC: 105 MMOL/L (ref 100–108)
CO2 SERPL-SCNC: 26 MMOL/L (ref 21–32)
CREAT SERPL-MCNC: 0.7 MG/DL (ref 0.6–1.3)
EOSINOPHIL # BLD AUTO: 0.21 THOUSAND/ΜL (ref 0–0.61)
EOSINOPHIL NFR BLD AUTO: 3 % (ref 0–6)
ERYTHROCYTE [DISTWIDTH] IN BLOOD BY AUTOMATED COUNT: 14.4 % (ref 11.6–15.1)
GFR SERPL CREATININE-BSD FRML MDRD: 93 ML/MIN/1.73SQ M
GLUCOSE SERPL-MCNC: 78 MG/DL (ref 65–140)
HCT VFR BLD AUTO: 34 % (ref 34.8–46.1)
HGB BLD-MCNC: 10.3 G/DL (ref 11.5–15.4)
IMM GRANULOCYTES # BLD AUTO: 0.04 THOUSAND/UL (ref 0–0.2)
IMM GRANULOCYTES NFR BLD AUTO: 1 % (ref 0–2)
LYMPHOCYTES # BLD AUTO: 1.54 THOUSANDS/ΜL (ref 0.6–4.47)
LYMPHOCYTES NFR BLD AUTO: 22 % (ref 14–44)
MCH RBC QN AUTO: 20.6 PG (ref 26.8–34.3)
MCHC RBC AUTO-ENTMCNC: 30.3 G/DL (ref 31.4–37.4)
MCV RBC AUTO: 68 FL (ref 82–98)
MONOCYTES # BLD AUTO: 0.81 THOUSAND/ΜL (ref 0.17–1.22)
MONOCYTES NFR BLD AUTO: 12 % (ref 4–12)
NEUTROPHILS # BLD AUTO: 4.24 THOUSANDS/ΜL (ref 1.85–7.62)
NEUTS SEG NFR BLD AUTO: 61 % (ref 43–75)
NRBC BLD AUTO-RTO: 0 /100 WBCS
PLATELET # BLD AUTO: 333 THOUSANDS/UL (ref 149–390)
PMV BLD AUTO: 9.6 FL (ref 8.9–12.7)
POTASSIUM SERPL-SCNC: 4.2 MMOL/L (ref 3.5–5.3)
RBC # BLD AUTO: 5.01 MILLION/UL (ref 3.81–5.12)
SODIUM SERPL-SCNC: 139 MMOL/L (ref 136–145)
WBC # BLD AUTO: 6.89 THOUSAND/UL (ref 4.31–10.16)

## 2021-10-02 PROCEDURE — 80048 BASIC METABOLIC PNL TOTAL CA: CPT | Performed by: INTERNAL MEDICINE

## 2021-10-02 PROCEDURE — 85025 COMPLETE CBC W/AUTO DIFF WBC: CPT | Performed by: INTERNAL MEDICINE

## 2021-10-02 PROCEDURE — 99239 HOSP IP/OBS DSCHRG MGMT >30: CPT | Performed by: INTERNAL MEDICINE

## 2021-10-02 PROCEDURE — 99232 SBSQ HOSP IP/OBS MODERATE 35: CPT | Performed by: SURGERY

## 2021-10-02 RX ORDER — CEPHALEXIN 500 MG/1
500 CAPSULE ORAL EVERY 6 HOURS SCHEDULED
Qty: 28 CAPSULE | Refills: 0 | Status: SHIPPED | OUTPATIENT
Start: 2021-10-02 | End: 2021-10-02 | Stop reason: HOSPADM

## 2021-10-02 RX ORDER — METRONIDAZOLE 500 MG/1
500 TABLET ORAL EVERY 8 HOURS SCHEDULED
Qty: 21 TABLET | Refills: 0 | Status: SHIPPED | OUTPATIENT
Start: 2021-10-02 | End: 2021-10-09

## 2021-10-02 RX ORDER — CEFDINIR 300 MG/1
300 CAPSULE ORAL EVERY 12 HOURS SCHEDULED
Qty: 14 CAPSULE | Refills: 0 | Status: SHIPPED | OUTPATIENT
Start: 2021-10-02 | End: 2021-10-09

## 2021-10-02 RX ADMIN — METRONIDAZOLE 500 MG: 500 INJECTION, SOLUTION INTRAVENOUS at 06:59

## 2021-10-04 ENCOUNTER — TRANSITIONAL CARE MANAGEMENT (OUTPATIENT)
Dept: FAMILY MEDICINE CLINIC | Facility: CLINIC | Age: 61
End: 2021-10-04

## 2021-10-04 LAB
BACTERIA BLD CULT: NORMAL
BACTERIA BLD CULT: NORMAL

## 2021-10-08 ENCOUNTER — OFFICE VISIT (OUTPATIENT)
Dept: FAMILY MEDICINE CLINIC | Facility: CLINIC | Age: 61
End: 2021-10-08
Payer: COMMERCIAL

## 2021-10-08 VITALS
RESPIRATION RATE: 16 BRPM | OXYGEN SATURATION: 99 % | BODY MASS INDEX: 23.83 KG/M2 | HEIGHT: 61 IN | HEART RATE: 78 BPM | SYSTOLIC BLOOD PRESSURE: 138 MMHG | TEMPERATURE: 98 F | WEIGHT: 126.2 LBS | DIASTOLIC BLOOD PRESSURE: 80 MMHG

## 2021-10-08 DIAGNOSIS — F41.9 ANXIETY: ICD-10-CM

## 2021-10-08 DIAGNOSIS — K57.32 SIGMOID DIVERTICULITIS: Primary | ICD-10-CM

## 2021-10-08 PROBLEM — K59.00 CONSTIPATION: Status: RESOLVED | Noted: 2021-08-04 | Resolved: 2021-10-08

## 2021-10-08 PROBLEM — R10.84 GENERALIZED ABDOMINAL PAIN: Status: RESOLVED | Noted: 2021-08-04 | Resolved: 2021-10-08

## 2021-10-08 PROBLEM — K57.92 DIVERTICULITIS: Status: RESOLVED | Noted: 2021-09-02 | Resolved: 2021-10-08

## 2021-10-08 PROCEDURE — 99496 TRANSJ CARE MGMT HIGH F2F 7D: CPT | Performed by: NURSE PRACTITIONER

## 2021-10-08 RX ORDER — ALPRAZOLAM 0.25 MG/1
0.25 TABLET ORAL
Qty: 20 TABLET | Refills: 0 | Status: SHIPPED | OUTPATIENT
Start: 2021-10-08 | End: 2021-11-15 | Stop reason: SDUPTHER

## 2021-10-12 ENCOUNTER — OFFICE VISIT (OUTPATIENT)
Dept: SURGERY | Facility: CLINIC | Age: 61
End: 2021-10-12
Payer: COMMERCIAL

## 2021-10-12 ENCOUNTER — PREP FOR PROCEDURE (OUTPATIENT)
Dept: SURGERY | Facility: CLINIC | Age: 61
End: 2021-10-12

## 2021-10-12 VITALS — HEIGHT: 61 IN | BODY MASS INDEX: 23.6 KG/M2 | WEIGHT: 125 LBS

## 2021-10-12 DIAGNOSIS — K57.90 DIVERTICULOSIS: Primary | ICD-10-CM

## 2021-10-12 DIAGNOSIS — K57.92 DIVERTICULITIS: Primary | ICD-10-CM

## 2021-10-12 PROCEDURE — 99213 OFFICE O/P EST LOW 20 MIN: CPT | Performed by: SURGERY

## 2021-10-12 RX ORDER — AMOXICILLIN AND CLAVULANATE POTASSIUM 875; 125 MG/1; MG/1
1 TABLET, FILM COATED ORAL EVERY 12 HOURS SCHEDULED
Qty: 40 TABLET | Refills: 0 | Status: SHIPPED | OUTPATIENT
Start: 2021-10-12 | End: 2021-11-01

## 2021-10-12 RX ORDER — METRONIDAZOLE 250 MG/1
250 TABLET ORAL 3 TIMES DAILY
Qty: 60 TABLET | Refills: 1 | Status: SHIPPED | OUTPATIENT
Start: 2021-10-12 | End: 2021-11-01

## 2021-11-05 ENCOUNTER — OFFICE VISIT (OUTPATIENT)
Dept: NEPHROLOGY | Facility: CLINIC | Age: 61
End: 2021-11-05
Payer: COMMERCIAL

## 2021-11-05 VITALS
DIASTOLIC BLOOD PRESSURE: 68 MMHG | HEIGHT: 61 IN | WEIGHT: 122.4 LBS | SYSTOLIC BLOOD PRESSURE: 120 MMHG | BODY MASS INDEX: 23.11 KG/M2 | HEART RATE: 46 BPM

## 2021-11-05 DIAGNOSIS — K57.90 DIVERTICULOSIS: ICD-10-CM

## 2021-11-05 DIAGNOSIS — I10 HYPERTENSION, UNSPECIFIED TYPE: Primary | ICD-10-CM

## 2021-11-05 DIAGNOSIS — D50.9 IRON DEFICIENCY ANEMIA, UNSPECIFIED IRON DEFICIENCY ANEMIA TYPE: ICD-10-CM

## 2021-11-05 PROCEDURE — 99214 OFFICE O/P EST MOD 30 MIN: CPT | Performed by: INTERNAL MEDICINE

## 2021-11-09 ENCOUNTER — OFFICE VISIT (OUTPATIENT)
Dept: LAB | Facility: CLINIC | Age: 61
End: 2021-11-09
Payer: COMMERCIAL

## 2021-11-09 ENCOUNTER — APPOINTMENT (OUTPATIENT)
Dept: LAB | Facility: CLINIC | Age: 61
End: 2021-11-09
Payer: COMMERCIAL

## 2021-11-09 ENCOUNTER — OFFICE VISIT (OUTPATIENT)
Dept: SURGERY | Facility: CLINIC | Age: 61
End: 2021-11-09
Payer: COMMERCIAL

## 2021-11-09 VITALS — BODY MASS INDEX: 23.6 KG/M2 | HEIGHT: 61 IN | WEIGHT: 125 LBS

## 2021-11-09 DIAGNOSIS — K57.92 DIVERTICULITIS: ICD-10-CM

## 2021-11-09 DIAGNOSIS — Z01.818 OTHER SPECIFIED PRE-OPERATIVE EXAMINATION: ICD-10-CM

## 2021-11-09 DIAGNOSIS — K57.32 SIGMOID DIVERTICULITIS: Primary | ICD-10-CM

## 2021-11-09 LAB
ABO GROUP BLD: NORMAL
ALBUMIN SERPL BCP-MCNC: 4.3 G/DL (ref 3.5–5)
ALP SERPL-CCNC: 100 U/L (ref 46–116)
ALT SERPL W P-5'-P-CCNC: 22 U/L (ref 12–78)
ANION GAP SERPL CALCULATED.3IONS-SCNC: 8 MMOL/L (ref 4–13)
APTT PPP: 31 SECONDS (ref 23–37)
AST SERPL W P-5'-P-CCNC: 24 U/L (ref 5–45)
BILIRUB SERPL-MCNC: 0.45 MG/DL (ref 0.2–1)
BLD GP AB SCN SERPL QL: NEGATIVE
BUN SERPL-MCNC: 12 MG/DL (ref 5–25)
CALCIUM SERPL-MCNC: 9.6 MG/DL (ref 8.3–10.1)
CHLORIDE SERPL-SCNC: 99 MMOL/L (ref 100–108)
CO2 SERPL-SCNC: 30 MMOL/L (ref 21–32)
CREAT SERPL-MCNC: 0.8 MG/DL (ref 0.6–1.3)
ERYTHROCYTE [DISTWIDTH] IN BLOOD BY AUTOMATED COUNT: 16.2 % (ref 11.6–15.1)
EST. AVERAGE GLUCOSE BLD GHB EST-MCNC: 111 MG/DL
GFR SERPL CREATININE-BSD FRML MDRD: 79 ML/MIN/1.73SQ M
GLUCOSE P FAST SERPL-MCNC: 91 MG/DL (ref 65–99)
HBA1C MFR BLD: 5.5 %
HCT VFR BLD AUTO: 40.6 % (ref 34.8–46.1)
HGB BLD-MCNC: 12.1 G/DL (ref 11.5–15.4)
INR PPP: 1 (ref 0.84–1.19)
MCH RBC QN AUTO: 20.4 PG (ref 26.8–34.3)
MCHC RBC AUTO-ENTMCNC: 29.8 G/DL (ref 31.4–37.4)
MCV RBC AUTO: 69 FL (ref 82–98)
PLATELET # BLD AUTO: 364 THOUSANDS/UL (ref 149–390)
PMV BLD AUTO: 9.4 FL (ref 8.9–12.7)
POTASSIUM SERPL-SCNC: 4.7 MMOL/L (ref 3.5–5.3)
PROT SERPL-MCNC: 7.9 G/DL (ref 6.4–8.2)
PROTHROMBIN TIME: 13.2 SECONDS (ref 11.6–14.5)
RBC # BLD AUTO: 5.93 MILLION/UL (ref 3.81–5.12)
RH BLD: POSITIVE
SODIUM SERPL-SCNC: 137 MMOL/L (ref 136–145)
SPECIMEN EXPIRATION DATE: NORMAL
WBC # BLD AUTO: 11.34 THOUSAND/UL (ref 4.31–10.16)

## 2021-11-09 PROCEDURE — 93005 ELECTROCARDIOGRAM TRACING: CPT

## 2021-11-09 PROCEDURE — 86900 BLOOD TYPING SEROLOGIC ABO: CPT

## 2021-11-09 PROCEDURE — 85610 PROTHROMBIN TIME: CPT

## 2021-11-09 PROCEDURE — 86850 RBC ANTIBODY SCREEN: CPT

## 2021-11-09 PROCEDURE — 80053 COMPREHEN METABOLIC PANEL: CPT

## 2021-11-09 PROCEDURE — 36415 COLL VENOUS BLD VENIPUNCTURE: CPT

## 2021-11-09 PROCEDURE — 86901 BLOOD TYPING SEROLOGIC RH(D): CPT

## 2021-11-09 PROCEDURE — 85730 THROMBOPLASTIN TIME PARTIAL: CPT

## 2021-11-09 PROCEDURE — 99212 OFFICE O/P EST SF 10 MIN: CPT | Performed by: SURGERY

## 2021-11-09 PROCEDURE — 83036 HEMOGLOBIN GLYCOSYLATED A1C: CPT

## 2021-11-09 PROCEDURE — 85027 COMPLETE CBC AUTOMATED: CPT

## 2021-11-10 LAB
ATRIAL RATE: 50 BPM
P AXIS: 77 DEGREES
PR INTERVAL: 138 MS
QRS AXIS: 0 DEGREES
QRSD INTERVAL: 72 MS
QT INTERVAL: 494 MS
QTC INTERVAL: 450 MS
T WAVE AXIS: -36 DEGREES
VENTRICULAR RATE: 50 BPM

## 2021-11-10 PROCEDURE — 93010 ELECTROCARDIOGRAM REPORT: CPT | Performed by: INTERNAL MEDICINE

## 2021-11-11 ENCOUNTER — ANESTHESIA EVENT (OUTPATIENT)
Dept: PERIOP | Facility: HOSPITAL | Age: 61
DRG: 330 | End: 2021-11-11
Payer: COMMERCIAL

## 2021-11-17 ENCOUNTER — HOSPITAL ENCOUNTER (INPATIENT)
Facility: HOSPITAL | Age: 61
LOS: 4 days | Discharge: HOME | DRG: 330 | End: 2021-11-21
Attending: SURGERY | Admitting: SURGERY
Payer: COMMERCIAL

## 2021-11-17 ENCOUNTER — ANESTHESIA (OUTPATIENT)
Dept: PERIOP | Facility: HOSPITAL | Age: 61
DRG: 330 | End: 2021-11-17
Payer: COMMERCIAL

## 2021-11-17 DIAGNOSIS — K57.92 DIVERTICULITIS: ICD-10-CM

## 2021-11-17 DIAGNOSIS — Z90.49 S/P LAPAROSCOPIC-ASSISTED SIGMOIDECTOMY: Primary | ICD-10-CM

## 2021-11-17 PROCEDURE — 44207 L COLECTOMY/COLOPROCTOSTOMY: CPT | Performed by: SURGERY

## 2021-11-17 PROCEDURE — 0DJD8ZZ INSPECTION OF LOWER INTESTINAL TRACT, VIA NATURAL OR ARTIFICIAL OPENING ENDOSCOPIC: ICD-10-PCS | Performed by: SURGERY

## 2021-11-17 PROCEDURE — 88307 TISSUE EXAM BY PATHOLOGIST: CPT | Performed by: PATHOLOGY

## 2021-11-17 PROCEDURE — 0DTN4ZZ RESECTION OF SIGMOID COLON, PERCUTANEOUS ENDOSCOPIC APPROACH: ICD-10-PCS | Performed by: SURGERY

## 2021-11-17 PROCEDURE — 44213 LAP MOBIL SPLENIC FL ADD-ON: CPT | Performed by: SURGERY

## 2021-11-17 RX ORDER — CEFAZOLIN SODIUM 1 G/50ML
1000 SOLUTION INTRAVENOUS ONCE
Status: COMPLETED | OUTPATIENT
Start: 2021-11-17 | End: 2021-11-17

## 2021-11-17 RX ORDER — EPHEDRINE SULFATE 50 MG/ML
INJECTION INTRAVENOUS AS NEEDED
Status: DISCONTINUED | OUTPATIENT
Start: 2021-11-17 | End: 2021-11-17

## 2021-11-17 RX ORDER — LIDOCAINE HYDROCHLORIDE 10 MG/ML
INJECTION, SOLUTION EPIDURAL; INFILTRATION; INTRACAUDAL; PERINEURAL AS NEEDED
Status: DISCONTINUED | OUTPATIENT
Start: 2021-11-17 | End: 2021-11-17

## 2021-11-17 RX ORDER — SODIUM CHLORIDE, SODIUM LACTATE, POTASSIUM CHLORIDE, CALCIUM CHLORIDE 600; 310; 30; 20 MG/100ML; MG/100ML; MG/100ML; MG/100ML
100 INJECTION, SOLUTION INTRAVENOUS CONTINUOUS
Status: DISCONTINUED | OUTPATIENT
Start: 2021-11-17 | End: 2021-11-17

## 2021-11-17 RX ORDER — ROCURONIUM BROMIDE 10 MG/ML
INJECTION, SOLUTION INTRAVENOUS AS NEEDED
Status: DISCONTINUED | OUTPATIENT
Start: 2021-11-17 | End: 2021-11-17

## 2021-11-17 RX ORDER — ACETAMINOPHEN 325 MG/1
975 TABLET ORAL ONCE
Status: COMPLETED | OUTPATIENT
Start: 2021-11-17 | End: 2021-11-17

## 2021-11-17 RX ORDER — SODIUM CHLORIDE, SODIUM LACTATE, POTASSIUM CHLORIDE, CALCIUM CHLORIDE 600; 310; 30; 20 MG/100ML; MG/100ML; MG/100ML; MG/100ML
125 INJECTION, SOLUTION INTRAVENOUS CONTINUOUS
Status: DISCONTINUED | OUTPATIENT
Start: 2021-11-17 | End: 2021-11-17

## 2021-11-17 RX ORDER — BUPIVACAINE HYDROCHLORIDE 2.5 MG/ML
INJECTION, SOLUTION EPIDURAL; INFILTRATION; INTRACAUDAL AS NEEDED
Status: DISCONTINUED | OUTPATIENT
Start: 2021-11-17 | End: 2021-11-17 | Stop reason: HOSPADM

## 2021-11-17 RX ORDER — KETOROLAC TROMETHAMINE 30 MG/ML
INJECTION, SOLUTION INTRAMUSCULAR; INTRAVENOUS AS NEEDED
Status: DISCONTINUED | OUTPATIENT
Start: 2021-11-17 | End: 2021-11-17

## 2021-11-17 RX ORDER — HYDROMORPHONE HCL IN WATER/PF 6 MG/30 ML
0.2 PATIENT CONTROLLED ANALGESIA SYRINGE INTRAVENOUS
Status: DISCONTINUED | OUTPATIENT
Start: 2021-11-17 | End: 2021-11-21 | Stop reason: HOSPADM

## 2021-11-17 RX ORDER — MAGNESIUM HYDROXIDE 1200 MG/15ML
LIQUID ORAL AS NEEDED
Status: DISCONTINUED | OUTPATIENT
Start: 2021-11-17 | End: 2021-11-17 | Stop reason: HOSPADM

## 2021-11-17 RX ORDER — ATENOLOL 50 MG/1
50 TABLET ORAL DAILY
Status: DISCONTINUED | OUTPATIENT
Start: 2021-11-18 | End: 2021-11-21 | Stop reason: HOSPADM

## 2021-11-17 RX ORDER — DEXAMETHASONE SODIUM PHOSPHATE 4 MG/ML
INJECTION, SOLUTION INTRA-ARTICULAR; INTRALESIONAL; INTRAMUSCULAR; INTRAVENOUS; SOFT TISSUE AS NEEDED
Status: DISCONTINUED | OUTPATIENT
Start: 2021-11-17 | End: 2021-11-17

## 2021-11-17 RX ORDER — NEOSTIGMINE METHYLSULFATE 1 MG/ML
INJECTION INTRAVENOUS AS NEEDED
Status: DISCONTINUED | OUTPATIENT
Start: 2021-11-17 | End: 2021-11-17

## 2021-11-17 RX ORDER — OXYCODONE HYDROCHLORIDE 5 MG/1
5 TABLET ORAL EVERY 4 HOURS PRN
Status: DISCONTINUED | OUTPATIENT
Start: 2021-11-17 | End: 2021-11-21 | Stop reason: HOSPADM

## 2021-11-17 RX ORDER — PROPOFOL 10 MG/ML
INJECTION, EMULSION INTRAVENOUS CONTINUOUS PRN
Status: DISCONTINUED | OUTPATIENT
Start: 2021-11-17 | End: 2021-11-17

## 2021-11-17 RX ORDER — FENTANYL CITRATE 50 UG/ML
INJECTION, SOLUTION INTRAMUSCULAR; INTRAVENOUS AS NEEDED
Status: DISCONTINUED | OUTPATIENT
Start: 2021-11-17 | End: 2021-11-17

## 2021-11-17 RX ORDER — FENTANYL CITRATE/PF 50 MCG/ML
25 SYRINGE (ML) INJECTION
Status: DISCONTINUED | OUTPATIENT
Start: 2021-11-17 | End: 2021-11-17 | Stop reason: HOSPADM

## 2021-11-17 RX ORDER — SODIUM CHLORIDE 9 MG/ML
INJECTION, SOLUTION INTRAVENOUS CONTINUOUS PRN
Status: DISCONTINUED | OUTPATIENT
Start: 2021-11-17 | End: 2021-11-17

## 2021-11-17 RX ORDER — HYDROMORPHONE HCL/PF 1 MG/ML
0.4 SYRINGE (ML) INJECTION
Status: DISCONTINUED | OUTPATIENT
Start: 2021-11-17 | End: 2021-11-17 | Stop reason: HOSPADM

## 2021-11-17 RX ORDER — KETOROLAC TROMETHAMINE 30 MG/ML
15 INJECTION, SOLUTION INTRAMUSCULAR; INTRAVENOUS EVERY 6 HOURS SCHEDULED
Status: DISPENSED | OUTPATIENT
Start: 2021-11-17 | End: 2021-11-20

## 2021-11-17 RX ORDER — HYDROMORPHONE HCL/PF 1 MG/ML
SYRINGE (ML) INJECTION AS NEEDED
Status: DISCONTINUED | OUTPATIENT
Start: 2021-11-17 | End: 2021-11-17

## 2021-11-17 RX ORDER — PROPOFOL 10 MG/ML
INJECTION, EMULSION INTRAVENOUS AS NEEDED
Status: DISCONTINUED | OUTPATIENT
Start: 2021-11-17 | End: 2021-11-17

## 2021-11-17 RX ORDER — PANTOPRAZOLE SODIUM 40 MG/1
40 TABLET, DELAYED RELEASE ORAL
Status: DISCONTINUED | OUTPATIENT
Start: 2021-11-17 | End: 2021-11-17

## 2021-11-17 RX ORDER — GLYCOPYRROLATE 0.2 MG/ML
INJECTION INTRAMUSCULAR; INTRAVENOUS AS NEEDED
Status: DISCONTINUED | OUTPATIENT
Start: 2021-11-17 | End: 2021-11-17

## 2021-11-17 RX ORDER — ONDANSETRON 2 MG/ML
INJECTION INTRAMUSCULAR; INTRAVENOUS AS NEEDED
Status: DISCONTINUED | OUTPATIENT
Start: 2021-11-17 | End: 2021-11-17

## 2021-11-17 RX ORDER — DEXTROSE, SODIUM CHLORIDE, AND POTASSIUM CHLORIDE 5; .45; .22 G/100ML; G/100ML; G/100ML
100 INJECTION INTRAVENOUS CONTINUOUS
Status: DISCONTINUED | OUTPATIENT
Start: 2021-11-17 | End: 2021-11-18

## 2021-11-17 RX ORDER — LIDOCAINE HYDROCHLORIDE 10 MG/ML
0.5 INJECTION, SOLUTION EPIDURAL; INFILTRATION; INTRACAUDAL; PERINEURAL ONCE AS NEEDED
Status: DISCONTINUED | OUTPATIENT
Start: 2021-11-17 | End: 2021-11-17

## 2021-11-17 RX ORDER — ONDANSETRON 2 MG/ML
4 INJECTION INTRAMUSCULAR; INTRAVENOUS ONCE AS NEEDED
Status: DISCONTINUED | OUTPATIENT
Start: 2021-11-17 | End: 2021-11-17 | Stop reason: HOSPADM

## 2021-11-17 RX ORDER — ATENOLOL 50 MG/1
50 TABLET ORAL DAILY
Status: DISCONTINUED | OUTPATIENT
Start: 2021-11-17 | End: 2021-11-17

## 2021-11-17 RX ORDER — PANTOPRAZOLE SODIUM 40 MG/1
40 TABLET, DELAYED RELEASE ORAL
Status: DISCONTINUED | OUTPATIENT
Start: 2021-11-18 | End: 2021-11-21 | Stop reason: HOSPADM

## 2021-11-17 RX ORDER — ACETAMINOPHEN 325 MG/1
975 TABLET ORAL EVERY 6 HOURS SCHEDULED
Status: DISCONTINUED | OUTPATIENT
Start: 2021-11-17 | End: 2021-11-21 | Stop reason: HOSPADM

## 2021-11-17 RX ORDER — MIDAZOLAM HYDROCHLORIDE 2 MG/2ML
INJECTION, SOLUTION INTRAMUSCULAR; INTRAVENOUS AS NEEDED
Status: DISCONTINUED | OUTPATIENT
Start: 2021-11-17 | End: 2021-11-17

## 2021-11-17 RX ORDER — OXYCODONE HYDROCHLORIDE 5 MG/1
2.5 TABLET ORAL EVERY 4 HOURS PRN
Status: DISCONTINUED | OUTPATIENT
Start: 2021-11-17 | End: 2021-11-21 | Stop reason: HOSPADM

## 2021-11-17 RX ORDER — SODIUM CHLORIDE, SODIUM LACTATE, POTASSIUM CHLORIDE, CALCIUM CHLORIDE 600; 310; 30; 20 MG/100ML; MG/100ML; MG/100ML; MG/100ML
INJECTION, SOLUTION INTRAVENOUS CONTINUOUS PRN
Status: DISCONTINUED | OUTPATIENT
Start: 2021-11-17 | End: 2021-11-17

## 2021-11-17 RX ORDER — ONDANSETRON 2 MG/ML
4 INJECTION INTRAMUSCULAR; INTRAVENOUS EVERY 6 HOURS PRN
Status: DISCONTINUED | OUTPATIENT
Start: 2021-11-17 | End: 2021-11-19

## 2021-11-17 RX ADMIN — FENTANYL CITRATE 25 MCG: 50 INJECTION, SOLUTION INTRAMUSCULAR; INTRAVENOUS at 07:43

## 2021-11-17 RX ADMIN — METRONIDAZOLE 500 MG: 500 INJECTION, SOLUTION INTRAVENOUS at 07:52

## 2021-11-17 RX ADMIN — ACETAMINOPHEN 975 MG: 325 TABLET, FILM COATED ORAL at 06:53

## 2021-11-17 RX ADMIN — GLYCOPYRROLATE 0.4 MG: 0.2 INJECTION, SOLUTION INTRAMUSCULAR; INTRAVENOUS at 11:04

## 2021-11-17 RX ADMIN — PROPOFOL 170 MG: 10 INJECTION, EMULSION INTRAVENOUS at 07:43

## 2021-11-17 RX ADMIN — PHENYLEPHRINE HYDROCHLORIDE 50 MCG/MIN: 10 INJECTION INTRAVENOUS at 07:46

## 2021-11-17 RX ADMIN — ROCURONIUM BROMIDE 10 MG: 10 INJECTION, SOLUTION INTRAVENOUS at 08:48

## 2021-11-17 RX ADMIN — HYDROMORPHONE HYDROCHLORIDE 0.25 MG: 1 INJECTION, SOLUTION INTRAMUSCULAR; INTRAVENOUS; SUBCUTANEOUS at 09:09

## 2021-11-17 RX ADMIN — CEFAZOLIN SODIUM 1000 MG: 1 SOLUTION INTRAVENOUS at 07:46

## 2021-11-17 RX ADMIN — OXYCODONE HYDROCHLORIDE 5 MG: 5 TABLET ORAL at 12:48

## 2021-11-17 RX ADMIN — DEXTROSE, SODIUM CHLORIDE, AND POTASSIUM CHLORIDE 100 ML/HR: 5; .45; .22 INJECTION INTRAVENOUS at 14:22

## 2021-11-17 RX ADMIN — ACETAMINOPHEN 975 MG: 325 TABLET, FILM COATED ORAL at 18:22

## 2021-11-17 RX ADMIN — KETOROLAC TROMETHAMINE 15 MG: 30 INJECTION, SOLUTION INTRAMUSCULAR; INTRAVENOUS at 16:25

## 2021-11-17 RX ADMIN — FENTANYL CITRATE 25 MCG: 50 INJECTION, SOLUTION INTRAMUSCULAR; INTRAVENOUS at 08:21

## 2021-11-17 RX ADMIN — MIDAZOLAM HYDROCHLORIDE 2 MG: 1 INJECTION, SOLUTION INTRAMUSCULAR; INTRAVENOUS at 07:29

## 2021-11-17 RX ADMIN — ONDANSETRON 4 MG: 2 INJECTION INTRAMUSCULAR; INTRAVENOUS at 10:49

## 2021-11-17 RX ADMIN — DEXAMETHASONE SODIUM PHOSPHATE 4 MG: 4 INJECTION INTRA-ARTICULAR; INTRALESIONAL; INTRAMUSCULAR; INTRAVENOUS; SOFT TISSUE at 07:55

## 2021-11-17 RX ADMIN — OXYCODONE HYDROCHLORIDE 5 MG: 5 TABLET ORAL at 19:53

## 2021-11-17 RX ADMIN — SODIUM CHLORIDE, SODIUM LACTATE, POTASSIUM CHLORIDE, AND CALCIUM CHLORIDE: .6; .31; .03; .02 INJECTION, SOLUTION INTRAVENOUS at 10:13

## 2021-11-17 RX ADMIN — FENTANYL CITRATE 25 MCG: 50 INJECTION, SOLUTION INTRAMUSCULAR; INTRAVENOUS at 08:10

## 2021-11-17 RX ADMIN — SODIUM CHLORIDE: 0.9 INJECTION, SOLUTION INTRAVENOUS at 07:51

## 2021-11-17 RX ADMIN — LIDOCAINE HYDROCHLORIDE 40 MG: 10 INJECTION, SOLUTION EPIDURAL; INFILTRATION; INTRACAUDAL; PERINEURAL at 07:43

## 2021-11-17 RX ADMIN — PROPOFOL 100 MCG/KG/MIN: 10 INJECTION, EMULSION INTRAVENOUS at 07:46

## 2021-11-17 RX ADMIN — HYDROMORPHONE HYDROCHLORIDE 0.25 MG: 1 INJECTION, SOLUTION INTRAMUSCULAR; INTRAVENOUS; SUBCUTANEOUS at 08:25

## 2021-11-17 RX ADMIN — SODIUM CHLORIDE, SODIUM LACTATE, POTASSIUM CHLORIDE, AND CALCIUM CHLORIDE: .6; .31; .03; .02 INJECTION, SOLUTION INTRAVENOUS at 07:27

## 2021-11-17 RX ADMIN — ROCURONIUM BROMIDE 10 MG: 10 INJECTION, SOLUTION INTRAVENOUS at 09:52

## 2021-11-17 RX ADMIN — HYDROMORPHONE HYDROCHLORIDE 0.2 MG: 0.2 INJECTION, SOLUTION INTRAMUSCULAR; INTRAVENOUS; SUBCUTANEOUS at 14:12

## 2021-11-17 RX ADMIN — ENOXAPARIN SODIUM 40 MG: 40 INJECTION SUBCUTANEOUS at 18:25

## 2021-11-17 RX ADMIN — SODIUM CHLORIDE: 0.9 INJECTION, SOLUTION INTRAVENOUS at 10:13

## 2021-11-17 RX ADMIN — FENTANYL CITRATE 25 MCG: 50 INJECTION, SOLUTION INTRAMUSCULAR; INTRAVENOUS at 08:18

## 2021-11-17 RX ADMIN — NEOSTIGMINE METHYLSULFATE 2 MG: 1 INJECTION INTRAVENOUS at 11:04

## 2021-11-17 RX ADMIN — EPHEDRINE SULFATE 5 MG: 50 INJECTION, SOLUTION INTRAVENOUS at 08:40

## 2021-11-17 RX ADMIN — KETOROLAC TROMETHAMINE 15 MG: 30 INJECTION, SOLUTION INTRAMUSCULAR at 10:49

## 2021-11-17 RX ADMIN — GLYCOPYRROLATE 0.1 MG: 0.2 INJECTION, SOLUTION INTRAMUSCULAR; INTRAVENOUS at 07:29

## 2021-11-17 RX ADMIN — FENTANYL CITRATE 25 MCG: 50 INJECTION INTRAMUSCULAR; INTRAVENOUS at 11:48

## 2021-11-17 RX ADMIN — ROCURONIUM BROMIDE 50 MG: 10 INJECTION, SOLUTION INTRAVENOUS at 07:43

## 2021-11-18 LAB
ANION GAP SERPL CALCULATED.3IONS-SCNC: 7 MMOL/L (ref 4–13)
BUN SERPL-MCNC: 6 MG/DL (ref 5–25)
CALCIUM SERPL-MCNC: 8.4 MG/DL (ref 8.3–10.1)
CHLORIDE SERPL-SCNC: 107 MMOL/L (ref 100–108)
CO2 SERPL-SCNC: 23 MMOL/L (ref 21–32)
CREAT SERPL-MCNC: 0.78 MG/DL (ref 0.6–1.3)
ERYTHROCYTE [DISTWIDTH] IN BLOOD BY AUTOMATED COUNT: 15.7 % (ref 11.6–15.1)
GFR SERPL CREATININE-BSD FRML MDRD: 82 ML/MIN/1.73SQ M
GLUCOSE SERPL-MCNC: 124 MG/DL (ref 65–140)
HCT VFR BLD AUTO: 30.7 % (ref 34.8–46.1)
HGB BLD-MCNC: 9.4 G/DL (ref 11.5–15.4)
MCH RBC QN AUTO: 20.8 PG (ref 26.8–34.3)
MCHC RBC AUTO-ENTMCNC: 30.6 G/DL (ref 31.4–37.4)
MCV RBC AUTO: 68 FL (ref 82–98)
PLATELET # BLD AUTO: 285 THOUSANDS/UL (ref 149–390)
PMV BLD AUTO: 9.5 FL (ref 8.9–12.7)
POTASSIUM SERPL-SCNC: 4.7 MMOL/L (ref 3.5–5.3)
RBC # BLD AUTO: 4.52 MILLION/UL (ref 3.81–5.12)
SODIUM SERPL-SCNC: 137 MMOL/L (ref 136–145)
WBC # BLD AUTO: 12.42 THOUSAND/UL (ref 4.31–10.16)

## 2021-11-18 PROCEDURE — 80048 BASIC METABOLIC PNL TOTAL CA: CPT | Performed by: SURGERY

## 2021-11-18 PROCEDURE — 99024 POSTOP FOLLOW-UP VISIT: CPT | Performed by: SURGERY

## 2021-11-18 PROCEDURE — 85027 COMPLETE CBC AUTOMATED: CPT | Performed by: SURGERY

## 2021-11-18 RX ORDER — DEXTROSE, SODIUM CHLORIDE, AND POTASSIUM CHLORIDE 5; .45; .15 G/100ML; G/100ML; G/100ML
75 INJECTION INTRAVENOUS CONTINUOUS
Status: DISCONTINUED | OUTPATIENT
Start: 2021-11-18 | End: 2021-11-18

## 2021-11-18 RX ORDER — CALCIUM CARBONATE 200(500)MG
500 TABLET,CHEWABLE ORAL DAILY PRN
Status: DISCONTINUED | OUTPATIENT
Start: 2021-11-18 | End: 2021-11-19

## 2021-11-18 RX ADMIN — ACETAMINOPHEN 975 MG: 325 TABLET, FILM COATED ORAL at 19:45

## 2021-11-18 RX ADMIN — KETOROLAC TROMETHAMINE 15 MG: 30 INJECTION, SOLUTION INTRAMUSCULAR; INTRAVENOUS at 05:55

## 2021-11-18 RX ADMIN — KETOROLAC TROMETHAMINE 15 MG: 30 INJECTION, SOLUTION INTRAMUSCULAR; INTRAVENOUS at 00:58

## 2021-11-18 RX ADMIN — ACETAMINOPHEN 975 MG: 325 TABLET, FILM COATED ORAL at 11:15

## 2021-11-18 RX ADMIN — KETOROLAC TROMETHAMINE 15 MG: 30 INJECTION, SOLUTION INTRAMUSCULAR; INTRAVENOUS at 17:34

## 2021-11-18 RX ADMIN — ACETAMINOPHEN 975 MG: 325 TABLET, FILM COATED ORAL at 00:58

## 2021-11-18 RX ADMIN — CALCIUM CARBONATE (ANTACID) CHEW TAB 500 MG 500 MG: 500 CHEW TAB at 17:56

## 2021-11-18 RX ADMIN — ATENOLOL 50 MG: 50 TABLET ORAL at 10:31

## 2021-11-18 RX ADMIN — ONDANSETRON 4 MG: 2 INJECTION INTRAMUSCULAR; INTRAVENOUS at 16:06

## 2021-11-18 RX ADMIN — OXYCODONE HYDROCHLORIDE 5 MG: 5 TABLET ORAL at 09:10

## 2021-11-18 RX ADMIN — ENOXAPARIN SODIUM 40 MG: 40 INJECTION SUBCUTANEOUS at 10:30

## 2021-11-18 RX ADMIN — PANTOPRAZOLE SODIUM 40 MG: 40 TABLET, DELAYED RELEASE ORAL at 05:55

## 2021-11-18 RX ADMIN — OXYCODONE HYDROCHLORIDE 5 MG: 5 TABLET ORAL at 21:43

## 2021-11-18 RX ADMIN — KETOROLAC TROMETHAMINE 15 MG: 30 INJECTION, SOLUTION INTRAMUSCULAR; INTRAVENOUS at 11:15

## 2021-11-18 RX ADMIN — ACETAMINOPHEN 975 MG: 325 TABLET, FILM COATED ORAL at 05:55

## 2021-11-19 ENCOUNTER — APPOINTMENT (INPATIENT)
Dept: RADIOLOGY | Facility: HOSPITAL | Age: 61
DRG: 330 | End: 2021-11-19
Payer: COMMERCIAL

## 2021-11-19 LAB
ANION GAP SERPL CALCULATED.3IONS-SCNC: 4 MMOL/L (ref 4–13)
BASOPHILS # BLD AUTO: 0.03 THOUSANDS/ΜL (ref 0–0.1)
BASOPHILS NFR BLD AUTO: 0 % (ref 0–1)
BUN SERPL-MCNC: 10 MG/DL (ref 5–25)
CALCIUM SERPL-MCNC: 8.6 MG/DL (ref 8.3–10.1)
CHLORIDE SERPL-SCNC: 100 MMOL/L (ref 100–108)
CO2 SERPL-SCNC: 30 MMOL/L (ref 21–32)
CREAT SERPL-MCNC: 0.81 MG/DL (ref 0.6–1.3)
EOSINOPHIL # BLD AUTO: 0.08 THOUSAND/ΜL (ref 0–0.61)
EOSINOPHIL NFR BLD AUTO: 1 % (ref 0–6)
ERYTHROCYTE [DISTWIDTH] IN BLOOD BY AUTOMATED COUNT: 15.9 % (ref 11.6–15.1)
GFR SERPL CREATININE-BSD FRML MDRD: 78 ML/MIN/1.73SQ M
GLUCOSE SERPL-MCNC: 106 MG/DL (ref 65–140)
HCT VFR BLD AUTO: 34.3 % (ref 34.8–46.1)
HGB BLD-MCNC: 10.4 G/DL (ref 11.5–15.4)
IMM GRANULOCYTES # BLD AUTO: 0.06 THOUSAND/UL (ref 0–0.2)
IMM GRANULOCYTES NFR BLD AUTO: 1 % (ref 0–2)
LYMPHOCYTES # BLD AUTO: 1.5 THOUSANDS/ΜL (ref 0.6–4.47)
LYMPHOCYTES NFR BLD AUTO: 14 % (ref 14–44)
MCH RBC QN AUTO: 20.4 PG (ref 26.8–34.3)
MCHC RBC AUTO-ENTMCNC: 30.3 G/DL (ref 31.4–37.4)
MCV RBC AUTO: 67 FL (ref 82–98)
MONOCYTES # BLD AUTO: 0.9 THOUSAND/ΜL (ref 0.17–1.22)
MONOCYTES NFR BLD AUTO: 8 % (ref 4–12)
NEUTROPHILS # BLD AUTO: 8.25 THOUSANDS/ΜL (ref 1.85–7.62)
NEUTS SEG NFR BLD AUTO: 76 % (ref 43–75)
NRBC BLD AUTO-RTO: 0 /100 WBCS
PLATELET # BLD AUTO: 297 THOUSANDS/UL (ref 149–390)
PMV BLD AUTO: 9.3 FL (ref 8.9–12.7)
POTASSIUM SERPL-SCNC: 3.9 MMOL/L (ref 3.5–5.3)
RBC # BLD AUTO: 5.09 MILLION/UL (ref 3.81–5.12)
SODIUM SERPL-SCNC: 134 MMOL/L (ref 136–145)
WBC # BLD AUTO: 10.82 THOUSAND/UL (ref 4.31–10.16)

## 2021-11-19 PROCEDURE — 80048 BASIC METABOLIC PNL TOTAL CA: CPT | Performed by: PHYSICIAN ASSISTANT

## 2021-11-19 PROCEDURE — 74018 RADEX ABDOMEN 1 VIEW: CPT

## 2021-11-19 PROCEDURE — 85025 COMPLETE CBC W/AUTO DIFF WBC: CPT | Performed by: PHYSICIAN ASSISTANT

## 2021-11-19 PROCEDURE — 99024 POSTOP FOLLOW-UP VISIT: CPT | Performed by: SURGERY

## 2021-11-19 RX ORDER — PROMETHAZINE HYDROCHLORIDE 25 MG/ML
25 INJECTION, SOLUTION INTRAMUSCULAR; INTRAVENOUS EVERY 6 HOURS PRN
Status: DISCONTINUED | OUTPATIENT
Start: 2021-11-19 | End: 2021-11-21 | Stop reason: HOSPADM

## 2021-11-19 RX ORDER — DEXTROSE, SODIUM CHLORIDE, AND POTASSIUM CHLORIDE 5; .45; .15 G/100ML; G/100ML; G/100ML
75 INJECTION INTRAVENOUS CONTINUOUS
Status: DISCONTINUED | OUTPATIENT
Start: 2021-11-19 | End: 2021-11-20

## 2021-11-19 RX ORDER — ONDANSETRON 2 MG/ML
4 INJECTION INTRAMUSCULAR; INTRAVENOUS EVERY 4 HOURS PRN
Status: DISCONTINUED | OUTPATIENT
Start: 2021-11-20 | End: 2021-11-19

## 2021-11-19 RX ORDER — ONDANSETRON 2 MG/ML
4 INJECTION INTRAMUSCULAR; INTRAVENOUS EVERY 4 HOURS PRN
Status: DISCONTINUED | OUTPATIENT
Start: 2021-11-20 | End: 2021-11-21 | Stop reason: HOSPADM

## 2021-11-19 RX ORDER — PROMETHAZINE HYDROCHLORIDE 25 MG/ML
25 INJECTION, SOLUTION INTRAMUSCULAR; INTRAVENOUS EVERY 6 HOURS PRN
Status: DISCONTINUED | OUTPATIENT
Start: 2021-11-19 | End: 2021-11-19

## 2021-11-19 RX ORDER — CALCIUM CARBONATE 200(500)MG
500 TABLET,CHEWABLE ORAL 3 TIMES DAILY PRN
Status: DISCONTINUED | OUTPATIENT
Start: 2021-11-19 | End: 2021-11-21 | Stop reason: HOSPADM

## 2021-11-19 RX ORDER — ONDANSETRON 2 MG/ML
4 INJECTION INTRAMUSCULAR; INTRAVENOUS EVERY 4 HOURS PRN
Status: DISCONTINUED | OUTPATIENT
Start: 2021-11-19 | End: 2021-11-19

## 2021-11-19 RX ORDER — ONDANSETRON 2 MG/ML
4 INJECTION INTRAMUSCULAR; INTRAVENOUS EVERY 6 HOURS SCHEDULED
Status: DISPENSED | OUTPATIENT
Start: 2021-11-19 | End: 2021-11-20

## 2021-11-19 RX ADMIN — KETOROLAC TROMETHAMINE 15 MG: 30 INJECTION, SOLUTION INTRAMUSCULAR; INTRAVENOUS at 23:46

## 2021-11-19 RX ADMIN — KETOROLAC TROMETHAMINE 15 MG: 30 INJECTION, SOLUTION INTRAMUSCULAR; INTRAVENOUS at 00:17

## 2021-11-19 RX ADMIN — ACETAMINOPHEN 975 MG: 325 TABLET, FILM COATED ORAL at 23:47

## 2021-11-19 RX ADMIN — ENOXAPARIN SODIUM 40 MG: 40 INJECTION SUBCUTANEOUS at 08:38

## 2021-11-19 RX ADMIN — KETOROLAC TROMETHAMINE 15 MG: 30 INJECTION, SOLUTION INTRAMUSCULAR; INTRAVENOUS at 11:32

## 2021-11-19 RX ADMIN — ACETAMINOPHEN 975 MG: 325 TABLET, FILM COATED ORAL at 17:05

## 2021-11-19 RX ADMIN — DEXTROSE, SODIUM CHLORIDE, AND POTASSIUM CHLORIDE 50 ML/HR: 5; .45; .15 INJECTION INTRAVENOUS at 14:06

## 2021-11-19 RX ADMIN — ATENOLOL 25 MG: 50 TABLET ORAL at 08:39

## 2021-11-19 RX ADMIN — CALCIUM CARBONATE (ANTACID) CHEW TAB 500 MG 500 MG: 500 CHEW TAB at 04:05

## 2021-11-19 RX ADMIN — PROMETHAZINE HYDROCHLORIDE 25 MG: 25 INJECTION INTRAMUSCULAR; INTRAVENOUS at 06:44

## 2021-11-19 RX ADMIN — ONDANSETRON 4 MG: 2 INJECTION INTRAMUSCULAR; INTRAVENOUS at 23:47

## 2021-11-19 RX ADMIN — ONDANSETRON 4 MG: 2 INJECTION INTRAMUSCULAR; INTRAVENOUS at 12:18

## 2021-11-19 RX ADMIN — ONDANSETRON 4 MG: 2 INJECTION INTRAMUSCULAR; INTRAVENOUS at 17:06

## 2021-11-19 RX ADMIN — KETOROLAC TROMETHAMINE 15 MG: 30 INJECTION, SOLUTION INTRAMUSCULAR; INTRAVENOUS at 06:39

## 2021-11-19 RX ADMIN — OXYCODONE HYDROCHLORIDE 5 MG: 5 TABLET ORAL at 17:05

## 2021-11-19 RX ADMIN — ONDANSETRON 4 MG: 2 INJECTION INTRAMUSCULAR; INTRAVENOUS at 03:15

## 2021-11-20 LAB
ANION GAP SERPL CALCULATED.3IONS-SCNC: 7 MMOL/L (ref 4–13)
BUN SERPL-MCNC: 11 MG/DL (ref 5–25)
CALCIUM SERPL-MCNC: 8.2 MG/DL (ref 8.3–10.1)
CHLORIDE SERPL-SCNC: 100 MMOL/L (ref 100–108)
CO2 SERPL-SCNC: 27 MMOL/L (ref 21–32)
CREAT SERPL-MCNC: 0.78 MG/DL (ref 0.6–1.3)
ERYTHROCYTE [DISTWIDTH] IN BLOOD BY AUTOMATED COUNT: 15.9 % (ref 11.6–15.1)
GFR SERPL CREATININE-BSD FRML MDRD: 82 ML/MIN/1.73SQ M
GLUCOSE SERPL-MCNC: 115 MG/DL (ref 65–140)
HCT VFR BLD AUTO: 34.7 % (ref 34.8–46.1)
HGB BLD-MCNC: 10.8 G/DL (ref 11.5–15.4)
MCH RBC QN AUTO: 20.7 PG (ref 26.8–34.3)
MCHC RBC AUTO-ENTMCNC: 31.1 G/DL (ref 31.4–37.4)
MCV RBC AUTO: 67 FL (ref 82–98)
PLATELET # BLD AUTO: 305 THOUSANDS/UL (ref 149–390)
PMV BLD AUTO: 9.1 FL (ref 8.9–12.7)
POTASSIUM SERPL-SCNC: 4.3 MMOL/L (ref 3.5–5.3)
RBC # BLD AUTO: 5.22 MILLION/UL (ref 3.81–5.12)
SODIUM SERPL-SCNC: 134 MMOL/L (ref 136–145)
WBC # BLD AUTO: 11.22 THOUSAND/UL (ref 4.31–10.16)

## 2021-11-20 PROCEDURE — 80048 BASIC METABOLIC PNL TOTAL CA: CPT | Performed by: PHYSICIAN ASSISTANT

## 2021-11-20 PROCEDURE — 85027 COMPLETE CBC AUTOMATED: CPT | Performed by: SURGERY

## 2021-11-20 PROCEDURE — 99024 POSTOP FOLLOW-UP VISIT: CPT | Performed by: STUDENT IN AN ORGANIZED HEALTH CARE EDUCATION/TRAINING PROGRAM

## 2021-11-20 RX ADMIN — OXYCODONE HYDROCHLORIDE 5 MG: 5 TABLET ORAL at 17:35

## 2021-11-20 RX ADMIN — ONDANSETRON 4 MG: 2 INJECTION INTRAMUSCULAR; INTRAVENOUS at 05:20

## 2021-11-20 RX ADMIN — KETOROLAC TROMETHAMINE 15 MG: 30 INJECTION, SOLUTION INTRAMUSCULAR; INTRAVENOUS at 12:29

## 2021-11-20 RX ADMIN — ACETAMINOPHEN 975 MG: 325 TABLET, FILM COATED ORAL at 05:20

## 2021-11-20 RX ADMIN — OXYCODONE HYDROCHLORIDE 5 MG: 5 TABLET ORAL at 23:24

## 2021-11-20 RX ADMIN — ATENOLOL 50 MG: 50 TABLET ORAL at 08:59

## 2021-11-20 RX ADMIN — PANTOPRAZOLE SODIUM 40 MG: 40 TABLET, DELAYED RELEASE ORAL at 05:20

## 2021-11-20 RX ADMIN — SODIUM CHLORIDE 500 ML: 0.9 INJECTION, SOLUTION INTRAVENOUS at 09:00

## 2021-11-20 RX ADMIN — ACETAMINOPHEN 975 MG: 325 TABLET, FILM COATED ORAL at 12:28

## 2021-11-20 RX ADMIN — ENOXAPARIN SODIUM 40 MG: 40 INJECTION SUBCUTANEOUS at 08:59

## 2021-11-20 RX ADMIN — OXYCODONE HYDROCHLORIDE 5 MG: 5 TABLET ORAL at 09:31

## 2021-11-20 RX ADMIN — DEXTROSE, SODIUM CHLORIDE, AND POTASSIUM CHLORIDE 75 ML/HR: 5; .45; .15 INJECTION INTRAVENOUS at 05:35

## 2021-11-20 RX ADMIN — KETOROLAC TROMETHAMINE 15 MG: 30 INJECTION, SOLUTION INTRAMUSCULAR; INTRAVENOUS at 05:20

## 2021-11-20 RX ADMIN — ACETAMINOPHEN 975 MG: 325 TABLET, FILM COATED ORAL at 17:35

## 2021-11-21 VITALS
DIASTOLIC BLOOD PRESSURE: 60 MMHG | HEART RATE: 55 BPM | RESPIRATION RATE: 16 BRPM | SYSTOLIC BLOOD PRESSURE: 122 MMHG | OXYGEN SATURATION: 96 % | TEMPERATURE: 98 F

## 2021-11-21 PROCEDURE — NC001 PR NO CHARGE: Performed by: SURGERY

## 2021-11-21 PROCEDURE — 99024 POSTOP FOLLOW-UP VISIT: CPT | Performed by: SURGERY

## 2021-11-21 RX ORDER — ACETAMINOPHEN 325 MG/1
975 TABLET ORAL EVERY 6 HOURS PRN
Qty: 60 TABLET | Refills: 0 | Status: SHIPPED | OUTPATIENT
Start: 2021-11-21

## 2021-11-21 RX ORDER — OXYCODONE HYDROCHLORIDE 5 MG/1
5 TABLET ORAL EVERY 4 HOURS PRN
Qty: 15 TABLET | Refills: 0 | Status: SHIPPED | OUTPATIENT
Start: 2021-11-21 | End: 2021-12-01 | Stop reason: SDUPTHER

## 2021-11-21 RX ADMIN — ENOXAPARIN SODIUM 40 MG: 40 INJECTION SUBCUTANEOUS at 08:19

## 2021-11-21 RX ADMIN — OXYCODONE HYDROCHLORIDE 5 MG: 5 TABLET ORAL at 08:27

## 2021-11-21 RX ADMIN — ATENOLOL 50 MG: 50 TABLET ORAL at 08:20

## 2021-11-21 RX ADMIN — PANTOPRAZOLE SODIUM 40 MG: 40 TABLET, DELAYED RELEASE ORAL at 06:35

## 2021-11-21 RX ADMIN — ACETAMINOPHEN 975 MG: 325 TABLET, FILM COATED ORAL at 06:35

## 2021-11-22 ENCOUNTER — TRANSITIONAL CARE MANAGEMENT (OUTPATIENT)
Dept: FAMILY MEDICINE CLINIC | Facility: CLINIC | Age: 61
End: 2021-11-22

## 2021-11-30 ENCOUNTER — OFFICE VISIT (OUTPATIENT)
Dept: SURGERY | Facility: CLINIC | Age: 61
End: 2021-11-30

## 2021-11-30 DIAGNOSIS — Z48.89 POSTOPERATIVE VISIT: Primary | ICD-10-CM

## 2021-11-30 PROCEDURE — 99024 POSTOP FOLLOW-UP VISIT: CPT | Performed by: SURGERY

## 2021-12-01 ENCOUNTER — OFFICE VISIT (OUTPATIENT)
Dept: FAMILY MEDICINE CLINIC | Facility: CLINIC | Age: 61
End: 2021-12-01
Payer: COMMERCIAL

## 2021-12-01 VITALS
HEART RATE: 51 BPM | WEIGHT: 121.4 LBS | OXYGEN SATURATION: 98 % | SYSTOLIC BLOOD PRESSURE: 108 MMHG | BODY MASS INDEX: 22.34 KG/M2 | TEMPERATURE: 99 F | HEIGHT: 62 IN | DIASTOLIC BLOOD PRESSURE: 80 MMHG | RESPIRATION RATE: 16 BRPM

## 2021-12-01 DIAGNOSIS — Z90.49 S/P LAPAROSCOPIC-ASSISTED SIGMOIDECTOMY: ICD-10-CM

## 2021-12-01 DIAGNOSIS — I10 HYPERTENSION, UNSPECIFIED TYPE: Primary | ICD-10-CM

## 2021-12-01 PROCEDURE — 99495 TRANSJ CARE MGMT MOD F2F 14D: CPT | Performed by: NURSE PRACTITIONER

## 2021-12-01 RX ORDER — OXYCODONE HYDROCHLORIDE 5 MG/1
5 TABLET ORAL EVERY 4 HOURS PRN
Qty: 15 TABLET | Refills: 0 | Status: SHIPPED | OUTPATIENT
Start: 2021-12-01 | End: 2021-12-11

## 2021-12-06 ENCOUNTER — APPOINTMENT (OUTPATIENT)
Dept: LAB | Age: 61
End: 2021-12-06
Payer: COMMERCIAL

## 2021-12-06 ENCOUNTER — OFFICE VISIT (OUTPATIENT)
Dept: SURGERY | Facility: CLINIC | Age: 61
End: 2021-12-06

## 2021-12-06 DIAGNOSIS — R10.32 LEFT LOWER QUADRANT ABDOMINAL PAIN: ICD-10-CM

## 2021-12-06 DIAGNOSIS — R10.32 LEFT LOWER QUADRANT ABDOMINAL PAIN: Primary | ICD-10-CM

## 2021-12-06 DIAGNOSIS — Z48.89 POSTOPERATIVE VISIT: ICD-10-CM

## 2021-12-06 LAB
ANION GAP SERPL CALCULATED.3IONS-SCNC: 6 MMOL/L (ref 4–13)
BACTERIA UR QL AUTO: NORMAL /HPF
BASOPHILS # BLD AUTO: 0.08 THOUSANDS/ΜL (ref 0–0.1)
BASOPHILS NFR BLD AUTO: 1 % (ref 0–1)
BILIRUB UR QL STRIP: NEGATIVE
BUN SERPL-MCNC: 14 MG/DL (ref 5–25)
CALCIUM SERPL-MCNC: 9.5 MG/DL (ref 8.3–10.1)
CHLORIDE SERPL-SCNC: 103 MMOL/L (ref 100–108)
CLARITY UR: CLEAR
CO2 SERPL-SCNC: 26 MMOL/L (ref 21–32)
COLOR UR: YELLOW
CREAT SERPL-MCNC: 1.02 MG/DL (ref 0.6–1.3)
EOSINOPHIL # BLD AUTO: 0.17 THOUSAND/ΜL (ref 0–0.61)
EOSINOPHIL NFR BLD AUTO: 2 % (ref 0–6)
ERYTHROCYTE [DISTWIDTH] IN BLOOD BY AUTOMATED COUNT: 16.9 % (ref 11.6–15.1)
GFR SERPL CREATININE-BSD FRML MDRD: 59 ML/MIN/1.73SQ M
GLUCOSE SERPL-MCNC: 128 MG/DL (ref 65–140)
GLUCOSE UR STRIP-MCNC: NEGATIVE MG/DL
HCT VFR BLD AUTO: 37 % (ref 34.8–46.1)
HGB BLD-MCNC: 11.1 G/DL (ref 11.5–15.4)
HGB UR QL STRIP.AUTO: NEGATIVE
HYALINE CASTS #/AREA URNS LPF: NORMAL /LPF
IMM GRANULOCYTES # BLD AUTO: 0.04 THOUSAND/UL (ref 0–0.2)
IMM GRANULOCYTES NFR BLD AUTO: 1 % (ref 0–2)
KETONES UR STRIP-MCNC: NEGATIVE MG/DL
LEUKOCYTE ESTERASE UR QL STRIP: NEGATIVE
LYMPHOCYTES # BLD AUTO: 1.57 THOUSANDS/ΜL (ref 0.6–4.47)
LYMPHOCYTES NFR BLD AUTO: 18 % (ref 14–44)
MCH RBC QN AUTO: 20.8 PG (ref 26.8–34.3)
MCHC RBC AUTO-ENTMCNC: 30 G/DL (ref 31.4–37.4)
MCV RBC AUTO: 69 FL (ref 82–98)
MONOCYTES # BLD AUTO: 0.72 THOUSAND/ΜL (ref 0.17–1.22)
MONOCYTES NFR BLD AUTO: 8 % (ref 4–12)
NEUTROPHILS # BLD AUTO: 5.95 THOUSANDS/ΜL (ref 1.85–7.62)
NEUTS SEG NFR BLD AUTO: 70 % (ref 43–75)
NITRITE UR QL STRIP: NEGATIVE
NON-SQ EPI CELLS URNS QL MICRO: NORMAL /HPF
NRBC BLD AUTO-RTO: 0 /100 WBCS
PH UR STRIP.AUTO: 6.5 [PH]
PLATELET # BLD AUTO: 575 THOUSANDS/UL (ref 149–390)
PMV BLD AUTO: 9.5 FL (ref 8.9–12.7)
POTASSIUM SERPL-SCNC: 3.5 MMOL/L (ref 3.5–5.3)
PROT UR STRIP-MCNC: NEGATIVE MG/DL
RBC # BLD AUTO: 5.34 MILLION/UL (ref 3.81–5.12)
RBC #/AREA URNS AUTO: NORMAL /HPF
SODIUM SERPL-SCNC: 135 MMOL/L (ref 136–145)
SP GR UR STRIP.AUTO: 1.01 (ref 1–1.03)
UROBILINOGEN UR QL STRIP.AUTO: 0.2 E.U./DL
WBC # BLD AUTO: 8.53 THOUSAND/UL (ref 4.31–10.16)
WBC #/AREA URNS AUTO: NORMAL /HPF

## 2021-12-06 PROCEDURE — 80048 BASIC METABOLIC PNL TOTAL CA: CPT

## 2021-12-06 PROCEDURE — 36415 COLL VENOUS BLD VENIPUNCTURE: CPT

## 2021-12-06 PROCEDURE — 85025 COMPLETE CBC W/AUTO DIFF WBC: CPT

## 2021-12-06 PROCEDURE — 81001 URINALYSIS AUTO W/SCOPE: CPT | Performed by: SURGERY

## 2021-12-06 PROCEDURE — 99024 POSTOP FOLLOW-UP VISIT: CPT | Performed by: SURGERY

## 2021-12-07 ENCOUNTER — TREATMENT (OUTPATIENT)
Dept: SURGERY | Facility: CLINIC | Age: 61
End: 2021-12-07

## 2021-12-07 DIAGNOSIS — R10.32 LEFT LOWER QUADRANT ABDOMINAL PAIN: Primary | ICD-10-CM

## 2021-12-09 ENCOUNTER — OFFICE VISIT (OUTPATIENT)
Dept: FAMILY MEDICINE CLINIC | Facility: CLINIC | Age: 61
End: 2021-12-09
Payer: COMMERCIAL

## 2021-12-09 VITALS
BODY MASS INDEX: 22.23 KG/M2 | DIASTOLIC BLOOD PRESSURE: 80 MMHG | OXYGEN SATURATION: 97 % | SYSTOLIC BLOOD PRESSURE: 132 MMHG | TEMPERATURE: 98.2 F | RESPIRATION RATE: 16 BRPM | WEIGHT: 120.8 LBS | HEIGHT: 62 IN | HEART RATE: 58 BPM

## 2021-12-09 DIAGNOSIS — Z90.49 S/P LAPAROSCOPIC-ASSISTED SIGMOIDECTOMY: ICD-10-CM

## 2021-12-09 DIAGNOSIS — R10.9 ABDOMINAL DISCOMFORT: Primary | ICD-10-CM

## 2021-12-09 PROBLEM — K57.32 SIGMOID DIVERTICULITIS: Status: RESOLVED | Noted: 2021-10-08 | Resolved: 2021-12-09

## 2021-12-09 PROCEDURE — 99213 OFFICE O/P EST LOW 20 MIN: CPT | Performed by: NURSE PRACTITIONER

## 2021-12-12 ENCOUNTER — HOSPITAL ENCOUNTER (OUTPATIENT)
Dept: ULTRASOUND IMAGING | Facility: HOSPITAL | Age: 61
Discharge: HOME | End: 2021-12-12
Attending: SURGERY
Payer: COMMERCIAL

## 2021-12-12 DIAGNOSIS — R10.32 LEFT LOWER QUADRANT ABDOMINAL PAIN: ICD-10-CM

## 2021-12-12 PROCEDURE — 76830 TRANSVAGINAL US NON-OB: CPT

## 2021-12-12 PROCEDURE — 76705 ECHO EXAM OF ABDOMEN: CPT

## 2021-12-12 PROCEDURE — 76856 US EXAM PELVIC COMPLETE: CPT

## 2021-12-20 ENCOUNTER — TELEPHONE (OUTPATIENT)
Dept: FAMILY MEDICINE CLINIC | Facility: CLINIC | Age: 61
End: 2021-12-20

## 2021-12-21 ENCOUNTER — OFFICE VISIT (OUTPATIENT)
Dept: FAMILY MEDICINE CLINIC | Facility: CLINIC | Age: 61
End: 2021-12-21
Payer: COMMERCIAL

## 2021-12-21 VITALS
TEMPERATURE: 97.9 F | SYSTOLIC BLOOD PRESSURE: 120 MMHG | HEIGHT: 62 IN | WEIGHT: 121.2 LBS | BODY MASS INDEX: 22.31 KG/M2 | DIASTOLIC BLOOD PRESSURE: 90 MMHG | OXYGEN SATURATION: 99 % | RESPIRATION RATE: 16 BRPM | HEART RATE: 71 BPM

## 2021-12-21 DIAGNOSIS — M54.6 ACUTE RIGHT-SIDED THORACIC BACK PAIN: ICD-10-CM

## 2021-12-21 DIAGNOSIS — M62.838 MUSCLE SPASM: Primary | ICD-10-CM

## 2021-12-21 PROCEDURE — 99213 OFFICE O/P EST LOW 20 MIN: CPT | Performed by: NURSE PRACTITIONER

## 2021-12-21 RX ORDER — DIAZEPAM 5 MG/1
5 TABLET ORAL EVERY 8 HOURS PRN
Qty: 20 TABLET | Refills: 0 | Status: SHIPPED | OUTPATIENT
Start: 2021-12-21

## 2021-12-28 ENCOUNTER — OFFICE VISIT (OUTPATIENT)
Dept: SURGERY | Facility: CLINIC | Age: 61
End: 2021-12-28

## 2021-12-28 DIAGNOSIS — R10.9 ABDOMINAL PAIN: Primary | ICD-10-CM

## 2021-12-28 DIAGNOSIS — Z48.89 POSTOPERATIVE VISIT: ICD-10-CM

## 2021-12-28 PROCEDURE — 99024 POSTOP FOLLOW-UP VISIT: CPT | Performed by: SURGERY

## 2022-01-08 ENCOUNTER — IMMUNIZATIONS (OUTPATIENT)
Dept: FAMILY MEDICINE CLINIC | Facility: HOSPITAL | Age: 62
End: 2022-01-08

## 2022-01-08 DIAGNOSIS — Z23 ENCOUNTER FOR IMMUNIZATION: Primary | ICD-10-CM

## 2022-01-08 PROCEDURE — 91300 COVID-19 PFIZER VACC 0.3 ML: CPT

## 2022-01-08 PROCEDURE — 0001A COVID-19 PFIZER VACC 0.3 ML: CPT

## 2022-02-08 DIAGNOSIS — K21.9 GASTROESOPHAGEAL REFLUX DISEASE WITHOUT ESOPHAGITIS: ICD-10-CM

## 2022-02-08 DIAGNOSIS — F41.9 ANXIETY: ICD-10-CM

## 2022-02-09 RX ORDER — ALPRAZOLAM 0.25 MG/1
0.25 TABLET ORAL
Qty: 30 TABLET | Refills: 0 | Status: SHIPPED | OUTPATIENT
Start: 2022-02-09 | End: 2022-03-17 | Stop reason: SDUPTHER

## 2022-02-09 RX ORDER — OMEPRAZOLE 40 MG/1
40 CAPSULE, DELAYED RELEASE ORAL DAILY
Qty: 90 CAPSULE | Refills: 0 | Status: SHIPPED | OUTPATIENT
Start: 2022-02-09 | End: 2022-03-17 | Stop reason: SDUPTHER

## 2022-02-09 NOTE — TELEPHONE ENCOUNTER
Medication:  PDMP   01/11/2022  1  01/11/2022  ALPRAZOLAM 0 25 MG TABLET  30 0  30  DO MEN      Active agreement on file -No

## 2022-02-11 ENCOUNTER — OFFICE VISIT (OUTPATIENT)
Dept: DERMATOLOGY | Facility: CLINIC | Age: 62
End: 2022-02-11
Payer: COMMERCIAL

## 2022-02-11 DIAGNOSIS — D48.5 NEOPLASM OF UNCERTAIN BEHAVIOR OF SKIN: ICD-10-CM

## 2022-02-11 DIAGNOSIS — D18.01 CHERRY ANGIOMA: Primary | ICD-10-CM

## 2022-02-11 DIAGNOSIS — L82.1 SEBORRHEIC KERATOSIS: ICD-10-CM

## 2022-02-11 DIAGNOSIS — D22.9 MULTIPLE NEVI: ICD-10-CM

## 2022-02-11 DIAGNOSIS — Z80.8 FAMILY HISTORY OF MELANOMA: ICD-10-CM

## 2022-02-11 PROCEDURE — 88305 TISSUE EXAM BY PATHOLOGIST: CPT | Performed by: STUDENT IN AN ORGANIZED HEALTH CARE EDUCATION/TRAINING PROGRAM

## 2022-02-11 PROCEDURE — 11102 TANGNTL BX SKIN SINGLE LES: CPT | Performed by: DERMATOLOGY

## 2022-02-11 PROCEDURE — 99214 OFFICE O/P EST MOD 30 MIN: CPT | Performed by: DERMATOLOGY

## 2022-02-11 NOTE — PROGRESS NOTES
[facility] Follow Up Note    Patient Name: Jimmy Cooper  Encounter Date: 02/11/2022    Today's Chief Concerns:  Danielito Hay Concern #1:  Spot of concern in nose      Current Medications:    Current Outpatient Medications:     acetaminophen (TYLENOL) 325 mg tablet, Take 3 tablets (975 mg total) by mouth every 6 (six) hours as needed for mild pain, Disp: 60 tablet, Rfl: 0    ALPRAZolam (XANAX) 0 25 mg tablet, Take 1 tablet (0 25 mg total) by mouth daily at bedtime as needed for anxiety, Disp: 30 tablet, Rfl: 0    conjugated estrogens (PREMARIN) vaginal cream, Insert 0 5 g into the vagina daily, Disp: 30 g, Rfl: 3    diazepam (VALIUM) 5 mg tablet, Take 1 tablet (5 mg total) by mouth every 8 (eight) hours as needed for muscle spasms, Disp: 20 tablet, Rfl: 0    Diclofenac Sodium (VOLTAREN) 1 %, Apply 2 g topically 4 (four) times a day, Disp: 100 g, Rfl: 2    dicyclomine (BENTYL) 10 mg capsule, Take 1 capsule (10 mg total) by mouth 3 (three) times a day as needed (abd pain), Disp: 90 capsule, Rfl: 3    Magnesium Gluconate (MAGNESIUM 27 PO), Take by mouth, Disp: , Rfl:     omeprazole (PriLOSEC) 40 MG capsule, Take 1 capsule (40 mg total) by mouth daily, Disp: 90 capsule, Rfl: 0    ondansetron (ZOFRAN-ODT) 4 mg disintegrating tablet, Take 1 tablet (4 mg total) by mouth every 6 (six) hours as needed for nausea or vomiting, Disp: 20 tablet, Rfl: 0    methocarbamol (ROBAXIN) 500 mg tablet, Take 1 tablet (500 mg total) by mouth 4 (four) times a day as needed for muscle spasms, Disp: 60 tablet, Rfl: 0    metoprolol tartrate (LOPRESSOR) 25 mg tablet, Take 0 5 tablets (12 5 mg total) by mouth every 12 (twelve) hours (Patient not taking: Reported on 2/11/2022 ), Disp: 30 tablet, Rfl: 5    CONSTITUTIONAL:   There were no vitals filed for this visit  Specific Alerts:    Have you been seen by a [facility] in the last 3 years? YES    Are you pregnant or planning to become pregnant?  No    Are you currently or planning to be nursing or breast feeding? No    No Known Allergies    May we call your Preferred Phone number to discuss your specific medical information? YES    May we leave a detailed message that includes your specific medical information? YES    Have you traveled outside of the [facility] in the past 3 months? No      Review of Systems:  Have you recently had or currently have any of the following?     · Fever or chills: No  · Night Sweats: No  · Headaches: No  · Weight Gain: No  · Weight Loss: No  · Blurry Vision: No  · Nausea: No  · Vomiting: No  · Diarrhea: No  · Blood in Stool: No  · Abdominal Pain: No  · Itchy Skin: No  · Painful Joints: No  · Swollen Joints: No  · Muscle Pain: No  · Irregular Mole: No  · Sun Burn: No  · Dry Skin: No  · Skin Color Changes: No  · Scar or Keloid: No  · Cold Sores/Fever Blisters: No  · Bacterial Infections/MRSA: No  · Anxiety: No  · Depression: No  · Suicidal or Homicidal Thoughts: No      PSYCH: Normal mood and affect   EYES: Normal conjunctiva  ENT: Normal lips and oral mucosa  CARDIOVASCULAR: No edema  RESPIRATORY: Normal respirations  HEME/LYMPH/IMMUNO:  No regional lymphadenopathy except as noted below in ASSESSMENT AND PLAN BY DIAGNOSIS    FULL ORGAN SYSTEM SKIN EXAM (SKIN)   Hair, Scalp, Ears, Face Normal except as noted below in Assessment   Neck,  Normal except as noted below in Assessment   Right Arm/Hand/Fingers Normal except as noted below in Assessment   Left Arm/Hand/Fingers Normal except as noted below in Assessment   Chest/Breasts/Axillae Viewed areas Normal except as noted below in Assessment   Abdomen, Umbilicus Normal except as noted below in Assessment   Back/Spine Normal except as noted below in Assessment   Groin/Genitalia/Buttocks Viewed areas Normal except as noted below in Assessment   Right Leg, Foot, Toes Normal except as noted below in Assessment   Left Leg, Foot, Toes Normal except as noted below in Assessment 1  FAMILY HISTORY OF MELANOMA       Additional History of Present Condition:  Patient states her sister passed away last year from metastatic melanoma  Assessment and Plan:  Based on a thorough discussion of this condition and the management approach to it (including a comprehensive discussion of the known risks, side effects and potential benefits of treatment), the patient (family) agrees to implement the following specific plan:   Routine skin exams     2  MELANOCYTIC NEVI ("Moles")    Physical Exam:   Anatomic Location Affected:   Mostly on sun-exposed areas of the arms, chest, back   Morphological Description:  Scattered, 1-4mm round to ovoid, symmetrical-appearing, even bordered, skin colored to dark brown macules/papules, mostly in sun-exposed areas   Pertinent Positives:   Pertinent Negatives: No regional LAD    Additional History of Present Condition:  Patient denies any changes  Assessment and Plan:  Based on a thorough discussion of this condition and the management approach to it (including a comprehensive discussion of the known risks, side effects and potential benefits of treatment), the patient (family) agrees to implement the following specific plan:   Monitor for changes   Use a moisturizer + sunscreen "combo" product such as Neutrogena Daily Defense SPF 50+ or CeraVe AM at least three times a day  Melanocytic Nevi  Melanocytic nevi ("moles") are tan or brown, raised or flat areas of the skin which have an increased number of melanocytes  Melanocytes are the cells in our body which make pigment and account for skin color  Some moles are present at birth (I e , "congenital nevi"), while others come up later in life (i e , "acquired nevi")  The sun can stimulate the body to make more moles  Sunburns are not the only thing that triggers more moles  Chronic sun exposure can do it too       Clinically distinguishing a healthy mole from melanoma may be difficult, even for experienced dermatologists  The "ABCDE's" of moles have been suggested as a means of helping to alert a person to a suspicious mole and the possible increased risk of melanoma  The suggestions for raising alert are as follows:    Asymmetry: Healthy moles tend to be symmetric, while melanomas are often asymmetric  Asymmetry means if you draw a line through the mole, the two halves do not match in color, size, shape, or surface texture  Asymmetry can be a result of rapid enlargement of a mole, the development of a raised area on a previously flat lesion, scaling, ulceration, bleeding or scabbing within the mole  Any mole that starts to demonstrate "asymmetry" should be examined promptly by a board certified dermatologist      Border: Healthy moles tend to have discrete, even borders  The border of a melanoma often blends into the normal skin and does not sharply delineate the mole from normal skin  Any mole that starts to demonstrate "uneven borders" should be examined promptly by a board certified dermatologist      Color: Healthy moles tend to be one color throughout  Melanomas tend to be made up of different colors ranging from dark black, blue, white, or red  Any mole that demonstrates a color change should be examined promptly by a board certified dermatologist      Diameter: Healthy moles tend to be smaller than 0 6 cm in size; an exception are "congenital nevi" that can be larger  Melanomas tend to grow and can often be greater than 0 6 cm (1/4 of an inch, or the size of a pencil eraser)  This is only a guideline, and many normal moles may be larger than 0 6 cm without being unhealthy  Any mole that starts to change in size (small to bigger or bigger to smaller) should be examined promptly by a board certified dermatologist      Evolving: Healthy moles tend to "stay the same "  Melanomas may often show signs of change or evolution such as a change in size, shape, color, or elevation    Any mole that starts to itch, bleed, crust, burn, hurt, or ulcerate or demonstrate a change or evolution should be examined promptly by a board certified dermatologist       Dysplastic Nevi  Dysplastic moles are moles that fit the ABCDE rules of melanoma but are not identified as melanomas when examined under the microscope  They may indicate an increased risk of melanoma in that person  If there is a family history of melanoma, most experts agree that the person may be at an increased risk for developing a melanoma  Experts still do not agree on what dysplastic moles mean in patients without a personal or family history of melanoma  Dysplastic moles are usually larger than common moles and have different colors within it with irregular borders  The appearance can be very similar to a melanoma  Biopsies of dysplastic moles may show abnormalities which are different from a regular mole  Melanoma  Malignant melanoma is a type of skin cancer that can be deadly if it spreads throughout the body  The incidence of melanoma in the [place] is growing faster than any other cancer  Melanoma usually grows near the surface of the skin for a period of time, and then begins to grow deeper into the skin  Once it grows deeper into the skin, the risk of spread to other organs greatly increases  Therefore, early detection and removal of a malignant melanoma may result in a better chance at a complete cure; removal after the tumor has spread may not be as effective, leading to worse clinical outcomes such as death  The true rate of nevus transformation into a melanoma is unknown  It has been estimated that the lifetime risk for any acquired melanocytic nevus on any 21year-old individual transforming into melanoma by age [de-identified] is 0 03% (1 in 3,164) for men and 0 009% (1 in 10,800) for women  The appearance of a "new mole" remains one of the most reliable methods for identifying a malignant melanoma    Occasionally, melanomas appear as rapidly growing, blue-black, dome-shaped bumps within a previous mole or previous area of normal skin  Other times, melanomas are suspected when a mole suddenly appears or changes  Itching, burning, or pain in a pigmented lesion should increase suspicion, but most patients with early melanoma have no skin discomfort whatsoever  Melanoma can occur anywhere on the skin, including areas that are difficult for self-examination  Many melanomas are first noticed by other family members  Suspicious-looking moles may be removed for microscopic examination  You may be able to prevent death from melanoma by doing two simple things:    1  Try to avoid unnecessary sun exposure and protect your skin when it is exposed to the sun  People who live near the equator, people who have intermittent exposures to large amounts of sun, and people who have had sunburns in childhood or adolescence have an increased risk for melanoma  Sun sense and vigilant sun protection may be keys to helping to prevent melanoma  We recommend wearing UPF-rated sun protective clothing and sunglasses whenever possible and applying a moisturizer-sunscreen combination product (SPF 50+) such as Neutrogena Daily Defense to sun exposed areas of skin at least three times a day  2  Have your moles regularly examined by a board certified dermatologist AND by yourself or a family member/friend at home  We recommend that you have your moles examined at least once a year by a board certified dermatologist   Use your birthday as an annual reminder to have your "Birthday Suit" (I e , your skin) examined; it is a nice birthday gift to yourself to know that your skin is healthy appearing! Additionally, at-home self examinations may be helpful for detecting a possible melanoma  Use the ABCDEs we discussed and check your moles once a month at home        3  CHERRY ANGIOMAS    Physical Exam:   Anatomic Location Affected:  Trunk and extremities  Morphological Description:  Scattered cherry red, 1-4 mm papules   Pertinent Positives:   Pertinent Negatives: Additional History of Present Condition:  Patient denies any bleeding    Assessment and Plan:  Based on a thorough discussion of this condition and the management approach to it (including a comprehensive discussion of the known risks, side effects and potential benefits of treatment), the patient (family) agrees to implement the following specific plan:   Monitor for changes    Assessment and Plan:    Cherry angioma, also known as [employer] spots, are benign vascular skin lesions  A "cherry angioma" is a firm red, blue or purple papule, 0 1-1 cm in diameter  When thrombosed, they can appear black in colour until evaluated with a dermatoscope when the red or purple colour is more easily seen  Cherry angioma may develop on any part of the body but most often appear on the scalp, face, lips and trunk  An angioma is due to proliferating endothelial cells; these are the cells that line the inside of a blood vessel  Angiomas can arise in early life or later in life; the most common type of angioma is a cherry angioma  Cherry angiomas are very common in males and females of any age or race  They are more noticeable in white skin than in skin of colour  They markedly increase in number from about the age of 36  There may be a family history of similar lesions  Eruptive cherry angiomas have been rarely reported to be associated with internal malignancy  The cause of angiomas is unknown  Genetic analysis of cherry angiomas has shown that they frequently carry specific somatic missense mutations in the GNAQ and GNA11 (Q209H) genes, which are involved in other vascular and melanocytic proliferations  Cherry angioma is usually diagnosed clinically and no investigations are necessary for the majority of lesions   It has a characteristic red-clod or lobular pattern on dermatoscopy (called lacunar pattern using conventional pattern analysis)  When there is uncertainty about the diagnosis, a biopsy may be performed  The angioma is composed of venules in a thickened papillary dermis  Collagen bundles may be prominent between the lobules  Cherry angiomas are harmless, so they do not usually have to be treated  Occasionally, they are removed to exclude a malignant skin lesion such as a nodular melanoma or because they are irritated or bleeding (and a subsequent risk for infection)  To decrease friction over the lesions, we recommend Neutrogena Daily Defense SPF 50+ at least 3 times a day  4  SEBORRHEIC KERATOSIS; NON-INFLAMED    Physical Exam:   Anatomic Location Affected:  back   Morphological Description:  Light brown thin plaques   Pertinent Positives:   Pertinent Negatives: Additional History of Present Condition:  Patient reports new bumps on the skin  Denies itch, burn, pain, bleeding or ulceration  Present constantly; nothing seems to make it worse or better  No prior treatment  Assessment and Plan:  Based on a thorough discussion of this condition and the management approach to it (including a comprehensive discussion of the known risks, side effects and potential benefits of treatment), the patient (family) agrees to implement the following specific plan:   Monitor for changes     Seborrheic Keratosis  A seborrheic keratosis is a harmless warty spot that appears during adult life as a common sign of skin aging  Seborrheic keratoses can arise on any area of skin, covered or uncovered, with the usual exception of the palms and soles  They do not arise from mucous membranes  Seborrheic keratoses can have highly variable appearance  Seborrheic keratoses are extremely common  It has been estimated that over 90% of adults over the age of 61 years have one or more of them  They occur in males and females of all races, typically beginning to erupt in the 35s or 45s   They are uncommon under the age of 21 years  The precise cause of seborrhoeic keratoses is not known  Seborrhoeic keratoses are considered degenerative in nature  As time goes by, seborrheic keratoses tend to become more numerous  Some people inherit a tendency to develop a very large number of them; some people may have hundreds of them  The name "seborrheic keratosis" is misleading, because these lesions are not limited to a seborrhoeic distribution (scalp, mid-face, chest, upper back), nor are they formed from sebaceous glands, nor are they associated with sebum -- which is greasy  Seborrheic keratosis may also be called "SK," "Seb K," "basal cell papilloma," "senile wart," or "barnacle "      Researchers have noted:   Eruptive seborrhoeic keratoses can follow sunburn or dermatitis   Skin friction may be the reason they appear in body folds   Viral cause (e g , human papillomavirus) seems unlikely   Stable and clonal mutations or activation of FRFR3, PIK3CA, YUE, AKT1 and EGFR genes are found in seborrhoeic keratoses   Seborrhoeic keratosis can arise from solar lentigo   FRFR3 mutations also arise in solar lentigines  These mutations are associated with increased age and location on the head and neck, suggesting a role of ultraviolet radiation in these lesions   Seborrheic keratoses do not harbour tumour suppressor gene mutations   Epidermal growth factor receptor inhibitors, which are used to treat some cancers, often result in an increase in verrucal (warty) keratoses  There is no easy way to remove multiple lesions on a single occasion  Unless a specific lesion is "inflamed" and is causing pain or stinging/burning or is bleeding, most insurance companies do not authorize treatment      NEOPLASM OF UNCERTAIN BEHAVIOR OF SKIN    Physical Exam:   (Anatomic Location); (Size and Morphological Description); (Differential Diagnosis):  o Right nostril; 0 4 cm filiform verruca papule; diff dx: Verruca vulgaris versus squamous cell carcinoma    Pertinent Positives:   Pertinent Negatives: Additional History of Present Condition:  Patient states it appeared  6 months ago and continues to increase in size  Assessment and Plan:   I have discussed with the patient that a sample of skin via a "skin biopsy would be potentially helpful to further make a specific diagnosis under the microscope   Based on a thorough discussion of this condition and the management approach to it (including a comprehensive discussion of the known risks, side effects and potential benefits of treatment), the patient (family) agrees to implement the following specific plan:    o Procedure:  Skin Biopsy  After a thorough discussion of treatment options and risk/benefits/alternatives (including but not limited to local pain, scarring, dyspigmentation, blistering, possible superinfection, and inability to confirm a diagnosis via histopathology), verbal and written consent were obtained and portion of the rash was biopsied for tissue sample  See below for consent that was obtained from patient and subsequent Procedure Note  PROCEDURE SHAVE BIOPSY NOTE:     Performing Physician: Maxine Aase Anatomic Location; Clinical Description with size (cm); Pre-Op Diagnosis:   o Right nostril; 0 4 cm filiform verruca papule; diff dx: Verruca vulgaris versus squamous cell carcinom   Post-op diagnosis: Same      Local anesthesia: 1% xylocaine with epi       Topical anesthesia: None     Hemostasis: Aluminum chloride       After obtaining informed consent  at which time there was a discussion about the purpose of biopsy  and low risks of infection and bleeding  The area was prepped and draped in the usual fashion  Anesthesia was obtained with 1% lidocaine with epinephrine  A shave biopsy to an appropriate sampling depth was obtained with a sterile blade (such as a 15-blade or DermaBlade)   The resulting wound was covered with surgical ointment and bandaged appropriately  The patient tolerated the procedure well without complications and was without signs of functional compromise  Specimen has been sent for review by Dermatopathology  Standard post-procedure care has been explained and has been included in written form within the patient's copy of Informed Consent  INFORMED CONSENT DISCUSSION AND POST-OPERATIVE INSTRUCTIONS FOR PATIENT    I   RATIONALE FOR PROCEDURE  I understand that a skin biopsy allows the Dermatologist to test a lesion or rash under the microscope to obtain a diagnosis  It usually involves numbing the area with numbing medication and removing a small piece of skin; sometimes the area will be closed with sutures  In this specific procedure, sutures are not usually needed  If any sutures are placed, then they are usually need to be removed in 2 weeks or less  I understand that my Dermatologist recommends that a skin "shave" biopsy be performed today  A local anesthetic, similar to the kind that a dentist uses when filling a cavity, will be injected with a very small needle into the skin area to be sampled  The injected skin and tissue underneath "will go to sleep and become numb so no pain should be felt afterwards  An instrument shaped like a tiny "razor blade" (shave biopsy instrument) will be used to cut a small piece of tissue and skin from the area so that a sample of tissue can be taken and examined more closely under the microscope  A slight amount of bleeding will occur, but it will be stopped with direct pressure and a pressure bandage and any other appropriate methods  I understands that a scar will form where the wound was created  Surgical ointment will be applied to help protect the wound  Sutures are not usually needed      II   RISKS AND POTENTIAL COMPLICATIONS   I understand the risks and potential complications of a skin biopsy include but are not limited to the following:   Bleeding   Infection   Pain   Scar/keloid   Skin discoloration   Incomplete Removal   Recurrence   Nerve Damage/Numbness/Loss of Function   Allergic Reaction to Anesthesia   Biopsies are diagnostic procedures and based on findings additional treatment or evaluation may be required   Loss or destruction of specimen resulting in no additional findings    My Dermatologist has explained to me the nature of the condition, the nature of the procedure, and the benefits to be reasonably expected compared with alternative approaches  My Dermatologist has discussed the likelihood of major risks or complications of this procedure including the specific risks listed above, such as bleeding, infection, and scarring/keloid  I understand that a scar is expected after this procedure  I understand that my physician cannot predict if the scar will form a "keloid," which extends beyond the borders of the wound that is created  A keloid is a thick, painful, and bumpy scar  A keloid can be difficult to treat, as it does not always respond well to therapy, which includes injecting cortisone directly into the keloid every few weeks  While this usually reduces the pain and size of the scar, it does not eliminate it  I understand that photographs may be taken before and after the procedure  These will be maintained as part of the medical providers confidential records and may not be made available to me  I further authorize the medical provider to use the photographs for teaching purposes or to illustrate scientific papers, books, or lectures if in his/her judgment, medical research, education, or science may benefit from its use  I have had an opportunity to fully inquire about the risks and benefits of this procedure and its alternatives  I have been given ample time and opportunity to ask questions and to seek a second opinion if I wished to do so    I acknowledge that there have specifically been no guarantees as to the cosmetic results from the procedure  I am aware that with any procedure there is always the possibility of an unexpected complication  III  POST-PROCEDURAL CARE (WHAT YOU WILL NEED TO DO "AFTER THE BIOPSY" TO OPTIMIZE HEALING)     Keep the area clean and dry  Try NOT to remove the bandage or get it wet for the first 24 hours   Gently clean the area and apply surgical ointment (such as Vaseline petrolatum ointment, which is available "over the counter" and not a prescription) to the biopsy site for up to 2 weeks straight  This acts to protect the wound from the outside world   Sutures are not usually placed in this procedure  If any sutures were placed, return for suture removal as instructed (generally 1 week for the face, 2 weeks for the body)   Take Acetaminophen (Tylenol) for discomfort, if no contraindications  Ibuprofen or aspirin could make bleeding worse   Call our office immediately for signs of infection: fever, chills, increased redness, warmth, tenderness, discomfort/pain, or pus or foul smell coming from the wound  WHAT TO DO IF THERE IS ANY BLEEDING? If a small amount of bleeding is noticed, place a clean cloth over the area and apply firm pressure for ten minutes  Check the wound after 10 minutes of direct pressure  If bleeding persists, try one more time for an additional 10 minutes of direct pressure on the area  If the bleeding becomes heavier or does not stop after the second attempt, or if you have any other questions about this procedure, then please call your [facility] Dermatologist by calling [phone] ([phone])  I hereby acknowledge that I have reviewed and verified the site with my Dermatologist and have requested and authorized my Dermatologist to proceed with the procedure            Scribe Attestation    I,:  Timbo Godwin am acting as a scribe while in the presence of the attending physician :       I,:  Sebastián Thompson Chuy Recio MD personally performed the services described in this documentation    as scribed in my presence :

## 2022-02-16 ENCOUNTER — OFFICE VISIT (OUTPATIENT)
Dept: OBGYN CLINIC | Facility: CLINIC | Age: 62
End: 2022-02-16
Payer: COMMERCIAL

## 2022-02-16 VITALS
HEIGHT: 61 IN | RESPIRATION RATE: 16 BRPM | SYSTOLIC BLOOD PRESSURE: 119 MMHG | BODY MASS INDEX: 22.84 KG/M2 | HEART RATE: 77 BPM | DIASTOLIC BLOOD PRESSURE: 74 MMHG | WEIGHT: 121 LBS

## 2022-02-16 DIAGNOSIS — M18.12 ARTHRITIS OF CARPOMETACARPAL (CMC) JOINT OF LEFT THUMB: Primary | ICD-10-CM

## 2022-02-16 DIAGNOSIS — M18.11 ARTHRITIS OF CARPOMETACARPAL (CMC) JOINT OF RIGHT THUMB: ICD-10-CM

## 2022-02-16 PROCEDURE — 99214 OFFICE O/P EST MOD 30 MIN: CPT | Performed by: SURGERY

## 2022-02-16 PROCEDURE — 20600 DRAIN/INJ JOINT/BURSA W/O US: CPT | Performed by: SURGERY

## 2022-02-16 RX ORDER — BUPIVACAINE HYDROCHLORIDE 2.5 MG/ML
0.5 INJECTION, SOLUTION INFILTRATION; PERINEURAL
Status: COMPLETED | OUTPATIENT
Start: 2022-02-16 | End: 2022-02-16

## 2022-02-16 RX ORDER — LIDOCAINE HYDROCHLORIDE 10 MG/ML
1 INJECTION, SOLUTION INFILTRATION; PERINEURAL
Status: COMPLETED | OUTPATIENT
Start: 2022-02-16 | End: 2022-02-16

## 2022-02-16 RX ORDER — TRIAMCINOLONE ACETONIDE 40 MG/ML
20 INJECTION, SUSPENSION INTRA-ARTICULAR; INTRAMUSCULAR
Status: COMPLETED | OUTPATIENT
Start: 2022-02-16 | End: 2022-02-16

## 2022-02-16 RX ADMIN — BUPIVACAINE HYDROCHLORIDE 0.5 ML: 2.5 INJECTION, SOLUTION INFILTRATION; PERINEURAL at 14:16

## 2022-02-16 RX ADMIN — TRIAMCINOLONE ACETONIDE 20 MG: 40 INJECTION, SUSPENSION INTRA-ARTICULAR; INTRAMUSCULAR at 14:16

## 2022-02-16 RX ADMIN — LIDOCAINE HYDROCHLORIDE 1 ML: 10 INJECTION, SOLUTION INFILTRATION; PERINEURAL at 14:16

## 2022-02-16 NOTE — PROGRESS NOTES
ASSESSMENT/PLAN:      57 y o  female with bilateral thumb CMC joint arthritis  Due to acute exacerbation of chronic bilateral thumb CMC arthritis, the decision was mad eto proceed with repeat CSI's  Bilateral thumb CMC joint CSI's were performed in the office without complication  Post injections protocol/expectations were reviewed  The CSI's may be repeated on a 3 month basis as needed  Continue the use of the comfort cool braces as needed as well as the use of Voltaren Gel  Follow up in 3 months time for repeat [facility] CSI's, if needed  The patient verbalized understanding of exam findings and treatment plan  We engaged in the shared decision-making process and treatment options were discussed at length with the patient  Surgical and conservative management discussed today along with risks and benefits  Diagnoses and all orders for this visit:    Arthritis of carpometacarpal [place]) joint of left thumb  -     Small joint arthrocentesis: bilateral thumb CMC    Arthritis of carpometacarpal (CMC) joint of right thumb  -     Small joint arthrocentesis: bilateral thumb CMC      Follow Up:  Return in about 3 months (around 5/16/2022)  To Do Next Visit:  Re-evaluation of current issue    ____________________________________________________________________________________________________________________________________________      CHIEF COMPLAINT:  Chief Complaint   Patient presents with    Right Thumb - Pain, Follow-up    Left Thumb - Pain, Follow-up       SUBJECTIVE:  Radha Kaye is a 57y o  year old RHD female who presents to the office today for a follow up reagrding bilateral thumb pain secondary to [facility] OA  She underwent bilateral thumb CMC joint CSI's on 9/16/21, which was beneficial for her until aprox  1 month ago  She notes pain started aprox  1 month ago  Right is worse then left today  She will take Ibuprofen for pain control as well as the use of Voltaren Gel   She is not wearing the comfort cool braces as it is difficult to wash her hands with them on  I have personally reviewed all the relevant PMH, PSH, SH, FH, Medications and allergies       PAST MEDICAL HISTORY:  Past Medical History:   Diagnosis Date    Adjustment disorder with anxiety     last assessed - [id number]    Anemia     Anxiety     Constipation 2021    Diverticulitis     Diverticulitis 2021    Diverticulitis of colon May 2020    Diverticulitis of large intestine with abscess without bleeding 2020    Generalized abdominal pain 2021    GERD (gastroesophageal reflux disease) 2000    History of total hysterectomy     History of transfusion     Hypertension     Hypertension 3/12/2021    Ingrown toenail     last assessed - [id number]    Thalassemia        PAST SURGICAL HISTORY:  Past Surgical History:   Procedure Laterality Date    APPENDECTOMY      BOWEL RESECTION  2021     SECTION      CHOLECYSTECTOMY      COLONOSCOPY      GALLBLADDER SURGERY      OOPHORECTOMY Bilateral     KS LAP,SURG,COLECTOMY, PARTIAL, W/ANAST N/A 2021    Procedure: RESECTION COLON SIGMOID LAPAROSCOPIC HAND-ASSISTED;  Surgeon: Mu Lima MD;  Location: [facility];  Service: General    TONSILLECTOMY AND ADENOIDECTOMY      TOTAL ABDOMINAL HYSTERECTOMY      UPPER GASTROINTESTINAL ENDOSCOPY         FAMILY HISTORY:  Family History   Problem Relation Age of Onset    Anxiety disorder Mother         Anxiety    Depression Mother     Anxiety disorder Sister         Anxiety    Depression Sister     Anxiety disorder Daughter         Anxiety    Depression Daughter     Heart disease Maternal Aunt     Heart disease Maternal Uncle     Cancer Sister         Melanoma    No Known Problems Sister     No Known Problems Sister        SOCIAL HISTORY:  Social History     Tobacco Use    Smoking status: Never Smoker    Smokeless tobacco: Never Used   Vaping Use    Vaping Use: Never used   Substance Use Topics    Alcohol use: Yes     Alcohol/week: 1 0 standard drink     Types: 1 Glasses of wine per week     Comment: occasional alcohol use    Drug use: No       MEDICATIONS:    Current Outpatient Medications:     acetaminophen (TYLENOL) 325 mg tablet, Take 3 tablets (975 mg total) by mouth every 6 (six) hours as needed for mild pain, Disp: 60 tablet, Rfl: 0    ALPRAZolam (XANAX) 0 25 mg tablet, Take 1 tablet (0 25 mg total) by mouth daily at bedtime as needed for anxiety, Disp: 30 tablet, Rfl: 0    conjugated estrogens (PREMARIN) vaginal cream, Insert 0 5 g into the vagina daily, Disp: 30 g, Rfl: 3    diazepam (VALIUM) 5 mg tablet, Take 1 tablet (5 mg total) by mouth every 8 (eight) hours as needed for muscle spasms, Disp: 20 tablet, Rfl: 0    Diclofenac Sodium (VOLTAREN) 1 %, Apply 2 g topically 4 (four) times a day, Disp: 100 g, Rfl: 2    dicyclomine (BENTYL) 10 mg capsule, Take 1 capsule (10 mg total) by mouth 3 (three) times a day as needed (abd pain), Disp: 90 capsule, Rfl: 3    Magnesium Gluconate (MAGNESIUM 27 PO), Take by mouth, Disp: , Rfl:     omeprazole (PriLOSEC) 40 MG capsule, Take 1 capsule (40 mg total) by mouth daily, Disp: 90 capsule, Rfl: 0    ondansetron (ZOFRAN-ODT) 4 mg disintegrating tablet, Take 1 tablet (4 mg total) by mouth every 6 (six) hours as needed for nausea or vomiting, Disp: 20 tablet, Rfl: 0    methocarbamol (ROBAXIN) 500 mg tablet, Take 1 tablet (500 mg total) by mouth 4 (four) times a day as needed for muscle spasms (Patient not taking: Reported on 2/16/2022 ), Disp: 60 tablet, Rfl: 0    metoprolol tartrate (LOPRESSOR) 25 mg tablet, Take 0 5 tablets (12 5 mg total) by mouth every 12 (twelve) hours (Patient not taking: Reported on 2/16/2022 ), Disp: 30 tablet, Rfl: 5    ALLERGIES:  No Known Allergies    REVIEW OF SYSTEMS:  Review of Systems   Constitutional: Negative for chills, fever and unexpected weight change     HENT: Negative for hearing loss, nosebleeds and sore throat  Eyes: Negative for pain, redness and visual disturbance  Respiratory: Negative for cough, shortness of breath and wheezing  Cardiovascular: Negative for chest pain, palpitations and leg swelling  Gastrointestinal: Negative for abdominal pain, nausea and vomiting  Endocrine: Negative for polydipsia and polyuria  Genitourinary: Negative for difficulty urinating and hematuria  Musculoskeletal: Positive for arthralgias  Negative for joint swelling and myalgias  Skin: Negative for rash and wound  Neurological: Negative for dizziness, numbness and headaches  Psychiatric/Behavioral: Negative for decreased concentration, dysphoric mood and suicidal ideas  The patient is not nervous/anxious  VITALS:  Vitals:    02/16/22 1403   BP: 119/74   Pulse: 77   Resp: 16       LABS:  HgA1c:   Lab Results   Component Value Date    HGBA1C 5 5 11/09/2021     BMP:   Lab Results   Component Value Date    GLUCOSE 84 09/22/2015    CALCIUM 9 5 12/06/2021     09/22/2015    K 3 5 12/06/2021    CO2 26 12/06/2021     12/06/2021    BUN 14 12/06/2021    CREATININE 1 02 12/06/2021       _____________________________________________________  PHYSICAL EXAMINATION:  General: well developed and well nourished, alert, oriented times 3 and appears comfortable  Psychiatric: Normal  HEENT: Normocephalic, Atraumatic Trachea Midline, No torticollis  Pulmonary: No audible wheezing or respiratory distress   Cardiovascular: No pitting edema, 2+ radial pulse   Abdominal/GI: abdomen non tender, non distended   Skin: No masses, erythema, lacerations, fluctation, ulcerations  Neurovascular: Sensation Intact to the Median, Ulnar, Radial Nerve, Motor Intact to the Median, Ulnar, Radial Nerve and Pulses Intact  Musculoskeletal: Normal, except as noted in detailed exam and in HPI        MUSCULOSKELETAL EXAMINATION:    Bilateral CMC Exam:  No adduction contracture  No hyperextension deformity of MCP joint  Positive localized tenderness over radial and dorsal aspect of thumb (CMC joint)  Grind test is Positive for pain and Positive for crepitus  Metacarpal load shift test positive   No triggering or tenderness over the A1 pulley    ___________________________________________________  STUDIES REVIEWED:  No new imaging to review           PROCEDURES PERFORMED:  Small joint arthrocentesis: bilateral thumb CMC  Universal Protocol:  Consent: Verbal consent obtained  Written consent not obtained  Risks and benefits: risks, benefits and alternatives were discussed  Consent given by: patient  Time out: Immediately prior to procedure a "time out" was called to verify the correct patient, procedure, equipment, support staff and site/side marked as required    Site marked: the operative site was marked  Patient identity confirmed: verbally with patient    Supporting Documentation  Indications: pain   Procedure Details  Location: thumb - bilateral thumb CMC  Preparation: Patient was prepped and draped in the usual sterile fashion  Needle size: 25 G  Ultrasound guidance: no    Medications (Right): 0 5 mL bupivacaine 0 25 %; 1 mL lidocaine 1 %; 20 mg triamcinolone acetonide 40 mg/mLMedications (Left): 0 5 mL bupivacaine 0 25 %; 1 mL lidocaine 1 %; 20 mg triamcinolone acetonide 40 mg/mL   Patient tolerance: patient tolerated the procedure well with no immediate complications  Dressing:  Sterile dressing applied           _____________________________________________________      Scribe Attestation    I,:  Benjy Metz am acting as a scribe while in the presence of the attending physician :       I,:  Patricia Welch MD personally performed the services described in this documentation    as scribed in my presence :

## 2022-02-17 NOTE — RESULT ENCOUNTER NOTE
DERMATOPATHOLOGY RESULT NOTE    Results reviewed by ordering physician  Called patient to personally discuss results  No answer, left voicemail with result  Instructions for Clinical Derm Team:   (remember to route Result Note to appropriate staff):    None    Result & Plan by Specimen:    Specimen A: benign  Plan: reassured, benign    Tissue Exam: [id number]  Order: [id number]  Visible to patient: Yes (not seen)    Dx: Neoplasm of uncertain behavior of skin    0 Result Notes    Component   Case Report  Surgical Pathology Report                         Case: [id number]                                   Authorizing Provider: Aida Rubin MD            Collected:           02/11/2022 1452              Ordering Location:     [facility]      Received:            02/11/2022 1452                                     [place]                                                                       Pathologist:           Kelli Gomez MD                                                           Specimen:    Skin, Other, A: Right nostril                                                            Final Diagnosis  A  Skin, right nostril, shave biopsy:     Consistent with superficial portion of VERRUCA VULGARIS (see note)      Note: Multiple levels examined  Electronically signed by Kelli Gomez MD on 2/16/2022 at  6:41 PM  Additional Information   All reported additional testing was performed with appropriately reactive controls   These tests were developed and their performance characteristics determined by [facility] or appropriate performing facility, though some tests may be performed on tissues which have not been validated for performance characteristics (such as staining performed on alcohol exposed cell blocks and decalcified tissues)   Results should be interpreted with caution and in the context of the patients' clinical condition   These tests may not be cleared or approved by the [place]  Food and Drug Administration, though the FDA has determined that such clearance or approval is not necessary  These tests are used for clinical purposes and they should not be regarded as investigational or for research  This laboratory has been approved by CLIA 88, designated as a high-complexity laboratory and is qualified to perform these tests  Yahaira Puga Description     A  The specimen is received in formalin, labeled with the patient's name and hospital number, and is designated "right nostril"  The specimen consists of a 0 4 x 0 2 x 0 2 cm shave biopsy of white skin  The presumed epithelial surface appears keratotic, bosselated and is inked red  The margin of resection is inked green  The specimen is entirely submitted between sponges, 1 cassette  Note: The estimated total formalin fixation time based upon information provided by the submitting clinician and the standard processing schedule is under 72 hours    Aaron Herman       Clinical Information   Specimen A:Right nostril;skin; shave biopsy;  0 4 cm filiform verrucous papule; diff dx: Verruca vulgaris versus squamous cell carcinoma     Attention:dermpath  Resulting Agency [id number]          Specimen Collected: 02/11/22  2:52 PM Last Resulted: 02/16/22  6:41 PM    Order Details    View Encounter    Lab and Collection Details    Routing    Result History          Scans on Order [id number]    Lab Result Document - Document on 2/16/2022  6:41 PM

## 2022-03-15 DIAGNOSIS — Z12.31 SCREENING MAMMOGRAM FOR BREAST CANCER: Primary | ICD-10-CM

## 2022-05-10 DIAGNOSIS — K21.9 GASTROESOPHAGEAL REFLUX DISEASE WITHOUT ESOPHAGITIS: ICD-10-CM

## 2022-05-10 DIAGNOSIS — F41.9 ANXIETY: ICD-10-CM

## 2022-05-10 RX ORDER — OMEPRAZOLE 40 MG/1
40 CAPSULE, DELAYED RELEASE ORAL DAILY
Qty: 90 CAPSULE | Refills: 0 | Status: SHIPPED | OUTPATIENT
Start: 2022-05-10 | End: 2022-08-08 | Stop reason: SDUPTHER

## 2022-05-10 RX ORDER — ALPRAZOLAM 0.25 MG/1
0.25 TABLET ORAL
Qty: 30 TABLET | Refills: 0 | Status: SHIPPED | OUTPATIENT
Start: 2022-05-10 | End: 2022-07-07 | Stop reason: SDUPTHER

## 2022-05-10 NOTE — TELEPHONE ENCOUNTER
Medication:  PDMP   03/18/2022 03/18/2022 ALPRAZolam (Tablet)  30 0 30 0 25 MG BRIANA KRISHNAN            Active agreement on file -No

## 2022-05-24 DIAGNOSIS — Z11.4 SCREENING FOR HIV (HUMAN IMMUNODEFICIENCY VIRUS): Primary | ICD-10-CM

## 2022-05-25 ENCOUNTER — OFFICE VISIT (OUTPATIENT)
Dept: FAMILY MEDICINE CLINIC | Facility: CLINIC | Age: 62
End: 2022-05-25
Payer: COMMERCIAL

## 2022-05-25 VITALS
BODY MASS INDEX: 21.79 KG/M2 | OXYGEN SATURATION: 99 % | SYSTOLIC BLOOD PRESSURE: 140 MMHG | RESPIRATION RATE: 16 BRPM | HEART RATE: 99 BPM | DIASTOLIC BLOOD PRESSURE: 92 MMHG | WEIGHT: 115.4 LBS | TEMPERATURE: 97.9 F | HEIGHT: 61 IN

## 2022-05-25 DIAGNOSIS — E55.9 VITAMIN D DEFICIENCY: ICD-10-CM

## 2022-05-25 DIAGNOSIS — I10 HYPERTENSION, UNSPECIFIED TYPE: Primary | ICD-10-CM

## 2022-05-25 DIAGNOSIS — D35.00 ADRENAL ADENOMA, UNSPECIFIED LATERALITY: ICD-10-CM

## 2022-05-25 DIAGNOSIS — R53.83 FATIGUE, UNSPECIFIED TYPE: ICD-10-CM

## 2022-05-25 DIAGNOSIS — R19.7 DIARRHEA, UNSPECIFIED TYPE: ICD-10-CM

## 2022-05-25 DIAGNOSIS — D50.9 IRON DEFICIENCY ANEMIA, UNSPECIFIED IRON DEFICIENCY ANEMIA TYPE: ICD-10-CM

## 2022-05-25 DIAGNOSIS — E53.8 VITAMIN B12 DEFICIENCY: ICD-10-CM

## 2022-05-25 PROCEDURE — 99214 OFFICE O/P EST MOD 30 MIN: CPT | Performed by: NURSE PRACTITIONER

## 2022-05-25 NOTE — PROGRESS NOTES
FAMILY PRACTICE OFFICE VISIT       NAME: Kayode Wolfe  AGE: 60 y o  SEX: female       : 1960        MRN: [id number]    DATE: 2022  TIME: 8:12 AM    Assessment and Plan   1  Hypertension, unspecified type  -     Comprehensive metabolic panel; Future    2  Diarrhea, unspecified type  -     Stool culture; Future  -     Calprotectin,Fecal; Future  -     Celiac Disease Antibody Profile; Future    3  Iron deficiency anemia, unspecified iron deficiency anemia type  -     CBC and differential; Future; Expected date: 2022  -     Iron Panel (Includes Ferritin, Iron Sat%, Iron, and TIBC); Future  -     Vitamin B12; Future    4  Fatigue, unspecified type  -     Comprehensive metabolic panel; Future  -     CBC and differential; Future; Expected date: 2022  -     TSH, 3rd generation with Free T4 reflex; Future  -     Iron Panel (Includes Ferritin, Iron Sat%, Iron, and TIBC); Future    5  Adrenal adenoma, unspecified laterality    6  Vitamin D deficiency  -     Vitamin D 25 hydroxy; Future    7  Vitamin B12 deficiency  -     Vitamin B12; Future               Chief Complaint     Chief Complaint   Patient presents with    Follow-up     Patient is here for hair loss    Fatigue    Hypertension     Loose stools and hair loss         History of Present Illness   Kayode Wolfe is a 60y o -year-old female who is here for hair loss and weight loss unintentional  Went from 138 to 115 in 4 to 5 months  Hair loss since then as well  Had bowel resection for diverticulitis  No change in her eating pattern  Not eating less  Patient thought it was normal after this type of surgery  Feels exhausted  Occasional nausea  Having pain in side where surgery was, LLQ  Did have one episode of thick clear mucous secretions running out of her rectum at one time since surgery, none since        Review of Systems   Review of Systems   Constitutional: Positive for fatigue and unexpected weight change  Negative for fever  HENT: Negative for postnasal drip, rhinorrhea and sore throat  Eyes: Negative for photophobia and visual disturbance  Respiratory: Negative for cough, shortness of breath and wheezing  Cardiovascular: Negative for chest pain and palpitations  Gastrointestinal: Positive for diarrhea and nausea  Genitourinary: Negative for dysuria and frequency  Musculoskeletal: Negative for arthralgias and myalgias  Skin: Negative for rash  Neurological: Negative for dizziness, light-headedness and headaches  Hematological: Negative for adenopathy  Psychiatric/Behavioral: Negative for dysphoric mood and sleep disturbance  The patient is not nervous/anxious          Active Problem List     Patient Active Problem List   Diagnosis    Anxiety    Gastroesophageal reflux disease    Dense breast tissue    Vaginal atrophy    Low bone density    Adrenal adenoma    History of hysterectomy    Hypertension    Chronic pain of left thumb    Adrenal nodule (HCC)    Diverticulosis    Iron deficiency anemia    S/P laparoscopic-assisted sigmoidectomy    Postoperative visit    Abdominal discomfort    Muscle spasm    Acute right-sided thoracic back pain    Fatigue    Diarrhea    Vitamin D deficiency    Vitamin B12 deficiency         Past Medical History:  Past Medical History:   Diagnosis Date    Adjustment disorder with anxiety     last assessed - [id number]    Anemia     Anxiety     Constipation 8/4/2021    Diverticulitis     Diverticulitis 9/2/2021    Diverticulitis of colon May 2020    Diverticulitis of large intestine with abscess without bleeding 5/24/2020    Generalized abdominal pain 8/4/2021    GERD (gastroesophageal reflux disease) 2000    History of total hysterectomy     History of transfusion     Hypertension     Hypertension 3/12/2021    Ingrown toenail     last assessed - [id number]    Thalassemia        Past Surgical History:  Past Surgical History:   Procedure Laterality Date    APPENDECTOMY      BOWEL RESECTION  2021     SECTION      CHOLECYSTECTOMY      COLONOSCOPY      GALLBLADDER SURGERY      OOPHORECTOMY Bilateral     OH LAP,SURG,COLECTOMY, PARTIAL, W/ANAST N/A 2021    Procedure: RESECTION COLON SIGMOID LAPAROSCOPIC HAND-ASSISTED;  Surgeon: Brent Dickinson MD;  [facility];  Service: General    TONSILLECTOMY AND ADENOIDECTOMY      TOTAL ABDOMINAL HYSTERECTOMY      UPPER GASTROINTESTINAL ENDOSCOPY         Family History:  Family History   Problem Relation Age of Onset    Anxiety disorder Mother         Anxiety    Depression Mother     Anxiety disorder Sister         Anxiety    Depression Sister     Anxiety disorder Daughter         Anxiety    Depression Daughter     Heart disease Maternal Aunt     Heart disease Maternal Uncle     Cancer Sister         Melanoma    No Known Problems Sister     No Known Problems Sister        Social History:  Social History     Socioeconomic History    Marital status: /Civil Union     Spouse name: Not on file    Number of children: Not on file    Years of education: Not on file    Highest education level: Not on file   Occupational History    Occupation:  - [employer]   Tobacco Use    Smoking status: Never Smoker    Smokeless tobacco: Never Used   Vaping Use    Vaping Use: Never used   Substance and Sexual Activity    Alcohol use:  Yes     Alcohol/week: 1 0 standard drink     Types: 1 Glasses of wine per week     Comment: occasional alcohol use    Drug use: No    Sexual activity: Not Currently     Partners: Male     Birth control/protection: Post-menopausal, Surgical     Comment: Hysterectomy 1988   Other Topics Concern    Not on file   Social History Narrative    Coffee     Social Determinants of Health     Financial Resource Strain: Not on file   Food Insecurity: Not on file   Transportation Needs: Not on file   Physical Activity: Not on file   Stress: Not on file   Social Connections: Not on file   Intimate Partner Violence: Not on file   Housing Stability: Not on file       Objective     Vitals:    05/25/22 1719   BP: 140/92   Pulse: 99   Resp: 16   Temp: 97 9 °F (36 6 °C)   SpO2: 99%     Wt Readings from Last 3 Encounters:   05/25/22 52 3 kg (115 lb 6 4 oz)   02/16/22 54 9 kg (121 lb)   12/21/21 55 kg (121 lb 3 2 oz)       Physical Exam  Vitals and nursing note reviewed  Constitutional:       Appearance: Normal appearance  HENT:      Head: Normocephalic and atraumatic  Right Ear: Tympanic membrane, ear canal and external ear normal       Left Ear: Tympanic membrane, ear canal and external ear normal       Nose: Nose normal       Mouth/Throat:      Mouth: Mucous membranes are moist    Eyes:      Conjunctiva/sclera: Conjunctivae normal       Pupils: Pupils are equal, round, and reactive to light  Cardiovascular:      Rate and Rhythm: Normal rate and regular rhythm  Pulses: Normal pulses  Heart sounds: Normal heart sounds  Pulmonary:      Effort: Pulmonary effort is normal       Breath sounds: Normal breath sounds  Abdominal:      General: Abdomen is flat  Bowel sounds are normal       Palpations: Abdomen is soft  Musculoskeletal:         General: Normal range of motion  Cervical back: Normal range of motion and neck supple  Skin:     General: Skin is warm and dry  Capillary Refill: Capillary refill takes less than 2 seconds  Neurological:      General: No focal deficit present  Mental Status: She is alert and oriented to person, place, and time  Psychiatric:         Mood and Affect: Mood normal          Behavior: Behavior normal          Thought Content:  Thought content normal          Judgment: Judgment normal          Pertinent Laboratory/Diagnostic Studies:  Lab Results   Component Value Date    GLUCOSE 84 09/22/2015    BUN 14 12/06/2021    CREATININE 1 02 12/06/2021    CALCIUM 9 5 12/06/2021     09/22/2015    K 3 5 12/06/2021 CO2 26 12/06/2021     12/06/2021     Lab Results   Component Value Date    ALT 22 11/09/2021    AST 24 11/09/2021    ALKPHOS 100 11/09/2021    BILITOT 0 69 09/22/2015       Lab Results   Component Value Date    WBC 8 53 12/06/2021    HGB 11 1 (L) 12/06/2021    HCT 37 0 12/06/2021    MCV 69 (L) 12/06/2021     (H) 12/06/2021       No results found for: TSH    Lab Results   Component Value Date    CHOL 169 09/22/2015     Lab Results   Component Value Date    TRIG 205 (H) 04/30/2021     Lab Results   Component Value Date    HDL 51 04/30/2021     Lab Results   Component Value Date    LDLCALC 98 04/30/2021     Lab Results   Component Value Date    HGBA1C 5 5 11/09/2021       Results for orders placed or performed in visit on 02/11/22   Tissue Exam   Result Value Ref Range    Case Report       Surgical Pathology Report                         Case: [id number]                                   Authorizing Provider:  Manjeet Lockhart MD            Collected:           02/11/2022 1452              Ordering Location:     [facility]      Received:            02/11/2022 [address] [place] [address]                                                                       Pathologist:           Deven Marie MD                                                           Specimen:    Skin, Other, A: Right nostril                                                              Final Diagnosis       A  Skin, right nostril, shave biopsy:    Consistent with superficial portion of VERRUCA VULGARIS (see note)  Note: Multiple levels examined  Additional Information       All reported additional testing was performed with appropriately reactive controls    These tests were developed and their performance characteristics determined by [facility] or appropriate performing facility, though some tests may be performed on tissues which have not been validated for performance characteristics (such as staining performed on alcohol exposed cell blocks and decalcified tissues)  Results should be interpreted with caution and in the context of the patients clinical condition  These tests may not be cleared or approved by the [place]  Food and Drug Administration, though the FDA has determined that such clearance or approval is not necessary  These tests are used for clinical purposes and they should not be regarded as investigational or for research  This laboratory has been approved by [place] [address], designated as a high-complexity laboratory and is qualified to perform these tests  Lorri Lawler Description          A  The specimen is received in formalin, labeled with the patient's name and hospital number, and is designated "right nostril"  The specimen consists of a 0 4 x 0 2 x 0 2 cm shave biopsy of white skin  The presumed epithelial surface appears keratotic, bosselated and is inked red  The margin of resection is inked green  The specimen is entirely submitted between sponges, 1 cassette  Note: The estimated total formalin fixation time based upon information provided by the submitting clinician and the standard processing schedule is under 72 hours    Aaron Perez          Clinical Information       Specimen A:Right nostril;skin; shave biopsy;  0 4 cm filiform verrucous papule; diff dx: Verruca vulgaris versus squamous cell carcinoma    Attention:dermpath       Orders Placed This Encounter   Procedures    Stool culture    Calprotectin,Fecal    Comprehensive metabolic panel    CBC and differential    TSH, 3rd generation with Free T4 reflex    Celiac Disease Antibody Profile    Vitamin D 25 hydroxy    Vitamin B12       ALLERGIES:  No Known Allergies    Current Medications     Current Outpatient Medications   Medication Sig Dispense Refill    ALPRAZolam (XANAX) 0 25 mg tablet Take 1 tablet (0 25 mg total) by mouth daily at bedtime as needed for anxiety 30 tablet 0    metoprolol tartrate (LOPRESSOR) 25 mg tablet Take 0 5 tablets (12 5 mg total) by mouth every 12 (twelve) hours 30 tablet 5    omeprazole (PriLOSEC) 40 MG capsule Take 1 capsule (40 mg total) by mouth daily 90 capsule 0    acetaminophen (TYLENOL) 325 mg tablet Take 3 tablets (975 mg total) by mouth every 6 (six) hours as needed for mild pain (Patient not taking: Reported on 5/25/2022) 60 tablet 0    conjugated estrogens (PREMARIN) vaginal cream Insert 0 5 g into the vagina daily (Patient not taking: Reported on 5/25/2022) 30 g 3    diazepam (VALIUM) 5 mg tablet Take 1 tablet (5 mg total) by mouth every 8 (eight) hours as needed for muscle spasms (Patient not taking: Reported on 5/25/2022) 20 tablet 0    Diclofenac Sodium (VOLTAREN) 1 % Apply 2 g topically 4 (four) times a day (Patient not taking: Reported on 5/25/2022) 100 g 2    dicyclomine (BENTYL) 10 mg capsule Take 1 capsule (10 mg total) by mouth 3 (three) times a day as needed (abd pain) (Patient not taking: Reported on 5/25/2022) 90 capsule 3    Magnesium Gluconate (MAGNESIUM 27 PO) Take by mouth (Patient not taking: Reported on 5/25/2022)       No current facility-administered medications for this visit           Health Maintenance     Health Maintenance   Topic Date Due    HIV Screening  Never done    COVID-19 Vaccine (4 - Booster for Pfizer series) 05/08/2022    Breast Cancer Screening: Mammogram  06/10/2022    Annual Physical  07/07/2022    DTaP,Tdap,and Td Vaccines (1 - Tdap) 07/07/2022 (Originally 4/28/1980)    Influenza Vaccine (Season Ended) 09/01/2022    Depression Screening  05/25/2023    BMI: Adult  05/25/2023    Colorectal Cancer Screening  09/17/2025    Hepatitis C Screening  Completed    Pneumococcal Vaccine: Pediatrics (0 to 5 Years) and At-Risk Patients (6 to 59 Years)  Aged Out    HIB Vaccine  Aged Out    Hepatitis B Vaccine  Aged Out    IPV Vaccine  Aged Out    Hepatitis A Vaccine  Aged Out    Meningococcal ACWY Vaccine Aged Out    HPV Vaccine  Aged [employer] History   Administered Date(s) Administered    COVID-19 PFIZER VACCINE 0 3 ML IM 12/23/2020, 01/14/2021, 01/08/2022    Influenza, recombinant, quadrivalent,injectable, preservative free 10/05/2018       Depression Screening and Follow-up Plan: Patient was screened for depression during today's encounter  They screened negative with a PHQ-2 score of 0          KATIANA Carlson

## 2022-05-26 PROBLEM — E53.8 VITAMIN B12 DEFICIENCY: Status: ACTIVE | Noted: 2022-05-26

## 2022-05-26 PROBLEM — R19.7 DIARRHEA: Status: ACTIVE | Noted: 2022-05-26

## 2022-05-26 PROBLEM — E55.9 VITAMIN D DEFICIENCY: Status: ACTIVE | Noted: 2022-05-26

## 2022-05-26 PROBLEM — R53.83 FATIGUE: Status: ACTIVE | Noted: 2022-05-26

## 2022-06-11 ENCOUNTER — APPOINTMENT (OUTPATIENT)
Dept: LAB | Facility: CLINIC | Age: 62
End: 2022-06-11
Payer: COMMERCIAL

## 2022-06-11 DIAGNOSIS — E53.8 VITAMIN B12 DEFICIENCY: ICD-10-CM

## 2022-06-11 DIAGNOSIS — R19.7 DIARRHEA, UNSPECIFIED TYPE: ICD-10-CM

## 2022-06-11 DIAGNOSIS — D50.9 IRON DEFICIENCY ANEMIA, UNSPECIFIED IRON DEFICIENCY ANEMIA TYPE: ICD-10-CM

## 2022-06-11 DIAGNOSIS — E55.9 VITAMIN D DEFICIENCY: ICD-10-CM

## 2022-06-11 DIAGNOSIS — R53.83 FATIGUE, UNSPECIFIED TYPE: ICD-10-CM

## 2022-06-11 DIAGNOSIS — I10 HYPERTENSION, UNSPECIFIED TYPE: ICD-10-CM

## 2022-06-11 LAB
25(OH)D3 SERPL-MCNC: 49.6 NG/ML (ref 30–100)
ALBUMIN SERPL BCP-MCNC: 4.1 G/DL (ref 3.5–5)
ALP SERPL-CCNC: 109 U/L (ref 34–104)
ALT SERPL W P-5'-P-CCNC: 20 U/L (ref 7–52)
ANION GAP SERPL CALCULATED.3IONS-SCNC: 5 MMOL/L (ref 4–13)
AST SERPL W P-5'-P-CCNC: 22 U/L (ref 13–39)
BILIRUB SERPL-MCNC: 0.63 MG/DL (ref 0.2–1)
BUN SERPL-MCNC: 14 MG/DL (ref 5–25)
CALCIUM SERPL-MCNC: 9 MG/DL (ref 8.4–10.2)
CHLORIDE SERPL-SCNC: 102 MMOL/L (ref 96–108)
CO2 SERPL-SCNC: 31 MMOL/L (ref 21–32)
CREAT SERPL-MCNC: 0.75 MG/DL (ref 0.6–1.3)
FERRITIN SERPL-MCNC: 43 NG/ML (ref 8–388)
GFR SERPL CREATININE-BSD FRML MDRD: 85 ML/MIN/1.73SQ M
GLUCOSE P FAST SERPL-MCNC: 70 MG/DL (ref 65–99)
IRON SATN MFR SERPL: 33 % (ref 15–50)
IRON SERPL-MCNC: 131 UG/DL (ref 50–170)
POTASSIUM SERPL-SCNC: 4.1 MMOL/L (ref 3.5–5.3)
PROT SERPL-MCNC: 6.8 G/DL (ref 6.4–8.4)
SODIUM SERPL-SCNC: 138 MMOL/L (ref 135–147)
TIBC SERPL-MCNC: 397 UG/DL (ref 250–450)
TSH SERPL DL<=0.05 MIU/L-ACNC: 1.19 UIU/ML (ref 0.45–4.5)
VIT B12 SERPL-MCNC: 1594 PG/ML (ref 100–900)

## 2022-06-11 PROCEDURE — 86231 EMA EACH IG CLASS: CPT

## 2022-06-11 PROCEDURE — 36415 COLL VENOUS BLD VENIPUNCTURE: CPT

## 2022-06-11 PROCEDURE — 86364 TISS TRNSGLTMNASE EA IG CLAS: CPT

## 2022-06-11 PROCEDURE — 83550 IRON BINDING TEST: CPT

## 2022-06-11 PROCEDURE — 83540 ASSAY OF IRON: CPT

## 2022-06-11 PROCEDURE — 86258 DGP ANTIBODY EACH IG CLASS: CPT

## 2022-06-11 PROCEDURE — 82784 ASSAY IGA/IGD/IGG/IGM EACH: CPT

## 2022-06-11 PROCEDURE — 82728 ASSAY OF FERRITIN: CPT

## 2022-06-11 PROCEDURE — 82607 VITAMIN B-12: CPT

## 2022-06-11 PROCEDURE — 84443 ASSAY THYROID STIM HORMONE: CPT

## 2022-06-11 PROCEDURE — 80053 COMPREHEN METABOLIC PANEL: CPT

## 2022-06-11 PROCEDURE — 82306 VITAMIN D 25 HYDROXY: CPT

## 2022-06-13 LAB
ENDOMYSIUM IGA SER QL: NEGATIVE
GLIADIN PEPTIDE IGA SER-ACNC: 2 UNITS (ref 0–19)
GLIADIN PEPTIDE IGG SER-ACNC: 2 UNITS (ref 0–19)
IGA SERPL-MCNC: 80 MG/DL (ref 87–352)
TTG IGA SER-ACNC: <2 U/ML (ref 0–3)
TTG IGG SER-ACNC: <2 U/ML (ref 0–5)

## 2022-07-07 DIAGNOSIS — F41.9 ANXIETY: ICD-10-CM

## 2022-07-07 RX ORDER — ALPRAZOLAM 0.25 MG/1
0.25 TABLET ORAL
Qty: 30 TABLET | Refills: 0 | Status: SHIPPED | OUTPATIENT
Start: 2022-07-07 | End: 2022-08-17 | Stop reason: SDUPTHER

## 2022-07-07 NOTE — TELEPHONE ENCOUNTER
Medication:  PDMP   05/10/2022  05/10/2022 ALPRAZolam (Tablet)  30 0 30 0 25 MG BRIANA HUNT      Active agreement on file -No

## 2022-07-08 ENCOUNTER — TELEPHONE (OUTPATIENT)
Dept: OBGYN CLINIC | Facility: HOSPITAL | Age: 62
End: 2022-07-08

## 2022-07-08 ENCOUNTER — HOSPITAL ENCOUNTER (OUTPATIENT)
Dept: MAMMOGRAPHY | Facility: MEDICAL CENTER | Age: 62
Discharge: HOME | End: 2022-07-08
Payer: COMMERCIAL

## 2022-07-08 VITALS — WEIGHT: 115.3 LBS | BODY MASS INDEX: 21.77 KG/M2 | HEIGHT: 61 IN

## 2022-07-08 DIAGNOSIS — Z12.31 SCREENING MAMMOGRAM FOR BREAST CANCER: ICD-10-CM

## 2022-07-08 PROCEDURE — 77067 SCR MAMMO BI INCL CAD: CPT

## 2022-07-08 PROCEDURE — 77063 BREAST TOMOSYNTHESIS BI: CPT

## 2022-07-08 NOTE — TELEPHONE ENCOUNTER
Patient is calling to schedule injections in both hands with Dr Alea Dickey     Please advise      Pam Castillo [phone]

## 2022-07-13 NOTE — TELEPHONE ENCOUNTER
I received a Care Request from patient to schedule for:  Where Does it Hurt? Hands  Are you considering joint replacement? No   Are you seeking a second opinion? No  If yes, who is your doctor? I lvm letting patient know that we did previously offer her appts but they did not match with her days off  I asked patient to cb to schedule

## 2022-07-14 ENCOUNTER — TELEPHONE (OUTPATIENT)
Dept: OBGYN CLINIC | Facility: HOSPITAL | Age: 62
End: 2022-07-14

## 2022-08-01 ENCOUNTER — OFFICE VISIT (OUTPATIENT)
Dept: OBGYN CLINIC | Facility: HOSPITAL | Age: 62
End: 2022-08-01
Payer: COMMERCIAL

## 2022-08-01 VITALS
WEIGHT: 120 LBS | DIASTOLIC BLOOD PRESSURE: 86 MMHG | HEIGHT: 61 IN | BODY MASS INDEX: 22.66 KG/M2 | HEART RATE: 50 BPM | SYSTOLIC BLOOD PRESSURE: 150 MMHG

## 2022-08-01 DIAGNOSIS — M18.12 ARTHRITIS OF CARPOMETACARPAL (CMC) JOINT OF LEFT THUMB: ICD-10-CM

## 2022-08-01 DIAGNOSIS — M18.11 ARTHRITIS OF CARPOMETACARPAL (CMC) JOINT OF RIGHT THUMB: Primary | ICD-10-CM

## 2022-08-01 PROCEDURE — 20600 DRAIN/INJ JOINT/BURSA W/O US: CPT | Performed by: SURGERY

## 2022-08-01 PROCEDURE — 99214 OFFICE O/P EST MOD 30 MIN: CPT | Performed by: SURGERY

## 2022-08-01 RX ORDER — BUPIVACAINE HYDROCHLORIDE 2.5 MG/ML
0.5 INJECTION, SOLUTION INFILTRATION; PERINEURAL
Status: COMPLETED | OUTPATIENT
Start: 2022-08-01 | End: 2022-08-01

## 2022-08-01 RX ORDER — TRIAMCINOLONE ACETONIDE 40 MG/ML
20 INJECTION, SUSPENSION INTRA-ARTICULAR; INTRAMUSCULAR
Status: COMPLETED | OUTPATIENT
Start: 2022-08-01 | End: 2022-08-01

## 2022-08-01 RX ORDER — LIDOCAINE HYDROCHLORIDE 10 MG/ML
1 INJECTION, SOLUTION INFILTRATION; PERINEURAL
Status: COMPLETED | OUTPATIENT
Start: 2022-08-01 | End: 2022-08-01

## 2022-08-01 RX ADMIN — LIDOCAINE HYDROCHLORIDE 1 ML: 10 INJECTION, SOLUTION INFILTRATION; PERINEURAL at 08:45

## 2022-08-01 RX ADMIN — TRIAMCINOLONE ACETONIDE 20 MG: 40 INJECTION, SUSPENSION INTRA-ARTICULAR; INTRAMUSCULAR at 08:45

## 2022-08-01 RX ADMIN — BUPIVACAINE HYDROCHLORIDE 0.5 ML: 2.5 INJECTION, SOLUTION INFILTRATION; PERINEURAL at 08:45

## 2022-08-01 NOTE — LETTER
August 1, 2022     Patient: Gigi Garcia  YOB: 1960  Date of Visit: 8/1/2022      To Whom it May Concern: Gigi Garcia is under my professional care  Bishop Song was seen in my office on 8/1/2022  If you have any questions or concerns, please don't hesitate to call           Sincerely,          Camilla Dickerson MD

## 2022-08-01 NOTE — PROGRESS NOTES
ASSESSMENT/PLAN:      69-year-old female with bilateral thumb CM arthritis  We did discuss repeat injections in the office today  The patient was agreeable to this  She consented and underwent bilateral thumb CMC injections in the office today without any complications  She will follow up in 3 months for repeat evaluation  The patient verbalized understanding of exam findings and treatment plan  We engaged in the shared decision-making process and treatment options were discussed at length with the patient  Surgical and conservative management discussed today along with risks and benefits  Diagnoses and all orders for this visit:    Arthritis of carpometacarpal [place]) joint of right thumb  -     Small joint arthrocentesis: bilateral thumb CMC    Arthritis of carpometacarpal (CMC) joint of left thumb  -     Small joint arthrocentesis: bilateral thumb CMC            Follow Up:  Return in about 3 months (around 11/1/2022)  To Do Next Visit:  Re-evaluation of current issue    ____________________________________________________________________________________________________________________________________________      CHIEF COMPLAINT:  Chief Complaint   Patient presents with    Left Thumb - Follow-up    Right Thumb - Follow-up       SUBJECTIVE:  Jenelle Caicedo is a 60y o  year old RHD female who presents to the office for follow up evaluation bilateral thumb CMC arthritis  She underwent bilateral thumb CMC injections at her last visit on 2/16/22 which she states provided her with good relief  She states her pain started to return over the past month  She states her right hand is worse than her left  She notes pain to the base of both of her thumbs  She notes increased pain with pinch, , and motion  She has been taking Advil OTC for pain  She denies any numbness or tingling  I have personally reviewed all the relevant PMH, PSH, SH, FH, Medications and allergies       PAST MEDICAL HISTORY:  Past Medical History:   Diagnosis Date    Adjustment disorder with anxiety     last assessed - [id number]    Anemia     Anxiety     Constipation 2021    Diverticulitis     Diverticulitis 2021    Diverticulitis of colon May 2020    Diverticulitis of large intestine with abscess without bleeding 2020    Generalized abdominal pain 2021    GERD (gastroesophageal reflux disease)     History of total hysterectomy     History of transfusion     Hypertension     Hypertension 3/12/2021    Ingrown toenail     last assessed - [id number]    Thalassemia        PAST SURGICAL HISTORY:  Past Surgical History:   Procedure Laterality Date    APPENDECTOMY      BOWEL RESECTION  2021     SECTION      CHOLECYSTECTOMY      COLONOSCOPY      GALLBLADDER SURGERY      OOPHORECTOMY Bilateral     [place] LAP,SURG,COLECTOMY, PARTIAL, W/ANAST N/A 2021    Procedure: RESECTION COLON SIGMOID LAPAROSCOPIC HAND-ASSISTED;  Surgeon: Harpreet Aggarwal MD;  [facility];  Service: General    TONSILLECTOMY AND ADENOIDECTOMY      TOTAL ABDOMINAL HYSTERECTOMY      UPPER GASTROINTESTINAL ENDOSCOPY         FAMILY HISTORY:  Family History   Problem Relation Age of Onset    Anxiety disorder Mother         Anxiety    Depression Mother     Anxiety disorder Sister         Anxiety    Depression Sister     Cancer Sister         Melanoma    No Known Problems Sister     No Known Problems Sister     Anxiety disorder Daughter         Anxiety    Depression Daughter     Heart disease Maternal Aunt     Heart disease Maternal Uncle        SOCIAL HISTORY:  Social History     Tobacco Use    Smoking status: Never Smoker    Smokeless tobacco: Never Used   Vaping Use    Vaping Use: Never used   Substance Use Topics    Alcohol use:  Yes     Alcohol/week: 1 0 standard drink     Types: 1 Glasses of wine per week     Comment: occasional alcohol use    Drug use: No       MEDICATIONS:    Current Outpatient Medications:     ALPRAZolam (XANAX) 0 25 mg tablet, Take 1 tablet (0 25 mg total) by mouth daily at bedtime as needed for anxiety, Disp: 30 tablet, Rfl: 0    metoprolol tartrate (LOPRESSOR) 25 mg tablet, Take 0 5 tablets (12 5 mg total) by mouth every 12 (twelve) hours, Disp: 90 tablet, Rfl: 3    omeprazole (PriLOSEC) 40 MG capsule, Take 1 capsule (40 mg total) by mouth daily, Disp: 90 capsule, Rfl: 0    acetaminophen (TYLENOL) 325 mg tablet, Take 3 tablets (975 mg total) by mouth every 6 (six) hours as needed for mild pain (Patient not taking: No sig reported), Disp: 60 tablet, Rfl: 0    conjugated estrogens (PREMARIN) vaginal cream, Insert 0 5 g into the vagina daily (Patient not taking: No sig reported), Disp: 30 g, Rfl: 3    diazepam (VALIUM) 5 mg tablet, Take 1 tablet (5 mg total) by mouth every 8 (eight) hours as needed for muscle spasms (Patient not taking: No sig reported), Disp: 20 tablet, Rfl: 0    Diclofenac Sodium (VOLTAREN) 1 %, Apply 2 g topically 4 (four) times a day (Patient not taking: No sig reported), Disp: 100 g, Rfl: 2    dicyclomine (BENTYL) 10 mg capsule, Take 1 capsule (10 mg total) by mouth 3 (three) times a day as needed (abd pain) (Patient not taking: No sig reported), Disp: 90 capsule, Rfl: 3    Magnesium Gluconate (MAGNESIUM 27 PO), Take by mouth (Patient not taking: No sig reported), Disp: , Rfl:     ALLERGIES:  No Known Allergies    REVIEW OF SYSTEMS:  Review of Systems   Constitutional: Negative for chills and fever  HENT: Negative for drooling and sneezing  Eyes: Negative for redness  Respiratory: Negative for cough and wheezing  Gastrointestinal: Negative for nausea and vomiting  Musculoskeletal: Positive for arthralgias  Negative for joint swelling and myalgias  Neurological: Negative for weakness and numbness  Psychiatric/Behavioral: Negative for behavioral problems  The patient is not nervous/anxious          VITALS:  Vitals:    08/01/22 0831   BP: 150/86 Pulse: (!) 50       LABS:  HgA1c:   Lab Results   Component Value Date    HGBA1C 5 5 11/09/2021     BMP:   Lab Results   Component Value Date    GLUCOSE 84 09/22/2015    CALCIUM 9 0 06/11/2022     09/22/2015    K 4 1 06/11/2022    CO2 31 06/11/2022     06/11/2022    BUN 14 06/11/2022    CREATININE 0 75 06/11/2022       _____________________________________________________  PHYSICAL EXAMINATION:  General: well developed and well nourished, alert, oriented times 3 and appears comfortable  Psychiatric: Normal  HEENT: Normocephalic, Atraumatic Trachea Midline, No torticollis  Pulmonary: No audible wheezing or respiratory distress   Cardiovascular: No pitting edema, 2+ radial pulse   Abdominal/GI: abdomen non tender, non distended   Skin: No masses, erythema, lacerations, fluctation, ulcerations  Neurovascular: Sensation Intact to the Median, Ulnar, Radial Nerve, Motor Intact to the Median, Ulnar, Radial Nerve and Pulses Intact  Musculoskeletal: Normal, except as noted in detailed exam and in HPI  MUSCULOSKELETAL EXAMINATION:    Bilateral CMC Exam:  No adduction contracture  No hyperextension deformity of MCP joint  Positive localized tenderness over radial and dorsal aspect of thumb (CMC joint)  Grind test is Positive for pain and Positive for crepitus  Metacarpal load shift test positive   No triggering or tenderness over the A1 pulley    ___________________________________________________  STUDIES REVIEWED:  No new imaging reviewed  PROCEDURES PERFORMED:  Small joint arthrocentesis: bilateral thumb CMC  Universal Protocol:  Consent: Verbal consent obtained    Consent given by: patient  Patient identity confirmed: verbally with patient    Supporting Documentation  Indications: pain   Procedure Details  Location: thumb - bilateral thumb CMC  Preparation: Patient was prepped and draped in the usual sterile fashion  Ultrasound guidance: no    Medications (Right): 0 5 mL bupivacaine 0 25 %; 1 mL lidocaine 1 %; 20 mg triamcinolone acetonide 40 mg/mLMedications (Left): 0 5 mL bupivacaine 0 25 %; 1 mL lidocaine 1 %; 20 mg triamcinolone acetonide 40 mg/mL   Patient tolerance: patient tolerated the procedure well with no immediate complications  Dressing:  Sterile dressing applied             _____________________________________________________      Scribe Attestation    I,:  Ashley Metz MA am acting as a scribe while in the presence of the attending physician :       I,:  Heath Skinner MD personally performed the services described in this documentation    as scribed in my presence :

## 2022-08-08 DIAGNOSIS — K21.9 GASTROESOPHAGEAL REFLUX DISEASE WITHOUT ESOPHAGITIS: ICD-10-CM

## 2022-08-08 RX ORDER — OMEPRAZOLE 40 MG/1
40 CAPSULE, DELAYED RELEASE ORAL DAILY
Qty: 90 CAPSULE | Refills: 0 | Status: SHIPPED | OUTPATIENT
Start: 2022-08-08 | End: 2022-11-28

## 2022-08-11 ENCOUNTER — OFFICE VISIT (OUTPATIENT)
Dept: FAMILY MEDICINE CLINIC | Facility: CLINIC | Age: 62
End: 2022-08-11
Payer: COMMERCIAL

## 2022-08-11 VITALS
HEIGHT: 61 IN | BODY MASS INDEX: 22.48 KG/M2 | HEART RATE: 51 BPM | SYSTOLIC BLOOD PRESSURE: 156 MMHG | DIASTOLIC BLOOD PRESSURE: 84 MMHG | WEIGHT: 119.1 LBS | OXYGEN SATURATION: 99 %

## 2022-08-11 DIAGNOSIS — Z00.00 ANNUAL PHYSICAL EXAM: Primary | ICD-10-CM

## 2022-08-11 DIAGNOSIS — R79.89 HIGH SERUM VITAMIN B12: ICD-10-CM

## 2022-08-11 PROBLEM — Z48.89 POSTOPERATIVE VISIT: Status: RESOLVED | Noted: 2021-11-30 | Resolved: 2022-08-11

## 2022-08-11 PROBLEM — M62.838 MUSCLE SPASM: Status: RESOLVED | Noted: 2021-12-21 | Resolved: 2022-08-11

## 2022-08-11 PROBLEM — R10.9 ABDOMINAL DISCOMFORT: Status: RESOLVED | Noted: 2021-12-09 | Resolved: 2022-08-11

## 2022-08-11 PROBLEM — R19.7 DIARRHEA: Status: RESOLVED | Noted: 2022-05-26 | Resolved: 2022-08-11

## 2022-08-11 PROBLEM — E53.8 VITAMIN B12 DEFICIENCY: Status: RESOLVED | Noted: 2022-05-26 | Resolved: 2022-08-11

## 2022-08-11 PROBLEM — R53.83 FATIGUE: Status: RESOLVED | Noted: 2022-05-26 | Resolved: 2022-08-11

## 2022-08-11 PROBLEM — M54.6 ACUTE RIGHT-SIDED THORACIC BACK PAIN: Status: RESOLVED | Noted: 2021-12-21 | Resolved: 2022-08-11

## 2022-08-11 PROBLEM — G89.29 CHRONIC PAIN OF LEFT THUMB: Status: RESOLVED | Noted: 2021-03-12 | Resolved: 2022-08-11

## 2022-08-11 PROBLEM — K57.90 DIVERTICULOSIS: Status: RESOLVED | Noted: 2021-05-07 | Resolved: 2022-08-11

## 2022-08-11 PROBLEM — M79.645 CHRONIC PAIN OF LEFT THUMB: Status: RESOLVED | Noted: 2021-03-12 | Resolved: 2022-08-11

## 2022-08-11 PROCEDURE — 99396 PREV VISIT EST AGE 40-64: CPT | Performed by: NURSE PRACTITIONER

## 2022-08-11 NOTE — PATIENT INSTRUCTIONS

## 2022-08-11 NOTE — PROGRESS NOTES
FAMILY PRACTICE OFFICE VISIT       NAME: Anna Rosa  AGE: 60 y o  SEX: female       : 1960        MRN: [id number]    DATE: 2022  TIME: 4:06 PM    Assessment and Plan   1  Annual physical exam    2  High serum vitamin B12  -     Vitamin B12; Future       Patient Instructions     Wellness Visit for Adults   AMBULATORY CARE:   A wellness visit  is when you see your healthcare provider to get screened for health problems  Your healthcare provider will also give you advice on how to stay healthy  Write down your questions so you remember to ask them  Ask your healthcare provider how often you should have a wellness visit  What happens at a wellness visit:  Your healthcare provider will ask about your health, and your family history of health problems  This includes high blood pressure, heart disease, and cancer  He or she will ask if you have symptoms that concern you, if you smoke, and about your mood  You may also be asked about your intake of medicines, supplements, food, and alcohol  Any of the following may be done: Your weight  will be checked  Your height may also be checked so your body mass index (BMI) can be calculated  Your BMI shows if you are at a healthy weight  Your blood pressure  and heart rate will be checked  Your temperature may also be checked  Blood and urine tests  may be done  Blood tests may be done to check your cholesterol levels  Abnormal cholesterol levels increase your risk for heart disease and stroke  You may also need a blood or urine test to check for diabetes if you are at increased risk  Urine tests may be done to look for signs of an infection or kidney disease  A physical exam  includes checking your heartbeat and lungs with a stethoscope  Your healthcare provider may also check your skin to look for sun damage  Screening tests  may be recommended  A screening test is done to check for diseases that may not cause symptoms   The screening tests you may need depend on your age, gender, family history, and lifestyle habits  For example, colorectal screening may be recommended if you are 48years old or older  Screening tests you need if you are a woman:   A Pap smear  is used to screen for cervical cancer  Pap smears are usually done every 3 to 5 years depending on your age  You may need them more often if you have had abnormal Pap smear test results in the past  Ask your healthcare provider how often you should have a Pap smear  A mammogram  is an x-ray of your breasts to screen for breast cancer  Experts recommend mammograms every 2 years starting at age 48 years  You may need a mammogram at age 52 years or younger if you have an increased risk for breast cancer  Talk to your healthcare provider about when you should start having mammograms and how often you need them  Vaccines you may need:   Get an influenza vaccine  every year  The influenza vaccine protects you from the flu  Several types of viruses cause the flu  The viruses change over time, so new vaccines are made each year  Get a tetanus-diphtheria (Td) booster vaccine  every 10 years  This vaccine protects you against tetanus and diphtheria  Tetanus is a severe infection that may cause painful muscle spasms and lockjaw  Diphtheria is a severe bacterial infection that causes a thick covering in the back of your mouth and throat  Get a human papillomavirus (HPV) vaccine  if you are female and aged 23 to 32 or male 23 to 24 and never received it  This vaccine protects you from HPV infection  HPV is the most common infection spread by sexual contact  HPV may also cause vaginal, penile, and anal cancers  Get a pneumococcal vaccine  if you are aged 72 years or older  The pneumococcal vaccine is an injection given to protect you from pneumococcal disease  Pneumococcal disease is an infection caused by pneumococcal bacteria   The infection may cause pneumonia, meningitis, or an ear infection  Get a shingles vaccine  if you are 60 or older, even if you have had shingles before  The shingles vaccine is an injection to protect you from the varicella-zoster virus  This is the same virus that causes chickenpox  Shingles is a painful rash that develops in people who had chickenpox or have been exposed to the virus  How to eat healthy:  My Plate is a model for planning healthy meals  It shows the types and amounts of foods that should go on your plate  Fruits and vegetables make up about half of your plate, and grains and protein make up the other half  A serving of dairy is included on the side of your plate  The amount of calories and serving sizes you need depends on your age, gender, weight, and height  Examples of healthy foods are listed below:  Eat a variety of vegetables  such as dark green, red, and orange vegetables  You can also include canned vegetables low in sodium (salt) and frozen vegetables without added butter or sauces  Eat a variety of fresh fruits , canned fruit in 100% juice, frozen fruit, and dried fruit  Include whole grains  At least half of the grains you eat should be whole grains  Examples include whole-wheat bread, wheat pasta, brown rice, and whole-grain cereals such as oatmeal     Eat a variety of protein foods such as seafood (fish and shellfish), lean meat, and poultry without skin (turkey and chicken)  Examples of lean meats include pork leg, shoulder, or tenderloin, and beef round, sirloin, tenderloin, and extra lean ground beef  Other protein foods include eggs and egg substitutes, beans, peas, soy products, nuts, and seeds  Choose low-fat dairy products such as skim or 1% milk or low-fat yogurt, cheese, and cottage cheese  Limit unhealthy fats  such as butter, hard margarine, and shortening  Exercise:  Exercise at least 30 minutes per day on most days of the week  Some examples of exercise include walking, biking, dancing, and swimming  You can also fit in more physical activity by taking the stairs instead of the elevator or parking farther away from stores  Include muscle strengthening activities 2 days each week  Regular exercise provides many health benefits  It helps you manage your weight, and decreases your risk for type 2 diabetes, heart disease, stroke, and high blood pressure  Exercise can also help improve your mood  Ask your healthcare provider about the best exercise plan for you  General health and safety guidelines:   Do not smoke  Nicotine and other chemicals in cigarettes and cigars can cause lung damage  Ask your healthcare provider for information if you currently smoke and need help to quit  E-cigarettes or smokeless tobacco still contain nicotine  Talk to your healthcare provider before you use these products  Limit alcohol  A drink of alcohol is 12 ounces of beer, 5 ounces of wine, or 1½ ounces of liquor  Lose weight, if needed  Being overweight increases your risk of certain health conditions  These include heart disease, high blood pressure, type 2 diabetes, and certain types of cancer  Protect your skin  Do not sunbathe or use tanning beds  Use sunscreen with a SPF 15 or higher  Apply sunscreen at least 15 minutes before you go outside  Reapply sunscreen every 2 hours  Wear protective clothing, hats, and sunglasses when you are outside  Drive safely  Always wear your seatbelt  Make sure everyone in your car wears a seatbelt  A seatbelt can save your life if you are in an accident  Do not use your cell phone when you are driving  This could distract you and cause an accident  Pull over if you need to make a call or send a text message  Practice safe sex  Use latex condoms if are sexually active and have more than one partner  Your healthcare provider may recommend screening tests for sexually transmitted infections (STIs)  Wear helmets, lifejackets, and protective gear    Always wear a helmet when you ride a bike or motorcycle, go skiing, or play sports that could cause a head injury  Wear protective equipment when you play sports  Wear a lifejacket when you are on a boat or doing water sports  © Copyright [employer] 2022 Information is for End User's use only and may not be sold, redistributed or otherwise used for commercial purposes  All illustrations and images included in CareNotes® are the copyrighted property of [employer]  or [facility] Viola Crawley   The above information is an  only  It is not intended as medical advice for individual conditions or treatments  Talk to your doctor, nurse or pharmacist before following any medical regimen to see if it is safe and effective for you  Chief Complaint     Chief Complaint   Patient presents with    Annual Exam       History of Present Illness   Jenelle Caicedo is a 60y o -year-old female who is here for f/u to abd pain  Last week was having abd pain  Not sure it was a virus  Having constipation and then diarrhea  Now resolved  Using miralax daily      Review of Systems   Review of Systems   Constitutional: Negative for fatigue and fever  HENT: Negative for congestion, postnasal drip and rhinorrhea  Eyes: Negative for photophobia and visual disturbance  Respiratory: Negative for cough, shortness of breath and wheezing  Gastrointestinal: Positive for abdominal pain  Negative for diarrhea and nausea  Genitourinary: Negative for dysuria and frequency  Musculoskeletal: Negative for arthralgias and myalgias  Neurological: Negative for dizziness, light-headedness and headaches  Hematological: Negative for adenopathy  Psychiatric/Behavioral: Negative for sleep disturbance  The patient is not nervous/anxious          Active Problem List     Patient Active Problem List   Diagnosis    Anxiety    Gastroesophageal reflux disease    Dense breast tissue    Vaginal atrophy    Low bone density    Adrenal adenoma  History of hysterectomy    Hypertension    Adrenal nodule (HCC)    Iron deficiency anemia    S/P laparoscopic-assisted sigmoidectomy    Vitamin D deficiency         Past Medical History:  Past Medical History:   Diagnosis Date    Adjustment disorder with anxiety     last assessed - [id number]    Anemia     Anxiety     Constipation 2021    Diverticulitis     Diverticulitis 2021    Diverticulitis of colon May 2020    Diverticulitis of large intestine with abscess without bleeding 2020    Generalized abdominal pain 2021    GERD (gastroesophageal reflux disease)     History of total hysterectomy     History of transfusion     Hypertension     Hypertension 3/12/2021    Ingrown toenail     last assessed - [id number]    Thalassemia        Past Surgical History:  Past Surgical History:   Procedure Laterality Date    APPENDECTOMY      BOWEL RESECTION  2021     SECTION      CHOLECYSTECTOMY      COLONOSCOPY      GALLBLADDER SURGERY      OOPHORECTOMY Bilateral     SC LAP,SURG,COLECTOMY, PARTIAL, W/ANAST N/A 2021    Procedure: RESECTION COLON SIGMOID LAPAROSCOPIC HAND-ASSISTED;  Surgeon: Leticia Salinas MD;  Location[facility];  Service: General    TONSILLECTOMY AND ADENOIDECTOMY      TOTAL ABDOMINAL HYSTERECTOMY      UPPER GASTROINTESTINAL ENDOSCOPY         Family History:  Family History   Problem Relation Age of Onset    Anxiety disorder Mother         Anxiety    Depression Mother     Anxiety disorder Sister         Anxiety    Depression Sister     Cancer Sister         Melanoma    No Known Problems Sister     No Known Problems Sister     Anxiety disorder Daughter         Anxiety    Depression Daughter     Heart disease Maternal Aunt     Heart disease Maternal Uncle        Social History:  Social History     Socioeconomic History    Marital status: /Civil Union     Spouse name: Not on file    Number of children: Not on file   Jose Marlow Years of education: Not on file    Highest education level: Not on file   Occupational History    Occupation:  - [employer]   Tobacco Use    Smoking status: Never Smoker    Smokeless tobacco: Never Used   Vaping Use    Vaping Use: Never used   Substance and Sexual Activity    Alcohol use: Yes     Alcohol/week: 1 0 standard drink     Types: 1 Glasses of wine per week     Comment: occasional alcohol use    Drug use: No    Sexual activity: Not Currently     Partners: Male     Birth control/protection: Post-menopausal, Surgical     Comment: Hysterectomy 1988   Other Topics Concern    Not on file   Social History Narrative    Coffee     Social Determinants of Health     Financial Resource Strain: Not on file   Food Insecurity: Not on file   Transportation Needs: Not on file   Physical Activity: Not on file   Stress: Not on file   Social Connections: Not on file   Intimate Partner Violence: Not on file   Housing Stability: Not on file       Objective     Vitals:    08/11/22 1515   BP: 156/84   Pulse: (!) 51   SpO2: 99%     Wt Readings from Last 3 Encounters:   08/11/22 54 kg (119 lb 1 6 oz)   08/01/22 54 4 kg (120 lb)   07/08/22 52 3 kg (115 lb 4 8 oz)       Physical Exam  Vitals and nursing note reviewed  Constitutional:       Appearance: Normal appearance  She is well-developed and normal weight  HENT:      Head: Normocephalic and atraumatic  Right Ear: Tympanic membrane, ear canal and external ear normal       Left Ear: Tympanic membrane, ear canal and external ear normal       Nose: Nose normal       Mouth/Throat:      Mouth: Mucous membranes are moist       Pharynx: Oropharynx is clear  Eyes:      Extraocular Movements: Extraocular movements intact  Pupils: Pupils are equal, round, and reactive to light  Cardiovascular:      Rate and Rhythm: Normal rate and regular rhythm  Pulses: Normal pulses  Heart sounds: Normal heart sounds     Pulmonary:      Effort: Pulmonary effort is normal       Breath sounds: Normal breath sounds  Abdominal:      General: Bowel sounds are normal       Palpations: Abdomen is soft  Musculoskeletal:         General: Normal range of motion  Cervical back: Normal range of motion and neck supple  Skin:     General: Skin is warm and dry  Capillary Refill: Capillary refill takes less than 2 seconds  Neurological:      General: No focal deficit present  Mental Status: She is alert and oriented to person, place, and time  Psychiatric:         Mood and Affect: Mood normal          Thought Content:  Thought content normal          Judgment: Judgment normal          Pertinent Laboratory/Diagnostic Studies:  Lab Results   Component Value Date    GLUCOSE 84 09/22/2015    BUN 14 06/11/2022    CREATININE 0 75 06/11/2022    CALCIUM 9 0 06/11/2022     09/22/2015    K 4 1 06/11/2022    CO2 31 06/11/2022     06/11/2022     Lab Results   Component Value Date    ALT 20 06/11/2022    AST 22 06/11/2022    ALKPHOS 109 (H) 06/11/2022    BILITOT 0 69 09/22/2015       Lab Results   Component Value Date    WBC 8 53 12/06/2021    HGB 11 1 (L) 12/06/2021    HCT 37 0 12/06/2021    MCV 69 (L) 12/06/2021     (H) 12/06/2021       No results found for: TSH    Lab Results   Component Value Date    CHOL 169 09/22/2015     Lab Results   Component Value Date    TRIG 205 (H) 04/30/2021     Lab Results   Component Value Date    HDL 51 04/30/2021     Lab Results   Component Value Date    LDLCALC 98 04/30/2021     Lab Results   Component Value Date    HGBA1C 5 5 11/09/2021       Results for orders placed or performed in visit on 06/11/22   Comprehensive metabolic panel   Result Value Ref Range    Sodium 138 135 - 147 mmol/L    Potassium 4 1 3 5 - 5 3 mmol/L    Chloride 102 96 - 108 mmol/L    CO2 31 21 - 32 mmol/L    ANION GAP 5 4 - 13 mmol/L    BUN 14 5 - 25 mg/dL    Creatinine 0 75 0 60 - 1 30 mg/dL    Glucose, Fasting 70 65 - 99 mg/dL    Calcium 9 0 8 4 - 10 2 mg/dL    AST 22 13 - 39 U/L    ALT 20 7 - 52 U/L    Alkaline Phosphatase 109 (H) 34 - 104 U/L    Total Protein 6 8 6 4 - 8 4 g/dL    Albumin 4 1 3 5 - 5 0 g/dL    Total Bilirubin 0 63 0 20 - 1 00 mg/dL    eGFR 85 ml/min/1 73sq m   TSH, 3rd generation with Free T4 reflex   Result Value Ref Range    TSH 3RD GENERATON 1 187 0 450 - 4 500 uIU/mL   Celiac Disease Antibody Profile   Result Value Ref Range    IgA 80 (L) 87 - 352 mg/dL    Gliadin IgA 2 0 - 19 units    Gliadin IgG 2 0 - 19 units    Tissue Transglut Ab IGG <2 0 - 5 U/mL    TISSUE TRANSGLUTAMINASE IGA <2 0 - 3 U/mL    Endomysial IgA Negative Negative   Vitamin D 25 hydroxy   Result Value Ref Range    Vit D, 25-Hydroxy 49 6 30 0 - 100 0 ng/mL   Vitamin B12   Result Value Ref Range    Vitamin B-12 1,594 (H) 100 - 900 pg/mL   Iron Saturation %   Result Value Ref Range    Iron Saturation 33 15 - 50 %    TIBC 397 250 - 450 ug/dL    Iron 131 50 - 170 ug/dL   Ferritin   Result Value Ref Range    Ferritin 43 8 - 388 ng/mL       Orders Placed This Encounter   Procedures    Vitamin B12       ALLERGIES:  No Known Allergies    Current Medications     Current Outpatient Medications   Medication Sig Dispense Refill    ALPRAZolam (XANAX) 0 25 mg tablet Take 1 tablet (0 25 mg total) by mouth daily at bedtime as needed for anxiety 30 tablet 0    metoprolol tartrate (LOPRESSOR) 25 mg tablet Take 0 5 tablets (12 5 mg total) by mouth every 12 (twelve) hours 90 tablet 3    omeprazole (PriLOSEC) 40 MG capsule Take 1 capsule (40 mg total) by mouth daily 90 capsule 0    dicyclomine (BENTYL) 10 mg capsule Take 1 capsule (10 mg total) by mouth 3 (three) times a day as needed (abd pain) (Patient not taking: No sig reported) 90 capsule 3     No current facility-administered medications for this visit           Health Maintenance     Health Maintenance   Topic Date Due    DTaP,Tdap,and Td Vaccines (1 - Tdap) Never done    COVID-19 Vaccine (4 - Booster for [employer] series) 05/08/2022    Influenza Vaccine (1) 09/01/2022    HIV Screening  08/11/2024 (Originally 4/28/1975)    Depression Screening  05/25/2023    BMI: Adult  08/11/2023    Annual Physical  08/11/2023    Breast Cancer Screening: Mammogram  07/08/2024    Colorectal Cancer Screening  09/17/2025    Hepatitis C Screening  Completed    Pneumococcal Vaccine: Pediatrics (0 to 5 Years) and At-Risk Patients (6 to 59 Years)  Aged Out    HIB Vaccine  Aged Out    Hepatitis B Vaccine  Aged Out    IPV Vaccine  Aged Out    Hepatitis A Vaccine  Aged Out    Meningococcal ACWY Vaccine  Aged Out    HPV Vaccine  Aged [employer] History   Administered Date(s) Administered    COVID-19 PFIZER VACCINE 0 3 ML IM 12/23/2020, 01/14/2021, 01/08/2022    Influenza, recombinant, quadrivalent,injectable, preservative free 10/05/2018          KATIANA Gant

## 2022-08-13 ENCOUNTER — APPOINTMENT (OUTPATIENT)
Dept: LAB | Facility: CLINIC | Age: 62
End: 2022-08-13
Payer: COMMERCIAL

## 2022-08-13 DIAGNOSIS — R79.89 HIGH SERUM VITAMIN B12: ICD-10-CM

## 2022-08-13 LAB — VIT B12 SERPL-MCNC: 1054 PG/ML (ref 100–900)

## 2022-08-13 PROCEDURE — 36415 COLL VENOUS BLD VENIPUNCTURE: CPT

## 2022-08-13 PROCEDURE — 82607 VITAMIN B-12: CPT

## 2022-08-17 DIAGNOSIS — F41.9 ANXIETY: ICD-10-CM

## 2022-08-17 RX ORDER — ALPRAZOLAM 0.25 MG/1
0.25 TABLET ORAL
Qty: 30 TABLET | Refills: 0 | Status: SHIPPED | OUTPATIENT
Start: 2022-08-17 | End: 2022-09-26 | Stop reason: SDUPTHER

## 2022-08-17 NOTE — TELEPHONE ENCOUNTER
Medication:  PDMP   07/07/2022 07/07/2022 ALPRAZolam (Tablet)  30 0 30 0 25 MG NA KACY JESUS      Active agreement on file -No

## 2022-08-19 DIAGNOSIS — R74.8 ELEVATED VITAMIN B12 LEVEL: Primary | ICD-10-CM

## 2022-08-22 ENCOUNTER — TELEPHONE (OUTPATIENT)
Dept: HEMATOLOGY ONCOLOGY | Facility: CLINIC | Age: 62
End: 2022-08-22

## 2022-09-13 ENCOUNTER — CONSULT (OUTPATIENT)
Dept: HEMATOLOGY ONCOLOGY | Facility: CLINIC | Age: 62
End: 2022-09-13
Payer: COMMERCIAL

## 2022-09-13 VITALS
TEMPERATURE: 97.6 F | OXYGEN SATURATION: 98 % | RESPIRATION RATE: 17 BRPM | WEIGHT: 120 LBS | HEIGHT: 62 IN | SYSTOLIC BLOOD PRESSURE: 124 MMHG | HEART RATE: 63 BPM | BODY MASS INDEX: 22.08 KG/M2 | DIASTOLIC BLOOD PRESSURE: 80 MMHG

## 2022-09-13 DIAGNOSIS — R71.8 MICROCYTOSIS: Primary | ICD-10-CM

## 2022-09-13 DIAGNOSIS — R74.8 ELEVATED VITAMIN B12 LEVEL: ICD-10-CM

## 2022-09-13 DIAGNOSIS — R53.83 FATIGUE, UNSPECIFIED TYPE: ICD-10-CM

## 2022-09-13 DIAGNOSIS — R10.9 ABDOMINAL PAIN, UNSPECIFIED ABDOMINAL LOCATION: ICD-10-CM

## 2022-09-13 DIAGNOSIS — R63.4 UNEXPLAINED WEIGHT LOSS: ICD-10-CM

## 2022-09-13 PROBLEM — R79.89 ELEVATED VITAMIN B12 LEVEL: Status: ACTIVE | Noted: 2022-09-13

## 2022-09-13 PROBLEM — R79.89 ELEVATED PLATELET COUNT: Status: ACTIVE | Noted: 2022-09-13

## 2022-09-13 PROCEDURE — 99244 OFF/OP CNSLTJ NEW/EST MOD 40: CPT | Performed by: PHYSICIAN ASSISTANT

## 2022-09-13 NOTE — PROGRESS NOTES
Oncology Outpatient Consult Note  Adriana Casey 60 y o  female MRN: @ Encounter: [id number]        Date:  9/13/2022      Assessment/ Plan:    1  Elevated B12   B12 1594 6/2022;  1594 6/22    Liver unremarkable on 9/28/2021 CT scan         2  Chronic microcytosis MCV around 68 since at least 4/2014 with hemoglobin 12 g/dL range, likely 2nd to Thalassemia Trait  Differential normal on 12/2021 CBCD  Fluctuant platelet count  Platelet count 142 2474;  395 2014;   280-300  2019- 2020 and 575 12/2021  Patient has had multiple constitutional symptoms including unexplained weight loss of 30 pounds over the past 6 months, change in bowel and bladder habits, drenching sweats, nausea, abdominal discomfort, fatigue  Orders Placed This Encounter   Procedures    CT chest abdomen pelvis w contrast    Erythropoietin    Tryptase    CBC and differential    Methylmalonic acid, serum    RADHA w/Reflex if Positive    Rheumatoid Factor    C-reactive protein    Leukemia/Lymphoma flow cytometry    Chronic Hepatitis Panel    Human Immunodeficiency Virus 1/2 Antigen / Antibody ( Fourth Generation) with Reflex Testing    JAK2 V617F,Ql,W/RFL Exons 12,13 and MPL J063,A174    LD,Blood    DNA (DS) ANTIBODY    Cortisol    Hemoglobin A1C    Comprehensive metabolic panel    Hgb Fractionation Cascade         HPI:  Adriana Casey is a 60 y o  seen for initial consultation 9/13/2022 at the referral of Vika Gomez regarding elevated B12 level  She has past medical history of GERD, adrenal adenoma, hypertension, anxiety, low bone density, iron deficiency, vitamin-D deficiency, anxiety, diverticulitis  Status post sigmoidectomy November 2021  Herpes zoster left chest 6/2021    Verruca vulgaris right nostril 2/2022    She is status post cholecystectomy    4/17/2014 hemoglobin 12 4, MCV 63, white blood cell count 8 64, platelet count 812  August 2018 hemoglobin 12 3, MCV 68, white blood cell count 8000, normal differential, platelets 635    41/3740 iron saturation 15%, ferritin 226    12/6/21- hemoglobin 11 1, MCV 69, white blood cell count 8 53, 70% neutrophils, 18% lymphocytes, 8% monocytes, platelets 533    92/18/15- transvaginal u/s  Normal study status post hysterectomy  Ovaries not visualized  5/25/22 Presented to PCP  Reported hair loss, weight loss 138lbs to 115 lbs in 4-5 months without diet modifications  Occasional nausea  Exhaustion  Diarrhea  6/11/22 TSH normal   CMP normal with exception of alk phos 109  Iron saturation 33%, ferritin 43  Negative celiac panel  B12 1594      9/2020 EGD and colonoscopy- small hiatal hernia, mild gastric erythema, large sigmoid diverticula, hyperplastic polyp, external and internal hemorrhoids    9/28/2021- CT scan abdomen and pelvis with IV contrast performed secondary to left lower quadrant pain- 2 areas suspicious for diverticulitis  Possible fistulization vaginal canal to sigmoid colon  She was admitted September 29th through 10/2/2021 and evaluated by GI and colorectal surgery  Outpatient elective sigmoidectomy was discussed  Admitted 11/17 through 11/21/2021 and underwent laparoscopic-assisted sigmoidectomy by Dr Mireya Husain for recurrent diverticulitis  No evidence for a sigmoid vaginal fistula  Pathology consistent with diverticulitis  Two lymph nodes reactive on pathology  Mammogram 7/8/22 - BiRADS 1    Patient states over the past 6 weeks she is had change in her bowel habits to more constipation without diet modifications  Occasional nausea  She has diffuse abdominal pain  This is unchanged since her sigmoidectomy  She experiences diffuse muscle and bone aches  Chronic insomnia  She denies any rash  She is had hair thinning, change with urinary pattern with episodes of occasional urgency versus episodes of hesitancy and sensation of incomplete bladder emptying  She denies any neuropathy or balance issues    She is not vegetarian  She has lost 30 pounds over the past 6 months without trying  She is experiencing drenching sweats  Her hair has been more brittle and thin  She is not drink alcohol  She is a nonsmoker  She works at [employer] in [employer]  Test Results:      Labs:   Lab Results   Component Value Date    HGB 11 1 (L) 12/06/2021    HCT 37 0 12/06/2021    MCV 69 (L) 12/06/2021     (H) 12/06/2021    WBC 8 53 12/06/2021    NRBC 0 12/06/2021     Lab Results   Component Value Date     09/22/2015    K 4 1 06/11/2022     06/11/2022    CO2 31 06/11/2022    ANIONGAP 9 09/22/2015    BUN 14 06/11/2022    CREATININE 0 75 06/11/2022    GLUCOSE 84 09/22/2015    GLUF 70 06/11/2022    CALCIUM 9 0 06/11/2022    AST 22 06/11/2022    ALT 20 06/11/2022    ALKPHOS 109 (H) 06/11/2022    PROT 7 4 09/22/2015    BILITOT 0 69 09/22/2015    EGFR 85 06/11/2022           Imaging:   No results found  ROS: As mentioned in HPI & Interval History otherwise 14 point ROS negative        Active Problems:   Patient Active Problem List   Diagnosis    Anxiety    Gastroesophageal reflux disease    Dense breast tissue    Vaginal atrophy    Low bone density    Adrenal adenoma    History of hysterectomy    Hypertension    Adrenal nodule (HCC)    Iron deficiency anemia    S/P laparoscopic-assisted sigmoidectomy    Vitamin D deficiency       Past Medical History:   Past Medical History:   Diagnosis Date    Adjustment disorder with anxiety     last assessed - 01Sep2016    Anemia     Anxiety     Constipation 8/4/2021    Diverticulitis     Diverticulitis 9/2/2021    Diverticulitis of colon May 2020    Diverticulitis of large intestine with abscess without bleeding 5/24/2020    Generalized abdominal pain 8/4/2021    GERD (gastroesophageal reflux disease) 2000    History of total hysterectomy     History of transfusion     Hypertension     Hypertension 3/12/2021    Ingrown toenail last assessed - [id number]    Thalassemia        Surgical History:   Past Surgical History:   Procedure Laterality Date    APPENDECTOMY      BOWEL RESECTION  2021     SECTION      CHOLECYSTECTOMY      COLONOSCOPY      GALLBLADDER SURGERY      OOPHORECTOMY Bilateral     MT LAP,SURG,COLECTOMY, PARTIAL, W/ANAST N/A 2021    Procedure: RESECTION COLON SIGMOID LAPAROSCOPIC HAND-ASSISTED;  Surgeon: Jasbir Porras MD;  Location: [facility];  Service: General    TONSILLECTOMY AND ADENOIDECTOMY      TOTAL ABDOMINAL HYSTERECTOMY      UPPER GASTROINTESTINAL ENDOSCOPY         Family History:    Family History   Problem Relation Age of Onset    Anxiety disorder Mother         Anxiety    Depression Mother     Anxiety disorder Sister         Anxiety    Depression Sister     Cancer Sister         Melanoma    No Known Problems Sister     No Known Problems Sister     Anxiety disorder Daughter         Anxiety    Depression Daughter     Heart disease Maternal Aunt     Heart disease Maternal Uncle        Cancer-related family history includes Cancer in her sister  Social History:   Social History     Socioeconomic History    Marital status: /Civil Union     Spouse name: Not on file    Number of children: Not on file    Years of education: Not on file    Highest education level: Not on file   Occupational History    Occupation:  - [employer]   Tobacco Use    Smoking status: Never Smoker    Smokeless tobacco: Never Used   Vaping Use    Vaping Use: Never used   Substance and Sexual Activity    Alcohol use:  Yes     Alcohol/week: 1 0 standard drink     Types: 1 Glasses of wine per week     Comment: occasional alcohol use    Drug use: No    Sexual activity: Not Currently     Partners: Male     Birth control/protection: Post-menopausal, Surgical     Comment: Hysterectomy    Other Topics Concern    Not on file   Social History Narrative    Coffee     Social Determinants of Health     Financial Resource Strain: Not on file   Food Insecurity: Not on file   Transportation Needs: Not on file   Physical Activity: Not on file   Stress: Not on file   Social Connections: Not on file   Intimate Partner Violence: Not on file   Housing Stability: Not on file       Current Medications:   Current Outpatient Medications   Medication Sig Dispense Refill    ALPRAZolam (XANAX) 0 25 mg tablet Take 1 tablet (0 25 mg total) by mouth daily at bedtime as needed for anxiety 30 tablet 0    metoprolol tartrate (LOPRESSOR) 25 mg tablet Take 0 5 tablets (12 5 mg total) by mouth every 12 (twelve) hours 90 tablet 3    omeprazole (PriLOSEC) 40 MG capsule Take 1 capsule (40 mg total) by mouth daily 90 capsule 0    dicyclomine (BENTYL) 10 mg capsule Take 1 capsule (10 mg total) by mouth 3 (three) times a day as needed (abd pain) (Patient not taking: No sig reported) 90 capsule 3     No current facility-administered medications for this visit  Allergies: No Known Allergies      Physical Exam:    Body surface area is 1 53 meters squared  Ht Readings from Last 3 Encounters:   09/13/22 5' 1 5" (1 562 m)   08/11/22 5' 1 5" (1 562 m)   08/01/22 5' 1" (1 549 m)       Wt Readings from Last 3 Encounters:   09/13/22 54 4 kg (120 lb)   08/11/22 54 kg (119 lb 1 6 oz)   08/01/22 54 4 kg (120 lb)        Temp Readings from Last 3 Encounters:   09/13/22 97 6 °F (36 4 °C)   05/25/22 97 9 °F (36 6 °C)   12/21/21 97 9 °F (36 6 °C)        BP Readings from Last 3 Encounters:   09/13/22 124/80   08/11/22 156/84   08/01/22 150/86         Pulse Readings from Last 3 Encounters:   09/13/22 63   08/11/22 (!) 51   08/01/22 (!) 50         Physical Exam    Physical Exam  Vitals reviewed  Constitutional:       General: She is not in acute distress  Appearance: She is well-developed  She is not diaphoretic  HENT:      Head: Normocephalic and atraumatic     Eyes:      Conjunctiva/sclera: Conjunctivae normal  Neck:      Trachea: No tracheal deviation  Cardiovascular:      Rate and Rhythm: Normal rate and regular rhythm  Heart sounds: Normal heart sounds  No murmur heard  No friction rub  No gallop  Pulmonary:      Effort: Pulmonary effort is normal  No respiratory distress  Breath sounds: Normal breath sounds  No wheezing or rales  Chest:      Chest wall: No tenderness  Abdominal:      General: There is no distension  Palpations: Abdomen is soft  Tenderness: There is abdominal tenderness (diffuse, mild)  Musculoskeletal:      Cervical back: Normal range of motion and neck supple  Lymphadenopathy:      Cervical: No cervical adenopathy  Skin:     General: Skin is warm and dry  Coloration: Skin is not pale  Findings: No erythema  Neurological:      Mental Status: She is alert and oriented to person, place, and time  Psychiatric:         Behavior: Behavior normal          Thought Content:  Thought content normal          Judgment: Judgment normal              Emergency Contacts:    Extended Emergency Contact Information  Primary Emergency Contact: López Moy  Address: [address] [place] [address] Phone: [phone]  Mobile Phone: [phone]  Relation: Spouse

## 2022-09-17 ENCOUNTER — APPOINTMENT (OUTPATIENT)
Dept: LAB | Facility: CLINIC | Age: 62
End: 2022-09-17
Payer: COMMERCIAL

## 2022-09-17 DIAGNOSIS — R71.8 MICROCYTOSIS: ICD-10-CM

## 2022-09-17 DIAGNOSIS — R53.83 FATIGUE, UNSPECIFIED TYPE: ICD-10-CM

## 2022-09-17 DIAGNOSIS — D50.9 IRON DEFICIENCY ANEMIA, UNSPECIFIED IRON DEFICIENCY ANEMIA TYPE: ICD-10-CM

## 2022-09-17 DIAGNOSIS — R63.4 UNEXPLAINED WEIGHT LOSS: ICD-10-CM

## 2022-09-17 DIAGNOSIS — R19.7 DIARRHEA, UNSPECIFIED TYPE: ICD-10-CM

## 2022-09-17 DIAGNOSIS — R10.9 ABDOMINAL PAIN: ICD-10-CM

## 2022-09-17 DIAGNOSIS — R10.9 ABDOMINAL PAIN, UNSPECIFIED ABDOMINAL LOCATION: ICD-10-CM

## 2022-09-17 DIAGNOSIS — R74.8 ELEVATED VITAMIN B12 LEVEL: ICD-10-CM

## 2022-09-17 LAB
ALBUMIN SERPL BCP-MCNC: 4.5 G/DL (ref 3.5–5)
ALP SERPL-CCNC: 112 U/L (ref 34–104)
ALT SERPL W P-5'-P-CCNC: 18 U/L (ref 7–52)
ANION GAP SERPL CALCULATED.3IONS-SCNC: 5 MMOL/L (ref 4–13)
AST SERPL W P-5'-P-CCNC: 22 U/L (ref 13–39)
BASOPHILS # BLD AUTO: 0.07 THOUSANDS/ΜL (ref 0–0.1)
BASOPHILS NFR BLD AUTO: 1 % (ref 0–1)
BILIRUB SERPL-MCNC: 0.75 MG/DL (ref 0.2–1)
BUN SERPL-MCNC: 13 MG/DL (ref 5–25)
CALCIUM SERPL-MCNC: 9.7 MG/DL (ref 8.4–10.2)
CHLORIDE SERPL-SCNC: 100 MMOL/L (ref 96–108)
CO2 SERPL-SCNC: 31 MMOL/L (ref 21–32)
CORTIS SERPL-MCNC: 8.3 UG/DL
CREAT SERPL-MCNC: 0.87 MG/DL (ref 0.6–1.3)
CRP SERPL QL: 4.1 MG/L
EOSINOPHIL # BLD AUTO: 0.26 THOUSAND/ΜL (ref 0–0.61)
EOSINOPHIL NFR BLD AUTO: 4 % (ref 0–6)
ERYTHROCYTE [DISTWIDTH] IN BLOOD BY AUTOMATED COUNT: 16 % (ref 11.6–15.1)
EST. AVERAGE GLUCOSE BLD GHB EST-MCNC: 111 MG/DL
FERRITIN SERPL-MCNC: 70 NG/ML (ref 8–388)
GFR SERPL CREATININE-BSD FRML MDRD: 71 ML/MIN/1.73SQ M
GLUCOSE P FAST SERPL-MCNC: 83 MG/DL (ref 65–99)
HBA1C MFR BLD: 5.5 %
HBV CORE AB SER QL: NORMAL
HBV CORE IGM SER QL: NORMAL
HBV SURFACE AG SER QL: NORMAL
HCT VFR BLD AUTO: 39.2 % (ref 34.8–46.1)
HCV AB SER QL: NORMAL
HGB BLD-MCNC: 12.2 G/DL (ref 11.5–15.4)
IMM GRANULOCYTES # BLD AUTO: 0.02 THOUSAND/UL (ref 0–0.2)
IMM GRANULOCYTES NFR BLD AUTO: 0 % (ref 0–2)
IRON SATN MFR SERPL: 37 % (ref 15–50)
IRON SERPL-MCNC: 151 UG/DL (ref 50–170)
LDH SERPL-CCNC: 142 U/L (ref 140–271)
LYMPHOCYTES # BLD AUTO: 1.85 THOUSANDS/ΜL (ref 0.6–4.47)
LYMPHOCYTES NFR BLD AUTO: 25 % (ref 14–44)
MCH RBC QN AUTO: 21.1 PG (ref 26.8–34.3)
MCHC RBC AUTO-ENTMCNC: 31.1 G/DL (ref 31.4–37.4)
MCV RBC AUTO: 68 FL (ref 82–98)
MONOCYTES # BLD AUTO: 0.75 THOUSAND/ΜL (ref 0.17–1.22)
MONOCYTES NFR BLD AUTO: 10 % (ref 4–12)
NEUTROPHILS # BLD AUTO: 4.41 THOUSANDS/ΜL (ref 1.85–7.62)
NEUTS SEG NFR BLD AUTO: 60 % (ref 43–75)
NRBC BLD AUTO-RTO: 0 /100 WBCS
PLATELET # BLD AUTO: 380 THOUSANDS/UL (ref 149–390)
PMV BLD AUTO: 8.9 FL (ref 8.9–12.7)
POTASSIUM SERPL-SCNC: 4.5 MMOL/L (ref 3.5–5.3)
PROT SERPL-MCNC: 7.3 G/DL (ref 6.4–8.4)
RBC # BLD AUTO: 5.78 MILLION/UL (ref 3.81–5.12)
SODIUM SERPL-SCNC: 136 MMOL/L (ref 135–147)
TIBC SERPL-MCNC: 411 UG/DL (ref 250–450)
WBC # BLD AUTO: 7.36 THOUSAND/UL (ref 4.31–10.16)

## 2022-09-17 PROCEDURE — 36415 COLL VENOUS BLD VENIPUNCTURE: CPT | Performed by: PHYSICIAN ASSISTANT

## 2022-09-17 PROCEDURE — 81270 JAK2 GENE: CPT

## 2022-09-17 PROCEDURE — 82533 TOTAL CORTISOL: CPT

## 2022-09-17 PROCEDURE — 88184 FLOWCYTOMETRY/ TC 1 MARKER: CPT

## 2022-09-17 PROCEDURE — 86803 HEPATITIS C AB TEST: CPT | Performed by: PHYSICIAN ASSISTANT

## 2022-09-17 PROCEDURE — 87340 HEPATITIS B SURFACE AG IA: CPT | Performed by: PHYSICIAN ASSISTANT

## 2022-09-17 PROCEDURE — 87389 HIV-1 AG W/HIV-1&-2 AB AG IA: CPT

## 2022-09-17 PROCEDURE — 88185 FLOWCYTOMETRY/TC ADD-ON: CPT

## 2022-09-17 PROCEDURE — 83036 HEMOGLOBIN GLYCOSYLATED A1C: CPT

## 2022-09-17 PROCEDURE — 86140 C-REACTIVE PROTEIN: CPT

## 2022-09-17 PROCEDURE — 86705 HEP B CORE ANTIBODY IGM: CPT | Performed by: PHYSICIAN ASSISTANT

## 2022-09-17 PROCEDURE — 83540 ASSAY OF IRON: CPT

## 2022-09-17 PROCEDURE — 86038 ANTINUCLEAR ANTIBODIES: CPT

## 2022-09-17 PROCEDURE — 83520 IMMUNOASSAY QUANT NOS NONAB: CPT

## 2022-09-17 PROCEDURE — 83615 LACTATE (LD) (LDH) ENZYME: CPT

## 2022-09-17 PROCEDURE — 83918 ORGANIC ACIDS TOTAL QUANT: CPT

## 2022-09-17 PROCEDURE — 86430 RHEUMATOID FACTOR TEST QUAL: CPT

## 2022-09-17 PROCEDURE — 86225 DNA ANTIBODY NATIVE: CPT

## 2022-09-17 PROCEDURE — 85025 COMPLETE CBC W/AUTO DIFF WBC: CPT

## 2022-09-17 PROCEDURE — 83020 HEMOGLOBIN ELECTROPHORESIS: CPT

## 2022-09-17 PROCEDURE — 82668 ASSAY OF ERYTHROPOIETIN: CPT

## 2022-09-17 PROCEDURE — 80053 COMPREHEN METABOLIC PANEL: CPT

## 2022-09-17 PROCEDURE — 82728 ASSAY OF FERRITIN: CPT

## 2022-09-17 PROCEDURE — 86704 HEP B CORE ANTIBODY TOTAL: CPT | Performed by: PHYSICIAN ASSISTANT

## 2022-09-17 PROCEDURE — 83550 IRON BINDING TEST: CPT

## 2022-09-18 LAB
HIV 1+2 AB+HIV1 P24 AG SERPL QL IA: NORMAL
RHEUMATOID FACT SER QL LA: NEGATIVE

## 2022-09-19 LAB
ANA HOMOGEN TITR SER: ABNORMAL {TITER}
ANA TITR SER IF: POSITIVE {TITER}
DSDNA AB SER-ACNC: <1 IU/ML (ref 0–9)
SL AMB NOTE:: ABNORMAL

## 2022-09-20 LAB
EPO SERPL-ACNC: 12.8 MIU/ML (ref 2.6–18.5)
METHYLMALONATE SERPL-SCNC: 125 NMOL/L (ref 0–378)
TRYPTASE SERPL-MCNC: 7.2 UG/L (ref 2.2–13.2)

## 2022-09-22 LAB
HGB A MFR BLD: 5 % (ref 1.8–3.2)
HGB A MFR BLD: 95 % (ref 96.4–98.8)
HGB F MFR BLD: 0 % (ref 0–2)
HGB FRACT BLD-IMP: ABNORMAL
HGB S MFR BLD: 0 %

## 2022-09-26 DIAGNOSIS — F41.9 ANXIETY: ICD-10-CM

## 2022-09-26 RX ORDER — ALPRAZOLAM 0.25 MG/1
0.25 TABLET ORAL
Qty: 30 TABLET | Refills: 0 | Status: SHIPPED | OUTPATIENT
Start: 2022-09-26

## 2022-09-26 NOTE — TELEPHONE ENCOUNTER
Medication:  PDMP   08/17/2022 08/17/2022 ALPRAZolam (Tablet)  30 0 30 0 25 MG BRIANA KRISHNAN     Active agreement on file -No

## 2022-09-28 ENCOUNTER — HOSPITAL ENCOUNTER (OUTPATIENT)
Dept: RADIOLOGY | Facility: HOSPITAL | Age: 62
Discharge: HOME | End: 2022-09-28
Payer: COMMERCIAL

## 2022-09-28 DIAGNOSIS — R74.8 ELEVATED VITAMIN B12 LEVEL: ICD-10-CM

## 2022-09-28 DIAGNOSIS — R63.4 UNEXPLAINED WEIGHT LOSS: ICD-10-CM

## 2022-09-28 DIAGNOSIS — R71.8 MICROCYTOSIS: ICD-10-CM

## 2022-09-28 DIAGNOSIS — R10.9 ABDOMINAL PAIN, UNSPECIFIED ABDOMINAL LOCATION: ICD-10-CM

## 2022-09-28 LAB — MISCELLANEOUS LAB TEST RESULT: NORMAL

## 2022-09-28 PROCEDURE — 74177 CT ABD & PELVIS W/CONTRAST: CPT

## 2022-09-28 PROCEDURE — 71260 CT THORAX DX C+: CPT

## 2022-09-28 RX ADMIN — IOHEXOL 100 ML: 350 INJECTION, SOLUTION INTRAVENOUS at 17:29

## 2022-10-12 ENCOUNTER — OFFICE VISIT (OUTPATIENT)
Dept: HEMATOLOGY ONCOLOGY | Facility: CLINIC | Age: 62
End: 2022-10-12
Payer: COMMERCIAL

## 2022-10-12 ENCOUNTER — TELEPHONE (OUTPATIENT)
Dept: HEMATOLOGY ONCOLOGY | Facility: CLINIC | Age: 62
End: 2022-10-12

## 2022-10-12 VITALS
HEART RATE: 69 BPM | BODY MASS INDEX: 22.08 KG/M2 | HEIGHT: 62 IN | SYSTOLIC BLOOD PRESSURE: 128 MMHG | OXYGEN SATURATION: 98 % | DIASTOLIC BLOOD PRESSURE: 80 MMHG | RESPIRATION RATE: 17 BRPM | WEIGHT: 120 LBS

## 2022-10-12 DIAGNOSIS — R74.8 ELEVATED VITAMIN B12 LEVEL: Primary | ICD-10-CM

## 2022-10-12 DIAGNOSIS — M79.10 MUSCLE PAIN: ICD-10-CM

## 2022-10-12 DIAGNOSIS — R76.8 ANA POSITIVE: ICD-10-CM

## 2022-10-12 DIAGNOSIS — D56.3 BETA THALASSEMIA MINOR: ICD-10-CM

## 2022-10-12 LAB — SCAN RESULT: NORMAL

## 2022-10-12 PROCEDURE — 99215 OFFICE O/P EST HI 40 MIN: CPT | Performed by: PHYSICIAN ASSISTANT

## 2022-10-12 NOTE — PROGRESS NOTES
Hematology/Oncology Outpatient Follow- up Note  eRnu Church 60 y o  female MRN: @ Encounter: [id number]        Date:  10/12/2022        Assessment/ Plan:    1  Elevated B12   B12 1594 6/2022;  1594 6/22     Liver unremarkable on 9/28/2021 CT scan          2  Chronic microcytosis MCV around 68 since at least 4/2014 with hemoglobin 12 g/dL range  Beta Thalassemia Trait identified      Differential normal on 12/2021 [facility]        Fluctuant platelet count  Platelet count 697 0982;  395 2014;   280-300  2019- 2020 and 575 12/2021         Patient has had multiple constitutional symptoms including unexplained weight loss of 30 pounds over the past 6 months, change in bowel and bladder habits, drenching sweats, nausea, abdominal discomfort, fatigue  Contrast CT C/A/P 9/28/22 unremarkable  9/17/22-CMP stable  Alk-phos 112, , iron saturation 37%, ferritin 70, , tryptase 7 2, normal EPO level 12 8, normal cortisol  Hepatitis and HIV nonreactive    Hemoglobin 12 2, MCV 68, white blood cell count 7 36, platelet count 601, normal differential     Hgb electrophoresis identified findings consistent with beta thalassemia minor    RADHA positive 1-160 in a homogeneous pattern  Negative double-stranded DNA  CRP 4 1  Negative rheumatoid factor    Negative JAK2, MPL, CALR mutation, normal flow cytometry    RADHA +  Diffuse joint aches  Will have her evaluated by Rheumatology  F/u in our office prn  HPI:  Renu Church is a 60 y o  seen for initial consultation 9/13/2022 at the referral of [address] regarding elevated B12 level        She has past medical history of GERD, adrenal adenoma, hypertension, anxiety, low bone density, iron deficiency, vitamin-D deficiency, anxiety, diverticulitis  Status post sigmoidectomy November 2021  Herpes zoster left chest 6/2021    Verruca vulgaris right nostril 2/2022     She is status post cholecystectomy     4/17/2014 hemoglobin 12 4, MCV 63, white blood cell count 8 64, platelet count 665  August 2018 hemoglobin 12 3, MCV 68, white blood cell count 8000, normal differential, platelets 846     57/5561 iron saturation 15%, ferritin 226     12/6/21- hemoglobin 11 1, MCV 69, white blood cell count 8 53, 70% neutrophils, 18% lymphocytes, 8% monocytes, platelets 288     80/83/27- transvaginal u/s  Normal study status post hysterectomy  Ovaries not visualized      5/25/22 Presented to PCP  Reported hair loss, weight loss 138lbs to 115 lbs in 4-5 months without diet modifications  Occasional nausea  Exhaustion  Diarrhea      6/11/22 TSH normal   CMP normal with exception of alk phos 109  Iron saturation 33%, ferritin 43  Negative celiac panel  B12 1594        9/2020 EGD and colonoscopy- small hiatal hernia, mild gastric erythema, large sigmoid diverticula, hyperplastic polyp, external and internal hemorrhoids     9/28/2021- CT scan abdomen and pelvis with IV contrast performed secondary to left lower quadrant pain- 2 areas suspicious for diverticulitis  Possible fistulization vaginal canal to sigmoid colon  She was admitted September 29th through 10/2/2021 and evaluated by GI and colorectal surgery  Outpatient elective sigmoidectomy was discussed      Admitted 11/17 through 11/21/2021 and underwent laparoscopic-assisted sigmoidectomy by Dr Elizabeth Henry for recurrent diverticulitis  No evidence for a sigmoid vaginal fistula  Pathology consistent with diverticulitis  Two lymph nodes reactive on pathology      Mammogram 7/8/22 - BiRADS 1     Patient states over the past 6 weeks she is had change in her bowel habits to more constipation without diet modifications  Occasional nausea  She has diffuse abdominal pain  This is unchanged since her sigmoidectomy      She experiences diffuse muscle and bone aches  Chronic insomnia  She denies any rash    She is had hair thinning, change with urinary pattern with episodes of occasional urgency versus episodes of hesitancy and sensation of incomplete bladder emptying  She denies any neuropathy or balance issues  She is not vegetarian  She has lost 30 pounds over the past 6 months without trying  She is experiencing drenching sweats  Her hair has been more brittle and thin        She is not drink alcohol  She is a nonsmoker      She works at [employer] in [employer]        Interval History:    9/17/22-CMP stable  Alk-phos 112, , iron saturation 37%, ferritin 70, , tryptase 7 2, normal EPO level 12 8, normal cortisol  Hepatitis and HIV nonreactive    Hemoglobin 12 2, MCV 68, white blood cell count 7 36, platelet count 847, normal differential     Hgb electrophoresis identified findings consistent with beta thalassemia minor    RADHA positive 1-160 in a homogeneous pattern  Negative double-stranded DNA  CRP 4 1  Negative rheumatoid factor    Negative JAK2, MPL, CALR mutation, normal flow cytometry    Test Results:        Labs:   Lab Results   Component Value Date    HGB 12 2 09/17/2022    HCT 39 2 09/17/2022    MCV 68 (L) 09/17/2022     09/17/2022    WBC 7 36 09/17/2022    NRBC 0 09/17/2022     Lab Results   Component Value Date     09/22/2015    K 4 5 09/17/2022     09/17/2022    CO2 31 09/17/2022    ANIONGAP 9 09/22/2015    BUN 13 09/17/2022    CREATININE 0 87 09/17/2022    GLUCOSE 84 09/22/2015    GLUF 83 09/17/2022    CALCIUM 9 7 09/17/2022    AST 22 09/17/2022    ALT 18 09/17/2022    ALKPHOS 112 (H) 09/17/2022    PROT 7 4 09/22/2015    BILITOT 0 69 09/22/2015    EGFR 71 09/17/2022       Imaging: CT chest abdomen pelvis w contrast    Result Date: 10/2/2022  Narrative: CT CHEST, ABDOMEN AND PELVIS WITH IV CONTRAST INDICATION:   R74 8: Abnormal levels of other serum enzymes R71 8: Other abnormality of red blood cells R10 9: Unspecified abdominal pain R63 4: Abnormal weight loss  COMPARISON:  Multiple prior CT scans, most recent dated 9/20/2021   TECHNIQUE: CT examination of the chest, abdomen and pelvis was performed  Axial, sagittal, and coronal 2D reformatted images were created from the source data and submitted for interpretation  Radiation dose length product (DLP) for this visit:  518 18 mGy-cm   This examination, like all CT scans performed in the [facility], was performed utilizing techniques to minimize radiation dose exposure, including the use of iterative  reconstruction and automated exposure control  IV Contrast:  100 mL of iohexol (OMNIPAQUE) Enteric Contrast: Enteric contrast was not administered  FINDINGS: CHEST LUNGS:  Lungs are clear  There is no tracheal or endobronchial lesion  PLEURA:  Unremarkable  HEART/GREAT VESSELS: Normal heart size  Coronary artery calcifications  Normal caliber thoracic aorta with mild atherosclerotic calcifications  MEDIASTINUM AND KIRK:  Unremarkable  CHEST WALL AND LOWER NECK:  Unremarkable  ABDOMEN LIVER/BILIARY TREE:  Unremarkable  GALLBLADDER:  Gallbladder is surgically absent  SPLEEN:  Unremarkable  PANCREAS:  Unremarkable  ADRENAL GLANDS:  Stable bilateral adrenal thickening compatible with adenomatous hyperplasia  KIDNEYS/URETERS:  Unremarkable  No hydronephrosis  STOMACH AND BOWEL:  There is colonic diverticulosis without evidence of acute diverticulitis  Postoperative changes in the distal sigmoid noted  APPENDIX:  No findings to suggest appendicitis  ABDOMINOPELVIC CAVITY:  No ascites  No pneumoperitoneum  No lymphadenopathy  VESSELS:  Atherosclerotic changes are present  No evidence of aneurysm  PELVIS REPRODUCTIVE ORGANS:  Surgical changes of prior hysterectomy  URINARY BLADDER:  Decompressed bladder, limiting evaluation  ABDOMINAL WALL/INGUINAL REGIONS:  Unremarkable  OSSEOUS STRUCTURES:  No acute fracture or destructive osseous lesion  Spinal degenerative changes are noted  Impression: No etiology for abnormal weight loss identified  Colonic diverticulosis   Workstation performed: [id number]           ROS:  As mentioned in HPI & Interval History otherwise 14 point ROS negative  Allergies: No Known Allergies  Current Medications: Reviewed  PMH/FH/SH:  Reviewed      Physical Exam:    1 53 meters squared    Ht Readings from Last 3 Encounters:   10/12/22 5' 1 5" (1 562 m)   22 5' 1 5" (1 562 m)   22 5' 1 5" (1 562 m)        Wt Readings from Last 3 Encounters:   10/12/22 54 4 kg (120 lb)   22 54 4 kg (120 lb)   22 54 kg (119 lb 1 6 oz)        Temp Readings from Last 3 Encounters:   22 97 6 °F (36 4 °C)   22 97 9 °F (36 6 °C)   21 97 9 °F (36 6 °C)        BP Readings from Last 3 Encounters:   10/12/22 128/80   22 124/80   22 156/84           Physical Exam  Constitutional:       Appearance: She is well-developed  HENT:      Head: Normocephalic and atraumatic  Cardiovascular:      Rate and Rhythm: Normal rate  Pulmonary:      Effort: Pulmonary effort is normal  No respiratory distress  Skin:     General: Skin is warm and dry  Neurological:      Mental Status: She is alert     Psychiatric:         Behavior: Behavior normal          ECO      Emergency Contacts:    Extended Emergency Contact Information  Primary Emergency Contact: López Moy  Address: [address] [place] [address] Phone: [phone]  Mobile Phone: [phone]  Relation: Spouse

## 2022-10-12 NOTE — TELEPHONE ENCOUNTER
Requesting results for Leukemia/flow  She provided results over the phone with FE Duran  She will be updating labs in epic to show results

## 2022-11-04 ENCOUNTER — OFFICE VISIT (OUTPATIENT)
Dept: FAMILY MEDICINE CLINIC | Facility: CLINIC | Age: 62
End: 2022-11-04

## 2022-11-04 VITALS
RESPIRATION RATE: 16 BRPM | DIASTOLIC BLOOD PRESSURE: 90 MMHG | BODY MASS INDEX: 22.12 KG/M2 | OXYGEN SATURATION: 97 % | HEART RATE: 46 BPM | TEMPERATURE: 98.7 F | HEIGHT: 62 IN | SYSTOLIC BLOOD PRESSURE: 170 MMHG | WEIGHT: 120.2 LBS

## 2022-11-04 DIAGNOSIS — I10 HYPERTENSION, UNSPECIFIED TYPE: ICD-10-CM

## 2022-11-04 DIAGNOSIS — S39.012A ACUTE MYOFASCIAL STRAIN OF LUMBAR REGION, INITIAL ENCOUNTER: Primary | ICD-10-CM

## 2022-11-04 RX ORDER — AMLODIPINE BESYLATE 5 MG/1
5 TABLET ORAL DAILY
Qty: 30 TABLET | Refills: 0 | Status: SHIPPED | OUTPATIENT
Start: 2022-11-04 | End: 2022-12-09 | Stop reason: SDUPTHER

## 2022-11-04 RX ORDER — CYCLOBENZAPRINE HCL 5 MG
5 TABLET ORAL
Qty: 21 TABLET | Refills: 0 | Status: SHIPPED | OUTPATIENT
Start: 2022-11-04

## 2022-11-07 DIAGNOSIS — K21.9 GASTROESOPHAGEAL REFLUX DISEASE WITHOUT ESOPHAGITIS: ICD-10-CM

## 2022-11-08 RX ORDER — OMEPRAZOLE 40 MG/1
40 CAPSULE, DELAYED RELEASE ORAL DAILY
Qty: 90 CAPSULE | Refills: 0 | Status: SHIPPED | OUTPATIENT
Start: 2022-11-08 | End: 2023-02-28

## 2022-11-14 ENCOUNTER — OFFICE VISIT (OUTPATIENT)
Dept: OBGYN CLINIC | Facility: HOSPITAL | Age: 62
End: 2022-11-14

## 2022-11-14 VITALS
SYSTOLIC BLOOD PRESSURE: 122 MMHG | WEIGHT: 121.4 LBS | RESPIRATION RATE: 16 BRPM | DIASTOLIC BLOOD PRESSURE: 79 MMHG | HEART RATE: 61 BPM | HEIGHT: 61 IN | BODY MASS INDEX: 22.92 KG/M2

## 2022-11-14 DIAGNOSIS — M18.11 ARTHRITIS OF CARPOMETACARPAL (CMC) JOINT OF RIGHT THUMB: ICD-10-CM

## 2022-11-14 DIAGNOSIS — M18.12 ARTHRITIS OF CARPOMETACARPAL (CMC) JOINT OF LEFT THUMB: Primary | ICD-10-CM

## 2022-11-14 RX ORDER — KETOROLAC TROMETHAMINE 30 MG/ML
60 INJECTION, SOLUTION INTRAMUSCULAR; INTRAVENOUS
Status: COMPLETED | OUTPATIENT
Start: 2022-11-14 | End: 2022-11-14

## 2022-11-14 RX ORDER — BETAMETHASONE SODIUM PHOSPHATE AND BETAMETHASONE ACETATE 3; 3 MG/ML; MG/ML
3 INJECTION, SUSPENSION INTRA-ARTICULAR; INTRALESIONAL; INTRAMUSCULAR; SOFT TISSUE
Status: COMPLETED | OUTPATIENT
Start: 2022-11-14 | End: 2022-11-14

## 2022-11-14 RX ORDER — BUPIVACAINE HYDROCHLORIDE 2.5 MG/ML
0.5 INJECTION, SOLUTION INFILTRATION; PERINEURAL
Status: COMPLETED | OUTPATIENT
Start: 2022-11-14 | End: 2022-11-14

## 2022-11-14 RX ADMIN — BETAMETHASONE SODIUM PHOSPHATE AND BETAMETHASONE ACETATE 3 MG: 3; 3 INJECTION, SUSPENSION INTRA-ARTICULAR; INTRALESIONAL; INTRAMUSCULAR; SOFT TISSUE at 08:21

## 2022-11-14 RX ADMIN — BUPIVACAINE HYDROCHLORIDE 0.5 ML: 2.5 INJECTION, SOLUTION INFILTRATION; PERINEURAL at 08:21

## 2022-11-14 RX ADMIN — KETOROLAC TROMETHAMINE 60 MG: 30 INJECTION, SOLUTION INTRAMUSCULAR; INTRAVENOUS at 08:21

## 2022-11-14 NOTE — PROGRESS NOTES
ASSESSMENT/PLAN:      79-year-old female with bilateral thumb CM arthritis, right worse than left  Prior injections did not provide much relief, patient elected to repeat injections today with a different steroid (celestone/toradol combo)  Injections were tolerated well, continue additional conservative measures w/antiinflammatories and bracing as tolerated  She would like to avoid surgery  Follow up 10-12 weeks  The patient verbalized understanding of exam findings and treatment plan  We engaged in the shared decision-making process and treatment options were discussed at length with the patient  Surgical and conservative management discussed today along with risks and benefits  Diagnoses and all orders for this visit:    Arthritis of carpometacarpal [place]) joint of left thumb  -     Small joint arthrocentesis: bilateral thumb CMC    Arthritis of carpometacarpal (CMC) joint of right thumb  -     Small joint arthrocentesis: bilateral thumb CMC            Follow Up:  Return in about 3 months (around 2/14/2023) for Recheck  To Do Next Visit:  Re-evaluation of current issue    ____________________________________________________________________________________________________________________________________________      CHIEF COMPLAINT:  Chief Complaint   Patient presents with   • Right Thumb - Pain, Follow-up   • Left Thumb - Pain, Follow-up       SUBJECTIVE:  Adriana Casey is a 60y o  year old RHD female who presents to the office for follow up evaluation bilateral thumb CMC arthritis  Patient states the last injections did not help much at all  She has significant pain in the right thumb that radiates up the arm        I have personally reviewed all the relevant PMH, PSH, SH, FH, Medications and allergies       PAST MEDICAL HISTORY:  Past Medical History:   Diagnosis Date   • Adjustment disorder with anxiety     last assessed - 01Sep2016   • Anemia    • Anxiety    • Constipation 8/4/2021   • Diverticulitis    • Diverticulitis 2021   • Diverticulitis of colon May 2020   • Diverticulitis of large intestine with abscess without bleeding 2020   • Generalized abdominal pain 2021   • GERD (gastroesophageal reflux disease)    • History of total hysterectomy    • History of transfusion    • Hypertension    • Hypertension 3/12/2021   • Ingrown toenail     last assessed - [id number]   • Thalassemia        PAST SURGICAL HISTORY:  Past Surgical History:   Procedure Laterality Date   • APPENDECTOMY     • BOWEL RESECTION  2021   •  SECTION     • CHOLECYSTECTOMY     • COLONOSCOPY     • GALLBLADDER SURGERY     • OOPHORECTOMY Bilateral    • AL LAP,SURG,COLECTOMY, PARTIAL, W/ANAST N/A 2021    Procedure: RESECTION COLON SIGMOID LAPAROSCOPIC HAND-ASSISTED;  Surgeon: Jasbir Porras MD;  Location: [facility];  Service: General   • TONSILLECTOMY AND ADENOIDECTOMY     • TOTAL ABDOMINAL HYSTERECTOMY     • UPPER GASTROINTESTINAL ENDOSCOPY         FAMILY HISTORY:  Family History   Problem Relation Age of Onset   • Anxiety disorder Mother         Anxiety   • Depression Mother    • Anxiety disorder Sister         Anxiety   • Depression Sister    • Cancer Sister         Melanoma   • No Known Problems Sister    • No Known Problems Sister    • Anxiety disorder Daughter         Anxiety   • Depression Daughter    • Heart disease Maternal Aunt    • Heart disease Maternal Uncle        SOCIAL HISTORY:  Social History     Tobacco Use   • Smoking status: Never Smoker   • Smokeless tobacco: Never Used   Vaping Use   • Vaping Use: Never used   Substance Use Topics   • Alcohol use:  Yes     Alcohol/week: 1 0 standard drink     Types: 1 Glasses of wine per week     Comment: occasional alcohol use   • Drug use: No       MEDICATIONS:    Current Outpatient Medications:   •  ALPRAZolam (XANAX) 0 25 mg tablet, Take 1 tablet (0 25 mg total) by mouth daily at bedtime as needed for anxiety, Disp: 30 tablet, Rfl: 5  •  amLODIPine (NORVASC) 5 mg tablet, Take 1 tablet (5 mg total) by mouth daily, Disp: 30 tablet, Rfl: 0  •  cyclobenzaprine (FLEXERIL) 5 mg tablet, Take 1 tablet (5 mg total) by mouth daily at bedtime, Disp: 21 tablet, Rfl: 0  •  dicyclomine (BENTYL) 10 mg capsule, Take 1 capsule (10 mg total) by mouth 3 (three) times a day as needed (abd pain), Disp: 90 capsule, Rfl: 3  •  metoprolol tartrate (LOPRESSOR) 25 mg tablet, Take 0 5 tablets (12 5 mg total) by mouth every 12 (twelve) hours, Disp: 90 tablet, Rfl: 3  •  omeprazole (PriLOSEC) 40 MG capsule, Take 1 capsule (40 mg total) by mouth daily, Disp: 90 capsule, Rfl: 0    ALLERGIES:  No Known Allergies    REVIEW OF SYSTEMS:  Review of Systems   Constitutional: Negative for chills and fever  HENT: Negative for drooling and sneezing  Eyes: Negative for redness  Respiratory: Negative for cough and wheezing  Gastrointestinal: Negative for nausea and vomiting  Musculoskeletal: Positive for arthralgias  Negative for joint swelling and myalgias  Neurological: Negative for weakness and numbness  Psychiatric/Behavioral: Negative for behavioral problems  The patient is not nervous/anxious          VITALS:  Vitals:    11/14/22 0757   BP: 122/79   Pulse: 61   Resp: 16       LABS:  HgA1c:   Lab Results   Component Value Date    HGBA1C 5 5 09/17/2022     BMP:   Lab Results   Component Value Date    GLUCOSE 84 09/22/2015    CALCIUM 9 7 09/17/2022     09/22/2015    K 4 5 09/17/2022    CO2 31 09/17/2022     09/17/2022    BUN 13 09/17/2022    CREATININE 0 87 09/17/2022       _____________________________________________________  PHYSICAL EXAMINATION:  General: well developed and well nourished, alert, oriented times 3 and appears comfortable  Psychiatric: Normal  HEENT: Normocephalic, Atraumatic Trachea Midline, No torticollis  Pulmonary: No audible wheezing or respiratory distress   Cardiovascular: No pitting edema, 2+ radial pulse   Abdominal/GI: abdomen non tender, non distended   Skin: No masses, erythema, lacerations, fluctation, ulcerations  Neurovascular: Sensation Intact to the Median, Ulnar, Radial Nerve, Motor Intact to the Median, Ulnar, Radial Nerve and Pulses Intact  Musculoskeletal: Normal, except as noted in detailed exam and in HPI  MUSCULOSKELETAL EXAMINATION:    Bilateral CMC Exam:  No adduction contracture  No hyperextension deformity of MCP joint  Positive localized tenderness over radial and dorsal aspect of thumb (CMC joint), right worse than left  Grind test is Positive for pain and Positive for crepitus  Metacarpal load shift test positive   No triggering or tenderness over the A1 pulley    ___________________________________________________  STUDIES REVIEWED:  No new imaging reviewed  PROCEDURES PERFORMED:  Small joint arthrocentesis: bilateral thumb CMC  Universal Protocol:  Consent: Verbal consent obtained  Risks and benefits: risks, benefits and alternatives were discussed  Consent given by: patient  Time out: Immediately prior to procedure a "time out" was called to verify the correct patient, procedure, equipment, support staff and site/side marked as required    Patient understanding: patient states understanding of the procedure being performed  Site marked: the operative site was marked  Required items: required blood products, implants, devices, and special equipment available  Patient identity confirmed: verbally with patient    Supporting Documentation  Indications: pain   Procedure Details  Location: thumb - bilateral thumb CMC  Needle size: 25 G  Ultrasound guidance: no  Approach: dorsal    Medications (Right): 0 5 mL bupivacaine 0 25 %; 3 mg betamethasone acetate-betamethasone sodium phosphate 6 (3-3) mg/mL; 60 mg ketorolac 60 mg/2 mLMedications (Left): 0 5 mL bupivacaine 0 25 %; 3 mg betamethasone acetate-betamethasone sodium phosphate 6 (3-3) mg/mL; 60 mg ketorolac 60 mg/2 mL   Patient tolerance: patient tolerated the procedure well with no immediate complications  Dressing:  Sterile dressing applied             _____________________________________________________      Scribe Attestation    I,:  Gina Hutton PA-C am acting as a scribe while in the presence of the attending physician :       I,:  Marie De Anda MD personally performed the services described in this documentation    as scribed in my presence :

## 2022-12-16 ENCOUNTER — TELEPHONE (OUTPATIENT)
Dept: HEMATOLOGY ONCOLOGY | Facility: CLINIC | Age: 62
End: 2022-12-16

## 2022-12-16 DIAGNOSIS — R74.8 ELEVATED VITAMIN B12 LEVEL: ICD-10-CM

## 2022-12-16 DIAGNOSIS — D75.839 THROMBOCYTOSIS: Primary | ICD-10-CM

## 2022-12-16 NOTE — TELEPHONE ENCOUNTER
Received [facility] report    DNTM3 alteration identified  (can be seen with MDS/ MPN)  JAK2, MPL, CALR negative  Reviewed case at length with Dr Ralph Linn        Recommends reassessment of CBCD, B12, CMP, LDH, and LAP at 6 month interval

## 2022-12-16 NOTE — TELEPHONE ENCOUNTER
Called patient on behalf of Carissa Mello to discuss lab work received  LV with name, office and callback number

## 2023-01-19 DIAGNOSIS — F41.9 ANXIETY: ICD-10-CM

## 2023-01-19 RX ORDER — ALPRAZOLAM 0.25 MG/1
0.25 TABLET ORAL
Qty: 30 TABLET | Refills: 0 | Status: SHIPPED | OUTPATIENT
Start: 2023-01-19

## 2023-01-19 NOTE — TELEPHONE ENCOUNTER
Medication:  PDMP   12/12/2022 12/12/2022 ALPRAZolam (Tablet)  30 0 30 0 25 MG BRIANA KRISHNAN      Active agreement on file -No

## 2023-01-21 PROBLEM — S39.012A ACUTE LUMBAR MYOFASCIAL STRAIN: Status: ACTIVE | Noted: 2023-01-21

## 2023-01-21 NOTE — ASSESSMENT & PLAN NOTE
Non specific acute back pain   No motor or sensory deficits  No red flags noted ( new onset bowel or bladder incontinence, urinary retention, loss of anal sphincter tone, saddle anesthesia, h/o of metastatic cancer, or suspected spinal infection)   Likely MSK in nature  Patient educated ans reassurance provided  Explained that the prognosis is often good and self limiting   Advised to stay active and avoid bed rest which may seem counterintuitive  Avoid twisting and and bending    Initiate trial of NSAIDs or acetaminophen   Muscle relaxant  Consider referral for PT if pain persist   Imaging not warranted

## 2023-01-21 NOTE — PROGRESS NOTES
Name: Kacy Banks      : 1960      MRN: [id number]  Encounter Provider: Nieves Sweeney MD  Encounter Date: 2022   Encounter department: [place] [address]  Acute myofascial strain of lumbar region, initial encounter  Assessment & Plan:  Non specific acute back pain   No motor or sensory deficits  No red flags noted ( new onset bowel or bladder incontinence, urinary retention, loss of anal sphincter tone, saddle anesthesia, h/o of metastatic cancer, or suspected spinal infection)   Likely MSK in nature  Patient educated ans reassurance provided  Explained that the prognosis is often good and self limiting   Advised to stay active and avoid bed rest which may seem counterintuitive  Avoid twisting and and bending  Initiate trial of NSAIDs or acetaminophen   Muscle relaxant  Consider referral for PT if pain persist   Imaging not warranted     Orders:  -     cyclobenzaprine (FLEXERIL) 5 mg tablet; Take 1 tablet (5 mg total) by mouth daily at bedtime  -     POCT urine dip    2  Hypertension, unspecified type  Assessment & Plan: Bp elevated at 170/90   Start amlodipine 5 mg daily   Continue metoprolol 25 mg BID              Subjective      Here with back pain x 3 days  Reports spasms  Initial inciting event: none  Alleviating factors identifiable by the patient are rest  Aggravating factors identifiable by the patient are sitting, standing and walking  Treatments initiated by the patient: NSAID  Previous work up: none  Review of Systems   Genitourinary: Negative for difficulty urinating  Musculoskeletal: Positive for back pain  Neurological: Negative for weakness and numbness         Current Outpatient Medications on File Prior to Visit   Medication Sig   • dicyclomine (BENTYL) 10 mg capsule Take 1 capsule (10 mg total) by mouth 3 (three) times a day as needed (abd pain)   • metoprolol tartrate (LOPRESSOR) 25 mg tablet Take 0 5 tablets (12 5 mg total) by mouth every 12 (twelve) hours       Objective     /90 (BP Location: Left arm)   Pulse (!) 46   Temp 98 7 °F (37 1 °C)   Resp 16   Ht 5' 1 5" (1 562 m)   Wt 54 5 kg (120 lb 3 2 oz)   SpO2 97%   BMI 22 34 kg/m²     Physical Exam  Constitutional:       General: She is not in acute distress  Appearance: Normal appearance  Cardiovascular:      Rate and Rhythm: Normal rate and regular rhythm  Heart sounds: No murmur heard  Pulmonary:      Effort: Pulmonary effort is normal  No respiratory distress  Breath sounds: Normal breath sounds  Musculoskeletal:      Thoracic back: Tenderness present  No edema, deformity, signs of trauma, spasms or bony tenderness  Decreased range of motion  Lumbar back: Spasms and tenderness present  No bony tenderness  Decreased range of motion  Negative right straight leg raise test and negative left straight leg raise test    Neurological:      Mental Status: She is alert         Paco Bello MD

## 2023-02-05 DIAGNOSIS — K21.9 GASTROESOPHAGEAL REFLUX DISEASE WITHOUT ESOPHAGITIS: ICD-10-CM

## 2023-02-06 RX ORDER — OMEPRAZOLE 40 MG/1
40 CAPSULE, DELAYED RELEASE ORAL DAILY
Qty: 90 CAPSULE | Refills: 0 | Status: SHIPPED | OUTPATIENT
Start: 2023-02-06 | End: 2023-05-29

## 2023-02-13 ENCOUNTER — OFFICE VISIT (OUTPATIENT)
Dept: OBGYN CLINIC | Facility: HOSPITAL | Age: 63
End: 2023-02-13

## 2023-02-13 ENCOUNTER — HOSPITAL ENCOUNTER (OUTPATIENT)
Dept: RADIOLOGY | Facility: HOSPITAL | Age: 63
Discharge: HOME | End: 2023-02-13
Attending: SURGERY

## 2023-02-13 VITALS
HEART RATE: 59 BPM | WEIGHT: 122 LBS | HEIGHT: 61 IN | DIASTOLIC BLOOD PRESSURE: 77 MMHG | SYSTOLIC BLOOD PRESSURE: 143 MMHG | BODY MASS INDEX: 23.03 KG/M2

## 2023-02-13 DIAGNOSIS — M79.642 LEFT HAND PAIN: ICD-10-CM

## 2023-02-13 DIAGNOSIS — M79.641 RIGHT HAND PAIN: ICD-10-CM

## 2023-02-13 DIAGNOSIS — M79.641 RIGHT HAND PAIN: Primary | ICD-10-CM

## 2023-02-13 DIAGNOSIS — M18.11 ARTHRITIS OF CARPOMETACARPAL (CMC) JOINT OF RIGHT THUMB: ICD-10-CM

## 2023-02-13 DIAGNOSIS — M25.50 MULTIPLE JOINT PAIN: ICD-10-CM

## 2023-02-13 DIAGNOSIS — M18.12 ARTHRITIS OF CARPOMETACARPAL (CMC) JOINT OF LEFT THUMB: ICD-10-CM

## 2023-02-13 NOTE — PATIENT INSTRUCTIONS
Medrol dose arianna is a 6 days course:  1st day take 2 tablets with Breakfast, 2 tablets with Lunch and 2 tablets with an afternoon snack  Do not take after 3pm, due to medication can keep you up from sleep  Each day decrease by 1 tablet from the last dose  Complete arianna

## 2023-02-13 NOTE — PROGRESS NOTES
ASSESSMENT/PLAN:      60year old female here for her bilateral thumb pain and OA  New x-rays of bilateral hands were obtained today and reviewed, noting no obvious rheumatoid pathology as of yet, it looks like osteoarthritis  Due to injections not giving her significant relief, then we would not pursue in additional injections  She would be a surgical candidate at this point but we will follow up with her post her Rheumatology appointment  Rheumatoid can change the integrity of tendons that could potentially be used for surgery  Bilateral thumb comfort cools were provided for the patient in the office today  We will follow up with her in 4 months for re-evaluation  The patient verbalized understanding of exam findings and treatment plan  We engaged in the shared decision-making process and treatment options were discussed at length with the patient  Surgical and conservative management discussed today along with risks and benefits  Diagnoses and all orders for this visit:    Right hand pain  -     XR hand 3+ vw right; Future    Left hand pain  -     XR hand 3+ vw left; Future    Arthritis of carpometacarpal (CMC) joint of right thumb  -     Thumb Cude comf/Cool    Arthritis of carpometacarpal (CMC) joint of left thumb  -     Thumb Cude comf/Cool    Multiple joint pain        Follow Up:  Return in about 4 months (around 6/13/2023) for bilateral thumb   To Do Next Visit:  Re-evaluation of current issue    ____________________________________________________________________________________________________________________________________________      CHIEF COMPLAINT:  Chief Complaint   Patient presents with   • Right Hand - Pain, Follow-up   • Left Hand - Pain, Follow-up       SUBJECTIVE:  Hobson Aase is a 60y o  year old RHD female who presents a 3 month follow up for her bilateral thumb CMC joint pain due to OA  Right worse than left   At her last visit, the type of cortisone was switched due to diminished returns of the kenalog  Patient reports that the injections at her last visit lasted her approximately 1 week  Patient is wearing a over-the-counter brace that has some metal stays in it that is difficult to wear while typing at work  Patient reports over the last couple weeks she has had some increased swelling in the left hand in particular and is unable to wear her wedding band  Patient does have an appointment with rheumatology on 2023 for initial evaluation  Patient has had an episode of numbness and tingling in the left index, long and ring fingers that started 2/10/2023  Her hematologist ordered Rheumatologic panel that has a + RADHA    I have personally reviewed all the relevant PMH, PSH, SH, FH, Medications and allergies       PAST MEDICAL HISTORY:  Past Medical History:   Diagnosis Date   • Adjustment disorder with anxiety     last assessed - 2016   • Anemia    • Anxiety    • Constipation 2021   • Diverticulitis    • Diverticulitis 2021   • Diverticulitis of colon May 2020   • Diverticulitis of large intestine with abscess without bleeding 2020   • Generalized abdominal pain 2021   • GERD (gastroesophageal reflux disease)    • History of total hysterectomy    • History of transfusion    • Hypertension    • Hypertension 3/12/2021   • Ingrown toenail     last assessed - [id number]   • Thalassemia        PAST SURGICAL HISTORY:  Past Surgical History:   Procedure Laterality Date   • APPENDECTOMY     • BOWEL RESECTION  2021   •  SECTION     • CHOLECYSTECTOMY     • COLONOSCOPY     • GALLBLADDER SURGERY     • OOPHORECTOMY Bilateral    • DC LAPAROSCOPY COLECTOMY PARTIAL W/ANASTOMOSIS N/A 2021    Procedure: RESECTION COLON SIGMOID LAPAROSCOPIC HAND-ASSISTED;  Surgeon: Silvia Goodrich MD;  Location[facility];  Service: General   • TONSILLECTOMY AND ADENOIDECTOMY     • TOTAL ABDOMINAL HYSTERECTOMY     • UPPER GASTROINTESTINAL ENDOSCOPY FAMILY HISTORY:  Family History   Problem Relation Age of Onset   • Anxiety disorder Mother         Anxiety   • Depression Mother    • Anxiety disorder Sister         Anxiety   • Depression Sister    • Cancer Sister         Melanoma   • No Known Problems Sister    • No Known Problems Sister    • Anxiety disorder Daughter         Anxiety   • Depression Daughter    • Heart disease Maternal Aunt    • Heart disease Maternal Uncle        SOCIAL HISTORY:  Social History     Tobacco Use   • Smoking status: Never   • Smokeless tobacco: Never   Vaping Use   • Vaping Use: Never used   Substance Use Topics   • Alcohol use: Yes     Alcohol/week: 1 0 standard drink     Types: 1 Glasses of wine per week     Comment: occasional alcohol use   • Drug use: No       MEDICATIONS:    Current Outpatient Medications:   •  ALPRAZolam (XANAX) 0 25 mg tablet, Take 1 tablet (0 25 mg total) by mouth daily at bedtime as needed for anxiety, Disp: 30 tablet, Rfl: 0  •  cyclobenzaprine (FLEXERIL) 5 mg tablet, Take 1 tablet (5 mg total) by mouth daily at bedtime, Disp: 21 tablet, Rfl: 0  •  dicyclomine (BENTYL) 10 mg capsule, Take 1 capsule (10 mg total) by mouth 3 (three) times a day as needed (abd pain), Disp: 90 capsule, Rfl: 3  •  metoprolol tartrate (LOPRESSOR) 25 mg tablet, Take 0 5 tablets (12 5 mg total) by mouth every 12 (twelve) hours, Disp: 90 tablet, Rfl: 3  •  omeprazole (PriLOSEC) 40 MG capsule, Take 1 capsule (40 mg total) by mouth daily, Disp: 90 capsule, Rfl: 0  •  amLODIPine (NORVASC) 5 mg tablet, Take 1 tablet (5 mg total) by mouth daily, Disp: 90 tablet, Rfl: 3    ALLERGIES:  No Known Allergies    REVIEW OF SYSTEMS:  Review of Systems   Constitutional: Negative for chills, fever and unexpected weight change  HENT: Negative for hearing loss, nosebleeds and sore throat  Eyes: Negative for pain, redness and visual disturbance  Respiratory: Negative for cough, shortness of breath and wheezing      Cardiovascular: Negative for chest pain, palpitations and leg swelling  Gastrointestinal: Negative for abdominal pain and nausea  Genitourinary: Negative for dyspareunia, dysuria and frequency  Musculoskeletal: Positive for arthralgias  Skin: Negative for rash and wound  Neurological: Negative for dizziness, numbness and headaches  Psychiatric/Behavioral: Negative for decreased concentration and suicidal ideas  The patient is not nervous/anxious  VITALS:  Vitals:    02/13/23 0804   BP: 143/77   Pulse: 59       LABS:  HgA1c:   Lab Results   Component Value Date    HGBA1C 5 5 09/17/2022     BMP:   Lab Results   Component Value Date    GLUCOSE 84 09/22/2015    CALCIUM 9 7 09/17/2022     09/22/2015    K 4 5 09/17/2022    CO2 31 09/17/2022     09/17/2022    BUN 13 09/17/2022    CREATININE 0 87 09/17/2022       _____________________________________________________  PHYSICAL EXAMINATION:  General: well developed and well nourished, alert, oriented times 3 and appears comfortable  Psychiatric: Normal  HEENT: Normocephalic, Atraumatic Trachea Midline, No torticollis  Pulmonary: No audible wheezing or respiratory distress   Cardiovascular: No pitting edema, 2+ radial pulse   Abdominal/GI: abdomen non tender, non distended   Skin: No masses, erythema, lacerations, fluctation, ulcerations  Neurovascular: Sensation intact to the Ulnar Nerve, Sensation Intact to the Radial Nerve, Decreased Sensation to  the Median Nerve, Motor Intact to the Median, Ulnar, Radial Nerve and Pulses Intact  Musculoskeletal: Normal, except as noted in detailed exam and in HPI        MUSCULOSKELETAL EXAMINATION:    Bilateral CMC Exam:  No adduction contracture  No hyperextension deformity of MCP joint  Positive localized tenderness over radial and dorsal aspect of thumb (CMC joint)  Grind test is Positive for pain and Positive for crepitus  Metacarpal load shift test positive  No triggering or tenderness over the A1 pulley  Negative pain with Finkelstein’s maneuver     Left Carpal Tunnel Exam:    Negative thenar atrophy  Negative phalen's test  Negative carpal tunnel compression  Negative tinels over median nerve at the wrist   Opposition strength 5/5  Abduction strength 5/5       ___________________________________________________  STUDIES REVIEWED:  I have personally reviewed AP lateral and oblique radiographs of B/Lhands 3 views which demonstrate  No acute  Fracture dislocation  Scattered degenerative changes trough out   MP joints spared          PROCEDURES PERFORMED:  Procedures  No Procedures performed today    _____________________________________________________      Lety Newman    I,:  Humberto Mulligan am acting as a scribe while in the presence of the attending physician :       I,:  Jess Finch MD personally performed the services described in this documentation    as scribed in my presence :

## 2023-03-26 DIAGNOSIS — F41.9 ANXIETY: ICD-10-CM

## 2023-03-27 RX ORDER — ALPRAZOLAM 0.25 MG/1
0.25 TABLET ORAL
Qty: 30 TABLET | Refills: 3 | Status: SHIPPED | OUTPATIENT
Start: 2023-03-27

## 2023-03-27 NOTE — TELEPHONE ENCOUNTER
Medication:Alprazolam  PDMP   02/20/2023 02/20/2023 ALPRAZolam (Tablet)  30 0 30 0 25 MG NA DANK KRISHNAN      Active agreement on file -No

## 2023-04-24 ENCOUNTER — APPOINTMENT (OUTPATIENT)
Dept: LAB | Facility: CLINIC | Age: 63
End: 2023-04-24

## 2023-04-24 DIAGNOSIS — Z00.8 HEALTH EXAMINATION IN POPULATION SURVEY: ICD-10-CM

## 2023-04-24 LAB
CHOLEST SERPL-MCNC: 205 MG/DL
HDLC SERPL-MCNC: 63 MG/DL
LDLC SERPL CALC-MCNC: 109 MG/DL (ref 0–100)
NONHDLC SERPL-MCNC: 142 MG/DL
TRIGL SERPL-MCNC: 166 MG/DL

## 2023-04-25 LAB
EST. AVERAGE GLUCOSE BLD GHB EST-MCNC: 105 MG/DL
HBA1C MFR BLD: 5.3 %

## 2023-06-01 ENCOUNTER — TELEPHONE (OUTPATIENT)
Dept: RHEUMATOLOGY | Facility: CLINIC | Age: 63
End: 2023-06-01

## 2023-06-01 ENCOUNTER — OFFICE VISIT (OUTPATIENT)
Dept: RHEUMATOLOGY | Facility: CLINIC | Age: 63
End: 2023-06-01
Payer: COMMERCIAL

## 2023-06-01 VITALS
WEIGHT: 123.6 LBS | DIASTOLIC BLOOD PRESSURE: 76 MMHG | SYSTOLIC BLOOD PRESSURE: 132 MMHG | HEIGHT: 61 IN | BODY MASS INDEX: 23.33 KG/M2

## 2023-06-01 DIAGNOSIS — R76.8 ANA POSITIVE: ICD-10-CM

## 2023-06-01 DIAGNOSIS — G56.03 BILATERAL CARPAL TUNNEL SYNDROME: ICD-10-CM

## 2023-06-01 DIAGNOSIS — M19.049 HAND ARTHRITIS: Primary | ICD-10-CM

## 2023-06-01 DIAGNOSIS — M79.10 MUSCLE PAIN: ICD-10-CM

## 2023-06-01 PROCEDURE — 99244 OFF/OP CNSLTJ NEW/EST MOD 40: CPT | Performed by: INTERNAL MEDICINE

## 2023-06-01 RX ORDER — CELECOXIB 100 MG/1
100 CAPSULE ORAL 2 TIMES DAILY
Qty: 60 CAPSULE | Refills: 6 | Status: SHIPPED | OUTPATIENT
Start: 2023-06-01

## 2023-06-01 RX ORDER — CELECOXIB 100 MG/1
100 CAPSULE ORAL 2 TIMES DAILY
Qty: 60 CAPSULE | Refills: 6 | Status: SHIPPED | OUTPATIENT
Start: 2023-06-01 | End: 2023-06-01

## 2023-06-01 NOTE — PROGRESS NOTES
Assessment and Plan:   Kyara Boston is a 58 y o   female who presents as a Rheumatology consult referred by Fred Kitchen* for evaluation of positive RADHA and hand arthritis  Hands have bothered her for at least a year now  Gabapentin causes confusion  Admits to dry eyes  Had bowel resection in November 2021  Hand symptoms are consistent with osteoarthritis and carpal tunnel syndrome  Will rule-out autoimmune disease  Do carpal tunnel exercises  Wear carpal tunnel wrist splints at bedtime  Wear copper fit gloves for hand arthritis symptoms  Do labs, including lupus activity labs; returned unremarkable  Schedule EMG  Take celecoxib twice a day as needed for joint pain  Continue diclofenac gel as needed for joint pain    Return to clinic in 6 months    Plan:  Diagnoses and all orders for this visit:    Hand arthritis  -     Discontinue: celecoxib (CeleBREX) 100 mg capsule; Take 1 capsule (100 mg total) by mouth 2 (two) times a day  -     Diclofenac Sodium (VOLTAREN) 1 %; Apply 2 g topically 4 (four) times a day  -     celecoxib (CeleBREX) 100 mg capsule; Take 1 capsule (100 mg total) by mouth 2 (two) times a day    RADHA positive  -     Ambulatory Referral to Rheumatology  -     Cyclic citrul peptide antibody, IgG  -     Sedimentation rate, automated  -     C-reactive protein  -     Protein / creatinine ratio, urine  -     Urinalysis with microscopic  -     C4 complement  -     C3 complement  -     Anti-DNA antibody, double-stranded  -     CBC and differential  -     Comprehensive metabolic panel  -     Sjogren's Antibodies    Muscle pain  -     Ambulatory Referral to Rheumatology    Bilateral carpal tunnel syndrome  -     EMG 2 Limb Upper Extremity; Future    Follow-up plan: RTC in 6 months        HPI  Kyara Boston is a 58 y o   female who presents as a Rheumatology consult referred by Fred Kitchen* for evaluation of positive RADHA and hand arthritis   Hands have been bothering her for a year now; steroid injections in bilateral 1st CMC joints used to help but not anymore  Fingertips go numb, drops things  Gets pain across her wrists and at DIPs; pain is all the time, worse with use  Wears CMC splint bilaterally; does a lot of typing  Was taking ibuprofen 600mg twice a day was causing bruising  Was on celecoxib years ago, which helped  Also get joint pain in knees and hips  Admits to dry eyes  Denies rashes, oral/nasal ulcers, Raynaud's symptoms, SOB/chest pain, alopecia, or h/o blood clots  Hair falls out a lot  Had one miscarriage in first trimester  Review of Systems  Review of Systems   Constitutional: Positive for fatigue  HENT: Negative for mouth sores  Eyes:        Dry eyes   Respiratory: Negative for cough and shortness of breath  Cardiovascular: Negative for chest pain and leg swelling  Gastrointestinal: Positive for abdominal pain and nausea  Negative for constipation and diarrhea  Genitourinary: Positive for frequency  Musculoskeletal: Positive for arthralgias, back pain, joint swelling, myalgias and neck pain  Skin: Negative for color change and rash  Neurological: Positive for dizziness, tremors, numbness and headaches  Negative for weakness  Hematological: Negative for adenopathy  Psychiatric/Behavioral: Positive for dysphoric mood  Negative for sleep disturbance  Reviewed and agree      Allergies  No Known Allergies    Home Medications    Current Outpatient Medications:   •  celecoxib (CeleBREX) 100 mg capsule, Take 1 capsule (100 mg total) by mouth 2 (two) times a day, Disp: 60 capsule, Rfl: 6  •  Diclofenac Sodium (VOLTAREN) 1 %, Apply 2 g topically 4 (four) times a day, Disp: 100 g, Rfl: 6  •  omeprazole (PriLOSEC) 40 MG capsule, Take 1 capsule (40 mg total) by mouth daily, Disp: 90 capsule, Rfl: 0  •  ALPRAZolam (XANAX) 0 25 mg tablet, Take 1 tablet (0 25 mg total) by mouth daily at bedtime as needed for anxiety, Disp: 30 tablet, Rfl: 3  • lisinopril (ZESTRIL) 10 mg tablet, Take 1 tablet (10 mg total) by mouth daily, Disp: 90 tablet, Rfl: 3  •  sertraline (ZOLOFT) 50 mg tablet, Take 1 tablet (50 mg total) by mouth daily, Disp: 90 tablet, Rfl: 3    Past Medical History  Past Medical History:   Diagnosis Date   • Adjustment disorder with anxiety     last assessed - [id number]   • Anemia    • Anxiety    • Constipation 2021   • Diverticulitis    • Diverticulitis 2021   • Diverticulitis of colon May 2020   • Diverticulitis of large intestine with abscess without bleeding 2020   • Elevated platelet count    • Elevated vitamin B12 level 2022   • Generalized abdominal pain 2021   • GERD (gastroesophageal reflux disease)    • History of total hysterectomy    • History of transfusion    • Hypertension    • Hypertension 3/12/2021   • Ingrown toenail     last assessed - [id number]   • Thalassemia        Past Surgical History   Past Surgical History:   Procedure Laterality Date   • APPENDECTOMY     • BOWEL RESECTION  2021   •  SECTION     • CHOLECYSTECTOMY     • COLONOSCOPY     • GALLBLADDER SURGERY     • OOPHORECTOMY Bilateral    • TN LAPAROSCOPY COLECTOMY PARTIAL W/ANASTOMOSIS N/A 2021    Procedure: RESECTION COLON SIGMOID LAPAROSCOPIC HAND-ASSISTED;  Surgeon: Margoth Rivers MD;  Location: [facility];  Service: General   • TONSILLECTOMY AND ADENOIDECTOMY     • TOTAL ABDOMINAL HYSTERECTOMY     • UPPER GASTROINTESTINAL ENDOSCOPY         Family History    Family History   Problem Relation Age of Onset   • Anxiety disorder Mother         Anxiety   • Depression Mother    • Anxiety disorder Sister         Anxiety   • Depression Sister    • Cancer Sister         Melanoma   • No Known Problems Sister    • No Known Problems Sister    • Anxiety disorder Daughter         Anxiety   • Depression Daughter    • Heart disease Maternal Aunt    • Heart disease Maternal Uncle      Mother - RA      Social History  Occupation: "works in [employer] at [address] [place] [address] History     Substance and Sexual Activity   Alcohol Use Yes   • Alcohol/week: 1 0 standard drink of alcohol   • Types: 1 Glasses of wine per week    Comment: occasional alcohol use     Social History     Substance and Sexual Activity   Drug Use No     Social History     Tobacco Use   Smoking Status Never   Smokeless Tobacco Never       Objective:  Vitals:    06/01/23 1249   BP: 132/76   Weight: 56 1 kg (123 lb 9 6 oz)   Height: 5' 1\" (1 549 m)       Physical Exam  Constitutional:       General: She is not in acute distress  HENT:      Head: Normocephalic and atraumatic  Eyes:      Conjunctiva/sclera: Conjunctivae normal    Cardiovascular:      Rate and Rhythm: Normal rate and regular rhythm  Heart sounds: S1 normal and S2 normal       No friction rub  Pulmonary:      Effort: Pulmonary effort is normal  No respiratory distress  Breath sounds: Normal breath sounds  No wheezing, rhonchi or rales  Musculoskeletal:         General: Tenderness present  Cervical back: Neck supple  Comments: Bilateral first CMC tenderness; left fourth DIP tenderness   Skin:     General: Skin is warm and dry  Coloration: Skin is not pale  Findings: No rash  Neurological:      Mental Status: She is alert  Mental status is at baseline  Psychiatric:         Mood and Affect: Mood normal          Behavior: Behavior normal        Reviewed labs and imaging  Imaging:   Bilateral hand x-rays 2/13/2023  Osteoarthritic degenerative changes are approximately symmetric in right and left hands    Most severe degenerative change, located at 1st carpometacarpal joint, is similar from May 5, 2021        Labs:   Office Visit on 06/01/2023   Component Date Value Ref Range Status   • Cyclic Citrullinated Peptide Ab  06/02/2023 44 0  See comment Final   • Sed Rate 06/02/2023 13  0 - 29 mm/hour Final   • CRP 06/02/2023 3 3 (H)  <3 0 mg/L Final   • Creatinine, Ur 06/02/2023 95 2  " mg/dL Final   • Protein Urine Random 06/02/2023 4  mg/dL Final   • Prot/Creat Ratio, Ur 06/02/2023 0 04  0 00 - 0 10 Final   • Color, UA 06/02/2023 Light Yellow   Final   • Clarity, UA 06/02/2023 Clear   Final   • Specific Gravity, UA 06/02/2023 1 013  1 003 - 1 030 Final   • pH, UA 06/02/2023 6 0  4 5, 5 0, 5 5, 6 0, 6 5, 7 0, 7 5, 8 0 Final   • Leukocytes, UA 06/02/2023 Small (A)  Negative Final   • Nitrite, UA 06/02/2023 Negative  Negative Final   • Protein, UA 06/02/2023 Negative  Negative mg/dl Final   • Glucose, UA 06/02/2023 Negative  Negative mg/dl Final   • Ketones, UA 06/02/2023 Negative  Negative mg/dl Final   • Urobilinogen, UA 06/02/2023 <2 0  <2 0 mg/dl mg/dl Final   • Bilirubin, UA 06/02/2023 Negative  Negative Final   • Occult Blood, UA 06/02/2023 Negative  Negative Final   • RBC, UA 06/02/2023 None Seen  None Seen, 1-2 /hpf Final   • WBC, UA 06/02/2023 2-4 (A)  None Seen, 1-2 /hpf Final   • Epithelial Cells 06/02/2023 Occasional  None Seen, Occasional /hpf Final   • Bacteria, UA 06/02/2023 None Seen  None Seen, Occasional /hpf Final   • C4, COMPLEMENT 06/02/2023 31 0  10 0 - 40 0 mg/dL Final   • C3 Complement 06/02/2023 122 0  90 0 - 180 0 mg/dL Final   • ds DNA Ab 06/02/2023 <1  0 - 9 IU/mL Final                                       Negative      <5                                     Equivocal  5 - 9                                     Positive      >9   • WBC 06/02/2023 6 98  4 31 - 10 16 Thousand/uL Final   • RBC 06/02/2023 5 70 (H)  3 81 - 5 12 Million/uL Final   • Hemoglobin 06/02/2023 11 7  11 5 - 15 4 g/dL Final   • Hematocrit 06/02/2023 38 6  34 8 - 46 1 % Final   • MCV 06/02/2023 68 (L)  82 - 98 fL Final   • MCH 06/02/2023 20 5 (L)  26 8 - 34 3 pg Final   • MCHC 06/02/2023 30 3 (L)  31 4 - 37 4 g/dL Final   • RDW 06/02/2023 16 0 (H)  11 6 - 15 1 % Final   • MPV 06/02/2023 9 4  8 9 - 12 7 fL Final   • Platelets 53/27/8532 348  149 - 390 Thousands/uL Final   • nRBC 06/02/2023 0  /100 WBCs Final   • Neutrophils Relative 06/02/2023 67  43 - 75 % Final   • Immat GRANS % 06/02/2023 0  0 - 2 % Final   • Lymphocytes Relative 06/02/2023 17  14 - 44 % Final   • Monocytes Relative 06/02/2023 10  4 - 12 % Final   • Eosinophils Relative 06/02/2023 5  0 - 6 % Final   • Basophils Relative 06/02/2023 1  0 - 1 % Final   • Neutrophils Absolute 06/02/2023 4 65  1 85 - 7 62 Thousands/µL Final   • Immature Grans Absolute 06/02/2023 0 03  0 00 - 0 20 Thousand/uL Final   • Lymphocytes Absolute 06/02/2023 1 20  0 60 - 4 47 Thousands/µL Final   • Monocytes Absolute 06/02/2023 0 68  0 17 - 1 22 Thousand/µL Final   • Eosinophils Absolute 06/02/2023 0 34  0 00 - 0 61 Thousand/µL Final   • Basophils Absolute 06/02/2023 0 08  0 00 - 0 10 Thousands/µL Final   • Sodium 06/02/2023 136  135 - 147 mmol/L Final   • Potassium 06/02/2023 4 3  3 5 - 5 3 mmol/L Final   • Chloride 06/02/2023 101  96 - 108 mmol/L Final   • CO2 06/02/2023 28  21 - 32 mmol/L Final   • ANION GAP 06/02/2023 7  4 - 13 mmol/L Final   • BUN 06/02/2023 13  5 - 25 mg/dL Final   • Creatinine 06/02/2023 0 82  0 60 - 1 30 mg/dL Final    Standardized to IDMS reference method   • Glucose, Fasting 06/02/2023 94  65 - 99 mg/dL Final   • Calcium 06/02/2023 9 4  8 4 - 10 2 mg/dL Final   • AST 06/02/2023 15  13 - 39 U/L Final   • ALT 06/02/2023 15  7 - 52 U/L Final    Specimen collection should occur prior to Sulfasalazine administration due to the potential for falsely depressed results  • Alkaline Phosphatase 06/02/2023 90  34 - 104 U/L Final   • Total Protein 06/02/2023 7 1  6 4 - 8 4 g/dL Final   • Albumin 06/02/2023 4 2  3 5 - 5 0 g/dL Final   • Total Bilirubin 06/02/2023 0 78  0 20 - 1 00 mg/dL Final    Use of this assay is not recommended for patients undergoing treatment with eltrombopag due to the potential for falsely elevated results    N-acetyl-p-benzoquinone imine (metabolite of Acetaminophen) will generate erroneously low results in samples for patients that have taken an overdose of Acetaminophen  • eGFR 06/02/2023 75  ml/min/1 73sq m Final   • SS-A (RO) Ab 06/02/2023 <0 2  0 0 - 0 9 AI Final   • SS-B (LA) Ab 06/02/2023 <0 2  0 0 - 0 9 AI Final   Appointment on 04/24/2023   Component Date Value Ref Range Status   • Hemoglobin A1C 04/24/2023 5 3  Normal 3 8-5 6%; PreDiabetic 5 7-6 4%; Diabetic >=6 5%; Glycemic control for adults with diabetes <7 0% % Final   • EAG 04/24/2023 105  mg/dl Final   • Cholesterol 04/24/2023 205 (H)  See Comment mg/dL Final    Cholesterol:         Pediatric <18 Years        Desirable          <170 mg/dL      Borderline High    170-199 mg/dL      High               >=200 mg/dL        Adult >=18 Years            Desirable         <200 mg/dL      Borderline High   200-239 mg/dL      High              >239 mg/dL     • Triglycerides 04/24/2023 166 (H)  See Comment mg/dL Final    Triglyceride:     0-9Y            <75mg/dL     10Y-17Y         <90 mg/dL       >=18Y     Normal          <150 mg/dL     Borderline High 150-199 mg/dL     High            200-499 mg/dL        Very High       >499 mg/dL    Specimen collection should occur prior to N-Acetylcysteine or Metamizole administration due to the potential for falsely depressed results  • HDL, Direct 04/24/2023 63  >=50 mg/dL Final    Specimen collection should occur prior to Metamizole administration due to the potential for falsley depressed results  • LDL Calculated 04/24/2023 109 (H)  0 - 100 mg/dL Final    LDL Cholesterol:     Optimal           <100 mg/dl     Near Optimal      100-129 mg/dl     Above Optimal       Borderline High 130-159 mg/dl       High            160-189 mg/dl       Very High       >189 mg/dl         This screening LDL is a calculated result  It does not have the accuracy of the Direct Measured LDL in the monitoring of patients with hyperlipidemia and/or statin therapy  Direct Measure LDL (LNV949) must be ordered separately in these patients     • Non-HDL-Chol (CHOL-HDL) 04/24/2023 142  mg/dl Final

## 2023-06-01 NOTE — PATIENT INSTRUCTIONS
Do carpal tunnel exercises  Wear carpal tunnel wrist splints at bedtime  Wear copper fit gloves for hand arthritis symptoms  Do labs  Schedule EMG  Take celecoxib twice a day as needed for joint pain  Continue diclofenac gel as needed for joint pain    Return to clinic in 6 months

## 2023-06-01 NOTE — LETTER
June 26, 2023     ALYX Henderson [address] [facility]    Patient: Shruthi Lopez   YOB: 1960   Date of Visit: 6/1/2023       Dear Dr Holly Cantu: Thank you for referring Shruthi Lopez to me for evaluation  Below are my notes for this consultation  If you have questions, please do not hesitate to call me  I look forward to following your patient along with you  Sincerely,        Michelle Carty MD        CC: No Recipients    Michelle Carty MD  6/26/2023  2:54 AM  Signed  Assessment and Plan:   Shruthi Lopez is a 58 y o   female who presents as a Rheumatology consult referred by Winnie Rosa* for evaluation of positive RADHA and hand arthritis  Hands have bothered her for at least a year now  Gabapentin causes confusion  Admits to dry eyes  Had bowel resection in November 2021  Hand symptoms are consistent with osteoarthritis and carpal tunnel syndrome  Will rule-out autoimmune disease  Do carpal tunnel exercises  Wear carpal tunnel wrist splints at bedtime  Wear copper fit gloves for hand arthritis symptoms  Do labs, including lupus activity labs; returned unremarkable  Schedule EMG  Take celecoxib twice a day as needed for joint pain  Continue diclofenac gel as needed for joint pain    Return to clinic in 6 months    Plan:  Diagnoses and all orders for this visit:    Hand arthritis  -     Discontinue: celecoxib (CeleBREX) 100 mg capsule; Take 1 capsule (100 mg total) by mouth 2 (two) times a day  -     Diclofenac Sodium (VOLTAREN) 1 %; Apply 2 g topically 4 (four) times a day  -     celecoxib (CeleBREX) 100 mg capsule;  Take 1 capsule (100 mg total) by mouth 2 (two) times a day    RADHA positive  -     Ambulatory Referral to Rheumatology  -     Cyclic citrul peptide antibody, IgG  -     Sedimentation rate, automated  -     C-reactive protein  -     Protein / creatinine ratio, urine  -     Urinalysis with microscopic  -     C4 complement  -     C3 complement  -     Anti-DNA antibody, double-stranded  -     CBC and differential  -     Comprehensive metabolic panel  -     Sjogren's Antibodies    Muscle pain  -     Ambulatory Referral to Rheumatology    Bilateral carpal tunnel syndrome  -     EMG 2 Limb Upper Extremity; Future    Follow-up plan: RTC in 6 months        HPI  Ysabel Gavin is a 58 y o   female who presents as a Rheumatology consult referred by Cheri Chavez* for evaluation of positive RADHA and hand arthritis  Hands have been bothering her for a year now; steroid injections in bilateral 1st CMC joints used to help but not anymore  Fingertips go numb, drops things  Gets pain across her wrists and at DIPs; pain is all the time, worse with use  Wears CMC splint bilaterally; does a lot of typing  Was taking ibuprofen 600mg twice a day was causing bruising  Was on celecoxib years ago, which helped  Also get joint pain in knees and hips  Admits to dry eyes  Denies rashes, oral/nasal ulcers, Raynaud's symptoms, SOB/chest pain, alopecia, or h/o blood clots  Hair falls out a lot  Had one miscarriage in first trimester  Review of Systems  Review of Systems   Constitutional: Positive for fatigue  HENT: Negative for mouth sores  Eyes:        Dry eyes   Respiratory: Negative for cough and shortness of breath  Cardiovascular: Negative for chest pain and leg swelling  Gastrointestinal: Positive for abdominal pain and nausea  Negative for constipation and diarrhea  Genitourinary: Positive for frequency  Musculoskeletal: Positive for arthralgias, back pain, joint swelling, myalgias and neck pain  Skin: Negative for color change and rash  Neurological: Positive for dizziness, tremors, numbness and headaches  Negative for weakness  Hematological: Negative for adenopathy  Psychiatric/Behavioral: Positive for dysphoric mood  Negative for sleep disturbance       Reviewed and agree      Allergies  No Known Allergies    Home Medications    Current Outpatient Medications:   •  celecoxib (CeleBREX) 100 mg capsule, Take 1 capsule (100 mg total) by mouth 2 (two) times a day, Disp: 60 capsule, Rfl: 6  •  Diclofenac Sodium (VOLTAREN) 1 %, Apply 2 g topically 4 (four) times a day, Disp: 100 g, Rfl: 6  •  omeprazole (PriLOSEC) 40 MG capsule, Take 1 capsule (40 mg total) by mouth daily, Disp: 90 capsule, Rfl: 0  •  ALPRAZolam (XANAX) 0 25 mg tablet, Take 1 tablet (0 25 mg total) by mouth daily at bedtime as needed for anxiety, Disp: 30 tablet, Rfl: 3  •  lisinopril (ZESTRIL) 10 mg tablet, Take 1 tablet (10 mg total) by mouth daily, Disp: 90 tablet, Rfl: 3  •  sertraline (ZOLOFT) 50 mg tablet, Take 1 tablet (50 mg total) by mouth daily, Disp: 90 tablet, Rfl: 3    Past Medical History  Past Medical History:   Diagnosis Date   • Adjustment disorder with anxiety     last assessed - 2016   • Anemia    • Anxiety    • Constipation 2021   • Diverticulitis    • Diverticulitis 2021   • Diverticulitis of colon May 2020   • Diverticulitis of large intestine with abscess without bleeding 2020   • Elevated platelet count    • Elevated vitamin B12 level 2022   • Generalized abdominal pain 2021   • GERD (gastroesophageal reflux disease)    • History of total hysterectomy    • History of transfusion    • Hypertension    • Hypertension 3/12/2021   • Ingrown toenail     last assessed - [id number]   • Thalassemia        Past Surgical History   Past Surgical History:   Procedure Laterality Date   • APPENDECTOMY     • BOWEL RESECTION  2021   •  SECTION     • CHOLECYSTECTOMY     • COLONOSCOPY     • GALLBLADDER SURGERY     • OOPHORECTOMY Bilateral    • MA LAPAROSCOPY COLECTOMY PARTIAL W/ANASTOMOSIS N/A 2021    Procedure: RESECTION COLON SIGMOID LAPAROSCOPIC HAND-ASSISTED;  Surgeon: Collette Boast, MD;  Location: [facility];  Service: General   • TONSILLECTOMY "AND ADENOIDECTOMY     • TOTAL ABDOMINAL HYSTERECTOMY     • UPPER GASTROINTESTINAL ENDOSCOPY         Family History    Family History   Problem Relation Age of Onset   • Anxiety disorder Mother         Anxiety   • Depression Mother    • Anxiety disorder Sister         Anxiety   • Depression Sister    • Cancer Sister         Melanoma   • No Known Problems Sister    • No Known Problems Sister    • Anxiety disorder Daughter         Anxiety   • Depression Daughter    • Heart disease Maternal Aunt    • Heart disease Maternal Uncle      Mother - RA      Social History  Occupation: works in [employer] at [address] [place], [place] [address]way History     Substance and Sexual Activity   Alcohol Use Yes   • Alcohol/week: 1 0 standard drink of alcohol   • Types: 1 Glasses of wine per week    Comment: occasional alcohol use     Social History     Substance and Sexual Activity   Drug Use No     Social History     Tobacco Use   Smoking Status Never   Smokeless Tobacco Never       Objective:  Vitals:    06/01/23 1249   BP: 132/76   Weight: 56 1 kg (123 lb 9 6 oz)   Height: 5' 1\" (1 549 m)       Physical Exam  Constitutional:       General: She is not in acute distress  HENT:      Head: Normocephalic and atraumatic  Eyes:      Conjunctiva/sclera: Conjunctivae normal    Cardiovascular:      Rate and Rhythm: Normal rate and regular rhythm  Heart sounds: S1 normal and S2 normal       No friction rub  Pulmonary:      Effort: Pulmonary effort is normal  No respiratory distress  Breath sounds: Normal breath sounds  No wheezing, rhonchi or rales  Musculoskeletal:         General: Tenderness present  Cervical back: Neck supple  Comments: Bilateral first CMC tenderness; left fourth DIP tenderness   Skin:     General: Skin is warm and dry  Coloration: Skin is not pale  Findings: No rash  Neurological:      Mental Status: She is alert  Mental status is at baseline     Psychiatric:         Mood and Affect: Mood normal          " Behavior: Behavior normal        Reviewed labs and imaging  Imaging:   Bilateral hand x-rays 2/13/2023  Osteoarthritic degenerative changes are approximately symmetric in right and left hands  Most severe degenerative change, located at 1st carpometacarpal joint, is similar from May 5, 2021         Labs:   Office Visit on 06/01/2023   Component Date Value Ref Range Status   • Cyclic Citrullinated Peptide Ab  06/02/2023 44 0  See comment Final   • Sed Rate 06/02/2023 13  0 - 29 mm/hour Final   • CRP 06/02/2023 3 3 (H)  <3 0 mg/L Final   • Creatinine, Ur 06/02/2023 95 2  mg/dL Final   • Protein Urine Random 06/02/2023 4  mg/dL Final   • Prot/Creat Ratio, Ur 06/02/2023 0 04  0 00 - 0 10 Final   • Color, UA 06/02/2023 Light Yellow   Final   • Clarity, UA 06/02/2023 Clear   Final   • Specific Gravity, UA 06/02/2023 1 013  1 003 - 1 030 Final   • pH, UA 06/02/2023 6 0  4 5, 5 0, 5 5, 6 0, 6 5, 7 0, 7 5, 8 0 Final   • Leukocytes, UA 06/02/2023 Small (A)  Negative Final   • Nitrite, UA 06/02/2023 Negative  Negative Final   • Protein, UA 06/02/2023 Negative  Negative mg/dl Final   • Glucose, UA 06/02/2023 Negative  Negative mg/dl Final   • Ketones, UA 06/02/2023 Negative  Negative mg/dl Final   • Urobilinogen, UA 06/02/2023 <2 0  <2 0 mg/dl mg/dl Final   • Bilirubin, UA 06/02/2023 Negative  Negative Final   • Occult Blood, UA 06/02/2023 Negative  Negative Final   • RBC, UA 06/02/2023 None Seen  None Seen, 1-2 /hpf Final   • WBC, UA 06/02/2023 2-4 (A)  None Seen, 1-2 /hpf Final   • Epithelial Cells 06/02/2023 Occasional  None Seen, Occasional /hpf Final   • Bacteria, UA 06/02/2023 None Seen  None Seen, Occasional /hpf Final   • C4, COMPLEMENT 06/02/2023 31 0  10 0 - 40 0 mg/dL Final   • C3 Complement 06/02/2023 122 0  90 0 - 180 0 mg/dL Final   • ds DNA Ab 06/02/2023 <1  0 - 9 IU/mL Final                                       Negative      <5                                     Equivocal  5 - 9 Positive      >9   • WBC 06/02/2023 6 98  4 31 - 10 16 Thousand/uL Final   • RBC 06/02/2023 5 70 (H)  3 81 - 5 12 Million/uL Final   • Hemoglobin 06/02/2023 11 7  11 5 - 15 4 g/dL Final   • Hematocrit 06/02/2023 38 6  34 8 - 46 1 % Final   • MCV 06/02/2023 68 (L)  82 - 98 fL Final   • MCH 06/02/2023 20 5 (L)  26 8 - 34 3 pg Final   • MCHC 06/02/2023 30 3 (L)  31 4 - 37 4 g/dL Final   • RDW 06/02/2023 16 0 (H)  11 6 - 15 1 % Final   • MPV 06/02/2023 9 4  8 9 - 12 7 fL Final   • Platelets 81/57/6552 348  149 - 390 Thousands/uL Final   • nRBC 06/02/2023 0  /100 WBCs Final   • Neutrophils Relative 06/02/2023 67  43 - 75 % Final   • Immat GRANS % 06/02/2023 0  0 - 2 % Final   • Lymphocytes Relative 06/02/2023 17  14 - 44 % Final   • Monocytes Relative 06/02/2023 10  4 - 12 % Final   • Eosinophils Relative 06/02/2023 5  0 - 6 % Final   • Basophils Relative 06/02/2023 1  0 - 1 % Final   • Neutrophils Absolute 06/02/2023 4 65  1 85 - 7 62 Thousands/µL Final   • Immature Grans Absolute 06/02/2023 0 03  0 00 - 0 20 Thousand/uL Final   • Lymphocytes Absolute 06/02/2023 1 20  0 60 - 4 47 Thousands/µL Final   • Monocytes Absolute 06/02/2023 0 68  0 17 - 1 22 Thousand/µL Final   • Eosinophils Absolute 06/02/2023 0 34  0 00 - 0 61 Thousand/µL Final   • Basophils Absolute 06/02/2023 0 08  0 00 - 0 10 Thousands/µL Final   • Sodium 06/02/2023 136  135 - 147 mmol/L Final   • Potassium 06/02/2023 4 3  3 5 - 5 3 mmol/L Final   • Chloride 06/02/2023 101  96 - 108 mmol/L Final   • CO2 06/02/2023 28  21 - 32 mmol/L Final   • ANION GAP 06/02/2023 7  4 - 13 mmol/L Final   • BUN 06/02/2023 13  5 - 25 mg/dL Final   • Creatinine 06/02/2023 0 82  0 60 - 1 30 mg/dL Final    Standardized to IDMS reference method   • Glucose, Fasting 06/02/2023 94  65 - 99 mg/dL Final   • Calcium 06/02/2023 9 4  8 4 - 10 2 mg/dL Final   • AST 06/02/2023 15  13 - 39 U/L Final   • ALT 06/02/2023 15  7 - 52 U/L Final    Specimen collection should occur prior to Sulfasalazine administration due to the potential for falsely depressed results  • Alkaline Phosphatase 06/02/2023 90  34 - 104 U/L Final   • Total Protein 06/02/2023 7 1  6 4 - 8 4 g/dL Final   • Albumin 06/02/2023 4 2  3 5 - 5 0 g/dL Final   • Total Bilirubin 06/02/2023 0 78  0 20 - 1 00 mg/dL Final    Use of this assay is not recommended for patients undergoing treatment with eltrombopag due to the potential for falsely elevated results  N-acetyl-p-benzoquinone imine (metabolite of Acetaminophen) will generate erroneously low results in samples for patients that have taken an overdose of Acetaminophen  • eGFR 06/02/2023 75  ml/min/1 73sq m Final   • SS-A (RO) Ab 06/02/2023 <0 2  0 0 - 0 9 AI Final   • SS-B (LA) Ab 06/02/2023 <0 2  0 0 - 0 9 AI Final   Appointment on 04/24/2023   Component Date Value Ref Range Status   • Hemoglobin A1C 04/24/2023 5 3  Normal 3 8-5 6%; PreDiabetic 5 7-6 4%; Diabetic >=6 5%; Glycemic control for adults with diabetes <7 0% % Final   • EAG 04/24/2023 105  mg/dl Final   • Cholesterol 04/24/2023 205 (H)  See Comment mg/dL Final    Cholesterol:         Pediatric <18 Years        Desirable          <170 mg/dL      Borderline High    170-199 mg/dL      High               >=200 mg/dL        Adult >=18 Years            Desirable         <200 mg/dL      Borderline High   200-239 mg/dL      High              >239 mg/dL     • Triglycerides 04/24/2023 166 (H)  See Comment mg/dL Final    Triglyceride:     0-9Y            <75mg/dL     10Y-17Y         <90 mg/dL       >=18Y     Normal          <150 mg/dL     Borderline High 150-199 mg/dL     High            200-499 mg/dL        Very High       >499 mg/dL    Specimen collection should occur prior to N-Acetylcysteine or Metamizole administration due to the potential for falsely depressed results     • HDL, Direct 04/24/2023 63  >=50 mg/dL Final    Specimen collection should occur prior to Metamizole administration due to the potential for falsley depressed results  • LDL Calculated 04/24/2023 109 (H)  0 - 100 mg/dL Final    LDL Cholesterol:     Optimal           <100 mg/dl     Near Optimal      100-129 mg/dl     Above Optimal       Borderline High 130-159 mg/dl       High            160-189 mg/dl       Very High       >189 mg/dl         This screening LDL is a calculated result  It does not have the accuracy of the Direct Measured LDL in the monitoring of patients with hyperlipidemia and/or statin therapy  Direct Measure LDL (HAY837) must be ordered separately in these patients     • Non-HDL-Chol (CHOL-HDL) 04/24/2023 142  mg/dl Final

## 2023-06-02 ENCOUNTER — APPOINTMENT (OUTPATIENT)
Dept: LAB | Facility: CLINIC | Age: 63
End: 2023-06-02
Payer: COMMERCIAL

## 2023-06-02 DIAGNOSIS — D75.839 THROMBOCYTOSIS: ICD-10-CM

## 2023-06-02 DIAGNOSIS — R74.8 ELEVATED VITAMIN B12 LEVEL: ICD-10-CM

## 2023-06-02 LAB
ALBUMIN SERPL BCP-MCNC: 4.2 G/DL (ref 3.5–5)
ALP SERPL-CCNC: 90 U/L (ref 34–104)
ALT SERPL W P-5'-P-CCNC: 15 U/L (ref 7–52)
ANION GAP SERPL CALCULATED.3IONS-SCNC: 7 MMOL/L (ref 4–13)
AST SERPL W P-5'-P-CCNC: 15 U/L (ref 13–39)
BACTERIA UR QL AUTO: ABNORMAL /HPF
BASOPHILS # BLD AUTO: 0.08 THOUSANDS/ÂΜL (ref 0–0.1)
BASOPHILS NFR BLD AUTO: 1 % (ref 0–1)
BILIRUB SERPL-MCNC: 0.78 MG/DL (ref 0.2–1)
BILIRUB UR QL STRIP: NEGATIVE
BUN SERPL-MCNC: 13 MG/DL (ref 5–25)
C3 SERPL-MCNC: 122 MG/DL (ref 90–180)
C4 SERPL-MCNC: 31 MG/DL (ref 10–40)
CALCIUM SERPL-MCNC: 9.4 MG/DL (ref 8.4–10.2)
CHLORIDE SERPL-SCNC: 101 MMOL/L (ref 96–108)
CLARITY UR: CLEAR
CO2 SERPL-SCNC: 28 MMOL/L (ref 21–32)
COLOR UR: ABNORMAL
CREAT SERPL-MCNC: 0.82 MG/DL (ref 0.6–1.3)
CREAT UR-MCNC: 95.2 MG/DL
CRP SERPL QL: 3.3 MG/L
EOSINOPHIL # BLD AUTO: 0.34 THOUSAND/ÂΜL (ref 0–0.61)
EOSINOPHIL NFR BLD AUTO: 5 % (ref 0–6)
ERYTHROCYTE [DISTWIDTH] IN BLOOD BY AUTOMATED COUNT: 16 % (ref 11.6–15.1)
ERYTHROCYTE [SEDIMENTATION RATE] IN BLOOD: 13 MM/HOUR (ref 0–29)
GFR SERPL CREATININE-BSD FRML MDRD: 75 ML/MIN/1.73SQ M
GLUCOSE P FAST SERPL-MCNC: 94 MG/DL (ref 65–99)
GLUCOSE UR STRIP-MCNC: NEGATIVE MG/DL
HCT VFR BLD AUTO: 38.6 % (ref 34.8–46.1)
HGB BLD-MCNC: 11.7 G/DL (ref 11.5–15.4)
HGB UR QL STRIP.AUTO: NEGATIVE
IMM GRANULOCYTES # BLD AUTO: 0.03 THOUSAND/UL (ref 0–0.2)
IMM GRANULOCYTES NFR BLD AUTO: 0 % (ref 0–2)
KETONES UR STRIP-MCNC: NEGATIVE MG/DL
LDH SERPL-CCNC: 138 U/L (ref 140–271)
LEUKOCYTE ESTERASE UR QL STRIP: ABNORMAL
LYMPHOCYTES # BLD AUTO: 1.2 THOUSANDS/ÂΜL (ref 0.6–4.47)
LYMPHOCYTES NFR BLD AUTO: 17 % (ref 14–44)
MCH RBC QN AUTO: 20.5 PG (ref 26.8–34.3)
MCHC RBC AUTO-ENTMCNC: 30.3 G/DL (ref 31.4–37.4)
MCV RBC AUTO: 68 FL (ref 82–98)
MONOCYTES # BLD AUTO: 0.68 THOUSAND/ÂΜL (ref 0.17–1.22)
MONOCYTES NFR BLD AUTO: 10 % (ref 4–12)
NEUTROPHILS # BLD AUTO: 4.65 THOUSANDS/ÂΜL (ref 1.85–7.62)
NEUTS SEG NFR BLD AUTO: 67 % (ref 43–75)
NITRITE UR QL STRIP: NEGATIVE
NON-SQ EPI CELLS URNS QL MICRO: ABNORMAL /HPF
NRBC BLD AUTO-RTO: 0 /100 WBCS
PH UR STRIP.AUTO: 6 [PH]
PLATELET # BLD AUTO: 348 THOUSANDS/UL (ref 149–390)
PMV BLD AUTO: 9.4 FL (ref 8.9–12.7)
POTASSIUM SERPL-SCNC: 4.3 MMOL/L (ref 3.5–5.3)
PROT SERPL-MCNC: 7.1 G/DL (ref 6.4–8.4)
PROT UR STRIP-MCNC: NEGATIVE MG/DL
PROT UR-MCNC: 4 MG/DL
PROT/CREAT UR: 0.04 MG/G{CREAT} (ref 0–0.1)
RBC # BLD AUTO: 5.7 MILLION/UL (ref 3.81–5.12)
RBC #/AREA URNS AUTO: ABNORMAL /HPF
SODIUM SERPL-SCNC: 136 MMOL/L (ref 135–147)
SP GR UR STRIP.AUTO: 1.01 (ref 1–1.03)
UROBILINOGEN UR STRIP-ACNC: <2 MG/DL
VIT B12 SERPL-MCNC: 414 PG/ML (ref 180–914)
WBC # BLD AUTO: 6.98 THOUSAND/UL (ref 4.31–10.16)
WBC #/AREA URNS AUTO: ABNORMAL /HPF

## 2023-06-02 PROCEDURE — 86200 CCP ANTIBODY: CPT | Performed by: INTERNAL MEDICINE

## 2023-06-02 PROCEDURE — 81001 URINALYSIS AUTO W/SCOPE: CPT | Performed by: INTERNAL MEDICINE

## 2023-06-02 PROCEDURE — 82570 ASSAY OF URINE CREATININE: CPT | Performed by: INTERNAL MEDICINE

## 2023-06-02 PROCEDURE — 86160 COMPLEMENT ANTIGEN: CPT | Performed by: INTERNAL MEDICINE

## 2023-06-02 PROCEDURE — 86225 DNA ANTIBODY NATIVE: CPT | Performed by: INTERNAL MEDICINE

## 2023-06-02 PROCEDURE — 86140 C-REACTIVE PROTEIN: CPT | Performed by: INTERNAL MEDICINE

## 2023-06-02 PROCEDURE — 80053 COMPREHEN METABOLIC PANEL: CPT | Performed by: INTERNAL MEDICINE

## 2023-06-02 PROCEDURE — 84156 ASSAY OF PROTEIN URINE: CPT | Performed by: INTERNAL MEDICINE

## 2023-06-02 PROCEDURE — 85025 COMPLETE CBC W/AUTO DIFF WBC: CPT | Performed by: INTERNAL MEDICINE

## 2023-06-02 PROCEDURE — 83615 LACTATE (LD) (LDH) ENZYME: CPT

## 2023-06-02 PROCEDURE — 85652 RBC SED RATE AUTOMATED: CPT | Performed by: INTERNAL MEDICINE

## 2023-06-02 PROCEDURE — 86235 NUCLEAR ANTIGEN ANTIBODY: CPT | Performed by: INTERNAL MEDICINE

## 2023-06-02 PROCEDURE — 36415 COLL VENOUS BLD VENIPUNCTURE: CPT | Performed by: INTERNAL MEDICINE

## 2023-06-02 PROCEDURE — 82607 VITAMIN B-12: CPT

## 2023-06-03 LAB
DSDNA AB SER-ACNC: <1 IU/ML (ref 0–9)
ENA SS-A AB SER-ACNC: <0.2 AI (ref 0–0.9)
ENA SS-B AB SER-ACNC: <0.2 AI (ref 0–0.9)

## 2023-06-04 DIAGNOSIS — F32.9 REACTIVE DEPRESSION: Primary | ICD-10-CM

## 2023-06-06 LAB — CCP AB SER IA-ACNC: 44

## 2023-06-07 DIAGNOSIS — F41.9 ANXIETY: ICD-10-CM

## 2023-06-08 ENCOUNTER — OFFICE VISIT (OUTPATIENT)
Dept: FAMILY MEDICINE CLINIC | Facility: CLINIC | Age: 63
End: 2023-06-08
Payer: COMMERCIAL

## 2023-06-08 ENCOUNTER — APPOINTMENT (OUTPATIENT)
Dept: LAB | Facility: CLINIC | Age: 63
End: 2023-06-08
Payer: COMMERCIAL

## 2023-06-08 VITALS
TEMPERATURE: 97.5 F | OXYGEN SATURATION: 98 % | BODY MASS INDEX: 23.77 KG/M2 | WEIGHT: 125.8 LBS | SYSTOLIC BLOOD PRESSURE: 131 MMHG | RESPIRATION RATE: 16 BRPM | DIASTOLIC BLOOD PRESSURE: 78 MMHG | HEART RATE: 65 BPM

## 2023-06-08 DIAGNOSIS — M25.552 PAIN OF LEFT HIP: ICD-10-CM

## 2023-06-08 DIAGNOSIS — I10 PRIMARY HYPERTENSION: ICD-10-CM

## 2023-06-08 DIAGNOSIS — Z12.31 ENCOUNTER FOR SCREENING MAMMOGRAM FOR MALIGNANT NEOPLASM OF BREAST: ICD-10-CM

## 2023-06-08 DIAGNOSIS — R10.32 LEFT LOWER QUADRANT ABDOMINAL PAIN: Primary | ICD-10-CM

## 2023-06-08 DIAGNOSIS — M54.50 ACUTE LEFT-SIDED LOW BACK PAIN WITHOUT SCIATICA: ICD-10-CM

## 2023-06-08 DIAGNOSIS — R10.12 LEFT UPPER QUADRANT ABDOMINAL PAIN: ICD-10-CM

## 2023-06-08 PROCEDURE — 36415 COLL VENOUS BLD VENIPUNCTURE: CPT

## 2023-06-08 PROCEDURE — 99214 OFFICE O/P EST MOD 30 MIN: CPT | Performed by: NURSE PRACTITIONER

## 2023-06-08 RX ORDER — LISINOPRIL 10 MG/1
10 TABLET ORAL DAILY
Qty: 90 TABLET | Refills: 3 | Status: SHIPPED | OUTPATIENT
Start: 2023-06-08 | End: 2023-06-08 | Stop reason: SDUPTHER

## 2023-06-08 RX ORDER — ALPRAZOLAM 0.25 MG/1
0.25 TABLET ORAL
Qty: 30 TABLET | Refills: 3 | Status: SHIPPED | OUTPATIENT
Start: 2023-06-08

## 2023-06-08 RX ORDER — LISINOPRIL 10 MG/1
10 TABLET ORAL DAILY
Qty: 90 TABLET | Refills: 3 | Status: SHIPPED | OUTPATIENT
Start: 2023-06-08

## 2023-06-08 NOTE — PROGRESS NOTES
FAMILY PRACTICE OFFICE VISIT       NAME: Anatoliy Amaya  AGE: 58 y o  SEX: female       : 1960        MRN: [id number]    DATE: 2023  TIME: 8:32 AM    Assessment and Plan   1  Left lower quadrant abdominal pain  -     CT abdomen pelvis w contrast; Future; Expected date: 2023    2  Left upper quadrant abdominal pain  -     CT abdomen pelvis w contrast; Future; Expected date: 2023    3  Pain of left hip  -     XR hip/pelv 2-3 vws left if performed; Future; Expected date: 2023    4  Acute left-sided low back pain without sciatica  -     XR spine lumbar 2 or 3 views injury; Future; Expected date: 2023    5  Primary hypertension  Assessment & Plan:  Stable  D/c amlodipine and start lisinopril        Orders:  -     lisinopril (ZESTRIL) 10 mg tablet; Take 1 tablet (10 mg total) by mouth daily    6  Encounter for screening mammogram for malignant neoplasm of breast  -     Mammo screening bilateral w 3d & cad; Future; Expected date: 2023               Chief Complaint     Chief Complaint   Patient presents with   • Follow-up     Left lower quadrant pain, headache from BP medication       History of Present Illness   Anatoliy Amaya is a 58y o -year-old female who is here for c/o left lower and left upper quadrant pain  Comes and goes  Not sure left hip pain  Pain with ambulation at times  Recently lost her , age of 70  No change in bowel habits  No numbness or tingling of bilat lower extremity  No rash  No urinary complaints    Having ha from bp meds, would like to change them      Review of Systems   Review of Systems   Constitutional: Negative for fatigue and fever  HENT: Negative for congestion, postnasal drip and rhinorrhea  Eyes: Negative for photophobia and visual disturbance  Respiratory: Negative for cough, shortness of breath and wheezing  Cardiovascular: Negative for chest pain and palpitations  Gastrointestinal: Positive for abdominal pain   Negative for constipation, diarrhea, nausea and vomiting  Genitourinary: Negative for dysuria and frequency  Musculoskeletal: Positive for arthralgias and myalgias  Skin: Negative for rash  Neurological: Negative for dizziness, light-headedness and headaches  Hematological: Negative for adenopathy  Psychiatric/Behavioral: Positive for dysphoric mood  Negative for sleep disturbance  The patient is not nervous/anxious          Active Problem List     Patient Active Problem List   Diagnosis   • Anxiety   • Gastroesophageal reflux disease   • Dense breast tissue   • Vaginal atrophy   • Low bone density   • Adrenal adenoma   • History of hysterectomy   • Hypertension   • Adrenal nodule (HCC)   • Abdominal pain   • Iron deficiency anemia   • S/P laparoscopic-assisted sigmoidectomy   • Fatigue   • Vitamin D deficiency   • Elevated vitamin B12 level   • Microcytosis   • Elevated platelet count   • Unexplained weight loss   • Beta thalassemia minor   • RADHA positive   • Acute lumbar myofascial strain   • Pain of left hip   • Acute left-sided low back pain without sciatica         Past Medical History:  Past Medical History:   Diagnosis Date   • Adjustment disorder with anxiety     last assessed - 2016   • Anemia    • Anxiety    • Constipation 2021   • Diverticulitis    • Diverticulitis 2021   • Diverticulitis of colon May 2020   • Diverticulitis of large intestine with abscess without bleeding 2020   • Generalized abdominal pain 2021   • GERD (gastroesophageal reflux disease)    • History of total hysterectomy    • History of transfusion    • Hypertension    • Hypertension 3/12/2021   • Ingrown toenail     last assessed - 2017   • Thalassemia        Past Surgical History:  Past Surgical History:   Procedure Laterality Date   • APPENDECTOMY     • BOWEL RESECTION  2021   •  SECTION     • CHOLECYSTECTOMY     • COLONOSCOPY     • GALLBLADDER SURGERY     • OOPHORECTOMY Bilateral    • AL LAPAROSCOPY COLECTOMY PARTIAL W/ANASTOMOSIS N/A 11/17/2021    Procedure: RESECTION COLON SIGMOID LAPAROSCOPIC HAND-ASSISTED;  Surgeon: Ermelinda Mares MD;  Location: [facility];  Service: General   • TONSILLECTOMY AND ADENOIDECTOMY     • TOTAL ABDOMINAL HYSTERECTOMY     • UPPER GASTROINTESTINAL ENDOSCOPY         Family History:  Family History   Problem Relation Age of Onset   • Anxiety disorder Mother         Anxiety   • Depression Mother    • Anxiety disorder Sister         Anxiety   • Depression Sister    • Cancer Sister         Melanoma   • No Known Problems Sister    • No Known Problems Sister    • Anxiety disorder Daughter         Anxiety   • Depression Daughter    • Heart disease Maternal Aunt    • Heart disease Maternal Uncle        Social History:  Social History     Socioeconomic History   • Marital status:      Spouse name: Not on file   • Number of children: Not on file   • Years of education: Not on file   • Highest education level: Not on file   Occupational History   • Occupation:  - [employer]   Tobacco Use   • Smoking status: Never   • Smokeless tobacco: Never   Vaping Use   • Vaping Use: Never used   Substance and Sexual Activity   • Alcohol use:  Yes     Alcohol/week: 1 0 standard drink of alcohol     Types: 1 Glasses of wine per week     Comment: occasional alcohol use   • Drug use: No   • Sexual activity: Not Currently     Partners: Male     Birth control/protection: Post-menopausal, Surgical     Comment: Hysterectomy 1988   Other Topics Concern   • Not on file   Social History Narrative    Coffee     Social Determinants of Health     Financial Resource Strain: Not on file   Food Insecurity: Not on file   Transportation Needs: Not on file   Physical Activity: Not on file   Stress: Not on file   Social Connections: Not on file   Intimate Partner Violence: Not on file   Housing Stability: Not on file       Objective     Vitals:    06/08/23 1355   BP: 131/78   Pulse: 65   Resp: 16   Temp: 97 5 °F (36 4 °C)   SpO2: 98%     Wt Readings from Last 3 Encounters:   06/08/23 57 1 kg (125 lb 12 8 oz)   06/01/23 56 1 kg (123 lb 9 6 oz)   02/13/23 55 3 kg (122 lb)       Physical Exam  Vitals and nursing note reviewed  Constitutional:       Appearance: Normal appearance  HENT:      Head: Normocephalic and atraumatic  Right Ear: Tympanic membrane, ear canal and external ear normal       Left Ear: Tympanic membrane, ear canal and external ear normal       Nose: Nose normal       Mouth/Throat:      Mouth: Mucous membranes are moist    Eyes:      Conjunctiva/sclera: Conjunctivae normal    Cardiovascular:      Rate and Rhythm: Normal rate and regular rhythm  Pulses: Normal pulses  Heart sounds: Normal heart sounds  Pulmonary:      Effort: Pulmonary effort is normal       Breath sounds: Normal breath sounds  Abdominal:      General: Bowel sounds are normal    Musculoskeletal:         General: Normal range of motion  Cervical back: Normal range of motion and neck supple  Skin:     General: Skin is warm and dry  Capillary Refill: Capillary refill takes less than 2 seconds  Neurological:      General: No focal deficit present  Mental Status: She is alert and oriented to person, place, and time  Psychiatric:         Mood and Affect: Mood normal          Behavior: Behavior normal          Thought Content:  Thought content normal          Judgment: Judgment normal          Pertinent Laboratory/Diagnostic Studies:  Lab Results   Component Value Date    BUN 13 06/02/2023    CALCIUM 9 4 06/02/2023     06/02/2023    CO2 28 06/02/2023    CREATININE 0 82 06/02/2023    GLUCOSE 84 09/22/2015    K 4 3 06/02/2023     09/22/2015     Lab Results   Component Value Date    ALKPHOS 90 06/02/2023    ALT 15 06/02/2023    AST 15 06/02/2023    BILITOT 0 69 09/22/2015       Lab Results   Component Value Date    HCT 38 6 06/02/2023    HGB 11 7 06/02/2023    MCV 68 (L) 06/02/2023 "    06/02/2023    WBC 6 98 06/02/2023       No results found for: \"TSH\"    Lab Results   Component Value Date    CHOL 169 09/22/2015     Lab Results   Component Value Date    TRIG 166 (H) 04/24/2023     Lab Results   Component Value Date    HDL 63 04/24/2023     Lab Results   Component Value Date    LDLCALC 109 (H) 04/24/2023     Lab Results   Component Value Date    HGBA1C 5 3 04/24/2023       Results for orders placed or performed in visit on 06/02/23   LD,Blood   Result Value Ref Range     (L) 140 - 271 U/L   Vitamin B12   Result Value Ref Range    Vitamin B-12 414 180 - 914 pg/mL       Orders Placed This Encounter   Procedures   • CT abdomen pelvis w contrast   • XR spine lumbar 2 or 3 views injury   • XR hip/pelv 2-3 vws left if performed   • Mammo screening bilateral w 3d & cad       ALLERGIES:  No Known Allergies    Current Medications     Current Outpatient Medications   Medication Sig Dispense Refill   • ALPRAZolam (XANAX) 0 25 mg tablet Take 1 tablet (0 25 mg total) by mouth daily at bedtime as needed for anxiety 30 tablet 3   • celecoxib (CeleBREX) 100 mg capsule Take 1 capsule (100 mg total) by mouth 2 (two) times a day 60 capsule 6   • Diclofenac Sodium (VOLTAREN) 1 % Apply 2 g topically 4 (four) times a day 100 g 6   • lisinopril (ZESTRIL) 10 mg tablet Take 1 tablet (10 mg total) by mouth daily 90 tablet 3   • omeprazole (PriLOSEC) 40 MG capsule Take 1 capsule (40 mg total) by mouth daily 90 capsule 0   • sertraline (ZOLOFT) 50 mg tablet Take 1 tablet (50 mg total) by mouth daily 90 tablet 3     No current facility-administered medications for this visit           Health Maintenance     Health Maintenance   Topic Date Due   • Depression Follow-up Plan  Never done   • DTaP,Tdap,and Td Vaccines (1 - Tdap) Never done   • Annual Physical  08/11/2023   • Influenza Vaccine (Season Ended) 09/01/2023   • COVID-19 Vaccine (4 - Pfizer series) 09/09/2023 (Originally 3/5/2022)   • Pneumococcal Vaccine: " Pediatrics (0 to 5 Years) and At-Risk Patients (6 to 59 Years) (1 - PCV) 06/09/2024 (Originally 4/28/1966)   • Depression Screening  06/08/2024   • BMI: Adult  06/08/2024   • Breast Cancer Screening: Mammogram  07/08/2024   • Colorectal Cancer Screening  09/17/2025   • HIV Screening  Completed   • Hepatitis C Screening  Completed   • HIB Vaccine  Aged Out   • IPV Vaccine  Aged Out   • Hepatitis A Vaccine  Aged Out   • Meningococcal ACWY Vaccine  Aged Out   • HPV Vaccine  Aged Out     Immunization History   Administered Date(s) Administered   • COVID-19 PFIZER VACCINE 0 3 ML IM 12/23/2020, 01/14/2021, 01/08/2022   • Influenza, recombinant, quadrivalent,injectable, preservative free 10/05/2018       Depression Screening and Follow-up Plan: Patient's depression screening was positive with a PHQ-2 score of 6  Their PHQ-9 score was 22  Patient assessed for underlying major depression  Brief counseling provided and recommend additional follow-up/re-evaluation next office visit           KATIANA Egan

## 2023-06-08 NOTE — TELEPHONE ENCOUNTER
Medication:  PDMP 05/04/2023 05/04/2023 ALPRAZolam (Tablet) 30 0 30 0 25 MG BRIANA KRISHNAN  Active agreement on file -No

## 2023-06-09 PROBLEM — M25.552 PAIN OF LEFT HIP: Status: ACTIVE | Noted: 2023-06-09

## 2023-06-09 PROBLEM — M54.50 ACUTE LEFT-SIDED LOW BACK PAIN WITHOUT SCIATICA: Status: ACTIVE | Noted: 2023-06-09

## 2023-06-15 ENCOUNTER — TELEPHONE (OUTPATIENT)
Dept: HEMATOLOGY ONCOLOGY | Facility: CLINIC | Age: 63
End: 2023-06-15

## 2023-06-15 NOTE — TELEPHONE ENCOUNTER
Spoke with Faviola from the lab who reports Kiran Estrada PA-C ordered a LAP stain back in March 2022, to be performed now  Faviola states this is an old test and is difficulty to find a supporting lab  They have attempted to collect and send out the specimen twice without success  Faviola is asking if there is an alternative test that can be ordered or if they need to try the LAP stain for a third time       Dr Ketty Pool, can you please assist  No indicators present

## 2023-06-16 ENCOUNTER — APPOINTMENT (OUTPATIENT)
Dept: RADIOLOGY | Age: 63
End: 2023-06-16
Payer: COMMERCIAL

## 2023-06-16 DIAGNOSIS — M25.552 PAIN OF LEFT HIP: ICD-10-CM

## 2023-06-16 DIAGNOSIS — M54.50 ACUTE LEFT-SIDED LOW BACK PAIN WITHOUT SCIATICA: ICD-10-CM

## 2023-06-16 PROCEDURE — 73502 X-RAY EXAM HIP UNI 2-3 VIEWS: CPT

## 2023-06-16 PROCEDURE — 72110 X-RAY EXAM L-2 SPINE 4/>VWS: CPT

## 2023-06-20 ENCOUNTER — OFFICE VISIT (OUTPATIENT)
Dept: HEMATOLOGY ONCOLOGY | Facility: CLINIC | Age: 63
End: 2023-06-20
Payer: COMMERCIAL

## 2023-06-20 ENCOUNTER — HOSPITAL ENCOUNTER (OUTPATIENT)
Dept: RADIOLOGY | Age: 63
Discharge: HOME | End: 2023-06-20
Payer: COMMERCIAL

## 2023-06-20 VITALS
BODY MASS INDEX: 23.22 KG/M2 | DIASTOLIC BLOOD PRESSURE: 78 MMHG | WEIGHT: 123 LBS | RESPIRATION RATE: 17 BRPM | HEIGHT: 61 IN | TEMPERATURE: 98.1 F | HEART RATE: 72 BPM | OXYGEN SATURATION: 99 % | SYSTOLIC BLOOD PRESSURE: 130 MMHG

## 2023-06-20 DIAGNOSIS — R10.32 LEFT LOWER QUADRANT ABDOMINAL PAIN: ICD-10-CM

## 2023-06-20 DIAGNOSIS — D56.3 BETA THALASSEMIA MINOR: ICD-10-CM

## 2023-06-20 DIAGNOSIS — R76.8 ANA POSITIVE: ICD-10-CM

## 2023-06-20 DIAGNOSIS — R10.12 LEFT UPPER QUADRANT ABDOMINAL PAIN: ICD-10-CM

## 2023-06-20 DIAGNOSIS — R74.8 ELEVATED VITAMIN B12 LEVEL: Primary | ICD-10-CM

## 2023-06-20 DIAGNOSIS — R53.83 FATIGUE, UNSPECIFIED TYPE: ICD-10-CM

## 2023-06-20 DIAGNOSIS — R71.8 MICROCYTOSIS: ICD-10-CM

## 2023-06-20 DIAGNOSIS — R79.89 ELEVATED PLATELET COUNT: ICD-10-CM

## 2023-06-20 PROCEDURE — 74177 CT ABD & PELVIS W/CONTRAST: CPT

## 2023-06-20 PROCEDURE — 99214 OFFICE O/P EST MOD 30 MIN: CPT | Performed by: PHYSICIAN ASSISTANT

## 2023-06-20 PROCEDURE — G1004 CDSM NDSC: HCPCS

## 2023-06-20 RX ADMIN — IOHEXOL 100 ML: 350 INJECTION, SOLUTION INTRAVENOUS at 09:01

## 2023-06-20 NOTE — PROGRESS NOTES
Hematology/Oncology Outpatient Follow- up Note  Enrique Bridges 58 y o  female MRN: @ Encounter: [id number]        Date:  6/20/2023      Assessment / Plan:      1  Elevated B12   B12 1594 6/2022;  1594 6/22     Liver unremarkable on 9/28/2021 CT scan          2  Chronic microcytosis MCV around 68 since at least 4/2014 with hemoglobin 12 g/dL range,    Beta Thalassemia Trait identified on hemoglobin fractionation cascade September 2022        3  Fluctuant platelet count 564 - 500 since at least 2018  4   Patient had multiple constitutional symptoms including unexplained weight loss of 30 pounds over the past 6 months, change in bowel and bladder habits, drenching sweats, nausea, abdominal discomfort, fatigue      Contrast CT C/A/P 9/28/22 unremarkable  6/20/23 CT A/P unremarkable      5  Fluctuant mild leukocytosis with normal differential in 2021  [employer] report performed as part of MPN workup        DNTM3 alteration identified  (can be seen with MDS/ MPN)      Negative JAK2, MPL, CALR mutation, normal flow cytometry      6  RADHA positive 1-160 in a homogeneous pattern  Negative double-stranded DNA  CRP 4 1  Negative rheumatoid factor  Had rheumatology evaluation          6/2/23 labs - CMP unremarkable, , B12 414, hemoglobin 11 7, MCV 68, white blood cell count 6 98, normal differential, platelets 864, sed rate 13, CRP 3 3, normal Sjogren's, double-stranded DNA, CCP  Normal complement    B12 level 414    AS B12 level normalized, hemoglobin stable, white blood cell count platelet count normal, we discussed as needed follow-up in our office  She is agreeable to this plan  She will continue to follow-up with PCP for routine health maintenance          HPI:  Enrique Bridges is a 58 y o  seen for initial consultation 9/13/2022 at the referral of Vika Joyner regarding elevated B12 level        She has past medical history of GERD, adrenal adenoma, hypertension, anxiety, low bone density, iron deficiency, vitamin-D deficiency, anxiety, diverticulitis  Status post sigmoidectomy November 2021  Herpes zoster left chest 6/2021  Verruca vulgaris right nostril 2/2022     She is status post cholecystectomy     4/17/2014 hemoglobin 12 4, MCV 63, white blood cell count 8 64, platelet count 270  August 2018 hemoglobin 12 3, MCV 68, white blood cell count 8000, normal differential, platelets 940     76/4049 iron saturation 15%, ferritin 226     12/6/21- hemoglobin 11 1, MCV 69, white blood cell count 8 53, 70% neutrophils, 18% lymphocytes, 8% monocytes, platelets 723     50/98/39- transvaginal u/s  Normal study status post hysterectomy  Ovaries not visualized      5/25/22 Presented to PCP  Reported hair loss, weight loss 138lbs to 115 lbs in 4-5 months without diet modifications  Occasional nausea  Exhaustion  Diarrhea      6/11/22 TSH normal   CMP normal with exception of alk phos 109  Iron saturation 33%, ferritin 43  Negative celiac panel  B12 1594        9/2020 EGD and colonoscopy- small hiatal hernia, mild gastric erythema, large sigmoid diverticula, hyperplastic polyp, external and internal hemorrhoids     9/28/2021- CT scan abdomen and pelvis with IV contrast performed secondary to left lower quadrant pain- 2 areas suspicious for diverticulitis  Possible fistulization vaginal canal to sigmoid colon  She was admitted September 29th through 10/2/2021 and evaluated by GI and colorectal surgery  Outpatient elective sigmoidectomy was discussed      Admitted 11/17 through 11/21/2021 and underwent laparoscopic-assisted sigmoidectomy by Dr Lenard Andrews for recurrent diverticulitis  No evidence for a sigmoid vaginal fistula  Pathology consistent with diverticulitis  Two lymph nodes reactive on pathology      Mammogram 7/8/22 - BiRADS 1     Patient states over the past 6 weeks she is had change in her bowel habits to more constipation without diet modifications  Occasional nausea    She has diffuse abdominal pain  This is unchanged since her sigmoidectomy      She experiences diffuse muscle and bone aches  Chronic insomnia  She denies any rash  She is had hair thinning, change with urinary pattern with episodes of occasional urgency versus episodes of hesitancy and sensation of incomplete bladder emptying  She denies any neuropathy or balance issues  She is not vegetarian  She has lost 30 pounds over the past 6 months without trying  She is experiencing drenching sweats  Her hair has been more brittle and thin        She is not drink alcohol  She is a nonsmoker      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        Interval History:    6/20/23 CT A/P-  Status post sigmoid resection      2   Bilateral adrenal adenomas      3  No evidence of acute abnormality in the abdomen or pelvis  6/2/23 -CMP unremarkable, , B12 414, hemoglobin 11 7, MCV 68, white blood cell count 6 98, normal differential, platelets 351, sed rate 13, CRP 3 3, normal Sjogren's, double-stranded DNA, CCP  Normal complement    B12 level 414      Review of Systems   Constitutional: Positive for fatigue  Negative for appetite change, chills, diaphoresis, fever and unexpected weight change  HENT:   Negative for mouth sores, nosebleeds, sore throat, tinnitus and voice change  Eyes: Negative for eye problems  Respiratory: Negative for chest tightness, cough, shortness of breath and wheezing  Cardiovascular: Negative for chest pain, leg swelling and palpitations  Gastrointestinal: Positive for abdominal distention and abdominal pain  Negative for blood in stool, constipation, diarrhea, nausea, rectal pain and vomiting  Endocrine: Negative for hot flashes  Genitourinary: Negative  Musculoskeletal: Negative for gait problem and myalgias  Skin: Negative for itching and rash  Neurological: Negative for dizziness, gait problem, headaches, light-headedness and numbness     Hematological: Negative for adenopathy  Psychiatric/Behavioral: Negative for confusion and sleep disturbance  The patient is not nervous/anxious  Test Results:        Labs:   Lab Results   Component Value Date    HGB 11 7 06/02/2023    HCT 38 6 06/02/2023    MCV 68 (L) 06/02/2023     06/02/2023    WBC 6 98 06/02/2023    NRBC 0 06/02/2023     Lab Results   Component Value Date     09/22/2015    K 4 3 06/02/2023     06/02/2023    CO2 28 06/02/2023    ANIONGAP 9 09/22/2015    BUN 13 06/02/2023    CREATININE 0 82 06/02/2023    GLUCOSE 84 09/22/2015    GLUF 94 06/02/2023    CALCIUM 9 4 06/02/2023    AST 15 06/02/2023    ALT 15 06/02/2023    ALKPHOS 90 06/02/2023    PROT 7 4 09/22/2015    BILITOT 0 69 09/22/2015    EGFR 75 06/02/2023           Imaging: CT abdomen pelvis w contrast    Result Date: 6/20/2023  Narrative: CT ABDOMEN AND PELVIS WITH IV CONTRAST INDICATION:   R10 32: Left lower quadrant pain R10 12: Left upper quadrant pain  59-year-old female with history of sigmoid resection for acute diverticulitis in 2021  COMPARISON: CT of the abdomen and pelvis from September 28, 2021  CT of the chest, abdomen and pelvis from September 28, 2022  Several earlier CTs of the abdomen and pelvis, dating back to May 24, 2020  TECHNIQUE:  CT examination of the abdomen and pelvis was performed  Multiplanar 2D reformatted images were created from the source data  This examination, like all CT scans performed in the [facility], was performed utilizing techniques to minimize radiation dose exposure, including the use of iterative reconstruction and automated exposure control  Radiation dose length product (DLP) for this visit:  353 mGy-cm IV Contrast:  100 mL of iohexol (OMNIPAQUE) Enteric Contrast: Enteric contrast was administered  FINDINGS: ABDOMEN LOWER CHEST:  No clinically significant abnormality identified in the visualized lower chest  LIVER/BILIARY TREE:  Unremarkable   GALLBLADDER: Status post "cholecystectomy  SPLEEN:  Unremarkable  PANCREAS:  Unremarkable  ADRENAL GLANDS: Small bilateral adrenal masses, most likely adenomas, unchanged since CTs dating back to 5/24/2020  KIDNEYS/URETERS:  Unremarkable  No hydronephrosis  STOMACH AND BOWEL: Status post sigmoid resection and reanastomosis  Otherwise unremarkable  APPENDIX:  No findings to suggest appendicitis  ABDOMINOPELVIC CAVITY: No lymphadenopathy  No ascites or discrete fluid collection  No extraluminal gas  VESSELS:  Unremarkable for patient's age  PELVIS REPRODUCTIVE ORGANS: Status post hysterectomy  URINARY BLADDER:  Unremarkable  ABDOMINAL WALL/INGUINAL REGIONS:  Unremarkable  OSSEOUS STRUCTURES:  No acute fracture or destructive osseous lesion  Impression: 1  Status post sigmoid resection  2   Bilateral adrenal adenomas  3   No evidence of acute abnormality in the abdomen or pelvis  Workstation performed: [id number]             Allergies: No Known Allergies  Current Medications: Reviewed  PMH/FH/SH:  Reviewed      Physical Exam:    1 54 meters squared    Ht Readings from Last 3 Encounters:   06/20/23 5' 1\" (1 549 m)   06/01/23 5' 1\" (1 549 m)   02/13/23 5' 1\" (1 549 m)        Wt Readings from Last 3 Encounters:   06/20/23 55 8 kg (123 lb)   06/08/23 57 1 kg (125 lb 12 8 oz)   06/01/23 56 1 kg (123 lb 9 6 oz)        Temp Readings from Last 3 Encounters:   06/20/23 98 1 °F (36 7 °C) (Temporal)   06/08/23 97 5 °F (36 4 °C)   11/04/22 98 7 °F (37 1 °C)        BP Readings from Last 3 Encounters:   06/20/23 130/78   06/08/23 131/78   06/01/23 132/76             Physical Exam  Constitutional:       Appearance: She is well-developed  HENT:      Head: Normocephalic and atraumatic  Cardiovascular:      Rate and Rhythm: Normal rate  Pulmonary:      Effort: Pulmonary effort is normal  No respiratory distress  Skin:     General: Skin is warm and dry  Neurological:      Mental Status: She is alert     Psychiatric:         Behavior: Behavior " normal          Emergency Contacts:    Extended Emergency Contact Information  Primary Emergency Contact: [address] [facility]  Mobile Phone: [phone]  Relation: Daughter

## 2023-06-21 PROBLEM — R79.89 ELEVATED PLATELET COUNT: Status: RESOLVED | Noted: 2022-09-13 | Resolved: 2023-06-21

## 2023-06-21 PROBLEM — R74.8 ELEVATED VITAMIN B12 LEVEL: Status: RESOLVED | Noted: 2022-09-13 | Resolved: 2023-06-21

## 2023-06-21 PROBLEM — R79.89 ELEVATED VITAMIN B12 LEVEL: Status: RESOLVED | Noted: 2022-09-13 | Resolved: 2023-06-21

## 2023-07-06 DIAGNOSIS — F41.9 ANXIETY: ICD-10-CM

## 2023-07-07 NOTE — TELEPHONE ENCOUNTER
Medication:  PDMP 06/08/2023 06/08/2023 ALPRAZolam (Tablet) 30.0 30 0.25 MG NA DANK KRISHNAN  Active agreement on file -No

## 2023-07-10 RX ORDER — ALPRAZOLAM 0.25 MG/1
0.25 TABLET ORAL
Qty: 30 TABLET | Refills: 3 | Status: SHIPPED | OUTPATIENT
Start: 2023-07-10

## 2023-07-12 ENCOUNTER — HOSPITAL ENCOUNTER (OUTPATIENT)
Dept: MAMMOGRAPHY | Facility: MEDICAL CENTER | Age: 63
Discharge: HOME | End: 2023-07-12
Payer: COMMERCIAL

## 2023-07-12 VITALS — WEIGHT: 123.02 LBS | HEIGHT: 61 IN | BODY MASS INDEX: 23.23 KG/M2

## 2023-07-12 DIAGNOSIS — Z12.31 ENCOUNTER FOR SCREENING MAMMOGRAM FOR MALIGNANT NEOPLASM OF BREAST: ICD-10-CM

## 2023-07-12 PROCEDURE — 77067 SCR MAMMO BI INCL CAD: CPT

## 2023-07-12 PROCEDURE — 77063 BREAST TOMOSYNTHESIS BI: CPT

## 2023-08-03 DIAGNOSIS — K21.9 GASTROESOPHAGEAL REFLUX DISEASE WITHOUT ESOPHAGITIS: ICD-10-CM

## 2023-08-03 RX ORDER — OMEPRAZOLE 40 MG/1
40 CAPSULE, DELAYED RELEASE ORAL DAILY
Qty: 90 CAPSULE | Refills: 0 | Status: SHIPPED | OUTPATIENT
Start: 2023-08-03 | End: 2023-11-23

## 2023-08-08 ENCOUNTER — HOSPITAL ENCOUNTER (OUTPATIENT)
Dept: NEUROLOGY | Facility: CLINIC | Age: 63
Discharge: HOME | End: 2023-08-08
Payer: COMMERCIAL

## 2023-08-08 DIAGNOSIS — G56.03 BILATERAL CARPAL TUNNEL SYNDROME: ICD-10-CM

## 2023-08-08 PROCEDURE — 95886 MUSC TEST DONE W/N TEST COMP: CPT | Performed by: PSYCHIATRY & NEUROLOGY

## 2023-08-08 PROCEDURE — 95911 NRV CNDJ TEST 9-10 STUDIES: CPT | Performed by: PSYCHIATRY & NEUROLOGY

## 2023-08-14 ENCOUNTER — OFFICE VISIT (OUTPATIENT)
Dept: FAMILY MEDICINE CLINIC | Facility: CLINIC | Age: 63
End: 2023-08-14
Payer: COMMERCIAL

## 2023-08-14 VITALS
TEMPERATURE: 97.3 F | RESPIRATION RATE: 16 BRPM | WEIGHT: 122.2 LBS | HEIGHT: 61 IN | DIASTOLIC BLOOD PRESSURE: 70 MMHG | OXYGEN SATURATION: 97 % | HEART RATE: 63 BPM | SYSTOLIC BLOOD PRESSURE: 116 MMHG | BODY MASS INDEX: 23.07 KG/M2

## 2023-08-14 DIAGNOSIS — F32.9 REACTIVE DEPRESSION: ICD-10-CM

## 2023-08-14 DIAGNOSIS — Z63.4 BEREAVEMENT: ICD-10-CM

## 2023-08-14 DIAGNOSIS — Z00.00 ANNUAL PHYSICAL EXAM: Primary | ICD-10-CM

## 2023-08-14 DIAGNOSIS — Z23 NEED FOR TDAP VACCINATION: ICD-10-CM

## 2023-08-14 PROCEDURE — 99396 PREV VISIT EST AGE 40-64: CPT | Performed by: NURSE PRACTITIONER

## 2023-08-14 PROCEDURE — 90715 TDAP VACCINE 7 YRS/> IM: CPT

## 2023-08-14 PROCEDURE — 90471 IMMUNIZATION ADMIN: CPT

## 2023-08-14 SDOH — SOCIAL STABILITY - SOCIAL INSECURITY: DISSAPEARANCE AND DEATH OF FAMILY MEMBER: Z63.4

## 2023-08-14 NOTE — PROGRESS NOTES
[facility] PRACT[facility]    NAME: Erendira Larkin  AGE: 58 y.o. SEX: female  : 1960     DATE: 2023     Assessment and Plan:     Problem List Items Addressed This Visit        Other    Reactive depression    Relevant Orders    Ambulatory Referral to Psychiatry    Bereavement    Relevant Orders    Ambulatory Referral to Psychiatry   Other Visit Diagnoses     Annual physical exam    -  Primary    Need for Tdap vaccination        Relevant Orders    TDAP VACCINE GREATER THAN OR EQUAL TO 6YO IM          Immunizations and preventive care screenings were discussed with patient today. Appropriate education was printed on patient's after visit summary. Counseling:  Alcohol/drug use: discussed moderation in alcohol intake, the recommendations for healthy alcohol use, and avoidance of illicit drug use. Dental Health: discussed importance of regular tooth brushing, flossing, and dental visits. Injury prevention: discussed safety/seat belts, safety helmets, smoke detectors, carbon dioxide detectors, and smoking near bedding or upholstery. Sexual health: discussed sexually transmitted diseases, partner selection, use of condoms, avoidance of unintended pregnancy, and contraceptive alternatives. · Exercise: the importance of regular exercise/physical activity was discussed. Recommend exercise 3-5 times per week for at least 30 minutes. Depression Screening and Follow-up Plan: Patient was screened for depression during today's encounter. They screened negative with a PHQ-2 score of 0. Return in about 6 months (around 2024) for Recheck.      Chief Complaint:     Chief Complaint   Patient presents with   • Physical Exam     Patient is here for her annual wellness      History of Present Illness:     Adult Annual Physical   Patient here for a comprehensive physical exam. The patient reports problems - grieving the loss of her , stoped sertraline, declines any meds, willing to go for counseling. .    Diet and Physical Activity  · Diet/Nutrition: well balanced diet. Exercise: no formal exercise. Walking     Depression Screening  PHQ-2/9 Depression Screening    Little interest or pleasure in doing things: 0 - not at all  Feeling down, depressed, or hopeless: 0 - not at all  PHQ-2 Score: 0  PHQ-2 Interpretation: Negative depression screen       General Health  · Sleep: sleeps well. · Hearing: normal - bilateral.  · Vision: no vision problems. · Dental: regular dental visits. /GYN Health  · Patient is: postmenopausal  ·      Review of Systems:     Review of Systems   Constitutional: Negative for fatigue and fever. HENT: Negative for congestion, postnasal drip and rhinorrhea. Eyes: Negative for photophobia and visual disturbance. Respiratory: Negative for cough, shortness of breath and wheezing. Cardiovascular: Negative for chest pain and palpitations. Gastrointestinal: Negative for constipation, diarrhea, nausea and vomiting. Genitourinary: Negative for dysuria and frequency. Musculoskeletal: Negative for arthralgias and myalgias. Skin: Negative for rash. Neurological: Negative for dizziness and headaches. Hematological: Negative for adenopathy. Psychiatric/Behavioral: Negative for dysphoric mood. The patient is not nervous/anxious.        Past Medical History:     Past Medical History:   Diagnosis Date   • Adjustment disorder with anxiety     last assessed - 01Sep2016   • Anemia    • Anxiety    • Constipation 8/4/2021   • Diverticulitis    • Diverticulitis 9/2/2021   • Diverticulitis of colon May 2020   • Diverticulitis of large intestine with abscess without bleeding 5/24/2020   • Elevated platelet count 4/28/6383   • Elevated vitamin B12 level 9/13/2022   • Generalized abdominal pain 8/4/2021   • GERD (gastroesophageal reflux disease) 2000   • History of total hysterectomy    • History of transfusion    • Hypertension    • Hypertension 3/12/2021   • Ingrown toenail     last assessed - [id number]   • Thalassemia       Past Surgical History:     Past Surgical History:   Procedure Laterality Date   • APPENDECTOMY     • BOWEL RESECTION  2021   •  SECTION     • CHOLECYSTECTOMY     • COLONOSCOPY     • GALLBLADDER SURGERY     • OOPHORECTOMY Bilateral    • [place] LAPAROSCOPY COLECTOMY PARTIAL W/ANASTOMOSIS N/A 2021    Procedure: RESECTION COLON SIGMOID LAPAROSCOPIC HAND-ASSISTED;  Surgeon: Omer Ferguson MD;  [facility];  Service: General   • TONSILLECTOMY AND ADENOIDECTOMY     • TOTAL ABDOMINAL HYSTERECTOMY     • UPPER GASTROINTESTINAL ENDOSCOPY        Social History:     Social History     Socioeconomic History   • Marital status:      Spouse name: None   • Number of children: None   • Years of education: None   • Highest education level: None   Occupational History   • Occupation:  - [employer]   Tobacco Use   • Smoking status: Never   • Smokeless tobacco: Never   Vaping Use   • Vaping Use: Never used   Substance and Sexual Activity   • Alcohol use:  Yes     Alcohol/week: 1.0 standard drink of alcohol     Types: 1 Glasses of wine per week     Comment: occasional alcohol use   • Drug use: No   • Sexual activity: Not Currently     Partners: Male     Birth control/protection: Post-menopausal, Surgical     Comment: Hysterectomy 1988   Other Topics Concern   • None   Social History Narrative    Coffee     Social Determinants of Health     Financial Resource Strain: Not on file   Food Insecurity: Not on file   Transportation Needs: Not on file   Physical Activity: Not on file   Stress: Not on file   Social Connections: Not on file   Intimate Partner Violence: Not on file   Housing Stability: Not on file      Family History:     Family History   Problem Relation Age of Onset   • Anxiety disorder Mother         Anxiety   • Depression Mother    • Anxiety disorder Sister         Anxiety   • Depression Sister    • Cancer Sister         Melanoma   • No Known Problems Sister    • No Known Problems Sister    • Anxiety disorder Daughter         Anxiety   • Depression Daughter    • Heart disease Maternal Aunt    • Heart disease Maternal Uncle       Current Medications:     Current Outpatient Medications   Medication Sig Dispense Refill   • ALPRAZolam (XANAX) 0.25 mg tablet Take 1 tablet (0.25 mg total) by mouth daily at bedtime as needed for anxiety 30 tablet 3   • celecoxib (CeleBREX) 100 mg capsule Take 1 capsule (100 mg total) by mouth 2 (two) times a day 60 capsule 6   • Diclofenac Sodium (VOLTAREN) 1 % Apply 2 g topically 4 (four) times a day 100 g 6   • lisinopril (ZESTRIL) 10 mg tablet Take 1 tablet (10 mg total) by mouth daily 90 tablet 3   • omeprazole (PriLOSEC) 40 MG capsule Take 1 capsule (40 mg total) by mouth daily 90 capsule 0     No current facility-administered medications for this visit. Allergies:     No Known Allergies   Physical Exam:     /70   Pulse 63   Temp (!) 97.3 °F (36.3 °C)   Resp 16   Ht 5' 1.42" (1.56 m)   Wt 55.4 kg (122 lb 3.2 oz)   SpO2 97%   BMI 22.78 kg/m²     Physical Exam  Vitals and nursing note reviewed. Constitutional:       Appearance: Normal appearance. HENT:      Head: Normocephalic and atraumatic. Right Ear: Tympanic membrane, ear canal and external ear normal.      Left Ear: Tympanic membrane, ear canal and external ear normal.      Nose: Nose normal.      Mouth/Throat:      Mouth: Mucous membranes are moist.   Eyes:      Conjunctiva/sclera: Conjunctivae normal.   Neck:      Thyroid: No thyroid mass, thyromegaly or thyroid tenderness. Vascular: No carotid bruit. Cardiovascular:      Rate and Rhythm: Normal rate and regular rhythm. Heart sounds: Normal heart sounds.    Pulmonary:      Effort: Pulmonary effort is normal.   Abdominal:      General: Bowel sounds are normal.      Palpations: Abdomen is soft.   Musculoskeletal:         General: Normal range of motion. Cervical back: Normal range of motion and neck supple. Skin:     General: Skin is warm and dry. Capillary Refill: Capillary refill takes less than 2 seconds. Neurological:      General: No focal deficit present. Mental Status: She is alert and oriented to person, place, and time. Psychiatric:         Mood and Affect: Mood normal.         Behavior: Behavior normal.         Thought Content:  Thought content normal.         Judgment: Judgment normal.          [place], [facility]

## 2023-09-13 ENCOUNTER — TELEPHONE (OUTPATIENT)
Dept: OTHER | Facility: HOSPITAL | Age: 63
End: 2023-09-13

## 2023-09-13 NOTE — TELEPHONE ENCOUNTER
received a voicemail from [facility] in regards to the pts appt with Dyan Fitting today.  contacted the pts Dr Alejandra Jones to obtain more information to check the pts chart.  confirmed the provider was out of office.  then called the pt and informed her that her appt was cancelled. Please reach out to the pt to reschedule her new patient appt.

## 2023-09-13 NOTE — TELEPHONE ENCOUNTER
Behavioral Health called back and stated she does not have an upcoming apt. They were going to make an outreach call to patient to let her know and to schedule an apt.

## 2023-09-13 NOTE — TELEPHONE ENCOUNTER
Patient stated she has an apt with Dr. Brett Jimenez today at 3 in the office. Can we confirm this as it does not show in apts. Please call Claire Gallagher back.  Thank You    She stated it was with a therapist.

## 2023-10-15 ENCOUNTER — OFFICE VISIT (OUTPATIENT)
Dept: URGENT CARE | Age: 63
End: 2023-10-15
Payer: COMMERCIAL

## 2023-10-15 VITALS
DIASTOLIC BLOOD PRESSURE: 86 MMHG | OXYGEN SATURATION: 99 % | HEART RATE: 83 BPM | RESPIRATION RATE: 18 BRPM | TEMPERATURE: 97.6 F | SYSTOLIC BLOOD PRESSURE: 142 MMHG

## 2023-10-15 DIAGNOSIS — B96.89 BACTERIAL SINUSITIS: Primary | ICD-10-CM

## 2023-10-15 DIAGNOSIS — J32.9 BACTERIAL SINUSITIS: Primary | ICD-10-CM

## 2023-10-15 PROCEDURE — 99213 OFFICE O/P EST LOW 20 MIN: CPT | Performed by: NURSE PRACTITIONER

## 2023-10-15 RX ORDER — AMOXICILLIN AND CLAVULANATE POTASSIUM 875; 125 MG/1; MG/1
1 TABLET, FILM COATED ORAL EVERY 12 HOURS SCHEDULED
Qty: 14 TABLET | Refills: 0 | Status: SHIPPED | OUTPATIENT
Start: 2023-10-15 | End: 2023-10-22

## 2023-10-15 NOTE — PROGRESS NOTES
NAME: Priyank Birch is a 58 y.o. female  : 1960    MRN: [id number]    /86   Pulse 83   Temp 97.6 °F (36.4 °C)   Resp 18   SpO2 99%     11:19 AM    Assessment and Plan   Bacterial sinusitis [J32.9, B96.89]  1. Bacterial sinusitis  amoxicillin-clavulanate (AUGMENTIN) 875-125 mg per tablet          Claire Gallagher was seen today for cold like symptoms. Diagnoses and all orders for this visit:    Bacterial sinusitis  -     amoxicillin-clavulanate (AUGMENTIN) 875-125 mg per tablet; Take 1 tablet by mouth every 12 (twelve) hours for 7 days        Patient Instructions     Patient Instructions   Take zyrtec or allegra daily  Use flonase 1-2 sprays in each nare daily   Use nasal saline to the nose,   Use humidifer in room  Symptoms worsen go to ER  Rest    Take the zyrtec prior to bed if your are tired or sleepy from it, non drowsy is available but still may take it prior to bed. Antibiotics will kill bacteria, please take the other symptoms medications daily as directed. Proceed to the nearest ER if symptoms worsen, Follow up with your PCP  Continue to social distance, wash your hands, and wear your masks. Please continue to follow the CDC. gov guidelines daily for they are subject to change on COVID-19    Chief Complaint     Chief Complaint   Patient presents with    Cold Like Symptoms     Patient relates congestion, sinus pressure for past 10 days. Has tried OTC allergy medications, but did not help. Unable to get mucus out of nose. History of Present Illness     79-year-old female who is here today with congestion and sinus pressure for the past 10 days. She was scheduled to get a flu shot yesterday but then she canceled. She is currently taking over-the-counter allergy medications but that has not helped or has been successful. She has been unable to get congestion out of her sinuses and complains of sinus pain and congestion.             Review of Systems   Review of Systems Constitutional:  Positive for fever. Negative for activity change, appetite change, chills and fatigue. HENT:  Positive for congestion, postnasal drip, sinus pressure, sinus pain, sneezing and sore throat. Negative for ear discharge, ear pain and rhinorrhea. Eyes:  Negative for pain. Respiratory:  Positive for cough. Negative for chest tightness, shortness of breath and wheezing. Cardiovascular: Negative. Gastrointestinal:  Negative for nausea. Genitourinary: Negative. Musculoskeletal: Negative. Negative for neck pain. Skin: Negative. Allergic/Immunologic: Environmental allergies: Sinus. Neurological:  Positive for headaches. Psychiatric/Behavioral: Negative.            Current Medications       Current Outpatient Medications:     ALPRAZolam (XANAX) 0.25 mg tablet, Take 1 tablet (0.25 mg total) by mouth daily at bedtime as needed for anxiety, Disp: 30 tablet, Rfl: 3    amoxicillin-clavulanate (AUGMENTIN) 875-125 mg per tablet, Take 1 tablet by mouth every 12 (twelve) hours for 7 days, Disp: 14 tablet, Rfl: 0    celecoxib (CeleBREX) 100 mg capsule, Take 1 capsule (100 mg total) by mouth 2 (two) times a day, Disp: 60 capsule, Rfl: 6    Diclofenac Sodium (VOLTAREN) 1 %, Apply 2 g topically 4 (four) times a day, Disp: 100 g, Rfl: 6    lisinopril (ZESTRIL) 10 mg tablet, Take 1 tablet (10 mg total) by mouth daily, Disp: 90 tablet, Rfl: 3    omeprazole (PriLOSEC) 40 MG capsule, Take 1 capsule (40 mg total) by mouth daily, Disp: 90 capsule, Rfl: 0    Current Allergies     Allergies as of 10/15/2023    (No Known Allergies)              Past Medical History:   Diagnosis Date    Adjustment disorder with anxiety     last assessed - 01Sep2016    Anemia     Anxiety     Constipation 8/4/2021    Diverticulitis     Diverticulitis 9/2/2021    Diverticulitis of colon May 2020    Diverticulitis of large intestine with abscess without bleeding 5/24/2020    Elevated platelet count 4/77/1993    Elevated vitamin B12 level 2022    Generalized abdominal pain 2021    GERD (gastroesophageal reflux disease)     History of total hysterectomy     History of transfusion     Hypertension     Hypertension 3/12/2021    Ingrown toenail     last assessed - [id number]    Thalassemia        Past Surgical History:   Procedure Laterality Date    APPENDECTOMY      BOWEL RESECTION  2021     SECTION  1981    CHOLECYSTECTOMY      COLONOSCOPY      GALLBLADDER SURGERY      OOPHORECTOMY Bilateral     NE LAPAROSCOPY COLECTOMY PARTIAL W/ANASTOMOSIS N/A 2021    Procedure: RESECTION COLON SIGMOID LAPAROSCOPIC HAND-ASSISTED;  Surgeon: Luis Villagran MD;  [facility];  Service: General    TONSILLECTOMY AND ADENOIDECTOMY      TOTAL ABDOMINAL HYSTERECTOMY      UPPER GASTROINTESTINAL ENDOSCOPY         Family History   Problem Relation Age of Onset    Anxiety disorder Mother         Anxiety    Depression Mother     Anxiety disorder Sister         Anxiety    Depression Sister     Cancer Sister         Melanoma    No Known Problems Sister     No Known Problems Sister     Anxiety disorder Daughter         Anxiety    Depression Daughter     Heart disease Maternal Aunt     Heart disease Maternal Uncle          Medications have been verified. The following portions of the patient's history were reviewed and updated as appropriate: allergies, current medications, past family history, past medical history, past social history, past surgical history and problem list.    Objective   /86   Pulse 83   Temp 97.6 °F (36.4 °C)   Resp 18   SpO2 99%      Physical Exam     Physical Exam  Constitutional:       General: She is awake. She is not in acute distress. Appearance: She is well-developed. She is not ill-appearing. HENT:      Head: Normocephalic.       Right Ear: Hearing, tympanic membrane, ear canal and external ear normal.      Left Ear: Hearing, tympanic membrane, ear canal and external ear normal. Nose: Mucosal edema, congestion and rhinorrhea present. No nasal tenderness. Right Turbinates: Swollen. Left Turbinates: Swollen. Right Sinus: Maxillary sinus tenderness present. Left Sinus: Maxillary sinus tenderness present. Mouth/Throat:      Lips: Pink. Mouth: Mucous membranes are moist.      Pharynx: Uvula midline. Posterior oropharyngeal erythema present. No pharyngeal swelling, oropharyngeal exudate or uvula swelling. Cardiovascular:      Rate and Rhythm: Normal rate and regular rhythm. Heart sounds: Normal heart sounds. Pulmonary:      Effort: Pulmonary effort is normal.      Breath sounds: Normal breath sounds and air entry. No decreased breath sounds, wheezing, rhonchi or rales. Neurological:      Mental Status: She is alert and oriented to person, place, and time. Psychiatric:         Behavior: Behavior is cooperative. Note: Portions of this record may have been created with voice recognition software. Occasional wrong word or "sound a like" substitutions may have occurred due to the inherent limitations of voice recognition software. Please read the chart carefully and recognize, using context, where substitutions have occurred. KATIANA Wolf

## 2023-10-15 NOTE — PATIENT INSTRUCTIONS
Take zyrtec or allegra daily  Use flonase 1-2 sprays in each nare daily   Use nasal saline to the nose,   Use humidifer in room  Symptoms worsen go to ER  Rest    Take the zyrtec prior to bed if your are tired or sleepy from it, non drowsy is available but still may take it prior to bed. Antibiotics will kill bacteria, please take the other symptoms medications daily as directed.

## 2023-10-31 DIAGNOSIS — K21.9 GASTROESOPHAGEAL REFLUX DISEASE WITHOUT ESOPHAGITIS: ICD-10-CM

## 2023-10-31 RX ORDER — OMEPRAZOLE 40 MG/1
40 CAPSULE, DELAYED RELEASE ORAL DAILY
Qty: 90 CAPSULE | Refills: 0 | Status: SHIPPED | OUTPATIENT
Start: 2023-10-31

## 2023-11-17 ENCOUNTER — TELEPHONE (OUTPATIENT)
Age: 63
End: 2023-11-17

## 2023-11-17 NOTE — TELEPHONE ENCOUNTER
Caller:  Isaiah Reza    Doctor: Emani Keen     Reason for call: Missed her last appt with Dr. Emani Keen in Sept. She is having a lot of pain and swelling and Dr. Emani Keen has no openings until the end of Dec. Patient would like to know if she can see another Hand Sx before this due to severe pain    Call back#: [phone]

## 2023-11-27 ENCOUNTER — OFFICE VISIT (OUTPATIENT)
Dept: OBGYN CLINIC | Facility: MEDICAL CENTER | Age: 63
End: 2023-11-27
Payer: COMMERCIAL

## 2023-11-27 VITALS
WEIGHT: 130.2 LBS | BODY MASS INDEX: 24.58 KG/M2 | DIASTOLIC BLOOD PRESSURE: 90 MMHG | HEIGHT: 61 IN | SYSTOLIC BLOOD PRESSURE: 152 MMHG | HEART RATE: 74 BPM

## 2023-11-27 DIAGNOSIS — M18.11 ARTHRITIS OF CARPOMETACARPAL (CMC) JOINT OF RIGHT THUMB: Primary | ICD-10-CM

## 2023-11-27 DIAGNOSIS — M18.12 ARTHRITIS OF CARPOMETACARPAL (CMC) JOINT OF LEFT THUMB: ICD-10-CM

## 2023-11-27 PROCEDURE — 99214 OFFICE O/P EST MOD 30 MIN: CPT | Performed by: ORTHOPAEDIC SURGERY

## 2023-11-27 RX ORDER — GABAPENTIN 300 MG/1
300 CAPSULE ORAL ONCE
OUTPATIENT
Start: 2023-11-27 | End: 2023-11-27

## 2023-11-27 RX ORDER — ACETAMINOPHEN 325 MG/1
975 TABLET ORAL ONCE
OUTPATIENT
Start: 2023-11-27 | End: 2023-11-27

## 2023-11-27 RX ORDER — CEFAZOLIN SODIUM 2 G/50ML
2000 SOLUTION INTRAVENOUS ONCE
OUTPATIENT
Start: 2023-11-27 | End: 2023-11-27

## 2023-11-27 NOTE — H&P
The HAND & UPPER EXTREMITY OFFICE VISIT   Referred By:  Fely Jerez, [address] [facility] [address]  [place],  [address]      Chief Complaint:         58 y.o., right hand dominant female presents with bilateral thumb pain. Patient was previously treating with Dr Jarret Quick for bilateral thumb CMC arthritis. Dr. Katie Mcdermott most recent note was reviewed. She is has over a year of symptoms which have been worsening over the last couple months. She had cortisone injections earlier in year with only a week's worth of relief. She has had multiple injections in both thumbs over the last 1.5 years with waning results. She has been using comfort cool braces which help some. Her job requires repetitive typing. Right is worse than left. [facility] was discussed with patient by Dr Jarret Quick over summer time. She has difficulty gripping, grasping items, open jars. She has noticed more deformity of right thumb, more popping and cracking. She is here to discuss surgery.        ADLs: Independent, community ambulator    Smoke: no ETOH:   Social Drugs:  n/a Job: [employer]        Past Medical History:  Past Medical History:   Diagnosis Date    Adjustment disorder with anxiety     last assessed - [id number]    Anemia     Anxiety     Constipation 2021    Diverticulitis     Diverticulitis 2021    Diverticulitis of colon May 2020    Diverticulitis of large intestine with abscess without bleeding 2020    Elevated platelet count 3/31/5426    Elevated vitamin B12 level 2022    Generalized abdominal pain 2021    GERD (gastroesophageal reflux disease) 2000    History of total hysterectomy     History of transfusion     Hypertension     Hypertension 3/12/2021    Ingrown toenail     last assessed - [id number]    Thalassemia      Past Surgical History:   Procedure Laterality Date    APPENDECTOMY      BOWEL RESECTION  2021     SECTION      CHOLECYSTECTOMY      COLONOSCOPY      GALLBLADDER SURGERY OOPHORECTOMY Bilateral     [place] LAPAROSCOPY COLECTOMY PARTIAL W/ANASTOMOSIS N/A 11/17/2021    Procedure: RESECTION COLON SIGMOID LAPAROSCOPIC HAND-ASSISTED;  Surgeon: Tera James MD;  [facility];  Service: General    TONSILLECTOMY AND ADENOIDECTOMY      TOTAL ABDOMINAL HYSTERECTOMY      UPPER GASTROINTESTINAL ENDOSCOPY       Family History   Problem Relation Age of Onset    Anxiety disorder Mother         Anxiety    Depression Mother     Anxiety disorder Sister         Anxiety    Depression Sister     Cancer Sister         Melanoma    No Known Problems Sister     No Known Problems Sister     Anxiety disorder Daughter         Anxiety    Depression Daughter     Heart disease Maternal Aunt     Heart disease Maternal Uncle      Social History     Socioeconomic History    Marital status:      Spouse name: Not on file    Number of children: Not on file    Years of education: Not on file    Highest education level: Not on file   Occupational History    Occupation:  - [employer]   Tobacco Use    Smoking status: Never    Smokeless tobacco: Never   Vaping Use    Vaping Use: Never used   Substance and Sexual Activity    Alcohol use: Yes     Alcohol/week: 1.0 standard drink of alcohol     Types: 1 Glasses of wine per week     Comment: occasional alcohol use    Drug use: No    Sexual activity: Not Currently     Partners: Male     Birth control/protection: Post-menopausal, Surgical     Comment: Hysterectomy 1988   Other Topics Concern    Not on file   Social History Narrative    Coffee     Social Determinants of Health     Financial Resource Strain: Not on file   Food Insecurity: Not on file   Transportation Needs: Not on file   Physical Activity: Not on file   Stress: Not on file   Social Connections: Not on file   Intimate Partner Violence: Not on file   Housing Stability: Not on file     Scheduled Meds:  Continuous Infusions:No current facility-administered medications for this visit.     PRN Meds:.  No Known Allergies        Physical Examination:    /90   Pulse 74   Ht 5' 1.42" (1.56 m)   Wt 59.1 kg (130 lb 3.2 oz)   BMI 24.27 kg/m²     Gen: A&Ox3, NAD  Cardiac: regular rate  Chest: non labored breathing  Abdomen: Non-distended      Right Upper Extremity:  Skin CDI  No obvious deformity of the shoulder, arm, elbow, forearm, wrist,    Sensation intact to light touch in the axillary median, ulnar, and radial nerve distributions  5/5 motor   2+RP  No adduction contracture  No hyperextension deformity of MCP joint  Positive localized tenderness over radial and dorsal aspect of thumb (CMC joint)  Grind test is Positive for pain and Positive for crepitus  Positive shoulder sign  Metacarpal load shift test positive  No triggering or tenderness over the A1 pulley      Left Upper Extremity:  Skin CDI  No obvious deformity of the shoulder, arm, elbow, forearm, wrist,    Sensation intact to light touch in the axillary median, ulnar, and radial nerve distributions  5/5 motor   2+RP  No adduction contracture  No hyperextension deformity of MCP joint  Positive localized tenderness over radial and dorsal aspect of thumb (CMC joint)  Grind test is Positive for pain and Positive for crepitus  Positive shoulder sign  Metacarpal load shift test positive  No triggering or tenderness over the A1 pulley      Studies:  Radiographs: I personally reviewed and independently interpreted the available radiographs. X-ray: Radiographs of the bilateral hands, multiple views, demonstrate moderate to severe thumb CMC arthritis with radial subluxation of the thumb metacarpal on the trapezium and osteophyte formation. no fractures or dislocations. Assessment and Plan:  1. Arthritis of carpometacarpal (CMC) joint of right thumb        2. Arthritis of carpometacarpal [place]) joint of left thumb            58 y.o. female presents with signs and symptoms consistent with the above diagnosis.   We discussed the natural history of this condition and its pathogenesis. We discussed operative and nonoperative treatment options. Discussed [facility] arthoplasty with tight rope procedure vs continued conservative treatment's. At this time patient would like to proceed with right [facility] arthroplasty-tight rope procedure as she has failed conservative treatment options. All risks and benefits of surgery were reviewed with patient in detail and questions answered to her satisfaction. Consented today. Hand therapy orders placed to start thumb range of motion 4 to 5 days following surgery, and custom brace fabrication  Regional anesthesia with sedation      Will schedule surgery at her convenience. . Follow up post operatively. she expressed understanding of the plan and agreed. We encouraged them to contact our office with any questions or concerns. Karon Goncalves MD  Hand and Upper Extremity Surgery        *This note was dictated using Dragon voice recognition software. Please excuse any word substitutions or errors. *

## 2023-12-18 ENCOUNTER — TELEPHONE (OUTPATIENT)
Dept: OBGYN CLINIC | Facility: HOSPITAL | Age: 63
End: 2023-12-18

## 2023-12-18 DIAGNOSIS — M18.11 ARTHRITIS OF CARPOMETACARPAL (CMC) JOINT OF RIGHT THUMB: Primary | ICD-10-CM

## 2023-12-18 NOTE — TELEPHONE ENCOUNTER
Caller: AJ LUGO    Doctor: Mohsen    Reason for call: Requesting Rx be changed to OT please.      Call back#: [phone]

## 2023-12-20 ENCOUNTER — ANESTHESIA EVENT (OUTPATIENT)
Age: 63
End: 2023-12-20
Payer: COMMERCIAL

## 2023-12-21 NOTE — PRE-PROCEDURE INSTRUCTIONS
Pre-Surgery Instructions:   Medication Instructions    ALPRAZolam (XANAX) 0.25 mg tablet Take Day of Surgery; Continue to take as prescribed including DOS with a small sip of water, unless usually taken at night     celecoxib (CeleBREX) 100 mg capsule Hold for at least 3 days unless otherwise instructed by surgery    Diclofenac Sodium (VOLTAREN) 1 % Do not take day of surgery; continue as prescribed excluding DOS     lisinopril (ZESTRIL) 10 mg tablet Do not take day of surgery; continue as prescribed excluding DOS     omeprazole (PriLOSEC) 40 MG capsule Take Day of Surgery; Continue to take as prescribed including DOS with a small sip of water, unless usually taken at night     Medication instructions for day surgery reviewed. Please use only a sip of water to take your instructed medications. Avoid all over the counter vitamins, supplements and NSAIDS for one week prior to surgery per anesthesia guidelines. Tylenol is ok to take as needed.     You will receive a call one business day prior to surgery with an arrival time and hospital directions. If your surgery is scheduled on a Monday, the hospital will be calling you on the Friday prior to your surgery. If you have not heard from anyone by 8pm, please call the hospital supervisor through the hospital  at [phone]. ([place] [phone]).    Do not eat or drink anything after midnight the night before your surgery, including candy, mints, lifesavers, or chewing gum. Do not drink alcohol 24hrs before your surgery. Try not to smoke at least 24hrs before your surgery.       Follow the pre surgery showering instructions as listed in the “My Surgical Experience Booklet” or otherwise provided by your surgeon's office. Do not use a blade to shave the surgical area 1 week before surgery. It is okay to use a clean electric clippers up to 24 hours before surgery. Do not apply any lotions, creams, including makeup, cologne, deodorant, or perfumes after showering  on the day of your surgery. Do not use dry shampoo, hair spray, hair gel, or any type of hair products.     No contact lenses, eye make-up, or artificial eyelashes. Remove nail polish, including gel polish, and any artificial, gel, or acrylic nails if possible. Remove all jewelry including rings and body piercing jewelry.     Wear causal clothing that is easy to take on and off. Consider your type of surgery.    Keep any valuables, jewelry, piercings at home. Please bring any specially ordered equipment (sling, braces) if indicated.    Arrange for a responsible person to drive you to and from the hospital on the day of your surgery. Visitor Guidelines discussed.     Call the surgeon's office with any new illnesses, exposures, or additional questions prior to surgery.    Please reference your “My Surgical Experience Booklet” for additional information to prepare for your upcoming surgery.

## 2023-12-26 ENCOUNTER — TELEPHONE (OUTPATIENT)
Age: 63
End: 2023-12-26

## 2023-12-26 DIAGNOSIS — M19.049 HAND ARTHRITIS: ICD-10-CM

## 2023-12-26 NOTE — TELEPHONE ENCOUNTER
Caller: Patient     Doctor: Mohsen    Reason for call: Patient calling to check status on surgery   Please advise     Call back#: [phone]

## 2023-12-28 ENCOUNTER — TELEPHONE (OUTPATIENT)
Dept: RHEUMATOLOGY | Facility: CLINIC | Age: 63
End: 2023-12-28

## 2023-12-28 RX ORDER — CELECOXIB 100 MG/1
100 CAPSULE ORAL 2 TIMES DAILY
Qty: 180 CAPSULE | Refills: 1 | Status: SHIPPED | OUTPATIENT
Start: 2023-12-28

## 2023-12-28 NOTE — TELEPHONE ENCOUNTER
After calling the patient in regards to their medication refill, they had stated that there was a need to reschedule the appointment and patient has been rescheduled for another date and time.

## 2024-01-03 PROBLEM — M18.11 ARTHRITIS OF CARPOMETACARPAL (CMC) JOINT OF RIGHT THUMB: Status: ACTIVE | Noted: 2024-01-03

## 2024-01-04 ENCOUNTER — HOSPITAL ENCOUNTER (OUTPATIENT)
Age: 64
Discharge: HOME | End: 2024-01-04
Payer: COMMERCIAL

## 2024-01-04 ENCOUNTER — ANESTHESIA (OUTPATIENT)
Age: 64
End: 2024-01-04
Payer: COMMERCIAL

## 2024-01-04 ENCOUNTER — HOSPITAL ENCOUNTER (OUTPATIENT)
Age: 64
Setting detail: OUTPATIENT SURGERY
Discharge: HOME | End: 2024-01-04
Attending: ORTHOPAEDIC SURGERY | Admitting: ORTHOPAEDIC SURGERY
Payer: COMMERCIAL

## 2024-01-04 VITALS
SYSTOLIC BLOOD PRESSURE: 138 MMHG | TEMPERATURE: 97.3 F | HEART RATE: 55 BPM | RESPIRATION RATE: 18 BRPM | BODY MASS INDEX: 23.48 KG/M2 | DIASTOLIC BLOOD PRESSURE: 65 MMHG | OXYGEN SATURATION: 99 % | WEIGHT: 126 LBS

## 2024-01-04 DIAGNOSIS — M18.11 ARTHRITIS OF CARPOMETACARPAL (CMC) JOINT OF RIGHT THUMB: ICD-10-CM

## 2024-01-04 DIAGNOSIS — M18.11 ARTHRITIS OF CARPOMETACARPAL (CMC) JOINT OF RIGHT THUMB: Primary | ICD-10-CM

## 2024-01-04 DIAGNOSIS — Z47.89 AFTERCARE FOLLOWING SURGERY OF THE MUSCULOSKELETAL SYSTEM: ICD-10-CM

## 2024-01-04 PROCEDURE — 73140 X-RAY EXAM OF FINGER(S): CPT

## 2024-01-04 PROCEDURE — C1713 ANCHOR/SCREW BN/BN,TIS/BN: HCPCS | Performed by: ORTHOPAEDIC SURGERY

## 2024-01-04 PROCEDURE — C9290 INJ, BUPIVACAINE LIPOSOME: HCPCS | Performed by: STUDENT IN AN ORGANIZED HEALTH CARE EDUCATION/TRAINING PROGRAM

## 2024-01-04 PROCEDURE — 25447 ARTHRP NTRCRP/CRP/MTCR NTRPS: CPT

## 2024-01-04 PROCEDURE — NC001 PR NO CHARGE: Performed by: ORTHOPAEDIC SURGERY

## 2024-01-04 PROCEDURE — 25447 ARTHRP NTRCRP/CRP/MTCR NTRPS: CPT | Performed by: ORTHOPAEDIC SURGERY

## 2024-01-04 DEVICE — IMPL SYS,CMC MINI T-ROPE,1.1 MM
Type: IMPLANTABLE DEVICE | Site: FINGER | Status: FUNCTIONAL
Brand: ARTHREX®

## 2024-01-04 RX ORDER — ONDANSETRON 4 MG/1
4 TABLET, FILM COATED ORAL EVERY 8 HOURS PRN
Qty: 20 TABLET | Refills: 0 | Status: SHIPPED | OUTPATIENT
Start: 2024-01-04

## 2024-01-04 RX ORDER — PROPOFOL 10 MG/ML
INJECTION, EMULSION INTRAVENOUS CONTINUOUS PRN
Status: DISCONTINUED | OUTPATIENT
Start: 2024-01-04 | End: 2024-01-04

## 2024-01-04 RX ORDER — CEFAZOLIN SODIUM 2 G/50ML
SOLUTION INTRAVENOUS AS NEEDED
Status: DISCONTINUED | OUTPATIENT
Start: 2024-01-04 | End: 2024-01-04

## 2024-01-04 RX ORDER — IBUPROFEN 600 MG/1
600 TABLET ORAL EVERY 6 HOURS PRN
Status: DISCONTINUED | OUTPATIENT
Start: 2024-01-04 | End: 2024-01-04 | Stop reason: HOSPADM

## 2024-01-04 RX ORDER — SODIUM CHLORIDE, SODIUM LACTATE, POTASSIUM CHLORIDE, CALCIUM CHLORIDE 600; 310; 30; 20 MG/100ML; MG/100ML; MG/100ML; MG/100ML
100 INJECTION, SOLUTION INTRAVENOUS CONTINUOUS
Status: DISCONTINUED | OUTPATIENT
Start: 2024-01-04 | End: 2024-01-04 | Stop reason: HOSPADM

## 2024-01-04 RX ORDER — ONDANSETRON 2 MG/ML
INJECTION INTRAMUSCULAR; INTRAVENOUS AS NEEDED
Status: DISCONTINUED | OUTPATIENT
Start: 2024-01-04 | End: 2024-01-04

## 2024-01-04 RX ORDER — ACETAMINOPHEN 325 MG/1
975 TABLET ORAL ONCE
Status: COMPLETED | OUTPATIENT
Start: 2024-01-04 | End: 2024-01-04

## 2024-01-04 RX ORDER — ACETAMINOPHEN 500 MG
1000 TABLET ORAL EVERY 8 HOURS SCHEDULED
Qty: 60 TABLET | Refills: 0 | Status: SHIPPED | OUTPATIENT
Start: 2024-01-04

## 2024-01-04 RX ORDER — SODIUM CHLORIDE, SODIUM LACTATE, POTASSIUM CHLORIDE, CALCIUM CHLORIDE 600; 310; 30; 20 MG/100ML; MG/100ML; MG/100ML; MG/100ML
INJECTION, SOLUTION INTRAVENOUS CONTINUOUS PRN
Status: DISCONTINUED | OUTPATIENT
Start: 2024-01-04 | End: 2024-01-04

## 2024-01-04 RX ORDER — FENTANYL CITRATE 50 UG/ML
25 INJECTION, SOLUTION INTRAMUSCULAR; INTRAVENOUS
Status: DISCONTINUED | OUTPATIENT
Start: 2024-01-04 | End: 2024-01-04 | Stop reason: HOSPADM

## 2024-01-04 RX ORDER — ONDANSETRON 4 MG/1
4 TABLET, ORALLY DISINTEGRATING ORAL EVERY 6 HOURS PRN
Status: DISCONTINUED | OUTPATIENT
Start: 2024-01-04 | End: 2024-01-04 | Stop reason: HOSPADM

## 2024-01-04 RX ORDER — DOCUSATE SODIUM 100 MG/1
100 CAPSULE, LIQUID FILLED ORAL DAILY
Qty: 10 CAPSULE | Refills: 0 | Status: SHIPPED | OUTPATIENT
Start: 2024-01-04

## 2024-01-04 RX ORDER — GABAPENTIN 300 MG/1
300 CAPSULE ORAL ONCE
Status: COMPLETED | OUTPATIENT
Start: 2024-01-04 | End: 2024-01-04

## 2024-01-04 RX ORDER — ALBUTEROL SULFATE 2.5 MG/3ML
2.5 SOLUTION RESPIRATORY (INHALATION) ONCE AS NEEDED
Status: DISCONTINUED | OUTPATIENT
Start: 2024-01-04 | End: 2024-01-04 | Stop reason: HOSPADM

## 2024-01-04 RX ORDER — PROPOFOL 10 MG/ML
INJECTION, EMULSION INTRAVENOUS AS NEEDED
Status: DISCONTINUED | OUTPATIENT
Start: 2024-01-04 | End: 2024-01-04

## 2024-01-04 RX ORDER — CEFAZOLIN SODIUM 2 G/50ML
2000 SOLUTION INTRAVENOUS ONCE
Status: COMPLETED | OUTPATIENT
Start: 2024-01-04 | End: 2024-01-04

## 2024-01-04 RX ORDER — OXYCODONE HYDROCHLORIDE 5 MG/1
5 TABLET ORAL EVERY 6 HOURS PRN
Qty: 20 TABLET | Refills: 0 | Status: SHIPPED | OUTPATIENT
Start: 2024-01-04 | End: 2024-01-14

## 2024-01-04 RX ORDER — MAGNESIUM HYDROXIDE 1200 MG/15ML
LIQUID ORAL AS NEEDED
Status: DISCONTINUED | OUTPATIENT
Start: 2024-01-04 | End: 2024-01-04 | Stop reason: HOSPADM

## 2024-01-04 RX ORDER — MIDAZOLAM HYDROCHLORIDE 2 MG/2ML
INJECTION, SOLUTION INTRAMUSCULAR; INTRAVENOUS AS NEEDED
Status: DISCONTINUED | OUTPATIENT
Start: 2024-01-04 | End: 2024-01-04

## 2024-01-04 RX ORDER — BUPIVACAINE HYDROCHLORIDE 5 MG/ML
INJECTION, SOLUTION EPIDURAL; INTRACAUDAL
Status: COMPLETED | OUTPATIENT
Start: 2024-01-04 | End: 2024-01-04

## 2024-01-04 RX ORDER — PROMETHAZINE HYDROCHLORIDE 25 MG/ML
12.5 INJECTION, SOLUTION INTRAMUSCULAR; INTRAVENOUS ONCE
Status: DISCONTINUED | OUTPATIENT
Start: 2024-01-04 | End: 2024-01-04 | Stop reason: HOSPADM

## 2024-01-04 RX ORDER — MIDAZOLAM HYDROCHLORIDE 2 MG/2ML
INJECTION, SOLUTION INTRAMUSCULAR; INTRAVENOUS
Status: COMPLETED | OUTPATIENT
Start: 2024-01-04 | End: 2024-01-04

## 2024-01-04 RX ORDER — ACETAMINOPHEN 325 MG/1
650 TABLET ORAL EVERY 6 HOURS PRN
Status: DISCONTINUED | OUTPATIENT
Start: 2024-01-04 | End: 2024-01-04 | Stop reason: HOSPADM

## 2024-01-04 RX ORDER — ONDANSETRON 2 MG/ML
4 INJECTION INTRAMUSCULAR; INTRAVENOUS ONCE AS NEEDED
Status: DISCONTINUED | OUTPATIENT
Start: 2024-01-04 | End: 2024-01-04 | Stop reason: HOSPADM

## 2024-01-04 RX ADMIN — BUPIVACAINE HYDROCHLORIDE 15 ML: 5 INJECTION, SOLUTION EPIDURAL; INTRACAUDAL; PERINEURAL at 09:05

## 2024-01-04 RX ADMIN — SODIUM CHLORIDE, SODIUM LACTATE, POTASSIUM CHLORIDE, AND CALCIUM CHLORIDE: .6; .31; .03; .02 INJECTION, SOLUTION INTRAVENOUS at 09:09

## 2024-01-04 RX ADMIN — SODIUM CHLORIDE, SODIUM LACTATE, POTASSIUM CHLORIDE, AND CALCIUM CHLORIDE: .6; .31; .03; .02 INJECTION, SOLUTION INTRAVENOUS at 10:15

## 2024-01-04 RX ADMIN — CEFAZOLIN SODIUM 2000 MG: 2 SOLUTION INTRAVENOUS at 08:48

## 2024-01-04 RX ADMIN — PROPOFOL 30 MG: 10 INJECTION, EMULSION INTRAVENOUS at 09:17

## 2024-01-04 RX ADMIN — CEFAZOLIN SODIUM 2000 MG: 2 SOLUTION INTRAVENOUS at 09:20

## 2024-01-04 RX ADMIN — SODIUM CHLORIDE, SODIUM LACTATE, POTASSIUM CHLORIDE, AND CALCIUM CHLORIDE 100 ML/HR: .6; .31; .03; .02 INJECTION, SOLUTION INTRAVENOUS at 08:48

## 2024-01-04 RX ADMIN — GABAPENTIN 300 MG: 300 CAPSULE ORAL at 08:48

## 2024-01-04 RX ADMIN — PROPOFOL 100 MCG/KG/MIN: 10 INJECTION, EMULSION INTRAVENOUS at 09:17

## 2024-01-04 RX ADMIN — BUPIVACAINE 20 ML: 13.3 INJECTION, SUSPENSION, LIPOSOMAL INFILTRATION at 09:05

## 2024-01-04 RX ADMIN — ONDANSETRON 4 MG: 2 INJECTION INTRAMUSCULAR; INTRAVENOUS at 10:07

## 2024-01-04 RX ADMIN — ACETAMINOPHEN 975 MG: 325 TABLET, FILM COATED ORAL at 08:47

## 2024-01-04 RX ADMIN — BUPIVACAINE 20 ML: 13.3 INJECTION, SUSPENSION, LIPOSOMAL INFILTRATION at 09:09

## 2024-01-04 RX ADMIN — MIDAZOLAM 2 MG: 1 INJECTION INTRAMUSCULAR; INTRAVENOUS at 09:00

## 2024-01-04 NOTE — H&P
The HAND & UPPER EXTREMITY OFFICE VISIT   Referred By:  Nathaniel Sow  [address] [facility]  [address] [place] [address]      Chief Complaint:         63 y.o., right hand dominant female presents with bilateral thumb pain. Patient was previously treating with Dr Padilla for bilateral thumb CMC arthritis.  Dr. Gonzalez's most recent note was reviewed.  She is has over a year of symptoms which have been worsening over the last couple months.  She had cortisone injections earlier in year with only a week's worth of relief. She has had multiple injections in both thumbs over the last 1.5 years with waning results. She has been using comfort cool braces which help some. Her job requires repetitive typing. Right is worse than left. CMC arthroplasty was discussed with patient by Dr Padilla over summer time. She has difficulty gripping, grasping items, open jars. She has noticed more deformity of right thumb, more popping and cracking.   She is here to discuss surgery.       ADLs: Independent, community ambulator    Smoke: no ETOH:   Social Drugs:  n/a Job: [employer]        Past Medical History:  Past Medical History:   Diagnosis Date    Adjustment disorder with anxiety     last assessed - [id number]    Anemia     Anxiety     Constipation 2021    Diverticulitis     Diverticulitis 2021    Diverticulitis of colon May 2020    Diverticulitis of large intestine with abscess without bleeding 2020    Elevated platelet count 2022    Elevated vitamin B12 level 2022    Generalized abdominal pain 2021    GERD (gastroesophageal reflux disease) 2000    History of total hysterectomy     History of transfusion     Hypertension     Hypertension 3/12/2021    Ingrown toenail     last assessed - [id number]    Thalassemia      Past Surgical History:   Procedure Laterality Date    APPENDECTOMY      BOWEL RESECTION  2021     SECTION  1981    CHOLECYSTECTOMY      COLONOSCOPY      GALLBLADDER SURGERY       OOPHORECTOMY Bilateral     MS LAPAROSCOPY COLECTOMY PARTIAL W/ANASTOMOSIS N/A 11/17/2021    Procedure: RESECTION COLON SIGMOID LAPAROSCOPIC HAND-ASSISTED;  Surgeon: Kee Mace MD;  Location: [facility];  Service: General    TONSILLECTOMY AND ADENOIDECTOMY      TOTAL ABDOMINAL HYSTERECTOMY      UPPER GASTROINTESTINAL ENDOSCOPY       Family History   Problem Relation Age of Onset    Anxiety disorder Mother         Anxiety    Depression Mother     Anxiety disorder Sister         Anxiety    Depression Sister     Cancer Sister         Melanoma    No Known Problems Sister     No Known Problems Sister     Anxiety disorder Daughter         Anxiety    Depression Daughter     Heart disease Maternal Aunt     Heart disease Maternal Uncle      Social History     Socioeconomic History    Marital status:      Spouse name: Not on file    Number of children: Not on file    Years of education: Not on file    Highest education level: Not on file   Occupational History    Occupation:  - [employer]   Tobacco Use    Smoking status: Never    Smokeless tobacco: Never   Vaping Use    Vaping Use: Never used   Substance and Sexual Activity    Alcohol use: Yes     Alcohol/week: 1.0 standard drink of alcohol     Types: 1 Glasses of wine per week     Comment: occasional alcohol use    Drug use: No    Sexual activity: Not Currently     Partners: Male     Birth control/protection: Post-menopausal, Surgical     Comment: Hysterectomy 1988   Other Topics Concern    Not on file   Social History Narrative    Coffee     Social Determinants of Health     Financial Resource Strain: Not on file   Food Insecurity: Not on file   Transportation Needs: Not on file   Physical Activity: Not on file   Stress: Not on file   Social Connections: Not on file   Intimate Partner Violence: Not on file   Housing Stability: Not on file     Scheduled Meds:  Continuous Infusions:No current facility-administered medications for this visit.    PRN  "Meds:.  No Known Allergies        Physical Examination:    /90   Pulse 74   Ht 5' 1.42\" (1.56 m)   Wt 59.1 kg (130 lb 3.2 oz)   BMI 24.27 kg/m²     Gen: A&Ox3, NAD  Cardiac: regular rate  Chest: non labored breathing  Abdomen: Non-distended      Right Upper Extremity:  Skin CDI  No obvious deformity of the shoulder, arm, elbow, forearm, wrist,    Sensation intact to light touch in the axillary median, ulnar, and radial nerve distributions  5/5 motor   2+RP  No adduction contracture  No hyperextension deformity of MCP joint  Positive localized tenderness over radial and dorsal aspect of thumb (CMC joint)  Grind test is Positive for pain and Positive for crepitus  Positive shoulder sign  Metacarpal load shift test positive  No triggering or tenderness over the A1 pulley      Left Upper Extremity:  Skin CDI  No obvious deformity of the shoulder, arm, elbow, forearm, wrist,    Sensation intact to light touch in the axillary median, ulnar, and radial nerve distributions  5/5 motor   2+RP  No adduction contracture  No hyperextension deformity of MCP joint  Positive localized tenderness over radial and dorsal aspect of thumb (CMC joint)  Grind test is Positive for pain and Positive for crepitus  Positive shoulder sign  Metacarpal load shift test positive  No triggering or tenderness over the A1 pulley      Studies:  Radiographs: I personally reviewed and independently interpreted the available radiographs.  X-ray: Radiographs of the bilateral hands, multiple views, demonstrate moderate to severe thumb CMC arthritis with radial subluxation of the thumb metacarpal on the trapezium and osteophyte formation.   no fractures or dislocations.       Assessment and Plan:  1. Arthritis of carpometacarpal (CMC) joint of right thumb        2. Arthritis of carpometacarpal (CMC) joint of left thumb            63 y.o. female presents with signs and symptoms consistent with the above diagnosis.  We discussed the natural history " of this condition and its pathogenesis.  We discussed operative and nonoperative treatment options.  Discussed CMC arthoplasty with tight rope procedure vs continued conservative treatment's.   At this time patient would like to proceed with right CMC arthroplasty-tight rope procedure as she has failed conservative treatment options.   All risks and benefits of surgery were reviewed with patient in detail and questions answered to her satisfaction.     Consented today.   Hand therapy orders placed to start thumb range of motion 4 to 5 days following surgery, and custom brace fabrication  Regional anesthesia with sedation      Will schedule surgery at her convenience.. Follow up post operatively.       she expressed understanding of the plan and agreed. We encouraged them to contact our office with any questions or concerns.         Eric Connolly MD  Hand and Upper Extremity Surgery        *This note was dictated using Dragon voice recognition software. Please excuse any word substitutions or errors.*

## 2024-01-04 NOTE — ANESTHESIA POSTPROCEDURE EVALUATION
Post-Op Assessment Note    CV Status:  Stable  Pain Score: 0    Pain management: adequate       Mental Status:  Awake   Hydration Status:  Stable   PONV Controlled:  None   Airway Patency:  Patent     Post Op Vitals Reviewed: Yes      Staff: CRNA               BP   132/63   Temp   98.6F   Pulse 55   Resp 16   SpO2 100% 6L FM

## 2024-01-04 NOTE — DISCHARGE INSTR - AVS FIRST PAGE
POST-OPERATIVE INSTRUCTIONS   THUMB CMC ARTHROPLASTY      You have just undergone a thumb carpometacarpal arthroplasty by Dr. Connolly in the operating room. It is our wish that your postoperative recovery be as quick and comfortable as possible.  Please carefully review the following items that are important in your recovery.    Pain Control:  After any operation, a certain degree of pain is to be expected.  You have been given a prescription for pain medicine which will relieve most of your pain but may not relieve all of the pain.  Pain medicine may make you drowsy so please curtail your activities appropriately.  Do not drive while taking pain medicine.  Take Advil or Extra Strength Tylenol.     When you go home, please keep your operated arm elevated at all times (above the level of your heart.)  If you do this, your swelling will be diminished and your pain will be diminished as well.    You had a nerve block as part of your surgical anesthesia as well as to aid in your post-operative pain control. This nerve block may last 12-36 hrs after surgery. While your block is working, you will not be able to feel or move your operative arm and hand. Please wear your sling while the block is in place, except for changing clothes or hygiene purposes, to protect your arm. As the block wears off you will start to notice pain in your operative extremity. Please take pain medication as soon as you start to notice the block wearing off. This prevents your pain from becoming unmanageable when the block has completely worn off.      Dressing Care:  The splint that you have on your extremity should remain on and intact until your first postoperative visit with hand therapy.  After surgery, make sure that your dressing is kept dry.  You can take a shower if you cover your arm with a plastic bag, such as a newspaper bag, and use tape or rubber bands. If your dressing gets wet, take it off,  place Band-Aids on the wound and re-wrap  it with sterile dressing that you can get at a local drug store, then please call the office to have a new splint placed.    Weight Bearing:  You should NOT bear weight through your operative extremity. Do not push off using your operative extremity.     If your fingers are not included in the splint, it is ok to move your free fingers as tolerated to prevent stiffness. You may also use your free fingers to assist with light activities of daily living such as putting on clothes, brushing your teeth and eating.       Follow up:  If you do not already have one, please call our office for a follow-up appointment. It is best to call the day after surgery to make an appointment in 10-14 days.  At your first postoperative visit, you will be seen by either Dr. Connolly, his PA;  or one of their associates and the sutures will be removed     You should already have an appointment to see a hand therapist to optimize your functional result. You will see the hand therapist ~5-7 days after surgery, before you follow up with Dr. Connolly's office. Each of the hand therapists that you may be referred to have received special training in the care of the hand and upper extremity ailments.    When to Call:  It is normal to see minor staining on the hand surgery dressing after surgery. If there is significant bleeding, you are advised to call the office during regular office hours to have this checked.     If you feel that you have a surgical emergency postoperatively that requires immediate attention, please call 9-1-1. In addition, any emergency can also be handled by the emergency room.        If you have questions regarding your surgery postop that you feel requires attention, please call the office.   If this occurs after our regular office hours, there is a message with instructions to talk to the physician on call.

## 2024-01-04 NOTE — OP NOTE
OPERATIVE REPORT  PATIENT NAME: Saloni Moy    :  1960  MRN: [id number]  Pt Location: [facility] OR ROOM 03    SURGERY DATE: 2024    Surgeons and Role:     * Eric Connolly MD - Primary     * Inez Conrad PA-C - Assisting    Preop Diagnosis:  Arthritis of carpometacarpal (CMC) joint of right thumb [M18.11]    Post-Op Diagnosis Codes:     * Arthritis of carpometacarpal (CMC) joint of right thumb [M18.11]    Procedure(s):  Right - Thumb carpometacarpal joint arthroplasty with TightRope Suspension    Specimen(s):  * No specimens in log *    Estimated Blood Loss:   Minimal    Drains:  * No LDAs found *    Anesthesia Type:   Regional with Sedation    Operative Indications:  Arthritis of carpometacarpal (CMC) joint of right thumb [M18.11]    63-year-old female with significant bilateral thumb pain right worse than left.  She had previously been treated and worked up by one of my colleagues and has had multiple injections to both thumbs over 1 and half years with waning results.  She has been using braces and oral and topical medications with minimal relief.  She is having a lot of difficulty functioning at work due to this pain as well as difficulty with gripping and grasping items.  She is also noticing worsening deformity.  Given her lack of relief with nonoperative options she would like to proceed with surgical intervention.    Operative Findings:  Calcifications and osteophytes of the trapezium.  Wearing of the cartilage at the thumb CMC joint.    Complications:   None    Procedure and Technique:  On the day of surgery, the patient was met in the pre-operative area. Once again the risks benefits and alternatives of thumb CMC arthroplasty with tight rope were discussed with the patient. Risks included pain, stiffness, swelling, injury to neurovascular to musculoskeletal structure, need for reoperation, thromboembolic event and death. Specific to this procedure the risk of nerve injury and fracture was  discussed and the potential treatment options. Benefits included removal of arthritic joint, decreased pain, and overall improved function.     Patient underwent anesthetic block. Patient was brought to the OR. A tourniquet was applied. The operative extremity was prepped and draped in a standard fashion.  2 grams of Ancef were administered for preoperative antibiotic prophylaxis.  A timeout was performed prior to incision identifying the name and laterality of the procedure which was corroborated with imaging that was present in the room.     The arm was exsanguinated and tourniquet inflated. A 3cm incision was made in longitudinal fashion over there first extensor compartment. The superficial radial nerves were identified and protected. The first dorsal compartment was incised and tendons retracted. The radial artery was identified and carefully mobilized and gently retracted. The CMC joint was incised and capsular flaps were raised dorsally and volarly. The scaphoitrapezial and trapeziometacarpal joint were identified. Soft tissue was detached from the trapezium sharply and with the use of a scalpel and right angle clamp.     We then turned out attention to the second metacarpal. 1 cm incision was made along the ulnar aspect of the metacarpal base. The extensor tendon was retracted, and a 1cm area of periosteum was elevated.     The long guidewire was then positioned on the thumb metacarpal and angled towards the second metacarpal.  Fluoroscopy was used to confirm proper postioning. The guidewire for the tightrope suture was then driven across the 4 cortices of the first and second metacarpals. The positioning of the guidewire was confirmed with fluoroscopy.     The tightrope suture was pulled through the guidewire trajectory. One button remained flush against the thumb metacarpal.     The trapezium was then removed with a rongeur and scissors. The scaphotrapezoid joint was inspected and noted to be free of  arthritis. The FCR tendon was intact.     The thumb was held in the appropriate length, abduction and extension and the tightrope suture was tied over the second button on the second metacarpal. Once again positioning was confirmed on fluoroscopy.     Final fluoroscopy was taken to confirm removal of trapezium. A stress radiograph demonstrated no loss of height of the thumb metacarpal.     Tourniquet was taken down. The wounds were irrigated. The radial incision was closed in a layered fashion with vicryl for the capsule and nylon for the skin. The second metacarpal incision was closed with nylon suture.     Sterile dressings were placed and a thumb spica splint was applied.     Post-operatively the patient will remain non weight bearing in the splint until the follow up appointment with therapy in 4-5 days.       I was present for the entire procedure., A qualified resident physician was not available., and A physician assistant was required during the procedure for retraction, tissue handling, dissection and suturing.    Patient Disposition:  PACU         SIGNATURE: Eric Connolly MD  DATE: January 4, 2024  TIME: 10:25 AM

## 2024-01-04 NOTE — ANESTHESIA PROCEDURE NOTES
Peripheral Block    Patient location during procedure: holding area  Start time: 1/4/2024 9:05 AM  Reason for block: at surgeon's request and post-op pain management  Staffing  Performed by: Amor Sunshine MD  Authorized by: Amor Sunshine MD    Preanesthetic Checklist  Completed: patient identified, IV checked, site marked, risks and benefits discussed, surgical consent, monitors and equipment checked, pre-op evaluation and timeout performed  Peripheral Block  Patient position: supine  Prep: ChloraPrep  Patient monitoring: frequent blood pressure checks, continuous pulse oximetry and heart rate  Block type: Supraclavicular  Laterality: right  Injection technique: single-shot  Procedures: ultrasound guided, Ultrasound guidance required for the procedure to increase accuracy and safety of medication placement and decrease risk of complications.  Ultrasound permanent image savedbupivacaine (PF) (MARCAINE) 0.5 % injection 20 mL - Perineural   15 mL - 1/4/2024 9:05:00 AM  midazolam (VERSED) injection 0.5 mg - Intravenous   2 mg - 1/4/2024 9:00:00 AM  bupivacaine liposomal (EXPAREL) 1.3 % injection 20 mL - Perineural   20 mL - 1/4/2024 9:05:00 AM  Needle  Needle type: Stimuplex   Needle length: 4 in  Needle localization: anatomical landmarks and ultrasound guidance  Assessment  Injection assessment: incremental injection, frequent aspiration, injected with ease, negative aspiration, negative for heart rate change, no paresthesia on injection, no symptoms of intraneural/intravenous injection and needle tip visualized at all times  Paresthesia pain: none  Post-procedure:  site cleaned  patient tolerated the procedure well with no immediate complications

## 2024-01-04 NOTE — ANESTHESIA PREPROCEDURE EVALUATION
Procedure:  Thumb carpometacarpal joint arthroplasty with TightRope Suspension (Right: Finger)    Relevant Problems   CARDIO   (+) Hypertension      GI/HEPATIC   (+) Gastroesophageal reflux disease      GYN   (+) History of hysterectomy      HEMATOLOGY   (+) Beta thalassemia minor   (+) Iron deficiency anemia      MUSCULOSKELETAL   (+) Acute left-sided low back pain without sciatica   (+) Arthritis of carpometacarpal (CMC) joint of right thumb      NEURO/PSYCH   (+) Anxiety   (+) Reactive depression      Endocrine   (+) Adrenal adenoma      Nervous and Auditory   (+) Bilateral carpal tunnel syndrome      Musculoskeletal and Integument   (+) Acute lumbar myofascial strain      Other   (+) RADHA positive   (+) Abdominal pain   (+) Fatigue   (+) Microcytosis   (+) S/P laparoscopic-assisted sigmoidectomy      Lab Results   Component Value Date     09/22/2015    SODIUM 136 06/02/2023    K 4.3 06/02/2023     06/02/2023    CO2 28 06/02/2023    ANIONGAP 9 09/22/2015    AGAP 7 06/02/2023    BUN 13 06/02/2023    CREATININE 0.82 06/02/2023    GLUC 128 12/06/2021    GLUF 94 06/02/2023    CALCIUM 9.4 06/02/2023    AST 15 06/02/2023    ALT 15 06/02/2023    ALKPHOS 90 06/02/2023    PROT 7.4 09/22/2015    TP 7.1 06/02/2023    BILITOT 0.69 09/22/2015    TBILI 0.78 06/02/2023    EGFR 75 06/02/2023     Lab Results   Component Value Date    WBC 6.98 06/02/2023    HGB 11.7 06/02/2023    HCT 38.6 06/02/2023    MCV 68 (L) 06/02/2023     06/02/2023         Physical Exam    Airway    Mallampati score: II  TM Distance: >3 FB  Neck ROM: full     Dental       Cardiovascular      Pulmonary      Other Findings  post-pubertal.      Anesthesia Plan  ASA Score- 2     Anesthesia Type- regional with ASA Monitors.         Additional Monitors:     Airway Plan:            Plan Factors-Exercise tolerance (METS): >4 METS.    Chart reviewed. EKG reviewed. Imaging results reviewed. Existing labs reviewed. Patient summary reviewed.                   Induction- intravenous.    Postoperative Plan-     Informed Consent- Anesthetic plan and risks discussed with patient.  I personally reviewed this patient with the CRNA. Discussed and agreed on the Anesthesia Plan with the CRNA..

## 2024-01-05 ENCOUNTER — TELEPHONE (OUTPATIENT)
Age: 64
End: 2024-01-05

## 2024-01-05 NOTE — TELEPHONE ENCOUNTER
Caller: patient    Doctor: Felisa    Reason for call: Patients body is itching & hand is swollen & fingers are swollen and numb    Patient would like a call back     Call back#: [phone]

## 2024-01-08 ENCOUNTER — EVALUATION (OUTPATIENT)
Dept: OCCUPATIONAL THERAPY | Age: 64
End: 2024-01-08
Payer: COMMERCIAL

## 2024-01-08 DIAGNOSIS — M18.11 ARTHRITIS OF CARPOMETACARPAL (CMC) JOINT OF RIGHT THUMB: ICD-10-CM

## 2024-01-08 PROCEDURE — L3808 WHFO, RIGID W/O JOINTS: HCPCS | Performed by: OCCUPATIONAL THERAPIST

## 2024-01-08 PROCEDURE — 97110 THERAPEUTIC EXERCISES: CPT | Performed by: OCCUPATIONAL THERAPIST

## 2024-01-08 PROCEDURE — 97165 OT EVAL LOW COMPLEX 30 MIN: CPT | Performed by: OCCUPATIONAL THERAPIST

## 2024-01-08 NOTE — PROGRESS NOTES
OT Evaluation     Today's date: 2024  Patient name: Saloni Moy  : 1960  MRN: [id number]  Referring provider: Eric Connolly,*  Dx:   Encounter Diagnosis     ICD-10-CM    1. Arthritis of carpometacarpal (CMC) joint of right thumb  M18.11 Ambulatory Referral to Physical Therapy                     Assessment  Assessment details: Barbra presents S/P R CMC arthroplasty 2024. She has pain, swelling, AROM restrictions and protocol restrictions affecting her ADL and IADLs. 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 She denies N/T and shows no S/S of infection.  Impairments: activity intolerance, impaired physical strength and pain with function    Goals  STG) Motion increased by 25% in 4-6 weeks to facilitate grooming   STG) Strength/Motor skills improved by 25% in 4-6 weeks to facilitate simple meal prep  STG) Swelling\Edema improved by 25% in 4-6 weeks  to facilitate fine motor skils  STG) Pain decreased by 25% in 4-6 weeks to facilitate dressing    LTG) ADL and IADL skills improved   LTG) Work skills improved  LTG) Leisure skills improved    Patient Goals: To use her thumb without pain      Goals, plan of care and treatment condition discussed with patient. Patient expresses their understanding and questions regarding these issues were addressed.      Plan  Patient would benefit from: OT eval and custom splinting  Planned modality interventions: TENS, thermotherapy: hydrocollator packs, thermotherapy: paraffin bath and ultrasound  Planned therapy interventions: neuromuscular re-education, motor coordination training, manual therapy, joint mobilization, Acosta taping, strengthening, stretching, therapeutic activities, therapeutic exercise, functional ROM exercises, fine motor coordination training and orthotic fitting/training  Frequency: 2x week  Duration in visits: 10  Treatment plan discussed with: patient        Subjective Evaluation    History of Present Illness  Mechanism of injury:  R CMC Arthroplasty 1/4 - had CMC injections for ~ 2 years until genaro      Objective     Observations     Additional Observation Details  C/D/I surgical sites with minimal discharge.    Active Range of Motion     Right Wrist   Wrist flexion: 10 degrees   Wrist extension: 50 degrees     Right Thumb   Flexion     MP: 25    DIP: 25  Palmar Abduction    CMC: 35  Radial Abduction    CMC: 45    Swelling     Right Wrist/Hand   Circumference MCP: 19.6 cm             Precautions: CMC Arthroplasty 1/4/24     Early motion protocol: active ROM, edema management, desensitization. Advance AROM as tolerated.  No PROM x 6 weeks     Hand based CMC removable brace for the day and a longer one that goes past the wrist for at night. She can remove the brace for hygiene and gentle active ROM exercises only.     Goal is to be out of the brace at 6 weeks with full thumb ROM then work on strengthening     HEP: Thumb flexion/abduction AROM, compression sleeve for swelling PRN, HF SPICA for day use, WHFO for night use      Manuals             MEM                                                    Neuro Re-Ed             Opposition             In hand manip             Thumb hops ( ab only)                                                                 Ther Ex             Wrist/ FA AROM                                                                                                        Ther Activity                                                                              Modalities             P

## 2024-01-09 DIAGNOSIS — M18.11 ARTHRITIS OF CARPOMETACARPAL (CMC) JOINT OF RIGHT THUMB: ICD-10-CM

## 2024-01-09 DIAGNOSIS — Z47.89 AFTERCARE FOLLOWING SURGERY OF THE MUSCULOSKELETAL SYSTEM: ICD-10-CM

## 2024-01-09 RX ORDER — OXYCODONE HYDROCHLORIDE 5 MG/1
5 TABLET ORAL EVERY 4 HOURS PRN
Qty: 30 TABLET | Refills: 0 | Status: SHIPPED | OUTPATIENT
Start: 2024-01-09 | End: 2024-01-19

## 2024-01-09 NOTE — TELEPHONE ENCOUNTER
Reason for call:     Patient requesting refill for oxycodone 5 mg, patient asking if she can have an increase in dosage, maybe 10mg, states the 5 mg is only lasting about 3 hours.       [x] Refill   [] Prior Auth  [] Other:     Office:   [] PCP/Provider -   [x] Specialty/Provider - Dr Mohsen delarosa    Medication:   Oxycodone 5 mg, 1 q6 prn      Pharmacy:   [facility]    Does the patient have enough for 3 days?   [] Yes   [x] No - Send as HP to POD - 2 pills left

## 2024-01-09 NOTE — TELEPHONE ENCOUNTER
Please review pt request below for increase in oxycodone dosage.  Refill must be reviewed and completed by the office or provider. The refill is unable to be approved by the medication management team.

## 2024-01-11 ENCOUNTER — OFFICE VISIT (OUTPATIENT)
Dept: OCCUPATIONAL THERAPY | Age: 64
End: 2024-01-11
Payer: COMMERCIAL

## 2024-01-11 DIAGNOSIS — M18.11 ARTHRITIS OF CARPOMETACARPAL (CMC) JOINT OF RIGHT THUMB: Primary | ICD-10-CM

## 2024-01-11 PROCEDURE — 97110 THERAPEUTIC EXERCISES: CPT | Performed by: OCCUPATIONAL THERAPIST

## 2024-01-11 PROCEDURE — 97140 MANUAL THERAPY 1/> REGIONS: CPT | Performed by: OCCUPATIONAL THERAPIST

## 2024-01-11 NOTE — PROGRESS NOTES
"Daily Note     Today's date: 2024  Patient name: Saloni Moy  : 1960  MRN: [id number]  Referring provider: Eric Connolly,*  Dx:   Encounter Diagnosis     ICD-10-CM    1. Arthritis of carpometacarpal (CMC) joint of right thumb  M18.11                      Subjective: \"It's hurting\"      Objective: See treatment diary below      Assessment: Tolerated fair; issued edema glove, fatigued and sore with thumb AROM but grossly improved.  Plan: Continue per plan of care.      Precautions: CMC Arthroplasty 24     Early motion protocol: active ROM, edema management, desensitization. Advance AROM as tolerated.  No PROM x 6 weeks     Hand based CMC removable brace for the day and a longer one that goes past the wrist for at night. She can remove the brace for hygiene and gentle active ROM exercises only.     Goal is to be out of the brace at 6 weeks with full thumb ROM then work on strengthening     HEP: Thumb flexion/abduction AROM, compression sleeve for swelling PRN, HF SPICA for day use, WHFO for night use      Manuals             MEM 15                                                   Neuro Re-Ed             Opposition marbles            In hand manip TB 2 planes x 5            Thumb hops ( ab only) 2x10                                                                Ther Ex             Wrist/ FA AROM 2x10 all planes                                                                                                       Ther Activity                                                                              Modalities             MHP 5                              "

## 2024-01-16 ENCOUNTER — APPOINTMENT (OUTPATIENT)
Dept: RADIOLOGY | Facility: MEDICAL CENTER | Age: 64
End: 2024-01-16
Payer: COMMERCIAL

## 2024-01-16 ENCOUNTER — APPOINTMENT (OUTPATIENT)
Dept: OCCUPATIONAL THERAPY | Age: 64
End: 2024-01-16
Payer: COMMERCIAL

## 2024-01-16 ENCOUNTER — OFFICE VISIT (OUTPATIENT)
Dept: OBGYN CLINIC | Facility: MEDICAL CENTER | Age: 64
End: 2024-01-16

## 2024-01-16 VITALS
DIASTOLIC BLOOD PRESSURE: 79 MMHG | WEIGHT: 132 LBS | HEIGHT: 61 IN | BODY MASS INDEX: 24.92 KG/M2 | SYSTOLIC BLOOD PRESSURE: 144 MMHG | HEART RATE: 55 BPM

## 2024-01-16 DIAGNOSIS — Z98.890 S/P MUSCULOSKELETAL SYSTEM SURGERY: ICD-10-CM

## 2024-01-16 DIAGNOSIS — M18.11 ARTHRITIS OF CARPOMETACARPAL (CMC) JOINT OF RIGHT THUMB: ICD-10-CM

## 2024-01-16 DIAGNOSIS — M18.11 ARTHRITIS OF CARPOMETACARPAL (CMC) JOINT OF RIGHT THUMB: Primary | ICD-10-CM

## 2024-01-16 PROCEDURE — 73130 X-RAY EXAM OF HAND: CPT

## 2024-01-16 PROCEDURE — 99024 POSTOP FOLLOW-UP VISIT: CPT | Performed by: ORTHOPAEDIC SURGERY

## 2024-01-16 NOTE — PROGRESS NOTES
HAND & UPPER EXTREMITY OFFICE VISIT   Referred By:  Nathaniel Sow  [address] [facility]  [address] [place] [address]      Chief Complaint:     Bilateral thumb pain    Surgery:  Surgery Date: 2024 - Thumb carpometacarpal joint arthroplasty with TightRope Suspension - Right     History of Present Illness:   Patient presents now 12 days status post the above surgery. she reports significant pain at the radial wrist since the surgery which seems worse after therapy sessions.  She has throbbing rating from the radial wrist out to the thumb tip. She has been wearing a compression glove for edema control. She reports being able to oppose the thumb to the small finger during her therapy sessions.     Past Medical History:  Past Medical History:   Diagnosis Date    Adjustment disorder with anxiety     last assessed - [id number]    Anemia     Anxiety     Constipation 2021    Diverticulitis     Diverticulitis 2021    Diverticulitis of colon May 2020    Diverticulitis of large intestine with abscess without bleeding 2020    Elevated platelet count 2022    Elevated vitamin B12 level 2022    Generalized abdominal pain 2021    GERD (gastroesophageal reflux disease)     History of total hysterectomy     History of transfusion     Hypertension     Hypertension 3/12/2021    Ingrown toenail     last assessed - [id number]    Thalassemia      Past Surgical History:   Procedure Laterality Date    APPENDECTOMY      BOWEL RESECTION  2021     SECTION      CHOLECYSTECTOMY      COLONOSCOPY      GALLBLADDER SURGERY      OOPHORECTOMY Bilateral     IA ARTHRP INTERPOS INTERCARPAL/METACARPAL JOINTS Right 2024    Procedure: Thumb carpometacarpal joint arthroplasty with TightRope Suspension;  Surgeon: Eric Connolly MD;  Location:  MAIN OR;  Service: Orthopedics    IA LAPAROSCOPY COLECTOMY PARTIAL W/ANASTOMOSIS N/A 2021    Procedure: RESECTION COLON SIGMOID LAPAROSCOPIC  "HAND-ASSISTED;  Surgeon: Kee Mace MD;  Location[facility];  Service: General    TONSILLECTOMY AND ADENOIDECTOMY      TOTAL ABDOMINAL HYSTERECTOMY      UPPER GASTROINTESTINAL ENDOSCOPY       Family History   Problem Relation Age of Onset    Anxiety disorder Mother         Anxiety    Depression Mother     Anxiety disorder Sister         Anxiety    Depression Sister     Cancer Sister         Melanoma    No Known Problems Sister     No Known Problems Sister     Anxiety disorder Daughter         Anxiety    Depression Daughter     Heart disease Maternal Aunt     Heart disease Maternal Uncle      Social History     Socioeconomic History    Marital status:      Spouse name: Not on file    Number of children: Not on file    Years of education: Not on file    Highest education level: Not on file   Occupational History    Occupation:  - [employer]   Tobacco Use    Smoking status: Never    Smokeless tobacco: Never   Vaping Use    Vaping status: Never Used   Substance and Sexual Activity    Alcohol use: Yes     Alcohol/week: 1.0 standard drink of alcohol     Types: 1 Glasses of wine per week     Comment: occasional alcohol use    Drug use: No    Sexual activity: Not Currently     Partners: Male     Birth control/protection: Post-menopausal, Surgical     Comment: Hysterectomy 1988   Other Topics Concern    Not on file   Social History Narrative    Coffee     Social Determinants of Health     Financial Resource Strain: Not on file   Food Insecurity: Not on file   Transportation Needs: Not on file   Physical Activity: Not on file   Stress: Not on file   Social Connections: Not on file   Intimate Partner Violence: Not on file   Housing Stability: Not on file     Scheduled Meds:  Continuous Infusions:No current facility-administered medications for this visit.    PRN Meds:.  No Known Allergies    Physical Examination:    /79   Pulse 55   Ht 5' 1.42\" (1.56 m)   Wt 59.9 kg (132 lb)   BMI 24.60 kg/m² "     Gen: A&Ox3, NAD    Right Upper Extremity:  Dressing clean and dry, removed  Sutures intact, removed  Underlying incision healing well without signs of infection   Positive swelling at radial wrist and over the first dorsal compartment  Sensation intact to light touch in the axillary median, ulnar, and radial nerve distributions  Limited thumb ROM due to stiffness but actively able to flex and extend the IP joint and MP joint.  Can oppose thumb to the middle finger today  Able to flex the remaining 4 fingers down into the palm  2+RP        Studies:  Radiographs: I personally reviewed and independently interpreted the available radiographs.  1/16/24: Radiographs of the right hand, multiple views, demonstrate status post trapeziectomy and tight rope suspension plasty.  No interval subsidence, no signs of hardware failure.    Assessment and Plan:  1. Arthritis of carpometacarpal (CMC) joint of right thumb  XR hand 3+ vw right      2. S/P musculoskeletal system surgery  XR hand 3+ vw right          63 y.o. female presents 12 days status post Thumb carpometacarpal joint arthroplasty with TightRope Suspension - Right. Sutures removed in the office today. She may begin scar massage for skin desensitization around the incision sites. She may cleanse the area and shower normally.     Continue wearing the custom brace full time except for hygiene and range of motion exercises.  She is a bit stiff today but it is encouraging that it appears her motion is much better during therapy sessions.  Intraoperatively we were able to get full range of motion of the thumb and full opposition to the base of the small finger.  Continue with PT/OT.  Patient having some irritation of her first dorsal compartment tendons which is the majority of her pain right now.  I think that as we can get her swelling down and improve her thumb range of motion the should improve.  We discussed that we could potentially do a corticosteroid injection at  the level of these tendons next visit if she does not have significant improvement.     It is recommended she return to the office in 4 weeks, or sooner should symptoms worsen    she expressed understanding of the plan and agreed. We encouraged them to contact our office with any questions or concerns.         Eric Connolly MD  Hand and Upper Extremity Surgery          *This note was dictated using Dragon voice recognition software. Please excuse any word substitutions or errors.*

## 2024-01-17 ENCOUNTER — APPOINTMENT (OUTPATIENT)
Dept: OCCUPATIONAL THERAPY | Age: 64
End: 2024-01-17
Payer: COMMERCIAL

## 2024-01-17 ENCOUNTER — OFFICE VISIT (OUTPATIENT)
Dept: OCCUPATIONAL THERAPY | Age: 64
End: 2024-01-17
Payer: COMMERCIAL

## 2024-01-17 DIAGNOSIS — M18.11 ARTHRITIS OF CARPOMETACARPAL (CMC) JOINT OF RIGHT THUMB: Primary | ICD-10-CM

## 2024-01-17 PROCEDURE — 97140 MANUAL THERAPY 1/> REGIONS: CPT

## 2024-01-17 PROCEDURE — 97110 THERAPEUTIC EXERCISES: CPT

## 2024-01-17 NOTE — PROGRESS NOTES
"Daily Note     Today's date: 2024  Patient name: Saloni Moy  : 1960  MRN: [id number]  Referring provider: Eric Connolly,*  Dx:   Encounter Diagnosis     ICD-10-CM    1. Arthritis of carpometacarpal (CMC) joint of right thumb  M18.11             Start Time: 1534  Stop Time: 1617  Total time in clinic (min): 43 minutes    Subjective: \"It's sore and stiff but doing pretty good.\"      Objective: See treatment diary below      Assessment: Pt noticing some stiffness as expected. She tolerated all AROM exercises well. Requires min cues for positioning during tendon glides and other exercises.    Plan: Continue per plan of care.  Progress treatment as tolerated.   Progress treament per protocol.      Precautions: CMC Arthroplasty 24     Early motion protocol: active ROM, edema management, desensitization. Advance AROM as tolerated.  No PROM x 6 weeks     Hand based CMC removable brace for the day and a longer one that goes past the wrist for at night. She can remove the brace for hygiene and gentle active ROM exercises only.     Goal is to be out of the brace at 6 weeks with full thumb ROM then work on strengthening     HEP: Thumb flexion/abduction AROM, compression sleeve for swelling PRN, HF SPICA for day use, WHFO for night use      Manuals            MEM 15 5 edema             5 STM                                     Neuro Re-Ed             Opposition marbles            In hand manip TB 2 planes x 5            Thumb hops ( ab only) 2x10                                                                Ther Ex             Wrist/ FA AROM 2x10 all planes 2x20           Tendon glides  2x20           Thumb Opposition  x20           Flexion, ext, abduction  2x20           Tennis ball rotations  X2 each direction                                                  Ther Activity                                                                              Modalities             MHP 5 5         "

## 2024-01-22 ENCOUNTER — TELEPHONE (OUTPATIENT)
Age: 64
End: 2024-01-22

## 2024-01-22 ENCOUNTER — OFFICE VISIT (OUTPATIENT)
Dept: FAMILY MEDICINE CLINIC | Facility: CLINIC | Age: 64
End: 2024-01-22
Payer: COMMERCIAL

## 2024-01-22 VITALS
HEIGHT: 61 IN | TEMPERATURE: 98.9 F | OXYGEN SATURATION: 98 % | HEART RATE: 63 BPM | WEIGHT: 132 LBS | SYSTOLIC BLOOD PRESSURE: 136 MMHG | RESPIRATION RATE: 17 BRPM | BODY MASS INDEX: 24.92 KG/M2 | DIASTOLIC BLOOD PRESSURE: 74 MMHG

## 2024-01-22 DIAGNOSIS — M18.11 ARTHRITIS OF CARPOMETACARPAL (CMC) JOINT OF RIGHT THUMB: Primary | ICD-10-CM

## 2024-01-22 DIAGNOSIS — M79.672 PAIN OF LEFT HEEL: Primary | ICD-10-CM

## 2024-01-22 PROBLEM — R63.4 UNEXPLAINED WEIGHT LOSS: Status: RESOLVED | Noted: 2022-09-13 | Resolved: 2024-01-22

## 2024-01-22 PROBLEM — Z63.4 BEREAVEMENT: Status: RESOLVED | Noted: 2023-08-14 | Resolved: 2024-01-22

## 2024-01-22 PROBLEM — R53.83 FATIGUE: Status: RESOLVED | Noted: 2022-05-26 | Resolved: 2024-01-22

## 2024-01-22 PROBLEM — M25.552 PAIN OF LEFT HIP: Status: RESOLVED | Noted: 2023-06-09 | Resolved: 2024-01-22

## 2024-01-22 PROBLEM — F32.9 REACTIVE DEPRESSION: Status: RESOLVED | Noted: 2023-08-14 | Resolved: 2024-01-22

## 2024-01-22 PROCEDURE — 99213 OFFICE O/P EST LOW 20 MIN: CPT | Performed by: NURSE PRACTITIONER

## 2024-01-22 RX ORDER — OXYCODONE HYDROCHLORIDE 5 MG/1
5 TABLET ORAL EVERY 4 HOURS PRN
Qty: 30 TABLET | Refills: 0 | Status: SHIPPED | OUTPATIENT
Start: 2024-01-22 | End: 2024-01-22

## 2024-01-22 NOTE — PROGRESS NOTES
FAMILY PRACTICE OFFICE VISIT       NAME: Saloni Moy  AGE: 63 y.o. SEX: female       : 1960        MRN: [id number]    DATE: 2024  TIME: 1:46 PM    Assessment and Plan   1. Pain of left heel  Assessment & Plan:  Handout on plantar fasciitis given to patient      Orders:  -     XR foot 3+ vw left; Future; Expected date: 2024                 Chief Complaint     Chief Complaint   Patient presents with    Foot Pain     Pt c/o left heel pain/ no injury       History of Present Illness   Saloni Moy is a 63 y.o.-year-old female who is here for c/o left heel pain  No injury      Review of Systems   Review of Systems   Constitutional:  Negative for fatigue and fever.   Respiratory:  Negative for cough and shortness of breath.    Cardiovascular:  Negative for chest pain and palpitations.   Gastrointestinal:  Negative for constipation and diarrhea.   Musculoskeletal:  Positive for arthralgias and myalgias.   Skin:  Negative for rash.   Neurological:  Negative for dizziness and headaches.   Hematological:  Negative for adenopathy.   Psychiatric/Behavioral:  Negative for dysphoric mood. The patient is not nervous/anxious.        Active Problem List     Patient Active Problem List   Diagnosis    Anxiety    Gastroesophageal reflux disease    Dense breast tissue    Vaginal atrophy    Low bone density    Adrenal adenoma    History of hysterectomy    Hypertension    Adrenal nodule (HCC)    Abdominal pain    Iron deficiency anemia    S/P laparoscopic-assisted sigmoidectomy    Vitamin D deficiency    Microcytosis    Beta thalassemia minor    RADHA positive    Acute lumbar myofascial strain    Acute left-sided low back pain without sciatica    Bilateral carpal tunnel syndrome    Arthritis of carpometacarpal (CMC) joint of right thumb    Pain of left heel         Past Medical History:  Past Medical History:   Diagnosis Date    Adjustment disorder with anxiety     last assessed - 2016    Anemia     Anxiety      Constipation 2021    Diverticulitis     Diverticulitis 2021    Diverticulitis of colon May 2020    Diverticulitis of large intestine with abscess without bleeding 2020    Elevated platelet count 2022    Elevated vitamin B12 level 2022    Generalized abdominal pain 2021    GERD (gastroesophageal reflux disease)     History of total hysterectomy     History of transfusion     Hypertension     Hypertension 3/12/2021    Ingrown toenail     last assessed - [id number]    Thalassemia        Past Surgical History:  Past Surgical History:   Procedure Laterality Date    APPENDECTOMY      BOWEL RESECTION  2021     SECTION      CHOLECYSTECTOMY      COLONOSCOPY      GALLBLADDER SURGERY      OOPHORECTOMY Bilateral     AK ARTHRP INTERPOS INTERCARPAL/METACARPAL JOINTS Right 2024    Procedure: Thumb carpometacarpal joint arthroplasty with TightRope Suspension;  Surgeon: Eric Connolly MD;  [facility];  Service: Orthopedics    AK LAPAROSCOPY COLECTOMY PARTIAL W/ANASTOMOSIS N/A 2021    Procedure: RESECTION COLON SIGMOID LAPAROSCOPIC HAND-ASSISTED;  Surgeon: Kee Mace MD;  Location[facility];  Service: General    TONSILLECTOMY AND ADENOIDECTOMY      TOTAL ABDOMINAL HYSTERECTOMY      UPPER GASTROINTESTINAL ENDOSCOPY         Family History:  Family History   Problem Relation Age of Onset    Anxiety disorder Mother         Anxiety    Depression Mother     Anxiety disorder Sister         Anxiety    Depression Sister     Cancer Sister         Melanoma    No Known Problems Sister     No Known Problems Sister     Anxiety disorder Daughter         Anxiety    Depression Daughter     Heart disease Maternal Aunt     Heart disease Maternal Uncle        Social History:  Social History     Socioeconomic History    Marital status:      Spouse name: Not on file    Number of children: Not on file    Years of education: Not on file    Highest education level: Not  on file   Occupational History    Occupation:  - [employer]   Tobacco Use    Smoking status: Never    Smokeless tobacco: Never   Vaping Use    Vaping status: Never Used   Substance and Sexual Activity    Alcohol use: Yes     Alcohol/week: 1.0 standard drink of alcohol     Types: 1 Glasses of wine per week     Comment: occasional alcohol use    Drug use: No    Sexual activity: Not Currently     Partners: Male     Birth control/protection: Post-menopausal, Surgical     Comment: Hysterectomy 1988   Other Topics Concern    Not on file   Social History Narrative    Coffee     Social Determinants of Health     Financial Resource Strain: Not on file   Food Insecurity: Not on file   Transportation Needs: Not on file   Physical Activity: Not on file   Stress: Not on file   Social Connections: Not on file   Intimate Partner Violence: Not on file   Housing Stability: Not on file       Objective     Vitals:    01/22/24 1124   BP: 136/74   Pulse: 63   Resp: 17   Temp: 98.9 °F (37.2 °C)   SpO2: 98%     Wt Readings from Last 3 Encounters:   01/22/24 59.9 kg (132 lb)   01/16/24 59.9 kg (132 lb)   01/04/24 57.2 kg (126 lb)       Physical Exam  Vitals and nursing note reviewed.   Constitutional:       Appearance: Normal appearance.   HENT:      Head: Normocephalic.   Eyes:      Conjunctiva/sclera: Conjunctivae normal.   Cardiovascular:      Rate and Rhythm: Normal rate and regular rhythm.      Heart sounds: Normal heart sounds.   Pulmonary:      Effort: Pulmonary effort is normal.      Breath sounds: Normal breath sounds.   Musculoskeletal:         General: Normal range of motion.      Cervical back: Normal range of motion and neck supple.      Right ankle: Normal.      Left ankle: Normal.      Right foot: Normal.      Left foot: Normal.   Skin:     General: Skin is warm and dry.      Capillary Refill: Capillary refill takes less than 2 seconds.   Neurological:      General: No focal deficit present.      Mental Status:  "She is alert and oriented to person, place, and time.   Psychiatric:         Mood and Affect: Mood normal.         Behavior: Behavior normal.         Pertinent Laboratory/Diagnostic Studies:  Lab Results   Component Value Date    GLUCOSE 84 09/22/2015    BUN 13 06/02/2023    CREATININE 0.82 06/02/2023    CALCIUM 9.4 06/02/2023     09/22/2015    K 4.3 06/02/2023    CO2 28 06/02/2023     06/02/2023     Lab Results   Component Value Date    ALT 15 06/02/2023    AST 15 06/02/2023    ALKPHOS 90 06/02/2023    BILITOT 0.69 09/22/2015       Lab Results   Component Value Date    WBC 6.98 06/02/2023    HGB 11.7 06/02/2023    HCT 38.6 06/02/2023    MCV 68 (L) 06/02/2023     06/02/2023       No results found for: \"TSH\"    Lab Results   Component Value Date    CHOL 169 09/22/2015     Lab Results   Component Value Date    TRIG 166 (H) 04/24/2023     Lab Results   Component Value Date    HDL 63 04/24/2023     Lab Results   Component Value Date    LDLCALC 109 (H) 04/24/2023     Lab Results   Component Value Date    HGBA1C 5.3 04/24/2023       Results for orders placed or performed in visit on 06/02/23   LD,Blood   Result Value Ref Range     (L) 140 - 271 U/L   Vitamin B12   Result Value Ref Range    Vitamin B-12 414 180 - 914 pg/mL       Orders Placed This Encounter   Procedures    XR foot 3+ vw left       ALLERGIES:  No Known Allergies    Current Medications     Current Outpatient Medications   Medication Sig Dispense Refill    ALPRAZolam (XANAX) 0.25 mg tablet Take 1 tablet (0.25 mg total) by mouth daily at bedtime as needed for anxiety 30 tablet 3    celecoxib (CeleBREX) 100 mg capsule TAKE ONE CAPSULE BY MOUTH 2 TIMES A  capsule 1    Diclofenac Sodium (VOLTAREN) 1 % Apply 2 g topically 4 (four) times a day 100 g 6    docusate sodium (COLACE) 100 mg capsule Take 1 capsule (100 mg total) by mouth in the morning 10 capsule 0    omeprazole (PriLOSEC) 40 MG capsule TAKE ONE CAPSULE BY MOUTH EVERY DAY. " 90 capsule 0    lisinopril (ZESTRIL) 10 mg tablet Take 1 tablet (10 mg total) by mouth daily (Patient not taking: Reported on 1/22/2024) 90 tablet 3     No current facility-administered medications for this visit.         Health Maintenance     Health Maintenance   Topic Date Due    Zoster Vaccine (1 of 2) Never done    Influenza Vaccine (1) 09/01/2023    COVID-19 Vaccine (4 - 2023-24 season) 09/01/2023    Pneumococcal Vaccine: Pediatrics (0 to 5 Years) and At-Risk Patients (6 to 64 Years) (1 - PCV) 06/09/2024 (Originally 4/28/1966)    OT PLAN OF CARE  02/07/2024    Annual Physical  08/14/2024    Depression Screening  01/22/2025    Breast Cancer Screening: Mammogram  07/12/2025    Colorectal Cancer Screening  09/17/2025    DTaP,Tdap,and Td Vaccines (2 - Td or Tdap) 08/14/2033    HIV Screening  Completed    Hepatitis C Screening  Completed    HIB Vaccine  Aged Out    IPV Vaccine  Aged Out    Hepatitis A Vaccine  Aged Out    Meningococcal ACWY Vaccine  Aged Out    HPV Vaccine  Aged Out     Immunization History   Administered Date(s) Administered    COVID-19 PFIZER VACCINE 0.3 ML IM 12/23/2020, 01/14/2021, 01/08/2022    Influenza, recombinant, quadrivalent,injectable, preservative free 10/05/2018    Tdap 08/14/2023       Depression Screening and Follow-up Plan: Patient was screened for depression during today's encounter. They screened negative with a PHQ-2 score of 1.        KATIANA Sow

## 2024-01-22 NOTE — TELEPHONE ENCOUNTER
Reason for call:     Pt is calling requesting more oxy, states she is still in pain? Please advise. She would like a call back.Thank you                [] Refill   [] Prior Auth  [] Other:     Office:   [] PCP/Provider -   [x] Specialty/Provider - Dr Connolly    Medication: oxycodone 5mg      Pharmacy: [facility]

## 2024-01-23 ENCOUNTER — OFFICE VISIT (OUTPATIENT)
Dept: OCCUPATIONAL THERAPY | Age: 64
End: 2024-01-23
Payer: COMMERCIAL

## 2024-01-23 ENCOUNTER — APPOINTMENT (OUTPATIENT)
Dept: RADIOLOGY | Age: 64
End: 2024-01-23
Payer: COMMERCIAL

## 2024-01-23 DIAGNOSIS — M18.11 ARTHRITIS OF CARPOMETACARPAL (CMC) JOINT OF RIGHT THUMB: Primary | ICD-10-CM

## 2024-01-23 DIAGNOSIS — M79.672 PAIN OF LEFT HEEL: ICD-10-CM

## 2024-01-23 PROCEDURE — 73630 X-RAY EXAM OF FOOT: CPT

## 2024-01-23 PROCEDURE — 97110 THERAPEUTIC EXERCISES: CPT | Performed by: OCCUPATIONAL THERAPIST

## 2024-01-23 NOTE — PROGRESS NOTES
"Daily Note     Today's date: 2024  Patient name: Saloni Moy  : 1960  MRN: [id number]  Referring provider: Eric Connolly,*  Dx:   Encounter Diagnosis     ICD-10-CM    1. Arthritis of carpometacarpal (CMC) joint of right thumb  M18.11                      Subjective: \"This is hard\"      Objective: See treatment diary below      Assessment: AROM progressing well, some hypersensitivity along scar    Plan: Continue per plan of care.      Precautions: CMC Arthroplasty 24     Early motion protocol: active ROM, edema management, desensitization. Advance AROM as tolerated.  No PROM x 6 weeks     Hand based CMC removable brace for the day and a longer one that goes past the wrist for at night. She can remove the brace for hygiene and gentle active ROM exercises only.     Goal is to be out of the brace at 6 weeks with full thumb ROM then work on strengthening     HEP: Thumb flexion/abduction AROM, compression sleeve for swelling PRN, HF SPICA for day use, WHFO for night use      Manuals           MEM 15 5 edema             5 STM 10                                    Neuro Re-Ed             Opposition marbles  Marbles x 30 alt FT, coins          In hand manip TB 2 planes x 5  TB 2 planes x 5          Thumb hops ( ab only) 2x10  2x10                                                              Ther Ex             Wrist/ FA AROM 2x10 all planes 2x20 2x10          Tendon glides  2x20           Thumb Opposition  x20           Flexion, ext, abduction  2x20           Tennis ball rotations  X2 each direction                                                  Ther Activity                                                                              Modalities             MHP 5 5                               "

## 2024-01-30 ENCOUNTER — APPOINTMENT (OUTPATIENT)
Dept: OCCUPATIONAL THERAPY | Age: 64
End: 2024-01-30
Payer: COMMERCIAL

## 2024-01-31 ENCOUNTER — OFFICE VISIT (OUTPATIENT)
Dept: OCCUPATIONAL THERAPY | Age: 64
End: 2024-01-31
Payer: COMMERCIAL

## 2024-01-31 DIAGNOSIS — M18.11 ARTHRITIS OF CARPOMETACARPAL (CMC) JOINT OF RIGHT THUMB: Primary | ICD-10-CM

## 2024-01-31 PROCEDURE — 97140 MANUAL THERAPY 1/> REGIONS: CPT | Performed by: OCCUPATIONAL THERAPIST

## 2024-01-31 PROCEDURE — 97110 THERAPEUTIC EXERCISES: CPT | Performed by: OCCUPATIONAL THERAPIST

## 2024-01-31 NOTE — PROGRESS NOTES
"Daily Note     Today's date: 2024  Patient name: Saloni Moy  : 1960  MRN: [id number]  Referring provider: Eric Connolly,*  Dx:   Encounter Diagnosis     ICD-10-CM    1. Arthritis of carpometacarpal (CMC) joint of right thumb  M18.11                      Subjective: \"It feels stiffer\"      Objective: See treatment diary below      Assessment: More stiffness today, improved with AROM activities and MHP. Mild pain  but improved by end of session some.    Plan: Continue per plan of care.      Precautions: CMC Arthroplasty 24     Early motion protocol: active ROM, edema management, desensitization. Advance AROM as tolerated.  No PROM x 6 weeks     Hand based CMC removable brace for the day and a longer one that goes past the wrist for at night. She can remove the brace for hygiene and gentle active ROM exercises only.     Goal is to be out of the brace at 6 weeks with full thumb ROM then work on strengthening     HEP: Thumb flexion/abduction AROM, compression sleeve for swelling PRN, HF SPICA for day use, WHFO for night use      Manuals          MEM 15 5 edema             5 STM 10 10                                   Neuro Re-Ed             Opposition marbles  Marbles x 30 alt FT, coins Coins, KP         In hand manip TB 2 planes x 5  TB 2 planes x 5 TB 4 planes x 5         Thumb hops ( ab only) 2x10  2x10 2x10                                                             Ther Ex             Wrist/ FA AROM 2x10 all planes 2x20 2x10 2x10         Tendon glides  2x20           Thumb Opposition  x20  KP         Flexion, ext, abduction  2x20           Tennis ball rotations  X2 each direction                                                  Ther Activity                                                                              Modalities             MHP 5 5  5                               "

## 2024-02-01 DIAGNOSIS — K21.9 GASTROESOPHAGEAL REFLUX DISEASE WITHOUT ESOPHAGITIS: ICD-10-CM

## 2024-02-02 RX ORDER — OMEPRAZOLE 40 MG/1
40 CAPSULE, DELAYED RELEASE ORAL DAILY
Qty: 90 CAPSULE | Refills: 3 | Status: SHIPPED | OUTPATIENT
Start: 2024-02-02

## 2024-02-07 ENCOUNTER — OFFICE VISIT (OUTPATIENT)
Dept: OCCUPATIONAL THERAPY | Age: 64
End: 2024-02-07
Payer: COMMERCIAL

## 2024-02-07 DIAGNOSIS — M18.11 ARTHRITIS OF CARPOMETACARPAL (CMC) JOINT OF RIGHT THUMB: Primary | ICD-10-CM

## 2024-02-07 PROCEDURE — 97140 MANUAL THERAPY 1/> REGIONS: CPT | Performed by: OCCUPATIONAL THERAPIST

## 2024-02-07 PROCEDURE — 97112 NEUROMUSCULAR REEDUCATION: CPT | Performed by: OCCUPATIONAL THERAPIST

## 2024-02-07 PROCEDURE — 97110 THERAPEUTIC EXERCISES: CPT | Performed by: OCCUPATIONAL THERAPIST

## 2024-02-07 NOTE — PROGRESS NOTES
"Daily Note     Today's date: 2024  Patient name: Saloni Moy  : 1960  MRN: [id number]  Referring provider: Eric Connolly,*  Dx:   Encounter Diagnosis     ICD-10-CM    1. Arthritis of carpometacarpal (CMC) joint of right thumb  M18.11                      Subjective: \"It hurt yesterday, but today it's fine\"      Objective: See treatment diary below      Assessment: Mild stiffness but tolerating well, near full AROM    Plan: Progress note during next visit.      Precautions: CMC Arthroplasty 24     Early motion protocol: active ROM, edema management, desensitization. Advance AROM as tolerated.  No PROM x 6 weeks     Hand based CMC removable brace for the day and a longer one that goes past the wrist for at night. She can remove the brace for hygiene and gentle active ROM exercises only.     Goal is to be out of the brace at 6 weeks with full thumb ROM then work on strengthening     HEP: Thumb flexion/abduction AROM, compression sleeve for swelling PRN, HF SPICA for day use, WHFO for night use      Manuals  2/7        MEM 15 5 edema           STM  5 STM 10 10 10                                  Neuro Re-Ed             Opposition marbles  Marbles x 30 alt FT, coins Coins, KP Coins, KP        In hand manip TB 2 planes x 5  TB 2 planes x 5 TB 4 planes x 5 TB 4 planes x 5        Thumb hops ( ab only) 2x10  2x10 2x10                                                             Ther Ex             Wrist/ FA AROM 2x10 all planes 2x20 2x10 2x10 2x10        Tendon glides  2x20           Thumb Opposition  x20  KP kp        Flexion, ext, abduction  2x20           Tennis ball rotations  X2 each direction           Wrist maze     10x                                  Ther Activity                                                                              Modalities             P 5 5  5 5                                "

## 2024-02-13 ENCOUNTER — APPOINTMENT (OUTPATIENT)
Dept: OCCUPATIONAL THERAPY | Age: 64
End: 2024-02-13
Payer: COMMERCIAL

## 2024-02-14 ENCOUNTER — EVALUATION (OUTPATIENT)
Dept: OCCUPATIONAL THERAPY | Age: 64
End: 2024-02-14
Payer: COMMERCIAL

## 2024-02-14 ENCOUNTER — OFFICE VISIT (OUTPATIENT)
Dept: OBGYN CLINIC | Facility: MEDICAL CENTER | Age: 64
End: 2024-02-14

## 2024-02-14 ENCOUNTER — APPOINTMENT (OUTPATIENT)
Dept: RADIOLOGY | Facility: MEDICAL CENTER | Age: 64
End: 2024-02-14
Payer: COMMERCIAL

## 2024-02-14 VITALS
DIASTOLIC BLOOD PRESSURE: 86 MMHG | WEIGHT: 133 LBS | SYSTOLIC BLOOD PRESSURE: 162 MMHG | HEART RATE: 52 BPM | BODY MASS INDEX: 25.11 KG/M2 | HEIGHT: 61 IN

## 2024-02-14 DIAGNOSIS — M18.11 ARTHRITIS OF CARPOMETACARPAL (CMC) JOINT OF RIGHT THUMB: Primary | ICD-10-CM

## 2024-02-14 DIAGNOSIS — Z98.890 S/P MUSCULOSKELETAL SYSTEM SURGERY: Primary | ICD-10-CM

## 2024-02-14 DIAGNOSIS — Z98.890 S/P MUSCULOSKELETAL SYSTEM SURGERY: ICD-10-CM

## 2024-02-14 DIAGNOSIS — M18.11 ARTHRITIS OF CARPOMETACARPAL (CMC) JOINT OF RIGHT THUMB: ICD-10-CM

## 2024-02-14 PROCEDURE — 97168 OT RE-EVAL EST PLAN CARE: CPT | Performed by: OCCUPATIONAL THERAPIST

## 2024-02-14 PROCEDURE — 73140 X-RAY EXAM OF FINGER(S): CPT

## 2024-02-14 PROCEDURE — 99024 POSTOP FOLLOW-UP VISIT: CPT | Performed by: ORTHOPAEDIC SURGERY

## 2024-02-14 NOTE — PROGRESS NOTES
OT Re-Evaluation     Today's date: 2024  Patient name: Saloni Moy  : 1960  MRN: [id number]  Referring provider: Eric Connolly,*  Dx:   Encounter Diagnosis     ICD-10-CM    1. Arthritis of carpometacarpal (CMC) joint of right thumb  M18.11                        Assessment  Assessment details: Barbra presents S/P R CMC arthroplasty 2024. Christie has reduced pain and swelling as well as improved AROM; anticipate clearance for PREs . She has continued pain, swelling and minor AROM deficits that need continued attention.  Impairments: activity intolerance, impaired physical strength and pain with function    Goals  STG) Motion increased by 25% in 4-6 weeks to facilitate grooming MET  STG) Strength/Motor skills improved by 25% in 4-6 weeks to facilitate simple meal prep NOT MET, EXTEND  STG) Swelling\Edema improved by 25% in 4-6 weeks  to facilitate fine motor skills MET, EXTEND  STG) Pain decreased by 25% in 4-6 weeks to facilitate dressing MET, EXTEND    LTG) ADL and IADL skills improved   LTG) Work skills improved  LTG) Leisure skills improved    Patient Goals: To use her thumb without pain      Goals, plan of care and treatment condition discussed with patient. Patient expresses their understanding and questions regarding these issues were addressed.      Plan  Patient would benefit from: OT eval and custom splinting  Planned modality interventions: TENS, thermotherapy: hydrocollator packs, thermotherapy: paraffin bath and ultrasound  Planned therapy interventions: neuromuscular re-education, motor coordination training, manual therapy, joint mobilization, [place]osta taping, strengthening, stretching, therapeutic activities, therapeutic exercise, functional ROM exercises, fine motor coordination training and orthotic fitting/training  Frequency: 2x week  Duration in visits: 10  Treatment plan discussed with: patient        Subjective Evaluation    History of Present  Illness  Mechanism of injury: R CMC Arthroplasty  - had CMC injections for ~ 2 years until pain was unaffected.  Pain  Current pain rating: 3  At worst pain ratin          Objective     Active Range of Motion     Right Wrist   Wrist flexion: 40 degrees   Wrist extension: 60 degrees     Right Thumb   Flexion     MP: 40    DIP: 48  Palmar Abduction    CMC: 60  Radial Abduction    CMC: 50    Tests     Additional Tests Details  Denies N/T    Swelling     Right Wrist/Hand   Circumference MCP: 19 cm             Precautions: CMC Arthroplasty 24     Early motion protocol: active ROM, edema management, desensitization. Advance AROM as tolerated.  No PROM x 6 weeks     Hand based CMC removable brace for the day and a longer one that goes past the wrist for at night. She can remove the brace for hygiene and gentle active ROM exercises only.     Goal is to be out of the brace at 6 weeks with full thumb ROM then work on strengthening     HEP: Thumb flexion/abduction AROM, compression sleeve for swelling PRN, HF SPICA for day use, WHFO for night use      Manuals             MEM                                                    Neuro Re-Ed             Opposition             In hand manip             Thumb hops ( ab only)                                                                 Ther Ex             Wrist/ FA AROM                                                                                                        Ther Activity                                                                              Modalities             P

## 2024-02-14 NOTE — PROGRESS NOTES
HAND & UPPER EXTREMITY OFFICE VISIT   Referred By:  Referral Self  No address on file      Chief Complaint:     Bilateral thumb pain    Surgery:  Surgery Date: 2024 - Thumb carpometacarpal joint arthroplasty with TightRope Suspension - Right     History of Present Illness:   Patient presents now 6 weeks status post the above surgery. she reports overall doing well. She has been attending PT regularly and working on her ROM. She reports some pain and tightness in the back of the thumb with certain motions and grabbing objects. She has been wearing her custom PT splint and compression glove for edema control. She denies any numbness or tingling in the hand.     Past Medical History:  Past Medical History:   Diagnosis Date    Adjustment disorder with anxiety     last assessed - [id number]    Anemia     Anxiety     Constipation 2021    Diverticulitis     Diverticulitis 2021    Diverticulitis of colon May 2020    Diverticulitis of large intestine with abscess without bleeding 2020    Elevated platelet count 2022    Elevated vitamin B12 level 2022    Generalized abdominal pain 2021    GERD (gastroesophageal reflux disease)     History of total hysterectomy     History of transfusion     Hypertension     Hypertension 3/12/2021    Ingrown toenail     last assessed - [id number]    Thalassemia      Past Surgical History:   Procedure Laterality Date    APPENDECTOMY      BOWEL RESECTION  2021     SECTION      CHOLECYSTECTOMY      COLONOSCOPY      GALLBLADDER SURGERY      OOPHORECTOMY Bilateral     GA ARTHRP INTERPOS INTERCARPAL/METACARPAL JOINTS Right 2024    Procedure: Thumb carpometacarpal joint arthroplasty with TightRope Suspension;  Surgeon: Eric Connolly MD;  Location:  MAIN OR;  Service: Orthopedics    GA LAPAROSCOPY COLECTOMY PARTIAL W/ANASTOMOSIS N/A 2021    Procedure: RESECTION COLON SIGMOID LAPAROSCOPIC HAND-ASSISTED;  Surgeon: Kee Mace MD;  " Location: [facility];  Service: General    TONSILLECTOMY AND ADENOIDECTOMY      TOTAL ABDOMINAL HYSTERECTOMY      UPPER GASTROINTESTINAL ENDOSCOPY       Family History   Problem Relation Age of Onset    Anxiety disorder Mother         Anxiety    Depression Mother     Anxiety disorder Sister         Anxiety    Depression Sister     Cancer Sister         Melanoma    No Known Problems Sister     No Known Problems Sister     Anxiety disorder Daughter         Anxiety    Depression Daughter     Heart disease Maternal Aunt     Heart disease Maternal Uncle      Social History     Socioeconomic History    Marital status:      Spouse name: Not on file    Number of children: Not on file    Years of education: Not on file    Highest education level: Not on file   Occupational History    Occupation:  - [employer]   Tobacco Use    Smoking status: Never    Smokeless tobacco: Never   Vaping Use    Vaping status: Never Used   Substance and Sexual Activity    Alcohol use: Yes     Alcohol/week: 1.0 standard drink of alcohol     Types: 1 Glasses of wine per week     Comment: occasional alcohol use    Drug use: No    Sexual activity: Not Currently     Partners: Male     Birth control/protection: Post-menopausal, Surgical     Comment: Hysterectomy 1988   Other Topics Concern    Not on file   Social History Narrative    Coffee     Social Determinants of Health     Financial Resource Strain: Not on file   Food Insecurity: Not on file   Transportation Needs: Not on file   Physical Activity: Not on file   Stress: Not on file   Social Connections: Not on file   Intimate Partner Violence: Not on file   Housing Stability: Not on file     Scheduled Meds:  Continuous Infusions:No current facility-administered medications for this visit.    PRN Meds:.  No Known Allergies    Physical Examination:    /86   Pulse (!) 52   Ht 5' 1.42\" (1.56 m)   Wt 60.3 kg (133 lb)   BMI 24.79 kg/m²     Gen: A&Ox3, NAD    Right Upper " Extremity:  Incision site well-healed without signs of infection   Mild swelling at radial wrist and dorsal hand   sensation intact to light touch in the axillary median, ulnar, and radial nerve distributions  Limited thumb ROM due to stiffness but actively able to flex and extend the IP joint and MP joint. Can oppose thumb to tip of the small finger.  Unable to oppose to the base.  Able to get hand flat on the table.  Decreased pinch and  strength  2+RP      Studies:  Radiographs: I personally reviewed and independently interpreted the available radiographs.  2/14/24: Radiographs of the right hand, multiple views, demonstrate s/p trapeziectomy and tight rope suspension arthroplasty. No evidence of hardware failure or subsidence.  No fractures.  Soft tissues unremarkable.    Assessment and Plan:  1. S/P musculoskeletal system surgery  XR thumb right first digit-min 2v      2. Arthritis of carpometacarpal (CMC) joint of right thumb  XR thumb right first digit-min 2v          63 y.o. female presents 6 weeks status post Thumb carpometacarpal joint arthroplasty with TightRope Suspension - Right. Overall she is doing well since her surgery. She continues with some stiffness and weakness of the right hand which is not unexpected at 6 weeks postop.  There are no concerning signs on today's clinical exam or radiographs.  We again discussed the expected timeframe for recovery status post surgery.  We discussed that should we will likely still have weakness with pinching and gripping activities for 3 to 4 months postoperatively and that she can continue to make improvements for 1 year or so after surgery.      She may continue with OT until she feels able to transition to home exercises. Updated work note provided to remain out of work for an addition 2 weeks. She may begin to wean out of her brace as tolerated. She should continue to wear the brace for heavier activities. She can continue to wear the compression glove at  night for edema control. It is recommended she return to the office in 6 weeks, or sooner should symptoms worsen    she expressed understanding of the plan and agreed. We encouraged them to contact our office with any questions or concerns.         Eric Connolly MD  Hand and Upper Extremity Surgery          *This note was dictated using Dragon voice recognition software. Please excuse any word substitutions or errors.*

## 2024-02-14 NOTE — LETTER
Patient: Saloni Moy  YOB: 1960      To Whom it May Concern:    Saloni Moy is under my professional care. Saloni may return to work on 3/4/24 without restrictions.    If you have any questions or concerns, please don't hesitate to call.         Sincerely,          Eric Connolly MD        CC: No Recipients

## 2024-02-14 NOTE — PROGRESS NOTES
HAND & UPPER EXTREMITY OFFICE VISIT   Referred By:  Referral Self  No address on file      Chief Complaint:     ***    Surgery:  Thumb carpometacarpal joint arthroplasty with TightRope Suspension - Right - 2024    History of Present Illness:   Patient presents now 6 week status post the above surgery. Since last visit she reports ***.    Past Medical History:  Past Medical History:   Diagnosis Date    Adjustment disorder with anxiety     last assessed - [id number]    Anemia     Anxiety     Constipation 2021    Diverticulitis     Diverticulitis 2021    Diverticulitis of colon May 2020    Diverticulitis of large intestine with abscess without bleeding 2020    Elevated platelet count 2022    Elevated vitamin B12 level 2022    Generalized abdominal pain 2021    GERD (gastroesophageal reflux disease)     History of total hysterectomy     History of transfusion     Hypertension     Hypertension 3/12/2021    Ingrown toenail     last assessed - [id number]    Thalassemia      Past Surgical History:   Procedure Laterality Date    APPENDECTOMY      BOWEL RESECTION  2021     SECTION      CHOLECYSTECTOMY      COLONOSCOPY      GALLBLADDER SURGERY      OOPHORECTOMY Bilateral     CA ARTHRP INTERPOS INTERCARPAL/METACARPAL JOINTS Right 2024    Procedure: Thumb carpometacarpal joint arthroplasty with TightRope Suspension;  Surgeon: Eric Connolly MD;  Location: [facility];  Service: Orthopedics    CA LAPAROSCOPY COLECTOMY PARTIAL W/ANASTOMOSIS N/A 2021    Procedure: RESECTION COLON SIGMOID LAPAROSCOPIC HAND-ASSISTED;  Surgeon: Kee Mace MD;  Location: [facility];  Service: General    TONSILLECTOMY AND ADENOIDECTOMY      TOTAL ABDOMINAL HYSTERECTOMY      UPPER GASTROINTESTINAL ENDOSCOPY       Family History   Problem Relation Age of Onset    Anxiety disorder Mother         Anxiety    Depression Mother     Anxiety disorder Sister         Anxiety    Depression  "Sister     Cancer Sister         Melanoma    No Known Problems Sister     No Known Problems Sister     Anxiety disorder Daughter         Anxiety    Depression Daughter     Heart disease Maternal Aunt     Heart disease Maternal Uncle      Social History     Socioeconomic History    Marital status:      Spouse name: Not on file    Number of children: Not on file    Years of education: Not on file    Highest education level: Not on file   Occupational History    Occupation:  - [employer]   Tobacco Use    Smoking status: Never    Smokeless tobacco: Never   Vaping Use    Vaping status: Never Used   Substance and Sexual Activity    Alcohol use: Yes     Alcohol/week: 1.0 standard drink of alcohol     Types: 1 Glasses of wine per week     Comment: occasional alcohol use    Drug use: No    Sexual activity: Not Currently     Partners: Male     Birth control/protection: Post-menopausal, Surgical     Comment: Hysterectomy 1988   Other Topics Concern    Not on file   Social History Narrative    Coffee     Social Determinants of Health     Financial Resource Strain: Not on file   Food Insecurity: Not on file   Transportation Needs: Not on file   Physical Activity: Not on file   Stress: Not on file   Social Connections: Not on file   Intimate Partner Violence: Not on file   Housing Stability: Not on file     Scheduled Meds:  Continuous Infusions:No current facility-administered medications for this visit.    PRN Meds:.  No Known Allergies    Physical Examination:    /86   Pulse (!) 52   Ht 5' 1.42\" (1.56 m)   Wt 60.3 kg (133 lb)   BMI 24.79 kg/m²     Gen: A&Ox3, NAD    Right Upper Extremity:  Dressing clean and dry, removed  Underlying incision healing well without signs of infection ***  ***  Sensation intact to light touch in the axillary median, ulnar, and radial nerve distributions  ***/5 motor ***  2+RP      Left Upper Extremity:  Dressing clean and dry, removed  Underlying incision healing well " without signs of infection ***  ***  Sensation intact to light touch in the axillary median, ulnar, and radial nerve distributions  ***/5 motor ***  2+RP      Studies:  Radiographs: I personally reviewed and independently interpreted the available radiographs.  ***: Radiographs of the ***, multiple views, demonstrate ***.      Assessment and Plan:  1. S/P musculoskeletal system surgery  XR thumb right first digit-min 2v      2. Arthritis of carpometacarpal (CMC) joint of right thumb  XR thumb right first digit-min 2v          63 y.o. female presents *** status post ***.   ***    she expressed understanding of the plan and agreed. We encouraged them to contact our office with any questions or concerns.         Eric Connolly MD  Hand and Upper Extremity Surgery          *This note was dictated using Dragon voice recognition software. Please excuse any word substitutions or errors.*

## 2024-02-16 ENCOUNTER — OFFICE VISIT (OUTPATIENT)
Dept: OCCUPATIONAL THERAPY | Age: 64
End: 2024-02-16
Payer: COMMERCIAL

## 2024-02-16 DIAGNOSIS — M18.11 ARTHRITIS OF CARPOMETACARPAL (CMC) JOINT OF RIGHT THUMB: Primary | ICD-10-CM

## 2024-02-16 PROCEDURE — 97112 NEUROMUSCULAR REEDUCATION: CPT | Performed by: OCCUPATIONAL THERAPIST

## 2024-02-16 NOTE — PROGRESS NOTES
"Daily Note     Today's date: 2024  Patient name: Saloni Moy  : 1960  MRN: [id number]  Referring provider: Eric Connolly,*  Dx:   Encounter Diagnosis     ICD-10-CM    1. Arthritis of carpometacarpal (CMC) joint of right thumb  M18.11                      Subjective: \"This is hard\"      Objective: See treatment diary below      Assessment: Stiffness and weakness persist but tolerated session well      Plan: Continue per plan of care.      Precautions: CMC Arthroplasty 24     Early motion protocol: active ROM, edema management, desensitization. Advance AROM as tolerated.  No PROM x 6 weeks     Hand based CMC removable brace for the day and a longer one that goes past the wrist for at night. She can remove the brace for hygiene and gentle active ROM exercises only.     Goal is to be out of the brace at 6 weeks with full thumb ROM then work on strengthening     HEP: Thumb flexion/abduction AROM, compression sleeve for swelling PRN, HF SPICA for day use, WHFO for night use      Manuals             MEM                                                    Neuro Re-Ed             Pinch [place] YRRTT/Key            WF/WE/S/P YPB 3x10             + pinch Y + G2            KP KP            EDC Sm rb on jar x 30                                      Ther Ex                                                                                                                     Ther Activity                                                                              Modalities             MHP                               "

## 2024-02-20 ENCOUNTER — TELEPHONE (OUTPATIENT)
Age: 64
End: 2024-02-20

## 2024-02-20 NOTE — TELEPHONE ENCOUNTER
Saloni called in to r/s her appointment with  . Patient needed a sooner appointment , since she is returning back to work . Patient was advise regarding the change of providers .

## 2024-02-21 ENCOUNTER — OFFICE VISIT (OUTPATIENT)
Dept: OCCUPATIONAL THERAPY | Age: 64
End: 2024-02-21
Payer: COMMERCIAL

## 2024-02-21 DIAGNOSIS — M18.11 ARTHRITIS OF CARPOMETACARPAL (CMC) JOINT OF RIGHT THUMB: Primary | ICD-10-CM

## 2024-02-21 PROCEDURE — 97112 NEUROMUSCULAR REEDUCATION: CPT | Performed by: OCCUPATIONAL THERAPIST

## 2024-02-21 NOTE — TELEPHONE ENCOUNTER
I am confused, please clarify with patient if the reason for canceling the March 25 appointment with me was because she could not make it at that time. Since she's scheduled with dr. Vance just one day later.

## 2024-02-21 NOTE — PROGRESS NOTES
"Daily Note     Today's date: 2024  Patient name: Saloni Moy  : 1960  MRN: [id number]  Referring provider: Eric Connolly,*  Dx:   Encounter Diagnosis     ICD-10-CM    1. Arthritis of carpometacarpal (CMC) joint of right thumb  M18.11                      Subjective: \"It felt pretty good yesterday\"      Objective: See treatment diary below      Assessment: Tolerated well, advancing strengthening as able    Plan: Continue per plan of care.      Precautions: CMC Arthroplasty 24     Early motion protocol: active ROM, edema management, desensitization. Advance AROM as tolerated.  No PROM x 6 weeks     Hand based CMC removable brace for the day and a longer one that goes past the wrist for at night. She can remove the brace for hygiene and gentle active ROM exercises only.     Goal is to be out of the brace at 6 weeks with full thumb ROM then work on strengthening     HEP: Thumb flexion/abduction AROM, compression sleeve for swelling PRN, HF SPICA for day use, WHFO for night use      Manuals            MEM                                                    Neuro Re-Ed             Pinch [place] YRRTT/Key RR TT/Key           WF/WE/S/P YPB 3x10 YPB 3x10            + pinch Y + G2 R G3; R digicizer x 30           KP KP kp           EDC Sm rb on jar x 30 Sm rb on jar x 30                                     Ther Ex             PROM  5                                                                                                      Ther Activity                                                                              Modalities             [facility]  5                               "

## 2024-02-23 ENCOUNTER — OFFICE VISIT (OUTPATIENT)
Dept: OCCUPATIONAL THERAPY | Age: 64
End: 2024-02-23
Payer: COMMERCIAL

## 2024-02-23 DIAGNOSIS — M18.11 ARTHRITIS OF CARPOMETACARPAL (CMC) JOINT OF RIGHT THUMB: Primary | ICD-10-CM

## 2024-02-23 PROCEDURE — 97112 NEUROMUSCULAR REEDUCATION: CPT | Performed by: OCCUPATIONAL THERAPIST

## 2024-02-23 NOTE — TELEPHONE ENCOUNTER
Pt stated she wanted to switch to Dr. Patel bc she is closer to her house, she has earlier appts and she doesn't have to wait months out to be seen.

## 2024-02-23 NOTE — PROGRESS NOTES
"Daily Note     Today's date: 2024  Patient name: Saloni Moy  : 1960  MRN: [id number]  Referring provider: Eric Connolly,*  Dx:   Encounter Diagnosis     ICD-10-CM    1. Arthritis of carpometacarpal (CMC) joint of right thumb  M18.11                      Subjective: :\"Work is gonna be hard\"      Objective: See treatment diary below      Assessment: Improving tolerance and endurance, making steady progress    Plan: Continue per plan of care.      Precautions: CMC Arthroplasty 24     Early motion protocol: active ROM, edema management, desensitization. Advance AROM as tolerated.  No PROM x 6 weeks     Hand based CMC removable brace for the day and a longer one that goes past the wrist for at night. She can remove the brace for hygiene and gentle active ROM exercises only.     Goal is to be out of the brace at 6 weeks with full thumb ROM then work on strengthening     HEP: Thumb flexion/abduction AROM, compression sleeve for swelling PRN, HF SPICA for day use, WHFO for night use      Manuals           MEM                                                    Neuro Re-Ed             Pinch [place] YRRTT/Key RR TT/Key RR TT/Key          WF/WE/S/P YPB 3x10 YPB 3x10 RPB 3x 10           + pinch Y + G2 R G3; R digicizer x 30 R G3; G digicizer x 30          KP KP kp kp          EDC Sm rb on jar x 30 Sm rb on jar x 30 Sm rb on jar x 30                                    Ther Ex             PROM  5                                                                                                      Ther Activity                                                                              Modalities             [facility]  5 5                                "

## 2024-02-27 ENCOUNTER — OFFICE VISIT (OUTPATIENT)
Dept: OCCUPATIONAL THERAPY | Age: 64
End: 2024-02-27
Payer: COMMERCIAL

## 2024-02-27 DIAGNOSIS — M18.11 ARTHRITIS OF CARPOMETACARPAL (CMC) JOINT OF RIGHT THUMB: Primary | ICD-10-CM

## 2024-02-27 PROCEDURE — 97112 NEUROMUSCULAR REEDUCATION: CPT | Performed by: OCCUPATIONAL THERAPIST

## 2024-02-27 NOTE — PROGRESS NOTES
"Daily Note     Today's date: 2024  Patient name: Saloni Moy  : 1960  MRN: [id number]  Referring provider: Eric Connolly,*  Dx:   Encounter Diagnosis     ICD-10-CM    1. Arthritis of carpometacarpal (CMC) joint of right thumb  M18.11                      Subjective: \"Feels fine\"      Objective: See treatment diary below      Assessment: Some fatigue, no acute pain. Wishes to continue with HEP only.    Plan:  DC     Precautions: CMC Arthroplasty 24     Early motion protocol: active ROM, edema management, desensitization. Advance AROM as tolerated.  No PROM x 6 weeks     Hand based CMC removable brace for the day and a longer one that goes past the wrist for at night. She can remove the brace for hygiene and gentle active ROM exercises only.     Goal is to be out of the brace at 6 weeks with full thumb ROM then work on strengthening     HEP: Thumb flexion/abduction AROM, compression sleeve for swelling PRN, HF SPICA for day use, WHFO for night use      Manuals          MEM                                                    Neuro Re-Ed             Pinch [place] YRRTT/Key RR TT/Key RR TT/Key RR TT/Key         WF/WE/S/P YPB 3x10 YPB 3x10 RPB 3x 10 RPB 3x 10          + pinch Y + G2 R G3; R digicizer x 30 R G3; G digicizer x 30 R G3; G digicizer x 30         KP KP kp kp kp         EDC Sm rb on jar x 30 Sm rb on jar x 30 Sm rb on jar x 30 Sm rb on jar x 30                                   Ther Ex             PROM  5                                                                                                      Ther Activity                                                                              Modalities             [facility]  5 5                                  "

## 2024-03-06 ENCOUNTER — TELEPHONE (OUTPATIENT)
Age: 64
End: 2024-03-06

## 2024-03-06 DIAGNOSIS — F41.9 ANXIETY: ICD-10-CM

## 2024-03-06 DIAGNOSIS — L03.011 CELLULITIS OF FINGER OF RIGHT HAND: Primary | ICD-10-CM

## 2024-03-06 RX ORDER — CEPHALEXIN 500 MG/1
500 CAPSULE ORAL EVERY 6 HOURS SCHEDULED
Qty: 28 CAPSULE | Refills: 0 | Status: SHIPPED | OUTPATIENT
Start: 2024-03-06 | End: 2024-03-13

## 2024-03-06 NOTE — TELEPHONE ENCOUNTER
Called and spoke w/pt and relayed Dr Connolly's msg to pt.  She states had is really swollen.  Will CB if worsen. No further questions/concerns.

## 2024-03-06 NOTE — TELEPHONE ENCOUNTER
Pt had on 1/4/23  Thumb carpometacarpal joint arthroplasty with TightRope Suspension (Right: Finger) .  Called and spoke w/pt and pt states on 3/3/24 cut base of thumb and tore off part of skin. Area is now red and palm area near thumb is swollen.  Area  is painful.  Looked for appt w/Dr Connolly in both [facility] and he is booked.  Asked pt to send picture via [employer] and went through process w/her. I also gave her my work email address [email or web address] if she is unable to send picture via [employer].      Please review. Thank you.

## 2024-03-06 NOTE — TELEPHONE ENCOUNTER
Caller: Patient    Doctor: Mohsen    Reason for call: Patient cut the base of her right thumb on 3/3. Covered w/bandaid. She noticed the inside of her hand/thumb is swollen. No redness/heat/fever. Questioned if this should be seen?    Call back#: [phone]

## 2024-03-07 RX ORDER — ALPRAZOLAM 0.25 MG/1
0.25 TABLET ORAL
Qty: 30 TABLET | Refills: 3 | Status: SHIPPED | OUTPATIENT
Start: 2024-03-07

## 2024-03-07 NOTE — TELEPHONE ENCOUNTER
Medication: Aprazolam 0.25 mg tablet  PDMP 12/26/2023 07/10/2023 ALPRAZolam (Tablet) 30.0 30 0.25 MG BRIANA KRISHNAN [facility]  Active agreement on file -Yes

## 2024-03-26 ENCOUNTER — OFFICE VISIT (OUTPATIENT)
Dept: OBGYN CLINIC | Facility: MEDICAL CENTER | Age: 64
End: 2024-03-26

## 2024-03-26 ENCOUNTER — APPOINTMENT (OUTPATIENT)
Dept: RADIOLOGY | Facility: MEDICAL CENTER | Age: 64
End: 2024-03-26
Payer: COMMERCIAL

## 2024-03-26 VITALS
HEIGHT: 61 IN | WEIGHT: 135 LBS | SYSTOLIC BLOOD PRESSURE: 163 MMHG | DIASTOLIC BLOOD PRESSURE: 81 MMHG | HEART RATE: 61 BPM | BODY MASS INDEX: 25.49 KG/M2

## 2024-03-26 DIAGNOSIS — Z98.890 S/P MUSCULOSKELETAL SYSTEM SURGERY: Primary | ICD-10-CM

## 2024-03-26 DIAGNOSIS — M18.11 ARTHRITIS OF CARPOMETACARPAL (CMC) JOINT OF RIGHT THUMB: ICD-10-CM

## 2024-03-26 DIAGNOSIS — Z98.890 S/P MUSCULOSKELETAL SYSTEM SURGERY: ICD-10-CM

## 2024-03-26 PROCEDURE — 73140 X-RAY EXAM OF FINGER(S): CPT

## 2024-03-26 PROCEDURE — 99024 POSTOP FOLLOW-UP VISIT: CPT | Performed by: ORTHOPAEDIC SURGERY

## 2024-03-26 NOTE — PROGRESS NOTES
HAND & UPPER EXTREMITY OFFICE VISIT   Referred By:  Referral Self  No address on file      Chief Complaint:     Right thumb pain    Surgery:  Thumb carpometacarpal joint arthroplasty with TightRope Suspension - Right - 2024    History of Present Illness:   Patient presents now 12 weeks status post the above surgery. Since last visit she reports She still is sore at the base of her thumb and and has some sensitivity over the dorsal thumb but otherwise her pain is significantly improved from preoperatively.  She can now do all her ADLs and perform her functions at work without significant discomfort when previously she had tremendous difficulty with these activities and was unable to perform them in most instances.    She has stop going to therapy as she returned to work and she has limited time to go. Her hand had been slowly improving as the weeks go on at work. Denies numbness and tingling.        Past Medical History:  Past Medical History:   Diagnosis Date    Adjustment disorder with anxiety     last assessed - 2016    Anemia     Anxiety     Constipation 2021    Diverticulitis     Diverticulitis 2021    Diverticulitis of colon May 2020    Diverticulitis of large intestine with abscess without bleeding 2020    Elevated platelet count 2022    Elevated vitamin B12 level 2022    Generalized abdominal pain 2021    GERD (gastroesophageal reflux disease)     History of total hysterectomy     History of transfusion     Hypertension     Hypertension 3/12/2021    Ingrown toenail     last assessed - [id number]    Thalassemia      Past Surgical History:   Procedure Laterality Date    APPENDECTOMY      BOWEL RESECTION  2021     SECTION      CHOLECYSTECTOMY      COLONOSCOPY      GALLBLADDER SURGERY      OOPHORECTOMY Bilateral     GA ARTHRP INTERPOS INTERCARPAL/METACARPAL JOINTS Right 2024    Procedure: Thumb carpometacarpal joint arthroplasty with TightRope  Suspension;  Surgeon: Eric Connolly MD;  Location: [facility];  Service: Orthopedics    ID LAPAROSCOPY COLECTOMY PARTIAL W/ANASTOMOSIS N/A 11/17/2021    Procedure: RESECTION COLON SIGMOID LAPAROSCOPIC HAND-ASSISTED;  Surgeon: Kee Mace MD;  Location[facility];  Service: General    TONSILLECTOMY AND ADENOIDECTOMY      TOTAL ABDOMINAL HYSTERECTOMY      UPPER GASTROINTESTINAL ENDOSCOPY       Family History   Problem Relation Age of Onset    Anxiety disorder Mother         Anxiety    Depression Mother     Anxiety disorder Sister         Anxiety    Depression Sister     Cancer Sister         Melanoma    No Known Problems Sister     No Known Problems Sister     Anxiety disorder Daughter         Anxiety    Depression Daughter     Heart disease Maternal Aunt     Heart disease Maternal Uncle      Social History     Socioeconomic History    Marital status:      Spouse name: Not on file    Number of children: Not on file    Years of education: Not on file    Highest education level: Not on file   Occupational History    Occupation:  - [employer]   Tobacco Use    Smoking status: Never    Smokeless tobacco: Never   Vaping Use    Vaping status: Never Used   Substance and Sexual Activity    Alcohol use: Yes     Alcohol/week: 1.0 standard drink of alcohol     Types: 1 Glasses of wine per week     Comment: occasional alcohol use    Drug use: No    Sexual activity: Not Currently     Partners: Male     Birth control/protection: Post-menopausal, Surgical     Comment: Hysterectomy 1988   Other Topics Concern    Not on file   Social History Narrative    Coffee     Social Determinants of Health     Financial Resource Strain: Not on file   Food Insecurity: Not on file   Transportation Needs: Not on file   Physical Activity: Not on file   Stress: Not on file   Social Connections: Not on file   Intimate Partner Violence: Not on file   Housing Stability: Not on file     Scheduled Meds:  Continuous  "Infusions:No current facility-administered medications for this visit.    PRN Meds:.  No Known Allergies    Physical Examination:    /81   Pulse 61   Ht 5' 1.42\" (1.56 m)   Wt 61.2 kg (135 lb)   BMI 25.16 kg/m²     Gen: A&Ox3, NAD    Right Upper Extremity:  Underlying incision well heal without signs of infection   Tenderness over the dorsal thumb near the incision site with some increase sensitivity around the incision site  Sensation intact to light touch in the axillary median, ulnar, and radial nerve distributions  2+RP  Full thumb oppostion to the base of the small finger  Composite fist  Normal ROM of wrist  4+/5  strength    Studies:  Radiographs: I personally reviewed and independently interpreted the available radiographs.  3/26/2024: Radiographs of the right thumb, multiple views, demonstrate stable alignment of her thumb metacarpal status post trapeziectomy and tight rope suspension without subsidence.  No signs of hardware failure or fracture..      Assessment and Plan:  1. S/P musculoskeletal system surgery  XR thumb right first digit-min 2v      2. Arthritis of carpometacarpal (CMC) joint of right thumb  XR thumb right first digit-min 2v          63 y.o. female presents 12 weeks status post  Thumb carpometacarpal joint arthroplasty with TightRope Suspension - Right  . Surgery Date: 1/4/2024     She is doing very well.  Her radiographs are stable without subsidence or hardware failure.  I think that the sensitivity of her dorsal incision should continue to improve may be related to some irritation of the branch superficial radial nerve however she has intact sensation and this is improving so there is low suspicion of significant nerve injury. She may massage the area to help with desensitization  She may continue to be as active as tolerated. She has no restrictions from an orthopedic standpoint.  We again discussed that her strength will slowly improve and full recovery can be up to 1 " year.     I recommend she follow up in 3 months for a recheck of her ROM and strength.  No x-rays needed at that time she is doing well.    she expressed understanding of the plan and agreed. We encouraged them to contact our office with any questions or concerns.         Eric Connolly MD  Hand and Upper Extremity Surgery          *This note was dictated using Dragon voice recognition software. Please excuse any word substitutions or errors.*

## 2024-04-17 DIAGNOSIS — F41.9 ANXIETY: ICD-10-CM

## 2024-04-18 NOTE — TELEPHONE ENCOUNTER
Medication:  PDMP     03/07/2024 03/07/2024 ALPRAZolam (Tablet) 30.0 30 0.25 MG NA DANK KRISHNAN     Active agreement on file -Yes

## 2024-04-19 RX ORDER — ALPRAZOLAM 0.25 MG/1
0.25 TABLET ORAL
Qty: 30 TABLET | Refills: 0 | Status: SHIPPED | OUTPATIENT
Start: 2024-04-19

## 2024-04-23 ENCOUNTER — OFFICE VISIT (OUTPATIENT)
Dept: OBGYN CLINIC | Facility: CLINIC | Age: 64
End: 2024-04-23
Payer: COMMERCIAL

## 2024-04-23 VITALS
HEIGHT: 62 IN | HEART RATE: 65 BPM | WEIGHT: 132 LBS | SYSTOLIC BLOOD PRESSURE: 152 MMHG | DIASTOLIC BLOOD PRESSURE: 80 MMHG | BODY MASS INDEX: 24.29 KG/M2

## 2024-04-23 DIAGNOSIS — M76.72 PERONEAL TENDINITIS OF LEFT LOWER EXTREMITY: Primary | ICD-10-CM

## 2024-04-23 PROCEDURE — 99244 OFF/OP CNSLTJ NEW/EST MOD 40: CPT | Performed by: ORTHOPAEDIC SURGERY

## 2024-04-23 NOTE — PROGRESS NOTES
James R Lachman, M.D.  Attending, Orthopaedic Surgery  Foot and Ankle  [facility]      ORTHOPAEDIC FOOT AND ANKLE CLINIC VISIT     Assessment:     Encounter Diagnosis   Name Primary?    Peroneal tendinitis of left lower extremity Yes       Plan:   The patient verbalized understanding of exam findings and treatment plan. We engaged in the shared decision-making process and treatment options were discussed at length with the patient. Surgical and conservative management discussed today along with risks and benefits.  She has weakness in all 4 planes in the left ankle compared to the right ankle. No histroy of trauma. Describes burning pain over her lateral ligaments and peroneals.  She has an insertional achilles enthysophyte but does not seem to be having pain associated with this.  Begin PT  Activity modification, supportive shoes  OTC pain medication, elevation for pain and swelling control   Return in about 7 weeks (around 6/11/2024).      History of Present Illness:   Chief Complaint:   Chief Complaint   Patient presents with    Left Foot - Pain     Burning on the outside of her foot and sometimes will go to her heel.      Saloni Moy is a 63 y.o. female who is being seen for left foot pain. Notes discomfort x several months.  Pain is localized at heel with minimal radiating and described as sharp and severe. Does note occasional burning sensation along lateral heel. Patient denies numbness, tingling or radicular pain. Notes particularly  Denies history of neuropathy. Denies any known injuries, increased activity. Patient does not smoke, does not have diabetes and does not take blood thinners.  Patient denies family history of anesthesia complications and has not had any complications with anesthesia.     Pain/symptom timing:  Worse during the day when active  Pain/symptom context:  Worse with activites and work  Pain/symptom modifying factors:  Rest makes better, activities make  worse  Pain/symptom associated signs/symptoms: none    Prior treatment   NSAIDsYes   Injections No   Bracing/Orthotics No    Physical Therapy No     Orthopedic Surgical History:   See below    Past Medical, Surgical and Social History:  Past Medical History:  has a past medical history of Adjustment disorder with anxiety, Anemia, Anxiety, Constipation (2021), Diverticulitis, Diverticulitis (2021), Diverticulitis of colon (May 2020), Diverticulitis of large intestine with abscess without bleeding (2020), Elevated platelet count (2022), Elevated vitamin B12 level (2022), Generalized abdominal pain (2021), GERD (gastroesophageal reflux disease) (), History of total hysterectomy, History of transfusion, Hypertension, Hypertension (3/12/2021), Ingrown toenail, and Thalassemia.  Problem List: does not have any pertinent problems on file.  Past Surgical History:  has a past surgical history that includes Appendectomy; Gallbladder surgery; Tonsillectomy and adenoidectomy; Total abdominal hysterectomy; Oophorectomy (Bilateral); Cholecystectomy; Colonoscopy; Upper gastrointestinal endoscopy; pr laparoscopy colectomy partial w/anastomosis (N/A, 2021); Bowel resection (2021);  section (); and pr arthrp interpos intercarpal/metacarpal joints (Right, 2024).  Family History: family history includes Anxiety disorder in her daughter, mother, and sister; Cancer in her sister; Depression in her daughter, mother, and sister; Heart disease in her maternal aunt and maternal uncle; No Known Problems in her sister and sister.  Social History:  reports that she has never smoked. She has never used smokeless tobacco. She reports current alcohol use of about 1.0 standard drink of alcohol per week. She reports that she does not use drugs.  Current Medications: has a current medication list which includes the following prescription(s): alprazolam, celecoxib, diclofenac sodium, docusate  "sodium, lisinopril, and omeprazole.  Allergies: has No Known Allergies.     Review of Systems:  General- denies fever/chills  HEENT- denies hearing loss or sore throat  Eyes- denies eye pain or visual disturbances, denies red eyes  Respiratory- denies cough or SOB  Cardio- denies chest pain or palpitations  GI- denies abdominal pain  Endocrine- denies urinary frequency  Urinary- denies pain with urination  Musculoskeletal- Negative except noted above  Skin- denies rashes or wounds  Neurological- denies dizziness or headache  Psychiatric- denies anxiety or difficulty concentrating    Physical Exam:   /80   Pulse 65   Ht 5' 1.5\" (1.562 m)   Wt 59.9 kg (132 lb)   BMI 24.54 kg/m²   General/Constitutional: No apparent distress: well-nourished and well developed.  Eyes: normal ocular motion  Cardio: RRR, Normal S1S2, No m/r/g  Lymphatic: No appreciable lymphadenopathy  Respiratory: Non-labored breathing, CTA b/l no w/c/r  Vascular: No edema, swelling or tenderness, except as noted in detailed exam.  Integumentary: No impressive skin lesions present, except as noted in detailed exam.  Neuro: No ataxia or tremors noted  Psych: Normal mood and affect, oriented to person, place and time. Appropriate affect.  Musculoskeletal: Normal, except as noted in detailed exam and in HPI.    Examination    Left    Gait Normal   Musculoskeletal Tender to palpation below lateral malleolus along peroneal tendons    Skin Normal.      Nails Normal    Range of Motion  20 degrees dorsiflexion, 30 degrees plantarflexion  Subtalar motion: normal    Stability Stable    Muscle Strength 5/5 tibialis anterior  5/5 gastrocnemius-soleus  5/5 posterior tibialis  3/5 peroneal/eversion strength  5/5 EHL  5/5 FHL    Neurologic Normal    Sensation Intact to light touch throughout sural, saphenous, superficial peroneal, deep peroneal and medial/lateral plantar nerve distributions.  [place]aci 5.07 filament (10g) testing  deferred.  "   Cardiovascular Brisk capillary refill < 2 seconds,intact DP and PT pulses    Special Tests None      Imaging Studies:   3 views of the left were were available for review and demonstrate calcaneal heel spur at achilles insertion. Reviewed by me personally.    Scribe Attestation      I,:  Guillermina Hairston PA-C am acting as a scribe while in the presence of the attending physician.:       I,:  James R Lachman, MD personally performed the services described in this documentation    as scribed in my presence.:               James R. Lachman, MD  Foot & Ankle Surgery   Department of Orthopaedic Surgery  [facility]      I personally performed the service.    James R. Lachman, MD

## 2024-05-15 ENCOUNTER — OFFICE VISIT (OUTPATIENT)
Dept: FAMILY MEDICINE CLINIC | Facility: CLINIC | Age: 64
End: 2024-05-15
Payer: COMMERCIAL

## 2024-05-15 VITALS
SYSTOLIC BLOOD PRESSURE: 140 MMHG | DIASTOLIC BLOOD PRESSURE: 90 MMHG | WEIGHT: 134 LBS | TEMPERATURE: 97.5 F | HEIGHT: 61 IN | OXYGEN SATURATION: 98 % | HEART RATE: 59 BPM | RESPIRATION RATE: 18 BRPM | BODY MASS INDEX: 25.3 KG/M2

## 2024-05-15 DIAGNOSIS — J06.9 ACUTE UPPER RESPIRATORY INFECTION: Primary | ICD-10-CM

## 2024-05-15 PROCEDURE — 99213 OFFICE O/P EST LOW 20 MIN: CPT | Performed by: NURSE PRACTITIONER

## 2024-05-15 RX ORDER — BROMPHENIRAMINE MALEATE, PSEUDOEPHEDRINE HYDROCHLORIDE, AND DEXTROMETHORPHAN HYDROBROMIDE 2; 30; 10 MG/5ML; MG/5ML; MG/5ML
5 SYRUP ORAL 4 TIMES DAILY PRN
Qty: 120 ML | Refills: 0 | Status: SHIPPED | OUTPATIENT
Start: 2024-05-15

## 2024-05-15 RX ORDER — AMOXICILLIN AND CLAVULANATE POTASSIUM 875; 125 MG/1; MG/1
1 TABLET, FILM COATED ORAL EVERY 12 HOURS SCHEDULED
Qty: 20 TABLET | Refills: 0 | Status: SHIPPED | OUTPATIENT
Start: 2024-05-15 | End: 2024-05-25

## 2024-05-15 NOTE — PROGRESS NOTES
FAMILY PRACTICE OFFICE VISIT       NAME: Saloni Moy  AGE: 64 y.o. SEX: female       : 1960        MRN: [id number]    DATE: 5/15/2024  TIME: 3:03 PM    Assessment and Plan   1. Acute upper respiratory infection  -     amoxicillin-clavulanate (AUGMENTIN) 875-125 mg per tablet; Take 1 tablet by mouth every 12 (twelve) hours for 10 days  -     brompheniramine-pseudoephedrine-DM 30-2-10 MG/5ML syrup; Take 5 mL by mouth 4 (four) times a day as needed for allergies       There are no Patient Instructions on file for this visit.        Chief Complaint     Chief Complaint   Patient presents with    Sinus Problem     Pt is being seen for sinus pressure, congestion x 5 days.     Nasal Congestion     Pt is being seen for sinus congestion x 5 days.       History of Present Illness   Saloni Moy is a 64 y.o.-year-old female who is here for illness  Started to get sick Friday  Ha photophobia  Teeth hurt  Sore throat  Phonophobia  Pnd  Cough  Fatigue  Fever  Did not go to work today  In bed       Review of Systems   Review of Systems   Constitutional:  Positive for fatigue and fever.   HENT:  Positive for congestion, postnasal drip, rhinorrhea, sinus pressure, sinus pain and sore throat. Negative for ear pain.    Eyes:  Negative for photophobia and visual disturbance.   Respiratory:  Positive for cough. Negative for shortness of breath.    Cardiovascular:  Negative for chest pain and palpitations.   Gastrointestinal:  Negative for constipation, diarrhea, nausea and vomiting.   Genitourinary:  Negative for dysuria and frequency.   Musculoskeletal:  Negative for arthralgias and myalgias.   Skin:  Negative for rash.   Neurological:  Positive for headaches. Negative for dizziness and light-headedness.   Hematological:  Negative for adenopathy.   Psychiatric/Behavioral:  Negative for dysphoric mood and sleep disturbance. The patient is not nervous/anxious.        Active Problem List     Patient Active Problem List    Diagnosis    Anxiety    Gastroesophageal reflux disease    Dense breast tissue    Vaginal atrophy    Low bone density    Adrenal adenoma    History of hysterectomy    Hypertension    Adrenal nodule (HCC)    Abdominal pain    Iron deficiency anemia    S/P laparoscopic-assisted sigmoidectomy    Vitamin D deficiency    Microcytosis    Beta thalassemia minor    RADHA positive    Acute lumbar myofascial strain    Acute left-sided low back pain without sciatica    Bilateral carpal tunnel syndrome    Arthritis of carpometacarpal (CMC) joint of right thumb    Pain of left heel         Past Medical History:  Past Medical History:   Diagnosis Date    Adjustment disorder with anxiety     last assessed - [id number]    Anemia     ((Pt Qnr Sub: no))     Anxiety     ((Pt Qnr Sub: yed))     Constipation 2021    Diverticulitis     Diverticulitis 2021    Diverticulitis of colon 2020    ((Pt Qnr Sub: yes))     Diverticulitis of large intestine with abscess without bleeding 2020    Elevated platelet count 2022    Elevated vitamin B12 level 2022    Generalized abdominal pain 2021    GERD (gastroesophageal reflux disease)     ((Pt Qnr Sub: yes))     History of total hysterectomy     History of transfusion     Hypertension     ((Pt Qnr Sub: no))     Hypertension 2021    ((Pt Qnr Sub: no))     Ingrown toenail     last assessed - [id number]    Thalassemia        Past Surgical History:  Past Surgical History:   Procedure Laterality Date    APPENDECTOMY      ((Pt Qnr Sub: yes))     BOWEL RESECTION  2021     SECTION      ((Pt Qnr Sub: yes))     CHOLECYSTECTOMY      ((Pt Qnr Sub: yes))     COLONOSCOPY      GALLBLADDER SURGERY      OOPHORECTOMY Bilateral     OR ARTHRP INTERPOS INTERCARPAL/METACARPAL JOINTS Right 2024    Procedure: Thumb carpometacarpal joint arthroplasty with TightRope Suspension;  Surgeon: Eric Connolly MD;  [facility]: [facility];  Service:  Orthopedics    GA LAPAROSCOPY COLECTOMY PARTIAL W/ANASTOMOSIS N/A 11/17/2021    Procedure: RESECTION COLON SIGMOID LAPAROSCOPIC HAND-ASSISTED;  Surgeon: Kee Mace MD;  [facility];  Service: General    TONSILLECTOMY AND ADENOIDECTOMY      TOTAL ABDOMINAL HYSTERECTOMY      ((Pt Qnr Sub: yes))     UPPER GASTROINTESTINAL ENDOSCOPY         Family History:  Family History   Problem Relation Age of Onset    Anxiety disorder Mother         Anxiety    Depression Mother     Anxiety disorder Sister         Anxiety    Depression Sister     Cancer Sister         Melanoma    No Known Problems Sister     No Known Problems Sister     Anxiety disorder Daughter         Anxiety    Depression Daughter     Heart disease Maternal Aunt     Heart disease Maternal Uncle        Social History:  Social History     Socioeconomic History    Marital status:      Spouse name: Not on file    Number of children: Not on file    Years of education: Not on file    Highest education level: Not on file   Occupational History    Occupation:  - [employer]   Tobacco Use    Smoking status: Never    Smokeless tobacco: Never   Vaping Use    Vaping status: Never Used   Substance and Sexual Activity    Alcohol use: Yes     Alcohol/week: 1.0 standard drink of alcohol     Types: 1 Glasses of wine per week     Comment: occasional alcohol use    Drug use: No    Sexual activity: Not Currently     Partners: Male     Birth control/protection: Post-menopausal, Surgical     Comment: Hysterectomy 1988   Other Topics Concern    Not on file   Social History Narrative    Coffee     Social Determinants of Health     Financial Resource Strain: Not on file   Food Insecurity: Not on file   Transportation Needs: Not on file   Physical Activity: Not on file   Stress: Not on file   Social Connections: Not on file   Intimate Partner Violence: Not on file   Housing Stability: Not on file       Objective     Vitals:    05/15/24 1347   BP: 140/90    Pulse: 59   Resp: 18   Temp: 97.5 °F (36.4 °C)   SpO2: 98%     Wt Readings from Last 3 Encounters:   05/15/24 60.8 kg (134 lb)   04/23/24 59.9 kg (132 lb)   03/26/24 61.2 kg (135 lb)       Physical Exam  Vitals and nursing note reviewed.   Constitutional:       Appearance: Normal appearance.   HENT:      Head: Normocephalic and atraumatic.      Right Ear: Tympanic membrane, ear canal and external ear normal.      Left Ear: Tympanic membrane, ear canal and external ear normal.      Nose: Congestion and rhinorrhea present.      Mouth/Throat:      Pharynx: Posterior oropharyngeal erythema present.   Eyes:      Conjunctiva/sclera: Conjunctivae normal.   Cardiovascular:      Rate and Rhythm: Normal rate and regular rhythm.      Heart sounds: Normal heart sounds.   Pulmonary:      Effort: Pulmonary effort is normal.      Breath sounds: Normal breath sounds.   Abdominal:      General: Bowel sounds are normal.   Musculoskeletal:         General: Normal range of motion.      Cervical back: Normal range of motion and neck supple.   Skin:     General: Skin is warm and dry.      Capillary Refill: Capillary refill takes less than 2 seconds.   Neurological:      General: No focal deficit present.      Mental Status: She is alert and oriented to person, place, and time.   Psychiatric:         Mood and Affect: Mood normal.         Behavior: Behavior normal.         Pertinent Laboratory/Diagnostic Studies:  Lab Results   Component Value Date    GLUCOSE 84 09/22/2015    BUN 13 06/02/2023    CREATININE 0.82 06/02/2023    CALCIUM 9.4 06/02/2023     09/22/2015    K 4.3 06/02/2023    CO2 28 06/02/2023     06/02/2023     Lab Results   Component Value Date    ALT 15 06/02/2023    AST 15 06/02/2023    ALKPHOS 90 06/02/2023    BILITOT 0.69 09/22/2015       Lab Results   Component Value Date    WBC 6.98 06/02/2023    HGB 11.7 06/02/2023    HCT 38.6 06/02/2023    MCV 68 (L) 06/02/2023     06/02/2023       No results found  "for: \"TSH\"    Lab Results   Component Value Date    CHOL 169 09/22/2015     Lab Results   Component Value Date    TRIG 166 (H) 04/24/2023     Lab Results   Component Value Date    HDL 63 04/24/2023     Lab Results   Component Value Date    LDLCALC 109 (H) 04/24/2023     Lab Results   Component Value Date    HGBA1C 5.3 04/24/2023       Results for orders placed or performed in visit on 06/02/23   LD,Blood   Result Value Ref Range     (L) 140 - 271 U/L   Vitamin B12   Result Value Ref Range    Vitamin B-12 414 180 - 914 pg/mL       No orders of the defined types were placed in this encounter.      ALLERGIES:  No Known Allergies    Current Medications     Current Outpatient Medications   Medication Sig Dispense Refill    ALPRAZolam (XANAX) 0.25 mg tablet Take 1 tablet (0.25 mg total) by mouth daily at bedtime as needed for anxiety 30 tablet 0    amoxicillin-clavulanate (AUGMENTIN) 875-125 mg per tablet Take 1 tablet by mouth every 12 (twelve) hours for 10 days 20 tablet 0    brompheniramine-pseudoephedrine-DM 30-2-10 MG/5ML syrup Take 5 mL by mouth 4 (four) times a day as needed for allergies 120 mL 0    celecoxib (CeleBREX) 100 mg capsule TAKE ONE CAPSULE BY MOUTH 2 TIMES A  capsule 1    Diclofenac Sodium (VOLTAREN) 1 % Apply 2 g topically 4 (four) times a day 100 g 6    docusate sodium (COLACE) 100 mg capsule Take 1 capsule (100 mg total) by mouth in the morning 10 capsule 0    lisinopril (ZESTRIL) 10 mg tablet Take 1 tablet (10 mg total) by mouth daily 90 tablet 3    omeprazole (PriLOSEC) 40 MG capsule Take 1 capsule (40 mg total) by mouth daily 90 capsule 3     No current facility-administered medications for this visit.         Health Maintenance     Health Maintenance   Topic Date Due    Pneumococcal Vaccine: 65+ Years (1 of 2 - PCV) Never done    Zoster Vaccine (1 of 2) Never done    RSV Vaccine Age 60+ Years (1 - 1-dose 60+ series) Never done    COVID-19 Vaccine (7 - 2023-24 season) 09/01/2023    " OT PLAN OF CARE  03/15/2024    Annual Physical  08/14/2024    Influenza Vaccine (Season Ended) 09/01/2024    Fall Risk  05/15/2025    Depression Screening  05/15/2025    Urinary Incontinence Screening  05/15/2025    Breast Cancer Screening: Mammogram  07/12/2025    Colorectal Cancer Screening  09/17/2025    DTaP,Tdap,and Td Vaccines (3 - Td or Tdap) 08/14/2033    HIV Screening  Completed    Hepatitis C Screening  Completed    Osteoporosis Screening  Completed    RSV Vaccine age 0-20 Months  Aged Out    HIB Vaccine  Aged Out    IPV Vaccine  Aged Out    Hepatitis A Vaccine  Aged Out    Meningococcal ACWY Vaccine  Aged Out    HPV Vaccine  Aged Out     Immunization History   Administered Date(s) Administered    COVID-19 PFIZER VACCINE 0.3 ML IM 12/23/2020, 12/23/2020, 01/14/2021, 01/14/2021, 01/08/2022, 01/08/2022    Influenza, recombinant, quadrivalent,injectable, preservative free 10/05/2018    Tdap 08/14/2023, 08/14/2023       Depression Screening and Follow-up Plan: Patient was screened for depression during today's encounter. They screened negative with a PHQ-2 score of 2.        KATIANA Sow

## 2024-05-29 DIAGNOSIS — F41.9 ANXIETY: ICD-10-CM

## 2024-05-29 NOTE — TELEPHONE ENCOUNTER
Reason for call:   [x] Refill   [] Prior Auth  [] Other:     Office:   [x] PCP/Provider - KATIANA Sow   [] Specialty/Provider -     Medication: ALPRAZolam (XANAX) 0.25 mg tablet     Dose/Frequency: Take 1 tablet (0.25 mg total) by mouth daily at bedtime as needed for anxiety     Quantity: 30    Pharmacy: [facility] - [place], [place] - [address] [place] [address] A      Does the patient have enough for 3 days?   [] Yes   [x] No - Send as HP to POD

## 2024-05-30 RX ORDER — ALPRAZOLAM 0.25 MG/1
0.25 TABLET ORAL
Qty: 30 TABLET | Refills: 3 | Status: SHIPPED | OUTPATIENT
Start: 2024-05-30

## 2024-05-30 NOTE — TELEPHONE ENCOUNTER
Medication:  PDMP   04/19/2024 04/19/2024 ALPRAZolam (Tablet) 30.0 30 0.25 MG NA DANK KRISHNAN     Active agreement on file -No

## 2024-06-11 ENCOUNTER — OFFICE VISIT (OUTPATIENT)
Dept: URGENT CARE | Age: 64
End: 2024-06-11
Payer: COMMERCIAL

## 2024-06-11 ENCOUNTER — APPOINTMENT (OUTPATIENT)
Dept: RADIOLOGY | Age: 64
End: 2024-06-11
Payer: COMMERCIAL

## 2024-06-11 VITALS
DIASTOLIC BLOOD PRESSURE: 78 MMHG | RESPIRATION RATE: 16 BRPM | HEIGHT: 61 IN | HEART RATE: 63 BPM | SYSTOLIC BLOOD PRESSURE: 150 MMHG | OXYGEN SATURATION: 98 % | TEMPERATURE: 98.6 F | BODY MASS INDEX: 24.92 KG/M2 | WEIGHT: 132 LBS

## 2024-06-11 DIAGNOSIS — M79.672 LEFT FOOT PAIN: ICD-10-CM

## 2024-06-11 DIAGNOSIS — M79.672 LEFT FOOT PAIN: Primary | ICD-10-CM

## 2024-06-11 PROCEDURE — 73630 X-RAY EXAM OF FOOT: CPT

## 2024-06-11 PROCEDURE — 99213 OFFICE O/P EST LOW 20 MIN: CPT

## 2024-06-11 NOTE — PATIENT INSTRUCTIONS
Rest, ice, compression and elevation.  Alternate ibuprofen and Tylenol as needed for pain.  Referral to podiatry for further evaluation of foot pain.   Follow up with PCP in 3-5 days.  Proceed to  ER if symptoms worsen.    If tests are performed, our office will contact you with results only if changes need to made to the care plan discussed with you at the visit. You can review your full results on [facility].

## 2024-06-11 NOTE — PROGRESS NOTES
[facility]        NAME: Saloni Moy is a 64 y.o. female  : 1960    MRN: [id number]  DATE: 2024  TIME: 6:48 PM    Assessment and Plan   Left foot pain [M79.672]  1. Left foot pain  XR foot 3+ vw left    Ambulatory Referral to Podiatry        XR of left foot reviewed, no acute osseous abnormality present. Pending final read per radiology.     Patient Instructions     Rest, ice, compression and elevation.  Alternate ibuprofen and Tylenol as needed for pain.  Referral to podiatry for further evaluation of foot pain.   Follow up with PCP in 3-5 days.  Proceed to  ER if symptoms worsen.    If tests are performed, our office will contact you with results only if changes need to made to the care plan discussed with you at the visit. [facility]    Chief Complaint     Chief Complaint   Patient presents with    Foot Pain     Pt c/o left foot pain since Saturday. Pain along lateral side and dorsal side of foot. Pain with walking and weight bearing. Some popping with flexion. States hx or neuroma and bone spur of heel.          History of Present Illness       64-year-old female presenting for evaluation of left foot pain.  Patient denies any trauma or injury, but states that 3 days ago she noticed pain to the outside aspect and bottom of her left foot after working out at the gym.  She denies use of new footwear to cause the pain.  She notes that the area is swollen, but denies any numbness or tingling or radiation of the pain.  Patient denies any current treatment for her symptoms.        Review of Systems   Review of Systems   Constitutional:  Negative for chills and fever.   Musculoskeletal:  Positive for arthralgias and joint swelling.   Skin:  Negative for color change.   Neurological:  Negative for weakness and numbness.   All other systems reviewed and are negative.        Current Medications       Current Outpatient Medications:     ALPRAZolam  (XANAX) 0.25 mg tablet, Take 1 tablet (0.25 mg total) by mouth daily at bedtime as needed for anxiety, Disp: 30 tablet, Rfl: 3    celecoxib (CeleBREX) 100 mg capsule, TAKE ONE CAPSULE BY MOUTH 2 TIMES A DAY, Disp: 180 capsule, Rfl: 1    Diclofenac Sodium (VOLTAREN) 1 %, Apply 2 g topically 4 (four) times a day, Disp: 100 g, Rfl: 6    omeprazole (PriLOSEC) 40 MG capsule, Take 1 capsule (40 mg total) by mouth daily, Disp: 90 capsule, Rfl: 3    brompheniramine-pseudoephedrine-DM 30-2-10 MG/5ML syrup, Take 5 mL by mouth 4 (four) times a day as needed for allergies (Patient not taking: Reported on 6/11/2024), Disp: 120 mL, Rfl: 0    docusate sodium (COLACE) 100 mg capsule, Take 1 capsule (100 mg total) by mouth in the morning (Patient not taking: Reported on 6/11/2024), Disp: 10 capsule, Rfl: 0    lisinopril (ZESTRIL) 10 mg tablet, Take 1 tablet (10 mg total) by mouth daily (Patient not taking: Reported on 6/11/2024), Disp: 90 tablet, Rfl: 3    Current Allergies     Allergies as of 06/11/2024    (No Known Allergies)            The following portions of the patient's history were reviewed and updated as appropriate: allergies, current medications, past family history, past medical history, past social history, past surgical history and problem list.     Past Medical History:   Diagnosis Date    Adjustment disorder with anxiety     last assessed - [id number]    Anemia     ((Pt Qnr Sub: no))     Anxiety     ((Pt Qnr Sub: yed))     Constipation 08/04/2021    Diverticulitis     Diverticulitis 09/02/2021    Diverticulitis of colon 05/2020    ((Pt Qnr Sub: yes))     Diverticulitis of large intestine with abscess without bleeding 05/24/2020    Elevated platelet count 09/13/2022    Elevated vitamin B12 level 09/13/2022    Generalized abdominal pain 08/04/2021    GERD (gastroesophageal reflux disease) 2000    ((Pt Qnr Sub: yes))     History of total hysterectomy     History of transfusion     Hypertension     ((Pt Qnr Sub: no))      "Hypertension 2021    ((Pt Qnr Sub: no))     Ingrown toenail     last assessed - [id number]    Thalassemia        Past Surgical History:   Procedure Laterality Date    APPENDECTOMY      ((Pt Qnr Sub: yes))     BOWEL RESECTION  2021     SECTION      ((Pt Qnr Sub: yes))     CHOLECYSTECTOMY      ((Pt Qnr Sub: yes))     COLONOSCOPY      GALLBLADDER SURGERY      OOPHORECTOMY Bilateral     CT ARTHRP INTERPOS INTERCARPAL/METACARPAL JOINTS Right 2024    Procedure: Thumb carpometacarpal joint arthroplasty with TightRope Suspension;  Surgeon: Eric Connolly MD;  [facility];  Service: Orthopedics    CT LAPAROSCOPY COLECTOMY PARTIAL W/ANASTOMOSIS N/A 2021    Procedure: RESECTION COLON SIGMOID LAPAROSCOPIC HAND-ASSISTED;  Surgeon: Kee Mace MD;  [facility];  Service: General    TONSILLECTOMY AND ADENOIDECTOMY      TOTAL ABDOMINAL HYSTERECTOMY      ((Pt Qnr Sub: yes))     UPPER GASTROINTESTINAL ENDOSCOPY         Family History   Problem Relation Age of Onset    Anxiety disorder Mother         Anxiety    Depression Mother     Anxiety disorder Sister         Anxiety    Depression Sister     Cancer Sister         Melanoma    No Known Problems Sister     No Known Problems Sister     Anxiety disorder Daughter         Anxiety    Depression Daughter     Heart disease Maternal Aunt     Heart disease Maternal Uncle          Medications have been verified.        Objective   /78   Pulse 63   Temp 98.6 °F (37 °C)   Resp 16   Ht 5' 1\" (1.549 m)   Wt 59.9 kg (132 lb)   SpO2 98%   BMI 24.94 kg/m²        Physical Exam     Physical Exam  Vitals and nursing note reviewed.   Constitutional:       General: She is not in acute distress.     Appearance: Normal appearance. She is normal weight. She is not ill-appearing, toxic-appearing or diaphoretic.   HENT:      Head: Normocephalic and atraumatic.   Eyes:      General:         Right eye: No discharge.         Left eye: " No discharge.   Cardiovascular:      Rate and Rhythm: Normal rate.      Pulses: Normal pulses.   Pulmonary:      Effort: Pulmonary effort is normal.   Musculoskeletal:         General: No swelling or tenderness. Normal range of motion.   Skin:     General: Skin is warm and dry.      Findings: No bruising or erythema.   Neurological:      Mental Status: She is alert.   Psychiatric:         Mood and Affect: Mood normal.         Behavior: Behavior normal.

## 2024-06-12 ENCOUNTER — TELEPHONE (OUTPATIENT)
Age: 64
End: 2024-06-12

## 2024-06-12 NOTE — TELEPHONE ENCOUNTER
"Patient extremely mad that Dr. Nicole's next available appt was so far out at either location. She stated multiple times she would go to the \"next higher ups\" past me to deal with this. Thank you.   "

## 2024-06-18 ENCOUNTER — OFFICE VISIT (OUTPATIENT)
Dept: FAMILY MEDICINE CLINIC | Facility: CLINIC | Age: 64
End: 2024-06-18
Payer: COMMERCIAL

## 2024-06-18 VITALS
RESPIRATION RATE: 16 BRPM | WEIGHT: 131.6 LBS | HEIGHT: 61 IN | OXYGEN SATURATION: 99 % | SYSTOLIC BLOOD PRESSURE: 160 MMHG | BODY MASS INDEX: 24.84 KG/M2 | DIASTOLIC BLOOD PRESSURE: 80 MMHG | TEMPERATURE: 98.4 F | HEART RATE: 55 BPM

## 2024-06-18 DIAGNOSIS — M25.572 ACUTE LEFT ANKLE PAIN: Primary | ICD-10-CM

## 2024-06-18 DIAGNOSIS — M79.672 LEFT FOOT PAIN: ICD-10-CM

## 2024-06-18 DIAGNOSIS — G57.62 MORTON NEUROMA, LEFT: ICD-10-CM

## 2024-06-18 PROCEDURE — 99214 OFFICE O/P EST MOD 30 MIN: CPT | Performed by: FAMILY MEDICINE

## 2024-06-18 RX ORDER — METHYLPREDNISOLONE 4 MG/1
TABLET ORAL
Qty: 21 EACH | Refills: 0 | Status: SHIPPED | OUTPATIENT
Start: 2024-06-18

## 2024-06-18 NOTE — PROGRESS NOTES
Ambulatory Visit  Name: Saloni Moy      : 1960      MRN: [id number]  Encounter Provider: Eusebio Azul MD  Encounter Date: 2024   Encounter department: [facility]    Assessment & Plan   1. Acute left ankle pain  -     Ambulatory Referral to Physical Therapy; Future  -     methylPREDNISolone 4 MG tablet therapy pack; Use as directed on package  2. Left foot pain  -     Ambulatory Referral to Physical Therapy; Future  -     methylPREDNISolone 4 MG tablet therapy pack; Use as directed on package  3. Arias neuroma, left  -     Ambulatory Referral to Physical Therapy; Future  -     methylPREDNISolone 4 MG tablet therapy pack; Use as directed on package    Patient presents with pain in the dorsal left foot radiating up to the ankle and with left Achilles pain.  Imaging revealed a heel spur but no other findings.  There is pain and swelling on the top of her foot near the cuneiform and metatarsal bones. Pain when pressure is applied to the top of the foot or if the the toes are squeezed. Etiology unclear but is making it difficult to ambulate. Will provide a short course of steroids and refer to PT. She was also advised to call and scheduled w/ podiatry as she may need further imaging such as an MRI      History of Present Illness     64-year-old female with a history of low bone density, GERD, anxiety, adrenal nodule, hypertension, PERLITA, diverticulitis status post sigmoidectomy seen today with left foot pain.  Started 2 weeks ago.  She reports pain on the top of the foot radiating up to the ankle.  She saw her PCP who ordered x-ray of the foot.  There was a calcaneal spur present for which she saw orthopedist.  She also went to urgent care who repeated x-rays and showed no acute findings.  She was referred to podiatry and PT. Patient did not want to do PT as she recently completed therapy for her right wrist.  She contacted podiatry and was told the first available appointment  "would be in July.  Patient has been taking ibuprofen with minimal relief.  At onepoint the pain was so intense she could not ambulate.  She denies any recent injuries.  Does have a history of Arias's neuroma which was \" released \" many years ago.       Review of Systems   Musculoskeletal:  Positive for arthralgias (left ankle and foot pain) and gait problem. Negative for joint swelling.   Past Medical History   Past Medical History:   Diagnosis Date   • Adjustment disorder with anxiety     last assessed - 2016   • Anemia     ((Pt Qnr Sub: no))    • Anxiety     ((Pt Qnr Sub: yed))    • Constipation 2021   • Diverticulitis    • Diverticulitis 2021   • Diverticulitis of colon 2020    ((Pt Qnr Sub: yes))    • Diverticulitis of large intestine with abscess without bleeding 2020   • Elevated platelet count 2022   • Elevated vitamin B12 level 2022   • Generalized abdominal pain 2021   • GERD (gastroesophageal reflux disease)     ((Pt Qnr Sub: yes))    • History of total hysterectomy    • History of transfusion    • Hypertension     ((Pt Qnr Sub: no))    • Hypertension 2021    ((Pt Qnr Sub: no))    • Ingrown toenail     last assessed - [id number]   • Thalassemia      Past Surgical History:   Procedure Laterality Date   • APPENDECTOMY      ((Pt Qnr Sub: yes))    • BOWEL RESECTION  2021   •  SECTION      ((Pt Qnr Sub: yes))    • CHOLECYSTECTOMY      ((Pt Qnr Sub: yes))    • COLONOSCOPY     • GALLBLADDER SURGERY     • OOPHORECTOMY Bilateral    • NY ARTHRP INTERPOS INTERCARPAL/METACARPAL JOINTS Right 2024    Procedure: Thumb carpometacarpal joint arthroplasty with TightRope Suspension;  Surgeon: Eric Connolly MD;  [facility];  Service: Orthopedics   • NY LAPAROSCOPY COLECTOMY PARTIAL W/ANASTOMOSIS N/A 2021    Procedure: RESECTION COLON SIGMOID LAPAROSCOPIC HAND-ASSISTED;  Surgeon: Kee Mace MD;  [facility];  " Service: General   • TONSILLECTOMY AND ADENOIDECTOMY     • TOTAL ABDOMINAL HYSTERECTOMY      ((Pt Qnr Sub: yes))    • UPPER GASTROINTESTINAL ENDOSCOPY       Family History   Problem Relation Age of Onset   • Anxiety disorder Mother         Anxiety   • Depression Mother    • Anxiety disorder Sister         Anxiety   • Depression Sister    • Cancer Sister         Melanoma   • No Known Problems Sister    • No Known Problems Sister    • Anxiety disorder Daughter         Anxiety   • Depression Daughter    • Heart disease Maternal Aunt    • Heart disease Maternal Uncle      Current Outpatient Medications on File Prior to Visit   Medication Sig Dispense Refill   • ALPRAZolam (XANAX) 0.25 mg tablet Take 1 tablet (0.25 mg total) by mouth daily at bedtime as needed for anxiety 30 tablet 3   • celecoxib (CeleBREX) 100 mg capsule TAKE ONE CAPSULE BY MOUTH 2 TIMES A  capsule 1   • Diclofenac Sodium (VOLTAREN) 1 % Apply 2 g topically 4 (four) times a day 100 g 6   • omeprazole (PriLOSEC) 40 MG capsule Take 1 capsule (40 mg total) by mouth daily 90 capsule 3   • brompheniramine-pseudoephedrine-DM 30-2-10 MG/5ML syrup Take 5 mL by mouth 4 (four) times a day as needed for allergies (Patient not taking: Reported on 6/11/2024) 120 mL 0   • docusate sodium (COLACE) 100 mg capsule Take 1 capsule (100 mg total) by mouth in the morning (Patient not taking: Reported on 6/11/2024) 10 capsule 0   • lisinopril (ZESTRIL) 10 mg tablet Take 1 tablet (10 mg total) by mouth daily (Patient not taking: Reported on 6/11/2024) 90 tablet 3     No current facility-administered medications on file prior to visit.   No Known Allergies   Current Outpatient Medications on File Prior to Visit   Medication Sig Dispense Refill   • ALPRAZolam (XANAX) 0.25 mg tablet Take 1 tablet (0.25 mg total) by mouth daily at bedtime as needed for anxiety 30 tablet 3   • celecoxib (CeleBREX) 100 mg capsule TAKE ONE CAPSULE BY MOUTH 2 TIMES A  capsule 1   •  "Diclofenac Sodium (VOLTAREN) 1 % Apply 2 g topically 4 (four) times a day 100 g 6   • omeprazole (PriLOSEC) 40 MG capsule Take 1 capsule (40 mg total) by mouth daily 90 capsule 3   • brompheniramine-pseudoephedrine-DM 30-2-10 MG/5ML syrup Take 5 mL by mouth 4 (four) times a day as needed for allergies (Patient not taking: Reported on 6/11/2024) 120 mL 0   • docusate sodium (COLACE) 100 mg capsule Take 1 capsule (100 mg total) by mouth in the morning (Patient not taking: Reported on 6/11/2024) 10 capsule 0   • lisinopril (ZESTRIL) 10 mg tablet Take 1 tablet (10 mg total) by mouth daily (Patient not taking: Reported on 6/11/2024) 90 tablet 3     No current facility-administered medications on file prior to visit.      Social History     Tobacco Use   • Smoking status: Never   • Smokeless tobacco: Never   Vaping Use   • Vaping status: Never Used   Substance and Sexual Activity   • Alcohol use: Yes     Alcohol/week: 1.0 standard drink of alcohol     Types: 1 Glasses of wine per week     Comment: occasional alcohol use   • Drug use: No   • Sexual activity: Not Currently     Partners: Male     Birth control/protection: Post-menopausal, Surgical     Comment: Hysterectomy 1988     Objective     /80 (BP Location: Left arm, Patient Position: Sitting, Cuff Size: Large)   Pulse 55   Temp 98.4 °F (36.9 °C) (Tympanic)   Resp 16   Ht 5' 1\" (1.549 m)   Wt 59.7 kg (131 lb 9.6 oz)   SpO2 99%   BMI 24.87 kg/m²     Physical Exam  Constitutional:       General: She is not in acute distress.     Appearance: Normal appearance. She is not ill-appearing or toxic-appearing.   HENT:      Head: Normocephalic and atraumatic.   Cardiovascular:      Pulses:           Posterior tibial pulses are 2+ on the left side.   Pulmonary:      Effort: Pulmonary effort is normal.   Musculoskeletal:      Left foot: Decreased range of motion.        Feet:    Feet:      Left foot:      Skin integrity: No blister, skin breakdown, erythema or warmth. "   Neurological:      Mental Status: She is alert.     Administrative Statements   I have spent a total time of 35 minutes on 6/18/24 In caring for this patient including Diagnostic results, Instructions for management, Risk factor reductions, Impressions, Counseling / Coordination of care, Documenting in the medical record, Reviewing / ordering tests, medicine, procedures  , and Obtaining or reviewing history  .

## 2024-06-24 DIAGNOSIS — N95.2 VAGINAL ATROPHY: Primary | ICD-10-CM

## 2024-06-24 RX ORDER — ESTRADIOL 0.1 MG/G
1 CREAM VAGINAL DAILY
Qty: 42.5 G | Refills: 3 | Status: SHIPPED | OUTPATIENT
Start: 2024-06-24

## 2024-06-25 ENCOUNTER — OFFICE VISIT (OUTPATIENT)
Dept: OBGYN CLINIC | Facility: MEDICAL CENTER | Age: 64
End: 2024-06-25
Payer: COMMERCIAL

## 2024-06-25 VITALS
BODY MASS INDEX: 24.55 KG/M2 | SYSTOLIC BLOOD PRESSURE: 149 MMHG | HEIGHT: 61 IN | WEIGHT: 130 LBS | HEART RATE: 54 BPM | DIASTOLIC BLOOD PRESSURE: 82 MMHG

## 2024-06-25 DIAGNOSIS — Z47.89 AFTERCARE FOLLOWING SURGERY OF THE MUSCULOSKELETAL SYSTEM: ICD-10-CM

## 2024-06-25 DIAGNOSIS — M18.11 ARTHRITIS OF CARPOMETACARPAL (CMC) JOINT OF RIGHT THUMB: Primary | ICD-10-CM

## 2024-06-25 PROCEDURE — 99213 OFFICE O/P EST LOW 20 MIN: CPT | Performed by: ORTHOPAEDIC SURGERY

## 2024-06-25 NOTE — PROGRESS NOTES
HAND & UPPER EXTREMITY OFFICE VISIT   Referred By:  No referring provider defined for this encounter.      Chief Complaint:     Right thumb pain    Surgery:  Right thumb carpometacarpal joint arthroplasty with trapeziectomy and Tightrope Suspension, DOS 2024.    History of Present Illness:   Patient presents now 6 months status post the above surgery. Since last visit she reports significant improvement in her pain compared to before surgery. 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 She notes soreness with rainy weather and states that she continues have soreness in the abdominal incision from her gallbladder surgery many years ago and similar whether conditions. She takes OTC anti-inflammatories for pain as needed. She denies any numbness or tingling. Overall, she is very happy with her outcome at this time.    Past Medical History:  Past Medical History:   Diagnosis Date    Adjustment disorder with anxiety     last assessed - 2016    Anemia     ((Pt Qnr Sub: no))     Anxiety     ((Pt Qnr Sub: yed))     Constipation 2021    Diverticulitis     Diverticulitis 2021    Diverticulitis of colon 2020    ((Pt Qnr Sub: yes))     Diverticulitis of large intestine with abscess without bleeding 2020    Elevated platelet count 2022    Elevated vitamin B12 level 2022    Generalized abdominal pain 2021    GERD (gastroesophageal reflux disease)     ((Pt Qnr Sub: yes))     History of total hysterectomy     History of transfusion     Hypertension     ((Pt Qnr Sub: no))     Hypertension 2021    ((Pt Qnr Sub: no))     Ingrown toenail     last assessed - [id number]    Thalassemia      Past Surgical History:   Procedure Laterality Date    APPENDECTOMY      ((Pt Qnr Sub: yes))     BOWEL RESECTION  2021     SECTION      ((Pt Qnr Sub: yes))     CHOLECYSTECTOMY      ((Pt Qnr Sub: yes))     COLONOSCOPY      GALLBLADDER  SURGERY      OOPHORECTOMY Bilateral     SC ARTHRP INTERPOS INTERCARPAL/METACARPAL JOINTS Right 01/04/2024    Procedure: Thumb carpometacarpal joint arthroplasty with TightRope Suspension;  Surgeon: Eric Connolly MD;  [facility];  Service: Orthopedics    SC LAPAROSCOPY COLECTOMY PARTIAL W/ANASTOMOSIS N/A 11/17/2021    Procedure: RESECTION COLON SIGMOID LAPAROSCOPIC HAND-ASSISTED;  Surgeon: Kee Mace MD;  [facility];  Service: General    TONSILLECTOMY AND ADENOIDECTOMY      TOTAL ABDOMINAL HYSTERECTOMY      ((Pt Qnr Sub: yes))     UPPER GASTROINTESTINAL ENDOSCOPY       Family History   Problem Relation Age of Onset    Anxiety disorder Mother         Anxiety    Depression Mother     Anxiety disorder Sister         Anxiety    Depression Sister     Cancer Sister         Melanoma    No Known Problems Sister     No Known Problems Sister     Anxiety disorder Daughter         Anxiety    Depression Daughter     Heart disease Maternal Aunt     Heart disease Maternal Uncle      Social History     Socioeconomic History    Marital status:      Spouse name: Not on file    Number of children: Not on file    Years of education: Not on file    Highest education level: Not on file   Occupational History    Occupation:  - [employer]   Tobacco Use    Smoking status: Never    Smokeless tobacco: Never   Vaping Use    Vaping status: Never Used   Substance and Sexual Activity    Alcohol use: Yes     Alcohol/week: 1.0 standard drink of alcohol     Types: 1 Glasses of wine per week     Comment: occasional alcohol use    Drug use: No    Sexual activity: Not Currently     Partners: Male     Birth control/protection: Post-menopausal, Surgical     Comment: Hysterectomy 1988   Other Topics Concern    Not on file   Social History Narrative    Coffee     Social Determinants of Health     Financial Resource Strain: Not on file   Food Insecurity: Not on file   Transportation Needs: Not on file  "  Physical Activity: Not on file   Stress: Not on file   Social Connections: Not on file   Intimate Partner Violence: Not on file   Housing Stability: Not on file     Scheduled Meds:  Continuous Infusions:No current facility-administered medications for this visit.    PRN Meds:.  No Known Allergies    Physical Examination:    /82   Pulse (!) 54   Ht 5' 1\" (1.549 m)   Wt 59 kg (130 lb)   BMI 24.56 kg/m²     Gen: A&Ox3, NAD      Right Upper Extremity:   incision well healed without signs of infection   No tenderness to palpation.   Full thumb oppostion to the base of the small finger  Composite fist  Normal ROM of wrist  Sensation intact to light touch in the axillary median, ulnar, and radial nerve distributions  2+RP      Studies:  No new imaging today    3/26/2024: Radiographs of the right thumb, multiple views, demonstrate stable alignment of her thumb metacarpal status post trapeziectomy and tight rope suspension without subsidence.  No signs of hardware failure or fracture.      Assessment and Plan:  1. Arthritis of carpometacarpal (CMC) joint of right thumb        2. Aftercare following surgery of the musculoskeletal system  CANCELED: XR hand 3+ vw right          64 y.o. female presents 6 months status most Right thumb carpometacarpal joint arthroplasty with trapeziectomy and Tightrope Suspension, DOS 01/04/2024. Overall, she is doing very well at this time. I explained the soreness in her hand after prolonged repetitive motions should hopefully continue to improve as her muscular endurance improves. She may continue to be active as tolerated. No formal restrictions for her hand.     She should return to follow-up in 6 weeks for 1 year check of strength and motion.    she expressed understanding of the plan and agreed. We encouraged them to contact our office with any questions or concerns.         Eric Connolly MD  Hand and Upper Extremity Surgery          *This note was dictated using Dragon " voice recognition software. Please excuse any word substitutions or errors.*

## 2024-07-22 ENCOUNTER — TELEPHONE (OUTPATIENT)
Age: 64
End: 2024-07-22

## 2024-07-22 NOTE — TELEPHONE ENCOUNTER
Please relay message to pt to keep the appt scheduled and if cancellation or opening we can contact her to schedule.

## 2024-07-22 NOTE — TELEPHONE ENCOUNTER
Caller: Saloni Eduardohen    Doctor: Kim Ochoa    Reason for call: Saloni's foot is very swollen and painful.  She also thinks she broke her toe.  Can we do a force on for her to be seen?  Thank you.    Call back#: [phone]

## 2024-07-23 ENCOUNTER — TELEPHONE (OUTPATIENT)
Age: 64
End: 2024-07-23

## 2024-07-23 ENCOUNTER — APPOINTMENT (EMERGENCY)
Dept: RADIOLOGY | Facility: HOSPITAL | Age: 64
End: 2024-07-23
Payer: COMMERCIAL

## 2024-07-23 ENCOUNTER — HOSPITAL ENCOUNTER (EMERGENCY)
Facility: HOSPITAL | Age: 64
Discharge: HOME | End: 2024-07-23
Attending: EMERGENCY MEDICINE
Payer: COMMERCIAL

## 2024-07-23 VITALS
TEMPERATURE: 98.1 F | DIASTOLIC BLOOD PRESSURE: 76 MMHG | RESPIRATION RATE: 18 BRPM | SYSTOLIC BLOOD PRESSURE: 170 MMHG | OXYGEN SATURATION: 97 % | HEART RATE: 60 BPM

## 2024-07-23 DIAGNOSIS — M79.673 FOOT AND ANKLE PAIN: Primary | ICD-10-CM

## 2024-07-23 DIAGNOSIS — M25.579 FOOT AND ANKLE PAIN: Primary | ICD-10-CM

## 2024-07-23 DIAGNOSIS — M25.472 SWELLING OF ANKLE JOINT, LEFT: ICD-10-CM

## 2024-07-23 LAB — D DIMER PPP FEU-MCNC: <0.27 UG/ML FEU

## 2024-07-23 PROCEDURE — 36415 COLL VENOUS BLD VENIPUNCTURE: CPT | Performed by: STUDENT IN AN ORGANIZED HEALTH CARE EDUCATION/TRAINING PROGRAM

## 2024-07-23 PROCEDURE — 85379 FIBRIN DEGRADATION QUANT: CPT | Performed by: STUDENT IN AN ORGANIZED HEALTH CARE EDUCATION/TRAINING PROGRAM

## 2024-07-23 PROCEDURE — 99284 EMERGENCY DEPT VISIT MOD MDM: CPT

## 2024-07-23 PROCEDURE — 73610 X-RAY EXAM OF ANKLE: CPT

## 2024-07-23 PROCEDURE — 99284 EMERGENCY DEPT VISIT MOD MDM: CPT | Performed by: EMERGENCY MEDICINE

## 2024-07-23 PROCEDURE — 73630 X-RAY EXAM OF FOOT: CPT

## 2024-07-23 RX ORDER — NAPROXEN 250 MG/1
250 TABLET ORAL ONCE
Status: COMPLETED | OUTPATIENT
Start: 2024-07-23 | End: 2024-07-23

## 2024-07-23 RX ORDER — ACETAMINOPHEN 325 MG/1
650 TABLET ORAL ONCE
Status: COMPLETED | OUTPATIENT
Start: 2024-07-23 | End: 2024-07-23

## 2024-07-23 RX ADMIN — ACETAMINOPHEN 650 MG: 325 TABLET, FILM COATED ORAL at 18:57

## 2024-07-23 RX ADMIN — NAPROXEN 250 MG: 250 TABLET ORAL at 18:57

## 2024-07-23 NOTE — TELEPHONE ENCOUNTER
"Called and spoke with patient. She is very upset we will not force her on. She claims she NEEDS to be see sooner. I explained to the patient that we have no available openings before her upcoming appointment on 7-31. I told patient if pain is that bad and unbearable to report to an urgent care [facility] ER. She stated \"I'm not going to them cause all they do is take my copay and tell me nothing is wrong \". I advised her that is all we can do without physically treating her. She kept appointment and will be here for it. "

## 2024-07-23 NOTE — ED PROVIDER NOTES
History  Chief Complaint   Patient presents with    Foot Swelling     Patient having L foot swelling for 6 weeks. And possibly broke a toe on same foot. Reports unable to get into podiatrist until next week.     HPI  Pt is 64F with 2 months L foot and ankle swelling and pain, worse in the last week or so.  Estimates the left foot pain and swelling began a little over 2 months ago without any instigating event that she can recall.  Says there was a bump at the mid forefoot when it first started.  Pain is worse with weightbearing, and swelling seems to increase with longer time on feet and certain shoe choices.  Says that the ankle/forefoot periodically becomes hot, but without any redness she is aware of.  Reports that she has been seen by PCP and Ortho for this complaint but no cause identified; got a dosepak of steroids that seems to help transiently.  Last week she also ran into a bedpost and caused bruising to her left second toe, which she says turned black and blue and got very swollen-these have all grossly improved since then but the overall swelling of the foot and ankle have remained.  She did get a new ankle tattoo a couple centimeters above her lateral malleolus in April but never had use with it or that area. has osteoarthritis in both hands, but no history of migratory arthritis, rheumatic or inflammatory processes, septic arthritis.  No crepitance or instability.  Has taken Motrin with mild effect, and often elevates her feet, but has not tried compression.  Is on estradiol vaginal cream, but no oral estrogen supplementation, is a non-smoker, has not been immobilized.  No signs of systemic illness including fever, chills, nausea, or vomiting.    Prior to Admission Medications   Prescriptions Last Dose Informant Patient Reported? Taking?   ALPRAZolam (XANAX) 0.25 mg tablet   No No   Sig: Take 1 tablet (0.25 mg total) by mouth daily at bedtime as needed for anxiety   Diclofenac Sodium (VOLTAREN) 1 %   No  No   Sig: Apply 2 g topically 4 (four) times a day   brompheniramine-pseudoephedrine-DM 30-2-10 MG/5ML syrup   No No   Sig: Take 5 mL by mouth 4 (four) times a day as needed for allergies   Patient not taking: Reported on 2024   celecoxib (CeleBREX) 100 mg capsule   No No   Sig: TAKE ONE CAPSULE BY MOUTH 2 TIMES A DAY   docusate sodium (COLACE) 100 mg capsule   No No   Sig: Take 1 capsule (100 mg total) by mouth in the morning   Patient not taking: Reported on 2024   estradiol (ESTRACE VAGINAL) 0.1 mg/g vaginal cream   No No   Sig: Insert 1 g into the vagina daily   lisinopril (ZESTRIL) 10 mg tablet   No No   Sig: Take 1 tablet (10 mg total) by mouth daily   Patient not taking: Reported on 2024   methylPREDNISolone 4 MG tablet therapy pack   No No   Sig: Use as directed on package   omeprazole (PriLOSEC) 40 MG capsule   No No   Sig: Take 1 capsule (40 mg total) by mouth daily      Facility-Administered Medications: None       Past Medical History:   Diagnosis Date    Adjustment disorder with anxiety     last assessed - 2016    Anemia     ((Pt Qnr Sub: no))     Anxiety     ((Pt Qnr Sub: yed))     Constipation 2021    Diverticulitis     Diverticulitis 2021    Diverticulitis of colon 2020    ((Pt Qnr Sub: yes))     Diverticulitis of large intestine with abscess without bleeding 2020    Elevated platelet count 2022    Elevated vitamin B12 level 2022    Generalized abdominal pain 2021    GERD (gastroesophageal reflux disease)     ((Pt Qnr Sub: yes))     History of total hysterectomy     History of transfusion     Hypertension     ((Pt Qnr Sub: no))     Hypertension 2021    ((Pt Qnr Sub: no))     Ingrown toenail     last assessed - [id number]    Thalassemia        Past Surgical History:   Procedure Laterality Date    APPENDECTOMY      ((Pt Qnr Sub: yes))     BOWEL RESECTION  2021     SECTION      ((Pt Qnr Sub: yes))     CHOLECYSTECTOMY       ((Pt Qnr Sub: yes))     COLONOSCOPY      GALLBLADDER SURGERY      OOPHORECTOMY Bilateral     FL ARTHRP INTERPOS INTERCARPAL/METACARPAL JOINTS Right 01/04/2024    Procedure: Thumb carpometacarpal joint arthroplasty with TightRope Suspension;  Surgeon: Eric Connolly MD;  Location: [facility];  Service: Orthopedics    FL LAPAROSCOPY COLECTOMY PARTIAL W/ANASTOMOSIS N/A 11/17/2021    Procedure: RESECTION COLON SIGMOID LAPAROSCOPIC HAND-ASSISTED;  Surgeon: Kee Mace MD;  Location: [facility];  Service: General    TONSILLECTOMY AND ADENOIDECTOMY      TOTAL ABDOMINAL HYSTERECTOMY      ((Pt Qnr Sub: yes))     UPPER GASTROINTESTINAL ENDOSCOPY         Family History   Problem Relation Age of Onset    Anxiety disorder Mother         Anxiety    Depression Mother     Anxiety disorder Sister         Anxiety    Depression Sister     Cancer Sister         Melanoma    No Known Problems Sister     No Known Problems Sister     Anxiety disorder Daughter         Anxiety    Depression Daughter     Heart disease Maternal Aunt     Heart disease Maternal Uncle      I have reviewed and agree with the history as documented.    E-Cigarette/Vaping    E-Cigarette Use Never User      E-Cigarette/Vaping Substances    Nicotine No     THC No     CBD No     Flavoring No     Other No     Unknown No      Social History     Tobacco Use    Smoking status: Never    Smokeless tobacco: Never   Vaping Use    Vaping status: Never Used   Substance Use Topics    Alcohol use: Yes     Alcohol/week: 1.0 standard drink of alcohol     Types: 1 Glasses of wine per week     Comment: occasional alcohol use    Drug use: No        Review of Systems  As per HPI    Physical Exam  ED Triage Vitals [07/23/24 1743]   Temperature Pulse Respirations Blood Pressure SpO2   98.1 °F (36.7 °C) 71 18 (!) 185/107 98 %      Temp Source Heart Rate Source Patient Position - Orthostatic VS BP Location FiO2 (%)   Oral Monitor Sitting Left arm --      Pain Score       8              Orthostatic Vital Signs  Vitals:    07/23/24 1743 07/23/24 1831   BP: (!) 185/107 170/76   Pulse: 71 60   Patient Position - Orthostatic VS: Sitting        Physical Exam  Gen: No acute distress; appears stated age  Neuro: Alert and oriented, follows commands  HEENT: Atraumatic, normocephalic  CV: Regular rate  Pulm: No increased work of breathing  MSK: Moves all extremities with normal strength in all joints.  Left foot and ankle are mildly swollen to the lower third of the calf, trace pitting.  Do not appreciate increased warmth, no erythema.  Moderately TTP at the line through the lateral ankle mortise and onto the forefoot, as well as at the lateral malleolus.  Somewhat ballotable lateral bursa, though comparable to contralateral side.  No crepitus, no palpable bony abnormalities or step-offs, anterior/posterior drawer normal, no instability with internal/external rotation or lateral deviation.  Grossly normal ROM.    ED Medications  Medications   naproxen (NAPROSYN) tablet 250 mg (250 mg Oral Given 7/23/24 1857)   acetaminophen (TYLENOL) tablet 650 mg (650 mg Oral Given 7/23/24 1857)       Diagnostic Studies  Results Reviewed       Procedure Component Value Units Date/Time    D-dimer, quantitative [[id number]] Collected: 07/23/24 1850    Lab Status: In process Specimen: Blood from Arm, Right Updated: 07/23/24 1853                   XR ankle 3+ views LEFT    (Results Pending)   XR foot 3+ views LEFT    (Results Pending)         Procedures  Procedures      ED Course  ED Course as of 07/23/24 2151 Tue Jul 23, 2024   1830 Assessed at bedside for left foot/ankle edema and pain   1858 P.o. Tylenol and Motrin ordered for pain management  XR left foot and ankle  D-dimer    1905 Wells criteria DVT--> 1 point for edema w trace pitting   1934 D-Dimer, Quant: <0.27  Normal D dimer          SBIRT 20yo+      Flowsheet Row Most Recent Value   Initial Alcohol Screen: US AUDIT-C     1. How often do you have a drink  containing alcohol? 1 Filed at: 07/23/2024 1802   2. How many drinks containing alcohol do you have on a typical day you are drinking?  1 Filed at: 07/23/2024 1802   3b. FEMALE Any Age, or MALE 65+: How often do you have 4 or more drinks on one occassion? 0 Filed at: 07/23/2024 1802   Audit-C Score 2 Filed at: 07/23/2024 1802   JOVANY: How many times in the past year have you...    Used an illegal drug or used a prescription medication for non-medical reasons? Never Filed at: 07/23/2024 1802              Wells' Criteria for DVT      Flowsheet Row Most Recent Value   Wells' Criteria for DVT    Active cancer Treatment or palliation within 6 months 0 Filed at: 07/23/2024 1900   Bedridden recently >3 days or major surgery within 12 weeks 0 Filed at: 07/23/2024 1900   Calf swelling >3 cm compared to the other leg 0 Filed at: 07/23/2024 1900   Entire leg swollen 0 Filed at: 07/23/2024 1900   Collateral (nonvaricose) superficial veins present 0 Filed at: 07/23/2024 1900   Localized tenderness along the deep venous system 0 Filed at: 07/23/2024 1900   Pitting edema, confined to symptomatic leg 1 Filed at: 07/23/2024 1900   Paralysis, paresis, or recent plaster immobilization of the lower extremity 0 Filed at: 07/23/2024 1900   Previously documented DVT 0 Filed at: 07/23/2024 1900   Alternative diagnosis to DVT as likely or more likely 0 Filed at: 07/23/2024 1900   Natalio DVT Critera Score 1 Filed at: 07/23/2024 1900            Medical Decision Making  Amount and/or Complexity of Data Reviewed  Labs: ordered.  Radiology: ordered.    Risk  OTC drugs.  Prescription drug management.      64F with ~2mo L foot and ankle swelling and pain without radiographic evidence supporting bony abnormality.  Swelling extends from the lower third of the leg through the foot, trace pitting overlying the tibia--evaluating for potential DVT with D-dimer. Pt describes episodic warmth of the/area but without other findings consistent with septic  arthritis by history or on current exam.  Given no accompanying erythema and ability to bear weight, consider gout unlikely etiology.  Consistent with osteoarthritis, but no migratory arthritis or known inflammatory conditions.  Plain film of foot and ankle to evaluate for bony/joint finding.    Also describes specific toe trauma, with erythema of the left second toe on physical exam.  Will also evaluate with plain film.    Update  Plain films WNL, no fracture evident, no ankle or floor pathology identified to explain pain  D-dimer negative so less suspicious for DVT  Manage edema with RICE, ACE wrap applied  Ok to continue to take OTC pain meds  F/u with podiatry as planned      Disposition  Final diagnoses:   None     ED Disposition       None          Follow-up Information    None         Patient's Medications   Discharge Prescriptions    No medications on file     No discharge procedures on file.    PDMP Review         Value Time User    PDMP Reviewed  Yes 5/30/2024  5:10 PM KATIANA Sow             ED Provider  Attending physically available and evaluated Saloni Moy. I managed the patient along with the ED Attending.    Electronically Signed by  Ana German MD  07/23/24 4373

## 2024-07-23 NOTE — TELEPHONE ENCOUNTER
Pt called because she is still having foot pain and wanted an appt on Thurs or Friday. She saw Mel and Dr. Azul previously for the foot pain. Pt will see podiatry on next Wed. The only availability was with Renetta Gatica on those days and pt said she did not want to see a third doctor regarding this. Pt said she will just wait and disconnected. Please, advise if anything can be done for pt. Pt was hoping to get an MRI but I advised a face to face was needed for that.

## 2024-07-23 NOTE — DISCHARGE INSTRUCTIONS
Today's workup did not identify any acute pathology, including fracture of the second toe, ankle, or foot.  Apply Ace wrap to left foot and ankle daily for control of swelling, and elevate the feet/left foot as often as you can for supportive footwear especially if you are spending long periods of time on your feet.  Continue to take Motrin and/or Tylenol as per the box directions, as needed for pain  Follow-up with podiatry and with your primary doctor for ongoing management

## 2024-07-23 NOTE — ED ATTENDING ATTESTATION
I saw and evaluated the patient. I have discussed the patient with the resident physician and agree with the resident's findings, assessment and plan as documented in the resident physician's note, unless otherwise documented below. All available laboratory and imaging studies were reviewed by myself.  I was present for key portions of any procedure(s) performed by the resident and I was immediately available to provide assistance.     I agree with the current assessment done in the Emergency Department. I have conducted an independent evaluation of this patient    Final Diagnosis:  1. Foot and ankle pain    2. Swelling of ankle joint, left             Chief Complaint   Patient presents with    Foot Swelling     Patient having L foot swelling for 6 weeks. And possibly broke a toe on same foot. Reports unable to get into podiatrist until next week.     This is a 64 y.o. female presenting for evaluation of left foot/ankle pain and swelling. Started 6 weeks ago. No initial trauma but had left foot xray done on 6/11 that did not show acute osseous abnormalities. Now has swelling of the foot and ankle. Pain worse with weight bearing. Did hit her ankle and foot on something at one point following prior xray. Denies fever, chills, cough, chest pain, SOB, n/v/d, abdominal pain, headache, any other complaints.    PMH:   has a past medical history of Adjustment disorder with anxiety, Anemia, Anxiety, Constipation (08/04/2021), Depression, Diverticulitis, Diverticulitis (09/02/2021), Diverticulitis of colon (05/2020), Diverticulitis of large intestine with abscess without bleeding (05/24/2020), Elevated platelet count (09/13/2022), Elevated vitamin B12 level (09/13/2022), Generalized abdominal pain (08/04/2021), GERD (gastroesophageal reflux disease) (2000), History of total hysterectomy, History of transfusion, Hypertension, Hypertension (03/12/2021), Ingrown toenail, Miscarriage (1985), and Thalassemia.    PSH:   has a past  surgical history that includes Appendectomy; Gallbladder surgery; Tonsillectomy and adenoidectomy; Total abdominal hysterectomy; Oophorectomy (Bilateral); Cholecystectomy; Colonoscopy; Upper gastrointestinal endoscopy; pr laparoscopy colectomy partial w/anastomosis (N/A, 2021); Bowel resection (2021);  section (); pr arthrp interpos intercarpal/metacarpal joints (Right, 2024); and  section.    Social:  Social History     Substance and Sexual Activity   Alcohol Use Yes    Alcohol/week: 2.0 standard drinks of alcohol    Types: 2 Glasses of wine per week    Comment: occasional alcohol use     Social History     Tobacco Use   Smoking Status Never   Smokeless Tobacco Never     Social History     Substance and Sexual Activity   Drug Use Never     PE:  Vitals:    24 1743 24 1831   BP: (!) 185/107 170/76   BP Location: Left arm Right arm   Pulse: 71 60   Resp: 18 18   Temp: 98.1 °F (36.7 °C)    TempSrc: Oral    SpO2: 98% 97%         Physical exam:  GENERAL APPEARANCE: Appears comfortable, no acute distress, calm and cooperative   NEURO: GCS 15, no focal deficits   HENT: Normocephalic, atraumatic, moist mucous membranes   Eyes: EOMI, normal pupil size   Neck: Full ROM  CV: Warm, well perfused  LUNGS: No respiratory distress  MSK: Mild tenderness to lateral ankle/foot. No tenderness over navicular bone or base of 5th MT. Trace pitting edema of dorsal ankle and tib/fib area. Full active ROM of foot and ankle. No overlying erythema or warmth. No crepitus or induration. Able to bear weight. 2+ DP and PT pulses. Distal sensation intact with cap refill <2 seconds.   SKIN: Warm and dry      Assessment and plan: This is a 64 y.o. female presenting for evaluation of left foot/ankle pain and swelling.  Within the ddx consider acute osseous injury, strain/sprain, DVT. Will xray and d-dimer to evaluate.     Final assessment: D-dimer and xray negative. Will refer to podiatry for follow  up. Advise compression stockings. Strict ED return precautions provided should symptoms worsen and patient can otherwise follow up outpatient. Patient expresses an understanding and agreement with the plan and remains in good condition for discharge.     Code Status: Prior  Advance Directive and Living Will:      Power of :    POLST:      Medications   naproxen (NAPROSYN) tablet 250 mg (250 mg Oral Given 7/23/24 1857)   acetaminophen (TYLENOL) tablet 650 mg (650 mg Oral Given 7/23/24 1857)     XR ankle 3+ views LEFT   Final Result      No acute osseous abnormality.         Computerized Assisted Algorithm (CAA) may have been used to analyze all applicable images.               Workstation performed: [id number]         XR foot 3+ views LEFT   Final Result      No acute osseous abnormality.         Computerized Assisted Algorithm (CAA) may have been used to analyze all applicable images.         Workstation performed: [id number]           Orders Placed This Encounter   Procedures    XR ankle 3+ views LEFT    XR foot 3+ views LEFT    D-dimer, quantitative     Labs Reviewed   D-DIMER, QUANTITATIVE - Normal       Result Value Ref Range Status    D-Dimer, Quant <0.27  <0.50 ug/ml FEU Final    Comment: Reference and upper limits to exclude DVT and PE are the same.  Do not use to exclude if clinical symptoms are present.  Pregnant women:  1st trimester:  <0.22 - 1.06 ug/ml FEU  2nd trimester:  <0.22 - 1.88 ug/ml FEU  3rd trimester:   0.24 - 3.28 ug/ml FEU    Note: Normal ranges may not apply to patients who are transgender, non-binary, or whose legal sex, sex at birth, and gender identity differ.      Narrative:     In the evaluation for possible pulmonary embolism, in the appropriate (Well's Score of 4 or less) patient, the age adjusted d-dimer cutoff for this patient can be calculated as:    Age x 0.01 (in ug/mL) for Age-adjusted D-dimer exclusion threshold for a patient over 50 years.         Time reflects when  diagnosis was documented in both MDM as applicable and the Disposition within this note       Time User Action Codes Description Comment    7/23/2024  7:54 PM Ana German Add [M79.673,  M25.579] Foot and ankle pain     7/23/2024  7:55 PM Ana German Add [M25.472] Swelling of ankle joint, left           ED Disposition       ED Disposition   Discharge    Condition   Stable    Date/Time   Tue Jul 23, 2024  7:54 PM    Comment   Saloni Chinmay discharge to home/self care.                   Follow-up Information       Follow up With Specialties Details Why Contact Info    KATIANA Sow [facility], Internal Medicine, Nurse Practitioner Schedule an appointment as soon as possible for a visit  As needed [address] [place] [address]  [phone]            Discharge Medication List as of 7/23/2024  7:56 PM        CONTINUE these medications which have NOT CHANGED    Details   ALPRAZolam (XANAX) 0.25 mg tablet Take 1 tablet (0.25 mg total) by mouth daily at bedtime as needed for anxiety, Starting Thu 5/30/2024, Normal      brompheniramine-pseudoephedrine-DM 30-2-10 MG/5ML syrup Take 5 mL by mouth 4 (four) times a day as needed for allergies, Starting Wed 5/15/2024, Normal      celecoxib (CeleBREX) 100 mg capsule TAKE ONE CAPSULE BY MOUTH 2 TIMES A DAY, Starting Thu 12/28/2023, Normal      Diclofenac Sodium (VOLTAREN) 1 % Apply 2 g topically 4 (four) times a day, Starting Thu 6/1/2023, Normal      docusate sodium (COLACE) 100 mg capsule Take 1 capsule (100 mg total) by mouth in the morning, Starting Thu 1/4/2024, Normal      estradiol (ESTRACE VAGINAL) 0.1 mg/g vaginal cream Insert 1 g into the vagina daily, Starting Mon 6/24/2024, Normal      lisinopril (ZESTRIL) 10 mg tablet Take 1 tablet (10 mg total) by mouth daily, Starting Thu 6/8/2023, Normal      methylPREDNISolone 4 MG tablet therapy pack Use as directed on package, Normal      omeprazole (PriLOSEC) 40 MG capsule Take 1  "capsule (40 mg total) by mouth daily, Starting Fri 2/2/2024, Normal           No discharge procedures on file.  Prior to Admission Medications   Prescriptions Last Dose Informant Patient Reported? Taking?   ALPRAZolam (XANAX) 0.25 mg tablet   No No   Sig: Take 1 tablet (0.25 mg total) by mouth daily at bedtime as needed for anxiety   Diclofenac Sodium (VOLTAREN) 1 %   No No   Sig: Apply 2 g topically 4 (four) times a day   brompheniramine-pseudoephedrine-DM 30-2-10 MG/5ML syrup   No No   Sig: Take 5 mL by mouth 4 (four) times a day as needed for allergies   Patient not taking: Reported on 6/11/2024   celecoxib (CeleBREX) 100 mg capsule   No No   Sig: TAKE ONE CAPSULE BY MOUTH 2 TIMES A DAY   docusate sodium (COLACE) 100 mg capsule   No No   Sig: Take 1 capsule (100 mg total) by mouth in the morning   Patient not taking: Reported on 6/11/2024   estradiol (ESTRACE VAGINAL) 0.1 mg/g vaginal cream   No No   Sig: Insert 1 g into the vagina daily   lisinopril (ZESTRIL) 10 mg tablet   No No   Sig: Take 1 tablet (10 mg total) by mouth daily   Patient not taking: Reported on 6/11/2024   methylPREDNISolone 4 MG tablet therapy pack   No No   Sig: Use as directed on package   Patient not taking: Reported on 7/25/2024   omeprazole (PriLOSEC) 40 MG capsule   No No   Sig: Take 1 capsule (40 mg total) by mouth daily      Facility-Administered Medications: None         Portions of the record may have been created with voice recognition software. Occasional wrong word or \"sound a like\" substitutions may have occurred due to the inherent limitations of voice recognition software. Read the chart carefully and recognize, using context, where substitutions have occurred.    Electronically signed by:  Kalyn Cantu    "

## 2024-07-25 ENCOUNTER — TRANSCRIBE ORDERS (OUTPATIENT)
Dept: LAB | Facility: AMBULARY SURGERY CENTER | Age: 64
End: 2024-07-25

## 2024-07-25 ENCOUNTER — ANNUAL EXAM (OUTPATIENT)
Dept: OBGYN CLINIC | Facility: CLINIC | Age: 64
End: 2024-07-25
Payer: COMMERCIAL

## 2024-07-25 ENCOUNTER — APPOINTMENT (OUTPATIENT)
Dept: LAB | Facility: AMBULARY SURGERY CENTER | Age: 64
End: 2024-07-25
Payer: COMMERCIAL

## 2024-07-25 VITALS
WEIGHT: 130 LBS | SYSTOLIC BLOOD PRESSURE: 148 MMHG | BODY MASS INDEX: 24.55 KG/M2 | HEIGHT: 61 IN | DIASTOLIC BLOOD PRESSURE: 80 MMHG

## 2024-07-25 DIAGNOSIS — Z11.3 SCREENING EXAMINATION FOR STI: ICD-10-CM

## 2024-07-25 DIAGNOSIS — Z00.8 HEALTH EXAMINATION IN POPULATION SURVEY: Primary | ICD-10-CM

## 2024-07-25 DIAGNOSIS — Z01.419 WOMEN'S ANNUAL ROUTINE GYNECOLOGICAL EXAMINATION: Primary | ICD-10-CM

## 2024-07-25 DIAGNOSIS — Z12.31 ENCOUNTER FOR SCREENING MAMMOGRAM FOR MALIGNANT NEOPLASM OF BREAST: ICD-10-CM

## 2024-07-25 LAB
CHOLEST SERPL-MCNC: 189 MG/DL
HBV SURFACE AG SER QL: NORMAL
HCV AB SER QL: NORMAL
HDLC SERPL-MCNC: 62 MG/DL
HIV 1+2 AB+HIV1 P24 AG SERPL QL IA: NORMAL
HIV 2 AB SERPL QL IA: NORMAL
HIV1 AB SERPL QL IA: NORMAL
HIV1 P24 AG SERPL QL IA: NORMAL
LDLC SERPL CALC-MCNC: 93 MG/DL (ref 0–100)
NONHDLC SERPL-MCNC: 127 MG/DL
TREPONEMA PALLIDUM IGG+IGM AB [PRESENCE] IN SERUM OR PLASMA BY IMMUNOASSAY: NORMAL
TRIGL SERPL-MCNC: 169 MG/DL

## 2024-07-25 PROCEDURE — 87591 N.GONORRHOEAE DNA AMP PROB: CPT | Performed by: OBSTETRICS & GYNECOLOGY

## 2024-07-25 PROCEDURE — 86803 HEPATITIS C AB TEST: CPT

## 2024-07-25 PROCEDURE — 87340 HEPATITIS B SURFACE AG IA: CPT

## 2024-07-25 PROCEDURE — 87491 CHLMYD TRACH DNA AMP PROBE: CPT | Performed by: OBSTETRICS & GYNECOLOGY

## 2024-07-25 PROCEDURE — 80061 LIPID PANEL: CPT

## 2024-07-25 PROCEDURE — S0612 ANNUAL GYNECOLOGICAL EXAMINA: HCPCS | Performed by: OBSTETRICS & GYNECOLOGY

## 2024-07-25 PROCEDURE — 83036 HEMOGLOBIN GLYCOSYLATED A1C: CPT

## 2024-07-25 PROCEDURE — 87389 HIV-1 AG W/HIV-1&-2 AB AG IA: CPT

## 2024-07-25 PROCEDURE — 36415 COLL VENOUS BLD VENIPUNCTURE: CPT

## 2024-07-25 PROCEDURE — 86780 TREPONEMA PALLIDUM: CPT

## 2024-07-25 RX ORDER — AMLODIPINE BESYLATE 2.5 MG/1
2.5 TABLET ORAL DAILY
COMMUNITY

## 2024-07-25 NOTE — PROGRESS NOTES
ASSESSMENT & PLAN:   Diagnoses and all orders for this visit:    Women's annual routine gynecological examination    Encounter for screening mammogram for malignant neoplasm of breast  -     Mammo screening bilateral w 3d & cad; Future    Screening examination for STI  -     HIV 1/2 AG/AB w Reflex [facility] for 2 yr old and above; Future  -     Hepatitis C antibody; Future  -     Hepatitis B surface antigen; Future  -     RPR-Syphilis Screening (Total Syphilis IGG/IGM); Future  -     Chlamydia/GC amplified DNA by PCR    Other orders  -     amLODIPine (NORVASC) 2.5 mg tablet; Take 2.5 mg by mouth daily          The following were reviewed in today's visit: ASCCP guidelines (Pap screen not indicated after age 65), STD testing breast self exam, mammography screening ordered, use and side effects of HRT, menopause, exercise, and healthy diet.    Patient to return to office in yearly for annual exam.     All questions have been answered to her satisfaction.        CC:  Annual Gynecologic Examination  Chief Complaint   Patient presents with    Gynecologic Exam     Hysterectomy for benign reasons-pap no longer indicated   Last mammo 2023-Repeat 1 yr   Last colonoscopy 2020-Repeat 5 yrs   Last DXA 10/28/2016     Medication Refill     Estradiol        HPI: Saloni Moy is a 64 y.o.  who presents for annual gynecologic examination.  She has the following concerns:  would like STI screening      Health Maintenance:    Exercise: intermittently  Breast exams/breast awareness: yes  Last mammogram:   Colorectal cancer screenin  DEXA:     Past Medical History:   Diagnosis Date    Adjustment disorder with anxiety     last assessed - 2016    Anemia     ((Pt Qnr Sub: no))     Anxiety     ((Pt Qnr Sub: yed))     Constipation 2021    Depression     Diverticulitis     Diverticulitis 2021    Diverticulitis of colon 2020    ((Pt Qnr Sub: yes))     Diverticulitis of large intestine with  abscess without bleeding 2020    Elevated platelet count 2022    Elevated vitamin B12 level 2022    Generalized abdominal pain 2021    GERD (gastroesophageal reflux disease)     ((Pt Qnr Sub: yes))     History of total hysterectomy     History of transfusion     Hypertension     Hypertension 2021    Ingrown toenail     last assessed - [id number]    Miscarriage 1985    Thalassemia        Past Surgical History:   Procedure Laterality Date    APPENDECTOMY      ((Pt Qnr Sub: yes))     BOWEL RESECTION  2021     SECTION      ((Pt Qnr Sub: yes))      SECTION      CHOLECYSTECTOMY      ((Pt Qnr Sub: yes))     COLONOSCOPY      GALLBLADDER SURGERY      OOPHORECTOMY Bilateral     MD ARTHRP INTERPOS INTERCARPAL/METACARPAL JOINTS Right 2024    Procedure: Thumb carpometacarpal joint arthroplasty with TightRope Suspension;  Surgeon: Eric Connolly MD;  Location: [facility];  Service: Orthopedics    MD LAPAROSCOPY COLECTOMY PARTIAL W/ANASTOMOSIS N/A 2021    Procedure: RESECTION COLON SIGMOID LAPAROSCOPIC HAND-ASSISTED;  Surgeon: Kee Mace MD;  Location[facility];  Service: General    TONSILLECTOMY AND ADENOIDECTOMY      TOTAL ABDOMINAL HYSTERECTOMY      ((Pt Qnr Sub: yes))     UPPER GASTROINTESTINAL ENDOSCOPY         Past OB/Gyn History:   No LMP recorded. Patient has had a hysterectomy.    Patient is menopausal.   Menopausal symptoms: None  Further pap screening not indicated  History of abnormal Pap smear: no    Patient is currently sexually active.     Family History  Family History   Problem Relation Age of Onset    Anxiety disorder Mother         Anxiety    Depression Mother     Osteoporosis Mother     Hypertension Father     Anxiety disorder Sister         Anxiety    Depression Sister     Cancer Sister         Melanoma    No Known Problems Sister     No Known Problems Sister     Anxiety disorder Daughter         Anxiety    Depression Daughter      Heart disease Maternal Aunt     Heart disease Maternal Uncle        Family history of uterine or ovarian cancer: no  Family history of breast cancer: no  Family history of colon cancer: no    Social History:  Social History     Socioeconomic History    Marital status:      Spouse name: Not on file    Number of children: Not on file    Years of education: Not on file    Highest education level: Not on file   Occupational History    Occupation:  - [employer]   Tobacco Use    Smoking status: Never    Smokeless tobacco: Never   Vaping Use    Vaping status: Never Used   Substance and Sexual Activity    Alcohol use: Yes     Alcohol/week: 2.0 standard drinks of alcohol     Types: 2 Glasses of wine per week     Comment: occasional alcohol use    Drug use: Never    Sexual activity: Yes     Partners: Male     Birth control/protection: Post-menopausal, Surgical     Comment: Hysterectomy 1988   Other Topics Concern    Not on file   Social History Narrative    Coffee     Social Determinants of Health     Financial Resource Strain: Not on file   Food Insecurity: Not on file   Transportation Needs: Not on file   Physical Activity: Not on file   Stress: Not on file   Social Connections: Not on file   Intimate Partner Violence: Not on file   Housing Stability: Not on file     Domestic violence screen: negative    Allergies:  Allergies   Allergen Reactions    Benadryl [Diphenhydramine] Tachycardia       Medications:    Current Outpatient Medications:     ALPRAZolam (XANAX) 0.25 mg tablet, Take 1 tablet (0.25 mg total) by mouth daily at bedtime as needed for anxiety, Disp: 30 tablet, Rfl: 3    amLODIPine (NORVASC) 2.5 mg tablet, Take 2.5 mg by mouth daily, Disp: , Rfl:     celecoxib (CeleBREX) 100 mg capsule, TAKE ONE CAPSULE BY MOUTH 2 TIMES A DAY, Disp: 180 capsule, Rfl: 1    Diclofenac Sodium (VOLTAREN) 1 %, Apply 2 g topically 4 (four) times a day, Disp: 100 g, Rfl: 6    estradiol (ESTRACE VAGINAL) 0.1 mg/g  "vaginal cream, Insert 1 g into the vagina daily, Disp: 42.5 g, Rfl: 3    omeprazole (PriLOSEC) 40 MG capsule, Take 1 capsule (40 mg total) by mouth daily, Disp: 90 capsule, Rfl: 3    brompheniramine-pseudoephedrine-DM 30-2-10 MG/5ML syrup, Take 5 mL by mouth 4 (four) times a day as needed for allergies (Patient not taking: Reported on 6/11/2024), Disp: 120 mL, Rfl: 0    docusate sodium (COLACE) 100 mg capsule, Take 1 capsule (100 mg total) by mouth in the morning (Patient not taking: Reported on 6/11/2024), Disp: 10 capsule, Rfl: 0    lisinopril (ZESTRIL) 10 mg tablet, Take 1 tablet (10 mg total) by mouth daily (Patient not taking: Reported on 6/11/2024), Disp: 90 tablet, Rfl: 3    methylPREDNISolone 4 MG tablet therapy pack, Use as directed on package (Patient not taking: Reported on 7/25/2024), Disp: 21 each, Rfl: 0    Review of Systems:  Review of Systems   Constitutional: Negative.    HENT: Negative.     Respiratory: Negative.     Cardiovascular: Negative.    Gastrointestinal: Negative.    Genitourinary: Negative.    Neurological: Negative.    Psychiatric/Behavioral: Negative.           Physical Exam:  /80 (BP Location: Left arm, Patient Position: Sitting, Cuff Size: Standard)   Ht 5' 1\" (1.549 m)   Wt 59 kg (130 lb)   BMI 24.56 kg/m²    Physical Exam  Constitutional:       Appearance: Normal appearance.   Genitourinary:      Bladder and urethral meatus normal.      No lesions in the vagina.      Right Labia: No rash, tenderness, lesions, skin changes or Bartholin's cyst.     Left Labia: No tenderness, lesions, skin changes, Bartholin's cyst or rash.     No vaginal erythema, tenderness or bleeding.      Moderate vaginal atrophy present.       Right Adnexa: not tender, not full and no mass present.     Left Adnexa: not tender, not full and no mass present.     Cervix is absent.      Uterus is absent.      Urethral meatus caruncle not present.     No urethral tenderness or mass present.   Breasts:     " Right: No swelling, bleeding, inverted nipple, mass, nipple discharge, skin change or tenderness.      Left: No swelling, bleeding, inverted nipple, mass, nipple discharge, skin change or tenderness.   HENT:      Head: Normocephalic and atraumatic.   Eyes:      Extraocular Movements: Extraocular movements intact.      Conjunctiva/sclera: Conjunctivae normal.      Pupils: Pupils are equal, round, and reactive to light.   Cardiovascular:      Rate and Rhythm: Normal rate and regular rhythm.      Heart sounds: Normal heart sounds. No murmur heard.  Pulmonary:      Effort: Pulmonary effort is normal. No respiratory distress.      Breath sounds: Normal breath sounds. No wheezing or rales.   Abdominal:      General: There is no distension.      Palpations: Abdomen is soft.      Tenderness: There is no abdominal tenderness. There is no guarding.   Neurological:      General: No focal deficit present.      Mental Status: She is alert and oriented to person, place, and time.   Skin:     General: Skin is warm and dry.   Psychiatric:         Mood and Affect: Mood normal.         Behavior: Behavior normal.   Vitals and nursing note reviewed.

## 2024-07-26 LAB
C TRACH DNA SPEC QL NAA+PROBE: NEGATIVE
EST. AVERAGE GLUCOSE BLD GHB EST-MCNC: 128 MG/DL
HBA1C MFR BLD: 6.1 %
N GONORRHOEA DNA SPEC QL NAA+PROBE: NEGATIVE

## 2024-07-29 DIAGNOSIS — K21.9 GASTROESOPHAGEAL REFLUX DISEASE WITHOUT ESOPHAGITIS: ICD-10-CM

## 2024-07-29 DIAGNOSIS — M19.049 HAND ARTHRITIS: ICD-10-CM

## 2024-07-29 RX ORDER — CELECOXIB 100 MG/1
100 CAPSULE ORAL 2 TIMES DAILY
Qty: 60 CAPSULE | Refills: 0 | Status: SHIPPED | OUTPATIENT
Start: 2024-07-29

## 2024-07-30 RX ORDER — OMEPRAZOLE 40 MG/1
40 CAPSULE, DELAYED RELEASE ORAL DAILY
Qty: 90 CAPSULE | Refills: 1 | Status: SHIPPED | OUTPATIENT
Start: 2024-07-30

## 2024-07-31 ENCOUNTER — OFFICE VISIT (OUTPATIENT)
Dept: PODIATRY | Facility: CLINIC | Age: 64
End: 2024-07-31
Payer: COMMERCIAL

## 2024-07-31 VITALS
SYSTOLIC BLOOD PRESSURE: 120 MMHG | BODY MASS INDEX: 25.11 KG/M2 | HEIGHT: 61 IN | WEIGHT: 133 LBS | HEART RATE: 59 BPM | DIASTOLIC BLOOD PRESSURE: 75 MMHG

## 2024-07-31 DIAGNOSIS — M76.72 PERONEAL TENDINITIS OF LEFT LOWER EXTREMITY: Primary | ICD-10-CM

## 2024-07-31 DIAGNOSIS — M79.672 LEFT FOOT PAIN: ICD-10-CM

## 2024-07-31 DIAGNOSIS — R29.898 WEAKNESS OF BOTH LOWER EXTREMITIES: ICD-10-CM

## 2024-07-31 PROCEDURE — 99244 OFF/OP CNSLTJ NEW/EST MOD 40: CPT | Performed by: PODIATRIST

## 2024-07-31 NOTE — PROGRESS NOTES
"Ambulatory Visit  Name: Saloni Moy      : 1960      MRN: [id number]  Encounter Provider: Piero Nicole DPM  Encounter Date: 2024   Encounter department: [facility]OD[facility]    Assessment & Plan   1. Peroneal tendinitis of left lower extremity  -     MRI ankle/heel left wo contrast; Future; Expected date: 2024  -     Ambulatory Referral to Physical Therapy; Future  2. Left foot pain  -     Ambulatory Referral to Podiatry  -     MRI ankle/heel left wo contrast; Future; Expected date: 2024  -     Ambulatory Referral to Physical Therapy; Future  3. Weakness of both lower extremities  -     MRI ankle/heel left wo contrast; Future; Expected date: 2024  -     Ambulatory Referral to Physical Therapy; Future    Diagnosis and options discussed with patient  Patient agreeable to the plan as stated below    PAtient has significant weakness of both peroneal tendons, left and right. The left is painful. She has little to no eversion strength and pain along the peroneal tendons on the left. Reviewed her visit with Dr. Lachman. Reivewed her XRays. She absolutely needs to try PT, she has been resisting this recommendation. I strongly recommend physical therapy    I am also recommending an MRI, this weakness and pain is getting worse. If the MRI is essentially normal, consider EMG-NC study    History of Present Illness     Saloni Moy is a 64 y.o. female who presents with left foot pain. She is getting severe pain and swelling. It is slowly feeling better but it isn't gone. She saw Dr. Lachman in April who diagnosed her with peroneal tendonitis and achilles insertional pain. PT was recommended but she didn't think it would help    Review of Systems  As stated in HPI, otherwise normal    Medical History Reviewed by provider this encounter:  Tobacco  Allergies  Meds  Problems  Med Hx  Surg Hx  Fam Hx        Objective     /75   Pulse 59   Ht 5' 1\" (1.549 m) "   Wt 60.3 kg (133 lb)   BMI 25.13 kg/m²     Physical Exam  Vitals reviewed.   Cardiovascular:      Pulses: Normal pulses.           Dorsalis pedis pulses are 2+ on the left side.        Posterior tibial pulses are 2+ on the left side.   Musculoskeletal:         General: Tenderness and deformity (little to no peroneal strength against resistance, tenderenss along the distal peroneal brevis tendon) present.      Left foot: Normal range of motion (normal STJ ROM, no crepitus, normal ankle ROM).   Skin:     Capillary Refill: Capillary refill takes less than 2 seconds.      Findings: No bruising.   Neurological:      Mental Status: She is alert and oriented to person, place, and time.       Administrative Statements   I have spent a total time of 45 minutes in caring for this patient on the day of the visit/encounter including Diagnostic results, Prognosis, Risks and benefits of tx options, Instructions for management, Patient and family education, Importance of tx compliance, Risk factor reductions, Impressions, Counseling / Coordination of care, Documenting in the medical record, Reviewing / ordering tests, medicine, procedures  , Obtaining or reviewing history  , and Communicating with other healthcare professionals .  XRay 3 views of the left foot and ankle personally read by Dr. Nicole in office today and discussed with patient:    There is no acute fracture or dislocation.  No significant degenerative changes.  No lytic or blastic osseous lesion.  Soft tissues are unremarkable.

## 2024-07-31 NOTE — TELEPHONE ENCOUNTER
Left voicemail for patient explaining that they where given a refill long enough to last until visit with Dr. Vance

## 2024-08-05 ENCOUNTER — OFFICE VISIT (OUTPATIENT)
Age: 64
End: 2024-08-05
Payer: COMMERCIAL

## 2024-08-05 VITALS
WEIGHT: 132.8 LBS | OXYGEN SATURATION: 99 % | HEART RATE: 55 BPM | HEIGHT: 61 IN | DIASTOLIC BLOOD PRESSURE: 78 MMHG | TEMPERATURE: 97.8 F | SYSTOLIC BLOOD PRESSURE: 138 MMHG | BODY MASS INDEX: 25.07 KG/M2

## 2024-08-05 DIAGNOSIS — M18.0 PRIMARY OSTEOARTHRITIS OF BOTH FIRST CARPOMETACARPAL JOINTS: ICD-10-CM

## 2024-08-05 DIAGNOSIS — M35.9 UNDIFFERENTIATED CONNECTIVE TISSUE DISEASE (HCC): Primary | ICD-10-CM

## 2024-08-05 DIAGNOSIS — R53.82 CHRONIC FATIGUE: ICD-10-CM

## 2024-08-05 DIAGNOSIS — G56.03 BILATERAL CARPAL TUNNEL SYNDROME: ICD-10-CM

## 2024-08-05 DIAGNOSIS — M25.571 SINUS TARSI SYNDROME OF BOTH ANKLES: ICD-10-CM

## 2024-08-05 DIAGNOSIS — M25.572 SINUS TARSI SYNDROME OF BOTH ANKLES: ICD-10-CM

## 2024-08-05 DIAGNOSIS — M35.00 SJOGREN'S SYNDROME WITHOUT EXTRAGLANDULAR INVOLVEMENT (HCC): ICD-10-CM

## 2024-08-05 DIAGNOSIS — R40.0 DAYTIME SOMNOLENCE: ICD-10-CM

## 2024-08-05 DIAGNOSIS — R76.8 ANA POSITIVE: ICD-10-CM

## 2024-08-05 PROCEDURE — 99215 OFFICE O/P EST HI 40 MIN: CPT | Performed by: INTERNAL MEDICINE

## 2024-08-05 NOTE — PROGRESS NOTES
Assessment/Plan:    Undifferentiated connective tissue disease (HCC)  History positive RADHA with chronic sicca symptoms with workup from June 2023 significant for low-grade positive CCP of 44 with complements normal, and double-stranded DNA antibodies negative.  Sjogren's antibodies negative.  Rule out Sjogren syndrome primary versus secondary.  Rule out less likely developing connective tissue disease.  No evidence clinically for inflammatory arthropathy.  Will refer for lupus [facility] diagnostic connective tissue disease testing for further evaluation of sicca symptoms, positive CCP, and history of positive RADHA.  Continue symptomatic treatment for sicca symptoms.  Encourage vigilant dental hygiene and follow-up with dentist every 6 months or sooner if needed.  Follow-up 6 months or sooner if needed.  Will notify the patient of results of her workup when available.  Discussed in detail with patient.    Sjogren's syndrome without extraglandular involvement (HCC)  Chronic sicca symptoms dry eyes treated with refresh eyedrops.  She does note dry mouth but dental exams have been stable with no new decay.  She does have an upper partial.  Encourage vigilant dental hygiene and dental follow-ups every 6 months or sooner if needed.  Sjogren's antibodies negative in the past, however, will repeat serologies with connective tissue disease workup with the lupus [facility] testing.  Continue to monitor.    Primary osteoarthritis of both first carpometacarpal joints  Bilateral first CMC osteoarthritis with interphalangeal osteoarthritis and scattered DIP and PIP joints.  No evidence for inflammatory arthropathy.  Patient is status post right CMC arthroplasty in January of this year with good postop course.  She does use Celebrex to treat her joint symptoms.  She certainly could consider paraffin wax bath for symptomatic relief.  She has had formal occupational therapy postop exercise plan upon completion of therapy.  Follow-up hand  surgeon if needed.  Continue to monitor.    Bilateral carpal tunnel syndrome  Bilateral carpal tunnel symptoms with EMG study from June 23 with no evidence for carpal tunnel syndrome or cervical radiculopathy.  Suggest symptomatic treatment with wrist splints if needed.  Continue to monitor.    Sinus tarsi syndrome of both ankles  Bilateral ankle pain with soft tissue prominence and tenderness bilateral sinus tarsi.  Tenderness noted bilateral peroneal tendons left greater than right.  Questionable Arias's neuromas.  She is to start physical therapy for peroneal tendinitis, and I have added treatment of sinus tarsi as well.  I have suggested metatarsal bars in view of her mechanical foot deformities and I have [facility].  Continue to monitor.  Follow-up podiatry.    Daytime somnolence  Chronic fatigue with sleep disturbance with insomnia and early waking and daytime somnolence.  I have suggested that the patient discuss a sleep consult with primary care.  Continue to monitor.    Chronic fatigue  Chronic fatigue with sleep disturbance with insomnia and early awakening.  Rule out myofascial component in view of polyarthralgias.  I have suggested the food elimination/anti-inflammatory diet which likely would be of benefit for some of her symptoms including chronic loose stools in the morning.  Also it would reduce insulin resistance and promote weight reduction.  It may improve her fatigue as well.  She was given a book reference for this.  I did suggest that the patient discuss a formal sleep consult with primary care.  Continue to monitor.    RADHA positive  RADHA positive with chronic sicca symptoms.  Rule out Sjogren syndrome primary versus secondary.  Rule out less likely developing connective tissue disease.  CCP positive with no evidence for inflammatory arthropathy.  Family history of rheumatoid arthritis in her mother.  Refer for lupus [facility] connective tissue disease testing.  Will be in  touch with the patient once results are reviewed.  Plan follow-up in 6 months or sooner if needed.  She will continue with symptomatic treatment for her sicca symptoms.         Problem List Items Addressed This Visit       Chronic fatigue     Chronic fatigue with sleep disturbance with insomnia and early awakening.  Rule out myofascial component in view of polyarthralgias.  I have suggested the food elimination/anti-inflammatory diet which likely would be of benefit for some of her symptoms including chronic loose stools in the morning.  Also it would reduce insulin resistance and promote weight reduction.  It may improve her fatigue as well.  She was given a book reference for this.  I did suggest that the patient discuss a formal sleep consult with primary care.  Continue to monitor.         Relevant Orders    TSH, 3rd generation with Free T4 reflex    RADHA positive     RADHA positive with chronic sicca symptoms.  Rule out Sjogren syndrome primary versus secondary.  Rule out less likely developing connective tissue disease.  CCP positive with no evidence for inflammatory arthropathy.  Family history of rheumatoid arthritis in her mother.  Refer for lupus [facility] connective tissue disease testing.  Will be in touch with the patient once results are reviewed.  Plan follow-up in 6 months or sooner if needed.  She will continue with symptomatic treatment for her sicca symptoms.         Relevant Orders    MISCELLANEOUS LAB TEST    Bilateral carpal tunnel syndrome     Bilateral carpal tunnel symptoms with EMG study from June 23 with no evidence for carpal tunnel syndrome or cervical radiculopathy.  Suggest symptomatic treatment with wrist splints if needed.  Continue to monitor.         Undifferentiated connective tissue disease (HCC) - Primary     History positive RADHA with chronic sicca symptoms with workup from June 2023 significant for low-grade positive CCP of 44 with complements normal, and double-stranded DNA  antibodies negative.  Sjogren's antibodies negative.  Rule out Sjogren syndrome primary versus secondary.  Rule out less likely developing connective tissue disease.  No evidence clinically for inflammatory arthropathy.  Will refer for lupus [facility] diagnostic connective tissue disease testing for further evaluation of sicca symptoms, positive CCP, and history of positive RADHA.  Continue symptomatic treatment for sicca symptoms.  Encourage vigilant dental hygiene and follow-up with dentist every 6 months or sooner if needed.  Follow-up 6 months or sooner if needed.  Will notify the patient of results of her workup when available.  Discussed in detail with patient.         Relevant Orders    MISCELLANEOUS LAB TEST    C-reactive protein    Sedimentation rate, automated    CBC and differential    Comprehensive metabolic panel    Urinalysis with microscopic    Primary osteoarthritis of both first carpometacarpal joints     Bilateral first CMC osteoarthritis with interphalangeal osteoarthritis and scattered DIP and PIP joints.  No evidence for inflammatory arthropathy.  Patient is status post right CMC arthroplasty in January of this year with good postop course.  She does use Celebrex to treat her joint symptoms.  She certainly could consider paraffin wax bath for symptomatic relief.  She has had formal occupational therapy postop exercise plan upon completion of therapy.  Follow-up hand surgeon if needed.  Continue to monitor.         Sinus tarsi syndrome of both ankles     Bilateral ankle pain with soft tissue prominence and tenderness bilateral sinus tarsi.  Tenderness noted bilateral peroneal tendons left greater than right.  Questionable Arias's neuromas.  She is to start physical therapy for peroneal tendinitis, and I have added treatment of sinus tarsi as well.  I have suggested metatarsal bars in view of her mechanical foot deformities and I have [facility].  Continue to monitor.  Follow-up  podiatry.         Relevant Orders    Ambulatory Referral to Physical Therapy    Sjogren's syndrome without extraglandular involvement (HCC)     Chronic sicca symptoms dry eyes treated with refresh eyedrops.  She does note dry mouth but dental exams have been stable with no new decay.  She does have an upper partial.  Encourage vigilant dental hygiene and dental follow-ups every 6 months or sooner if needed.  Sjogren's antibodies negative in the past, however, will repeat serologies with connective tissue disease workup with the lupus [facility] testing.  Continue to monitor.         Daytime somnolence     Chronic fatigue with sleep disturbance with insomnia and early waking and daytime somnolence.  I have suggested that the patient discuss a sleep consult with primary care.  Continue to monitor.                Reviewed records, labs, and imaging with the patient in detail.  Counseled patient.  Discussion regarding my findings and recommendations.  Office visit with documentation 45 min.    Subjective:      Patient ID: Saloni Moy is a 64 y.o. female.    64-year-old female, who had been evaluated by [facility] rheumatologist for history of positive RADHA last year, who presents for transfer of care for ongoing evaluation of positive RADHA, history of sicca symptoms, and ongoing joint symptoms, with pain first CMC joints, history of carpal tunnel symptoms, and more recent complaint of bilateral foot pain left greater than right, with ankle swelling.  She has been evaluated by orthopedic foot and ankle surgeon who did diagnose tendinitis, and most recently was evaluated by podiatrist who diagnosed left peroneal tendinitis and referred her for physical therapy, which she has not yet started.  Patient is status post right thumb CMC arthroplasty with trapeziectomy and tight rope suspension in January of this year.  She has had good postop course and has recovered from surgery. She does note weather changes in multiple of her  joints including hands, elbows, knees, and ankles.  She has been using Celebrex 200 mg daily.  Patient does have chronic sicca symptoms with dry eyes for which she uses refresh eyedrops.  Dry mouth is not significantly bothersome and dental exams have been good with no new dental decay.  She does have a history of a partial on the top years ago.  She has history of frontal headaches with photophobia and nausea following the headache.  She has noted floaters at work.  Questionable ocular migraines.  She has a history of congestion and runny nose.  She does have seasonal allergies and in the past had allergy shots.  She has loose BMs approximately 3 stools in the morning and does note abdominal bloating.  She has urinary stress incontinence.  She does have sleep disturbance with early awakening and insomnia as well as chronic fatigue.  She notes some daytime somnolence.  She has tried gabapentin in the past but was intolerant due to confusion.  She is  3 para 2 with 1 first trimester miscarriage.  Significant past medical history includes bowel resection for history diverticulitis, GERD, total hysterectomy, and hypertension.    Lab work dated 2023 significant for Sjogren's antibodies negative.  CCP positive at 44.  C3 and C4 complements normal.  Double-stranded DNA antibodies negative.  Sed rate 13.  CRP 3.3.  Hemoglobin 11.7 with hematocrit 38.6 with hypochromic microcytic indices secondary to beta thalassemia minor.  Creatinine 0.82 with estimated GFR 75.  Calcium 9.4.  LDH slightly low at 138.  Urine protein to creatinine ratio normal.    Hand films from 2023 significant for first CMC osteoarthritis with OA PIP and DIP joints.  These were personally reviewed by me.        Allergies  Allergies   Allergen Reactions    Benadryl [Diphenhydramine] Tachycardia       Home Medications    Current Outpatient Medications:     amLODIPine (NORVASC) 2.5 mg tablet, Take 2.5 mg by mouth daily, Disp: , Rfl:      celecoxib (CeleBREX) 100 mg capsule, Take 1 capsule (100 mg total) by mouth 2 (two) times a day, Disp: 60 capsule, Rfl: 0    Diclofenac Sodium (VOLTAREN) 1 %, Apply 2 g topically 4 (four) times a day, Disp: 100 g, Rfl: 6    estradiol (ESTRACE VAGINAL) 0.1 mg/g vaginal cream, Insert 1 g into the vagina daily, Disp: 42.5 g, Rfl: 3    omeprazole (PriLOSEC) 40 MG capsule, Take 1 capsule (40 mg total) by mouth daily, Disp: 90 capsule, Rfl: 1    ALPRAZolam (XANAX) 0.25 mg tablet, Take 1 tablet (0.25 mg total) by mouth daily at bedtime as needed for anxiety, Disp: 30 tablet, Rfl: 3    docusate sodium (COLACE) 100 mg capsule, Take 1 capsule (100 mg total) by mouth in the morning (Patient not taking: Reported on 6/11/2024), Disp: 10 capsule, Rfl: 0    lisinopril (ZESTRIL) 10 mg tablet, Take 1 tablet (10 mg total) by mouth daily (Patient not taking: Reported on 9/20/2024), Disp: 90 tablet, Rfl: 3    ondansetron (ZOFRAN) 4 mg tablet, Take 1 tablet (4 mg total) by mouth every 6 (six) hours (Patient not taking: Reported on 9/20/2024), Disp: 12 tablet, Rfl: 0    Past Medical History  Past Medical History:   Diagnosis Date    Adjustment disorder with anxiety     last assessed - [id number]    Anemia     ((Pt Qnr Sub: no))     Anxiety     ((Pt Qnr Sub: yed))     Constipation 08/04/2021    Depression     Diverticulitis     Diverticulitis 09/02/2021    Diverticulitis of colon 05/2020    ((Pt Qnr Sub: yes))     Diverticulitis of large intestine with abscess without bleeding 05/24/2020    Elevated platelet count 09/13/2022    Elevated vitamin B12 level 09/13/2022    Generalized abdominal pain 08/04/2021    GERD (gastroesophageal reflux disease) 2000    ((Pt Qnr Sub: yes))     History of total hysterectomy     History of transfusion     Hypertension     Hypertension 03/12/2021    Ingrown toenail     last assessed - [id number]    Miscarriage 1985    Thalassemia        Past Surgical History   Past Surgical History:   Procedure Laterality  Date    APPENDECTOMY      ((Pt Qnr Sub: yes))     BOWEL RESECTION  2021     SECTION  1981    ((Pt Qnr Sub: yes))      SECTION      CHOLECYSTECTOMY      ((Pt Qnr Sub: yes))     COLONOSCOPY      GALLBLADDER SURGERY      OOPHORECTOMY Bilateral     SC ARTHRP INTERPOS INTERCARPAL/METACARPAL JOINTS Right 2024    Procedure: Thumb carpometacarpal joint arthroplasty with TightRope Suspension;  Surgeon: Eric Connolly MD;  Location: [facility];  Service: Orthopedics    SC LAPAROSCOPY COLECTOMY PARTIAL W/ANASTOMOSIS N/A 2021    Procedure: RESECTION COLON SIGMOID LAPAROSCOPIC HAND-ASSISTED;  Surgeon: Kee Mace MD;  Location: [facility];  Service: General    TONSILLECTOMY AND ADENOIDECTOMY      TOTAL ABDOMINAL HYSTERECTOMY      ((Pt Qnr Sub: yes))     UPPER GASTROINTESTINAL ENDOSCOPY         Family History   Family History   Problem Relation Age of Onset    Anxiety disorder Mother         Anxiety    Depression Mother     Osteoporosis Mother     Hypertension Father     Anxiety disorder Sister         Anxiety    Depression Sister     Cancer Sister         Melanoma    No Known Problems Sister     No Known Problems Sister     Anxiety disorder Daughter         Anxiety    Depression Daughter     Heart disease Maternal Aunt     Heart disease Maternal Uncle        The following portions of the patient's history were reviewed and updated as appropriate: allergies, current medications, past family history, past medical history, past social history, past surgical history, and problem list.    Review of Systems   Constitutional:  Positive for fatigue. Negative for chills and fever.   HENT:  Positive for congestion and rhinorrhea. Negative for ear pain and sore throat.         Dry mouth   Eyes:  Positive for photophobia. Negative for pain and visual disturbance.        Dry eyes using Refresh eye drops   Respiratory:  Negative for cough and shortness of breath.    Cardiovascular:  Negative for  "chest pain and palpitations.   Gastrointestinal:  Positive for diarrhea. Negative for abdominal pain and vomiting.        Abd bloating   Genitourinary:  Negative for dysuria and hematuria.        Urinary stress incontinence   Musculoskeletal:  Positive for arthralgias and joint swelling. Negative for back pain.   Skin:  Negative for color change and rash.   Neurological:  Positive for headaches. Negative for seizures and syncope.   Psychiatric/Behavioral:  Positive for sleep disturbance.    All other systems reviewed and are negative.        Objective:      /78 (BP Location: Right arm, Patient Position: Sitting, Cuff Size: Adult)   Pulse 55   Temp 97.8 °F (36.6 °C) (Temporal)   Ht 5' 1\" (1.549 m)   Wt 60.2 kg (132 lb 12.8 oz)   SpO2 99%   BMI 25.09 kg/m²          Physical Exam  Vitals reviewed.   Constitutional:       Appearance: Normal appearance.   HENT:      Head: Normocephalic.      Nose:      Comments: Nose and throat unremarkable.  Eyes:      Extraocular Movements: Extraocular movements intact.   Neck:      Comments: Without masses, thyromegaly, lymphadenopathy  Cardiovascular:      Rate and Rhythm: Regular rhythm.   Pulmonary:      Breath sounds: Normal breath sounds.   Abdominal:      Palpations: Abdomen is soft.   Musculoskeletal:      Cervical back: Neck supple.      Comments: Neck mildly decreased lateral flexion.  Shoulders elbows and wrist full range of motion.  Tinel's positive bilaterally.  Hands right first CMC surgical scar noted.  Left hand squaring first carpometacarpal joint.  Bilateral scattered Heberden's with questionable early Sergio's nodes.  No synovitis appreciated.  Straight leg raising negative bilaterally.  Schober's negative.  No SI joint tenderness appreciated.  Hips full range of motion.  Knees full range of motion with patellofemoral crepitus.  Ankles soft tissue prominence with tenderness bilateral sinus tarsi.  Feet rearfoot hyperpronation with early hallux valgus " deformities and cock-up toe deformities.  Tenderness along peroneal tendons left greater than right.  No tenderness plantar fascia or Achilles tendon insertions.  Questionable Praful sign's with tenderness left foot webspace between second and third toes.  No synovitis appreciated.   Skin:     General: Skin is warm.   Neurological:      General: No focal deficit present.               This note was written in part using the assistance of the [email or web address] Direct [id number] microphone system. Those portions using this system have been dictated and not read.

## 2024-08-05 NOTE — PATIENT INSTRUCTIONS
Go to [facility] for better shoe gear.  Also asked them for metatarsal bars.  Never walk barefoot.  I did give you a prescription for physical therapy that you should give to the therapist at the same time you give the prescription written by the podiatrist.  You do have sinus tarsi syndrome more on your left ankle during your right.  You do have the neuromas.  Follow-up with the orthopedic hand surgeon.  I would delay your left thumb surgery until the pain is significant enough that you cannot tolerate it.  Get the lab work that I ordered completed.  Talk to Mel about a sleep study.  I think that would be important in view of your daytime sleepiness and difficulty with sleeping in general.  I would suggest that you try the food elimination/anti-inflammatory diet.  We will give you a book reference for that.  It is $15 on [employer].

## 2024-08-07 ENCOUNTER — EVALUATION (OUTPATIENT)
Dept: PHYSICAL THERAPY | Facility: REHABILITATION | Age: 64
End: 2024-08-07
Payer: COMMERCIAL

## 2024-08-07 DIAGNOSIS — R29.898 WEAKNESS OF BOTH LOWER EXTREMITIES: ICD-10-CM

## 2024-08-07 DIAGNOSIS — M76.72 PERONEAL TENDINITIS OF LEFT LOWER EXTREMITY: ICD-10-CM

## 2024-08-07 DIAGNOSIS — M79.672 LEFT FOOT PAIN: Primary | ICD-10-CM

## 2024-08-07 PROCEDURE — 97110 THERAPEUTIC EXERCISES: CPT | Performed by: PHYSICAL THERAPIST

## 2024-08-07 PROCEDURE — 97162 PT EVAL MOD COMPLEX 30 MIN: CPT | Performed by: PHYSICAL THERAPIST

## 2024-08-07 NOTE — PROGRESS NOTES
PT Evaluation     Today's date: 2024  Patient name: Saloni Moy  : 1960  MRN: [id number]  Referring provider: Piero Nicole D*  Dx:   Encounter Diagnosis     ICD-10-CM    1. Left foot pain  M79.672 Ambulatory Referral to Physical Therapy      2. Peroneal tendinitis of left lower extremity  M76.72 Ambulatory Referral to Physical Therapy      3. Weakness of both lower extremities  R29.898 Ambulatory Referral to Physical Therapy                     Assessment  Impairments: abnormal or restricted ROM, activity intolerance, impaired balance, impaired physical strength, lacks appropriate home exercise program, pain with function and activity limitations    Assessment details: Patient presents with pain, decreased ankle ROM, decreased ankle strength, and decreased function secondary to peroneal tendinitis; sinus tarsi.  Patient would benefit from skilled PT intervention to address these issues and to maximize function.  Thank you for the referral.  Understanding of Dx/Px/POC: good     Prognosis: good    Goals  Short Term:  Pt will report decreased levels of pain by at least 2 subjective ratings in 4 weeks  Pt will demonstrate improved ROM by at least 10 degrees in 4 weeks  Pt will demonstrate improved strength by 1/2 grade MMT in 4 weeks  Long Term:   Pt will be independent in their HEP in 8 weeks  Pt will demonstrate improved FOTO, > predicted elvel  Pt will be independent with all ADL's    Plan  Patient would benefit from: skilled physical therapy    Planned therapy interventions: balance, manual therapy, joint mobilization, neuromuscular re-education, patient/caregiver education, stretching, strengthening, therapeutic activities, therapeutic exercise, flexibility, graded exercise and home exercise program    Frequency: 1-2x/week.  Duration in weeks: 8  Plan of Care beginning date: 2024  Plan of Care expiration date: 10/4/2024  Treatment plan discussed with: patient  Plan details: Patient was  educated in HEP and Plan of Care.  All questions were answered to pt's satisfaction.        Subjective Evaluation    History of Present Illness  Mechanism of injury: Pt is a 64 y.o female with a c/o ongoing L foot/ankle pain of insidious onset for about 2 months.  Pt notes her pain initially was in her heel and then moved to dorsum of foot and lateral foot.  Pt saw Ortho initially, but states she did not care for him.  Pt then saw Podiatrist and had radiographs, which were negative.  Pt was referred for PT with a diagnosis of peroneal tendinitis and weakness.  Pt is also scheduled for an MRI next week.  Pt reports pain/difficulty with ambulation (limited tolerance), steps (step to pattern), household activities (limited tolerance to standing), sleep (intermittent disturbance and painful to apply pressure on her ankle in side lying).    Patient Goals  Patient goals for therapy: decreased pain, independence with ADLs/IADLs and increased strength    Pain  At best pain ratin  At worst pain rating: 10  Location: L anterolaeral ankle  Relieving factors: medications and ice (IBU)      Diagnostic Tests  X-ray: normal      Objective     Observations     Right Ankle/Foot   Positive for edema.     Tenderness     Additional Tenderness Details  Diffuse TTP lateral L ankle.      Neurological Testing     Additional Neurological Details  Pt denies n/t    Active Range of Motion   Left Ankle/Foot   Dorsiflexion (ke): 8 degrees   Plantar flexion: 45 degrees   Inversion: 40 degrees   Eversion: 5 degrees   Great toe flexion: WFL  Great toe extension: WFL  Lesser toes: WFL    Right Ankle/Foot   Dorsiflexion (ke): 10 degrees   Plantar flexion: WFL  Inversion: WFL  Eversion: 10 degrees   Great toe flexion: WFL  Great toe extension: WFL  Lesser toes: WFL    Additional Active Range of Motion Details  Standing HR:  R=10, L=10 with report of weakness and some pain at area of neuroma    Passive Range of Motion   Left Hip   Flexion:  "WFL  External rotation (90/90): WFL  Internal rotation (90/90): WFL    Right Hip   Flexion: WFL  External rotation (90/90): WFL  Internal rotation (90/90): WFL  Left Ankle/Foot    Dorsiflexion (ke): 10 degrees   Plantar flexion: 55 degrees   Inversion: WFL  Eversion: 25 degrees     Right Ankle/Foot    Dorsiflexion (ke): WFL  Eversion: WFL    Strength/Myotome Testing     Left Hip   Planes of Motion   Flexion: 5  Extension: 5  Abduction: 5    Right Hip   Planes of Motion   Flexion: 5  Extension: 5  Abduction: 5    Left Knee   Flexion: 5  Extension: 5    Right Knee   Flexion: 5  Extension: 5    Left Ankle/Foot   Dorsiflexion: 4  Plantar flexion: 4+  Inversion: 4-  Eversion: 3+    Right Ankle/Foot   Dorsiflexion: 5  Plantar flexion: 4+  Inversion: 4  Eversion: 4-    Tests     Lumbar     Left   Negative passive SLR.     Right   Negative passive SLR.   Left Ankle/Foot   Negative for anterior drawer, eversion talar tilt, inversion talar tilt, syndesmosis squeeze and syndesmosis external rotation.     General Comments:      Ankle/Foot Comments   Pt able to FWB without pain  SLS:  R=1 min, L=20\".           Precautions: anemia, anxiety, depression, HTN,   POC expires Unit limit Auth Expiration date PT/OT + Visit Limit?   10/4 BOMN NA BOMN              Pertinent Findings:                                                                                            Test / Measure  8/7/2024   FOTO (Predicted 66) 44                       Visits 1            Manuals 8/7                                                                Neuro Re-Ed             Rockerboard a/p, m/l (WS+balance)             SLS             Tandem amb on foam             Side stepping on foam                                                    Ther Ex             NS             Gastroc strap stertch             Ankle tb x4             Ankle inv/ev w/slideboard             Wobble board seated a/p, m/l, cw/ccw             Standing HR+TR             prostretch   "           Pt education+ HEP instruction TP 8 mins            Ther Activity             Fwd step ups             Mini squats             Gait Training                                       Modalities

## 2024-08-12 ENCOUNTER — HOSPITAL ENCOUNTER (OUTPATIENT)
Dept: RADIOLOGY | Age: 64
Discharge: HOME | End: 2024-08-12
Payer: COMMERCIAL

## 2024-08-12 DIAGNOSIS — M76.72 PERONEAL TENDINITIS OF LEFT LOWER EXTREMITY: ICD-10-CM

## 2024-08-12 DIAGNOSIS — M79.672 LEFT FOOT PAIN: ICD-10-CM

## 2024-08-12 DIAGNOSIS — R29.898 WEAKNESS OF BOTH LOWER EXTREMITIES: ICD-10-CM

## 2024-08-12 PROCEDURE — 73721 MRI JNT OF LWR EXTRE W/O DYE: CPT

## 2024-08-14 ENCOUNTER — APPOINTMENT (OUTPATIENT)
Dept: PHYSICAL THERAPY | Facility: REHABILITATION | Age: 64
End: 2024-08-14
Payer: COMMERCIAL

## 2024-08-14 DIAGNOSIS — S92.215A CLOSED NONDISPLACED FRACTURE OF CUBOID OF LEFT FOOT, INITIAL ENCOUNTER: Primary | ICD-10-CM

## 2024-08-14 DIAGNOSIS — F41.9 ANXIETY: ICD-10-CM

## 2024-08-14 NOTE — TELEPHONE ENCOUNTER
Medication Refill Request     Name   ALPRAZolam (XANAX) 0.25 mg tablet    Dose/Frequency Take 1 tablet (0.25 mg total) by mouth daily at bedtime as needed for anxiety   Quantity 30  Verified pharmacy   [x]  Verified ordering Provider   [x]  Does patient have enough for the next 3 days? Yes [x] No []

## 2024-08-15 RX ORDER — ALPRAZOLAM 0.25 MG
0.25 TABLET ORAL
Qty: 30 TABLET | Refills: 3 | Status: SHIPPED | OUTPATIENT
Start: 2024-08-15

## 2024-08-15 NOTE — TELEPHONE ENCOUNTER
Medication:  PDMP   05/30/2024 05/30/2024 ALPRAZolam (Tablet) 30.0 30 0.25 MG NA DANK KRISHNAN     Active agreement on file -No

## 2024-08-21 ENCOUNTER — APPOINTMENT (OUTPATIENT)
Dept: PHYSICAL THERAPY | Facility: REHABILITATION | Age: 64
End: 2024-08-21
Payer: COMMERCIAL

## 2024-08-28 ENCOUNTER — APPOINTMENT (OUTPATIENT)
Dept: PHYSICAL THERAPY | Facility: REHABILITATION | Age: 64
End: 2024-08-28
Payer: COMMERCIAL

## 2024-08-31 ENCOUNTER — HOSPITAL ENCOUNTER (EMERGENCY)
Facility: HOSPITAL | Age: 64
Discharge: HOME | End: 2024-08-31
Attending: EMERGENCY MEDICINE
Payer: COMMERCIAL

## 2024-08-31 ENCOUNTER — APPOINTMENT (EMERGENCY)
Dept: RADIOLOGY | Facility: HOSPITAL | Age: 64
End: 2024-08-31
Payer: COMMERCIAL

## 2024-08-31 VITALS
HEART RATE: 74 BPM | SYSTOLIC BLOOD PRESSURE: 178 MMHG | TEMPERATURE: 97.8 F | RESPIRATION RATE: 18 BRPM | DIASTOLIC BLOOD PRESSURE: 80 MMHG | OXYGEN SATURATION: 95 %

## 2024-08-31 DIAGNOSIS — U07.1 COVID-19: Primary | ICD-10-CM

## 2024-08-31 LAB
ALBUMIN SERPL BCG-MCNC: 4.4 G/DL (ref 3.5–5)
ALP SERPL-CCNC: 118 U/L (ref 34–104)
ALT SERPL W P-5'-P-CCNC: 18 U/L (ref 7–52)
ANION GAP SERPL CALCULATED.3IONS-SCNC: 6 MMOL/L (ref 4–13)
AST SERPL W P-5'-P-CCNC: 30 U/L (ref 13–39)
BASOPHILS # BLD AUTO: 0.03 THOUSANDS/ÂΜL (ref 0–0.1)
BASOPHILS NFR BLD AUTO: 1 % (ref 0–1)
BILIRUB SERPL-MCNC: 0.33 MG/DL (ref 0.2–1)
BUN SERPL-MCNC: 21 MG/DL (ref 5–25)
CALCIUM SERPL-MCNC: 9.4 MG/DL (ref 8.4–10.2)
CHLORIDE SERPL-SCNC: 102 MMOL/L (ref 96–108)
CO2 SERPL-SCNC: 27 MMOL/L (ref 21–32)
CREAT SERPL-MCNC: 1.03 MG/DL (ref 0.6–1.3)
EOSINOPHIL # BLD AUTO: 0.05 THOUSAND/ÂΜL (ref 0–0.61)
EOSINOPHIL NFR BLD AUTO: 1 % (ref 0–6)
ERYTHROCYTE [DISTWIDTH] IN BLOOD BY AUTOMATED COUNT: 15.5 % (ref 11.6–15.1)
FLUAV RNA RESP QL NAA+PROBE: NEGATIVE
FLUBV RNA RESP QL NAA+PROBE: NEGATIVE
GFR SERPL CREATININE-BSD FRML MDRD: 57 ML/MIN/1.73SQ M
GLUCOSE SERPL-MCNC: 96 MG/DL (ref 65–140)
HCT VFR BLD AUTO: 35.5 % (ref 34.8–46.1)
HGB BLD-MCNC: 11 G/DL (ref 11.5–15.4)
IMM GRANULOCYTES # BLD AUTO: 0.01 THOUSAND/UL (ref 0–0.2)
IMM GRANULOCYTES NFR BLD AUTO: 0 % (ref 0–2)
LIPASE SERPL-CCNC: 95 U/L (ref 11–82)
LYMPHOCYTES # BLD AUTO: 1.25 THOUSANDS/ÂΜL (ref 0.6–4.47)
LYMPHOCYTES NFR BLD AUTO: 35 % (ref 14–44)
MCH RBC QN AUTO: 20.4 PG (ref 26.8–34.3)
MCHC RBC AUTO-ENTMCNC: 31 G/DL (ref 31.4–37.4)
MCV RBC AUTO: 66 FL (ref 82–98)
MONOCYTES # BLD AUTO: 0.67 THOUSAND/ÂΜL (ref 0.17–1.22)
MONOCYTES NFR BLD AUTO: 19 % (ref 4–12)
NEUTROPHILS # BLD AUTO: 1.59 THOUSANDS/ÂΜL (ref 1.85–7.62)
NEUTS SEG NFR BLD AUTO: 44 % (ref 43–75)
NRBC BLD AUTO-RTO: 0 /100 WBCS
PLATELET # BLD AUTO: 267 THOUSANDS/UL (ref 149–390)
PMV BLD AUTO: 9.8 FL (ref 8.9–12.7)
POTASSIUM SERPL-SCNC: 3.7 MMOL/L (ref 3.5–5.3)
PROT SERPL-MCNC: 7 G/DL (ref 6.4–8.4)
RBC # BLD AUTO: 5.38 MILLION/UL (ref 3.81–5.12)
RSV RNA RESP QL NAA+PROBE: NEGATIVE
SARS-COV-2 RNA RESP QL NAA+PROBE: POSITIVE
SODIUM SERPL-SCNC: 135 MMOL/L (ref 135–147)
WBC # BLD AUTO: 3.6 THOUSAND/UL (ref 4.31–10.16)

## 2024-08-31 PROCEDURE — 83690 ASSAY OF LIPASE: CPT

## 2024-08-31 PROCEDURE — 71046 X-RAY EXAM CHEST 2 VIEWS: CPT

## 2024-08-31 PROCEDURE — 99284 EMERGENCY DEPT VISIT MOD MDM: CPT | Performed by: EMERGENCY MEDICINE

## 2024-08-31 PROCEDURE — 36415 COLL VENOUS BLD VENIPUNCTURE: CPT

## 2024-08-31 PROCEDURE — 0241U HB NFCT DS VIR RESP RNA 4 TRGT: CPT

## 2024-08-31 PROCEDURE — 85025 COMPLETE CBC W/AUTO DIFF WBC: CPT

## 2024-08-31 PROCEDURE — 80053 COMPREHEN METABOLIC PANEL: CPT

## 2024-08-31 PROCEDURE — 96374 THER/PROPH/DIAG INJ IV PUSH: CPT

## 2024-08-31 PROCEDURE — 99284 EMERGENCY DEPT VISIT MOD MDM: CPT

## 2024-08-31 PROCEDURE — 96361 HYDRATE IV INFUSION ADD-ON: CPT

## 2024-08-31 RX ORDER — ACETAMINOPHEN 325 MG/1
975 TABLET ORAL ONCE
Status: COMPLETED | OUTPATIENT
Start: 2024-08-31 | End: 2024-08-31

## 2024-08-31 RX ORDER — ONDANSETRON 2 MG/ML
4 INJECTION INTRAMUSCULAR; INTRAVENOUS ONCE
Status: COMPLETED | OUTPATIENT
Start: 2024-08-31 | End: 2024-08-31

## 2024-08-31 RX ORDER — KETOROLAC TROMETHAMINE 30 MG/ML
15 INJECTION, SOLUTION INTRAMUSCULAR; INTRAVENOUS ONCE
Status: DISCONTINUED | OUTPATIENT
Start: 2024-08-31 | End: 2024-08-31

## 2024-08-31 RX ORDER — IBUPROFEN 400 MG/1
400 TABLET, FILM COATED ORAL ONCE
Status: COMPLETED | OUTPATIENT
Start: 2024-08-31 | End: 2024-08-31

## 2024-08-31 RX ORDER — ONDANSETRON 4 MG/1
4 TABLET, FILM COATED ORAL EVERY 6 HOURS
Qty: 12 TABLET | Refills: 0 | Status: SHIPPED | OUTPATIENT
Start: 2024-08-31

## 2024-08-31 RX ADMIN — ONDANSETRON 4 MG: 2 INJECTION INTRAMUSCULAR; INTRAVENOUS at 18:46

## 2024-08-31 RX ADMIN — ACETAMINOPHEN 975 MG: 325 TABLET ORAL at 18:46

## 2024-08-31 RX ADMIN — SODIUM CHLORIDE 1000 ML: 0.9 INJECTION, SOLUTION INTRAVENOUS at 18:46

## 2024-08-31 RX ADMIN — IBUPROFEN 400 MG: 400 TABLET, FILM COATED ORAL at 16:51

## 2024-08-31 NOTE — ED PROVIDER NOTES
History  Chief Complaint   Patient presents with    Flu Symptoms     For 3 days with nausea/diarrhea, fevers, HA, congestion.  Took mucinex at 1445     Patient is a 64-year-old female with past medical history of reflux disease, small bowel resection in setting of diverticulitis, RADHA positive, with prescription for home prednisolone, presenting to the emergency department for complaint of viral symptoms.  Patient reporting on Wednesday this past week about 4 days ago she developed acute onset fevers, documented temperature oral at home of 103, reporting myalgias at that time, generalized weakness, says she has been decreased p.o. intake since then, does not have any more documented fevers, but has been using Motrin and Tylenol, says that with Motrin and Tylenol on board she feels okay, yesterday developed acute onset of diarrhea, no blood in her stool, today she also was nauseated with no vomiting, presenting to the emergency department due to feeling unwell and nauseous.  Reports no sick contacts, has not taken a home COVID/flu, no complaint of focal abdominal pain, does complain of nausea, reporting that she has had a headache also during the course of this illness, was generalized headache several days ago, not actively complaining of headache at this time does complain of MSK pain in the neck and back, no photophobia, no neck/spine pain.  Does not have a feeling of fever chills at this time, no dysuria symptoms, reports occasional cough during the course of this illness with production of sputum, did take Mucinex today, does not feel short of breath does not complain of chest pain.        Prior to Admission Medications   Prescriptions Last Dose Informant Patient Reported? Taking?   ALPRAZolam (XANAX) 0.25 mg tablet   No No   Sig: Take 1 tablet (0.25 mg total) by mouth daily at bedtime as needed for anxiety   Diclofenac Sodium (VOLTAREN) 1 %   No No   Sig: Apply 2 g topically 4 (four) times a day   amLODIPine  (NORVASC) 2.5 mg tablet   Yes No   Sig: Take 2.5 mg by mouth daily   brompheniramine-pseudoephedrine-DM 30-2-10 MG/5ML syrup   No No   Sig: Take 5 mL by mouth 4 (four) times a day as needed for allergies   Patient not taking: Reported on 6/11/2024   celecoxib (CeleBREX) 100 mg capsule   No No   Sig: Take 1 capsule (100 mg total) by mouth 2 (two) times a day   docusate sodium (COLACE) 100 mg capsule   No No   Sig: Take 1 capsule (100 mg total) by mouth in the morning   Patient not taking: Reported on 6/11/2024   estradiol (ESTRACE VAGINAL) 0.1 mg/g vaginal cream   No No   Sig: Insert 1 g into the vagina daily   lisinopril (ZESTRIL) 10 mg tablet   No No   Sig: Take 1 tablet (10 mg total) by mouth daily   Patient not taking: Reported on 6/11/2024   methylPREDNISolone 4 MG tablet therapy pack   No No   Sig: Use as directed on package   Patient not taking: Reported on 7/25/2024   omeprazole (PriLOSEC) 40 MG capsule   No No   Sig: Take 1 capsule (40 mg total) by mouth daily      Facility-Administered Medications: None       Past Medical History:   Diagnosis Date    Adjustment disorder with anxiety     last assessed - [id number]    Anemia     ((Pt Qnr Sub: no))     Anxiety     ((Pt Qnr Sub: yed))     Constipation 08/04/2021    Depression     Diverticulitis     Diverticulitis 09/02/2021    Diverticulitis of colon 05/2020    ((Pt Qnr Sub: yes))     Diverticulitis of large intestine with abscess without bleeding 05/24/2020    Elevated platelet count 09/13/2022    Elevated vitamin B12 level 09/13/2022    Generalized abdominal pain 08/04/2021    GERD (gastroesophageal reflux disease) 2000    ((Pt Qnr Sub: yes))     History of total hysterectomy     History of transfusion     Hypertension     Hypertension 03/12/2021    Ingrown toenail     last assessed - [id number]    Miscarriage 1985    Thalassemia        Past Surgical History:   Procedure Laterality Date    APPENDECTOMY      ((Pt Qnr Sub: yes))     BOWEL RESECTION  11/17/2021      SECTION  1981    ((Pt Qnr Sub: yes))      SECTION      CHOLECYSTECTOMY      ((Pt Qnr Sub: yes))     COLONOSCOPY      GALLBLADDER SURGERY      OOPHORECTOMY Bilateral     LA ARTHRP INTERPOS INTERCARPAL/METACARPAL JOINTS Right 2024    Procedure: Thumb carpometacarpal joint arthroplasty with TightRope Suspension;  Surgeon: Eric Connolly MD;  Location: [facility];  Service: Orthopedics    LA LAPAROSCOPY COLECTOMY PARTIAL W/ANASTOMOSIS N/A 2021    Procedure: RESECTION COLON SIGMOID LAPAROSCOPIC HAND-ASSISTED;  Surgeon: Kee Mace MD;  Location: [facility];  Service: General    TONSILLECTOMY AND ADENOIDECTOMY      TOTAL ABDOMINAL HYSTERECTOMY      ((Pt Qnr Sub: yes))     UPPER GASTROINTESTINAL ENDOSCOPY         Family History   Problem Relation Age of Onset    Anxiety disorder Mother         Anxiety    Depression Mother     Osteoporosis Mother     Hypertension Father     Anxiety disorder Sister         Anxiety    Depression Sister     Cancer Sister         Melanoma    No Known Problems Sister     No Known Problems Sister     Anxiety disorder Daughter         Anxiety    Depression Daughter     Heart disease Maternal Aunt     Heart disease Maternal Uncle      I have reviewed and agree with the history as documented.    E-Cigarette/Vaping    E-Cigarette Use Never User      E-Cigarette/Vaping Substances    Nicotine No     THC No     CBD No     Flavoring No     Other No     Unknown No      Social History     Tobacco Use    Smoking status: Never    Smokeless tobacco: Never   Vaping Use    Vaping status: Never Used   Substance Use Topics    Alcohol use: Yes     Alcohol/week: 2.0 standard drinks of alcohol     Types: 2 Glasses of wine per week     Comment: occasional alcohol use    Drug use: Never        Review of Systems    Physical Exam  ED Triage Vitals [24 1648]   Temperature Pulse Respirations Blood Pressure SpO2   97.8 °F (36.6 °C) 74 18 (!) 178/80 95 %      Temp Source Heart  Rate Source Patient Position - Orthostatic VS BP Location FiO2 (%)   Temporal Monitor -- -- --      Pain Score       8             Orthostatic Vital Signs  Vitals:    08/31/24 1648   BP: (!) 178/80   Pulse: 74       Physical Exam  Vitals and nursing note reviewed.   Constitutional:       General: She is not in acute distress.     Appearance: She is well-developed.   HENT:      Head: Normocephalic and atraumatic.      Nose: Congestion present. No rhinorrhea.      Mouth/Throat:      Mouth: Mucous membranes are moist.      Pharynx: Oropharynx is clear.   Eyes:      Extraocular Movements: Extraocular movements intact.      Conjunctiva/sclera: Conjunctivae normal.      Pupils: Pupils are equal, round, and reactive to light.   Cardiovascular:      Rate and Rhythm: Normal rate and regular rhythm.      Heart sounds: No murmur heard.  Pulmonary:      Effort: Pulmonary effort is normal. No respiratory distress.      Breath sounds: Normal breath sounds. No wheezing, rhonchi or rales.      Comments: Right side lower lung fields diminished compared to the left  Abdominal:      Palpations: Abdomen is soft.      Tenderness: There is no abdominal tenderness. There is no guarding or rebound.   Musculoskeletal:         General: No swelling or tenderness.      Cervical back: Neck supple. No rigidity or tenderness.   Skin:     General: Skin is warm and dry.      Capillary Refill: Capillary refill takes less than 2 seconds.      Findings: No erythema or rash.   Neurological:      General: No focal deficit present.      Mental Status: She is alert and oriented to person, place, and time.   Psychiatric:         Mood and Affect: Mood normal.         ED Medications  Medications   ibuprofen (MOTRIN) tablet 400 mg (400 mg Oral Given 8/31/24 1651)   sodium chloride 0.9 % bolus 1,000 mL (0 mL Intravenous Stopped 8/31/24 2050)   ondansetron (ZOFRAN) injection 4 mg (4 mg Intravenous Given 8/31/24 1846)   acetaminophen (TYLENOL) tablet 975 mg (975  mg Oral Given 8/31/24 1846)       Diagnostic Studies  Results Reviewed       Procedure Component Value Units Date/Time    COVID/FLU/RSV [[id number]]  (Abnormal) Collected: 08/31/24 1846    Lab Status: Final result Specimen: Nares from Nose Updated: 08/31/24 1954     SARS-CoV-2 Positive     INFLUENZA A PCR Negative     INFLUENZA B PCR Negative     RSV PCR Negative    Narrative:      This test has been performed using the CoV-2/Flu/RSV plus assay on the [employer] platform. This test has been validated by the  and verified by the performing laboratory.     This test is designed to amplify and detect the following: nucleocapsid (N), envelope (E), and RNA-dependent RNA polymerase (RdRP) genes of the SARS-CoV-2 genome; matrix (M), basic polymerase (PB2), and acidic protein (PA) segments of the influenza A genome; matrix (M) and non-structural protein (NS) segments of the influenza B genome, and the nucleocapsid genes of RSV A and RSV B.     Positive results are indicative of the presence of Flu A, Flu B, RSV, and/or SARS-CoV-2 RNA. Positive results for SARS-CoV-2 or suspected novel influenza should be reported to state, local, or federal health departments according to local reporting requirements.      All results should be assessed in conjunction with clinical presentation and other laboratory markers for clinical management.     FOR PEDIATRIC PATIENTS - copy/paste COVID Guidelines URL to browser: [email or web address]       Comprehensive metabolic panel [[id number]]  (Abnormal) Collected: 08/31/24 1846    Lab Status: Final result Specimen: Blood from Arm, Right Updated: 08/31/24 1918     Sodium 135 mmol/L      Potassium 3.7 mmol/L      Chloride 102 mmol/L      CO2 27 mmol/L      ANION GAP 6 mmol/L      BUN 21 mg/dL      Creatinine 1.03 mg/dL      Glucose 96 mg/dL      Calcium 9.4 mg/dL      AST 30 U/L      ALT 18 U/L      Alkaline Phosphatase 118  U/L      Total Protein 7.0 g/dL      Albumin 4.4 g/dL      Total Bilirubin 0.33 mg/dL      eGFR 57 ml/min/1.73sq m     Narrative:      National Kidney Disease Foundation guidelines for Chronic Kidney Disease (CKD):     Stage 1 with normal or high GFR (GFR > 90 mL/min/1.73 square meters)    Stage 2 Mild CKD (GFR = 60-89 mL/min/1.73 square meters)    Stage 3A Moderate CKD (GFR = 45-59 mL/min/1.73 square meters)    Stage 3B Moderate CKD (GFR = 30-44 mL/min/1.73 square meters)    Stage 4 Severe CKD (GFR = 15-29 mL/min/1.73 square meters)    Stage 5 End Stage CKD (GFR <15 mL/min/1.73 square meters)  Note: GFR calculation is accurate only with a steady state creatinine    Lipase [[id number]]  (Abnormal) Collected: 08/31/24 1846    Lab Status: Final result Specimen: Blood from Arm, Right Updated: 08/31/24 1918     Lipase 95 u/L     CBC and differential [[id number]]  (Abnormal) Collected: 08/31/24 1846    Lab Status: Final result Specimen: Blood from Arm, Right Updated: 08/31/24 1904     WBC 3.60 Thousand/uL      RBC 5.38 Million/uL      Hemoglobin 11.0 g/dL      Hematocrit 35.5 %      MCV 66 fL      MCH 20.4 pg      MCHC 31.0 g/dL      RDW 15.5 %      MPV 9.8 fL      Platelets 267 Thousands/uL      nRBC 0 /100 WBCs      Segmented % 44 %      Immature Grans % 0 %      Lymphocytes % 35 %      Monocytes % 19 %      Eosinophils Relative 1 %      Basophils Relative 1 %      Absolute Neutrophils 1.59 Thousands/µL      Absolute Immature Grans 0.01 Thousand/uL      Absolute Lymphocytes 1.25 Thousands/µL      Absolute Monocytes 0.67 Thousand/µL      Eosinophils Absolute 0.05 Thousand/µL      Basophils Absolute 0.03 Thousands/µL                    XR chest 2 views    (Results Pending)         Procedures  Procedures      ED Course  ED Course as of 09/02/24 1344   Sat Aug 31, 2024   1919 Lipase of 95, not elevated compared to prior   1919 Alk phos mildly elevated, consistent with priors   1919 Hemoglobin(!): 11.0  Consistent with priors    1919 Chest x-ray without evidence of acute cardiopulmonary illness                             SBIRT 20yo+      Flowsheet Row Most Recent Value   Initial Alcohol Screen: US AUDIT-C     1. How often do you have a drink containing alcohol? 0 Filed at: 08/31/2024 1649   2. How many drinks containing alcohol do you have on a typical day you are drinking?  0 Filed at: 08/31/2024 1649   3a. Male UNDER 65: How often do you have five or more drinks on one occasion? 0 Filed at: 08/31/2024 1649   3b. FEMALE Any Age, or MALE 65+: How often do you have 4 or more drinks on one occassion? 0 Filed at: 08/31/2024 1649   Audit-C Score 0 Filed at: 08/31/2024 1649                  Medical Decision Making  Vital signs on arrival blood pressure elevated 178/88, otherwise vital signs within normal limits. On exam patient is congested, otherwise well-appearing, no evidence respiratory distress, no evidence of abdominal pain, no chest wall reproducible tenderness, right lower lung field possibly diminished in comparison of the left, occasional cough while in the room with the patient, oxygen saturation remained stable.    History and physical exam most consistent with viral syndrome. However, differential diagnosis included but not limited to viral gastritis, pneumonia, pancreatitis, dehydration, electrolyte abnormality. Plan symptomatic control with Motrin/Tylenol, Zofran, chest x-ray, laboratory evaluation CBC/CMP/lipase, COVID/flu swab.    View ED course above for further discussion on patient workup.     Status post administration of Motrin/Zofran/Tylenol/1 L bolus of saline patient states she is feeling much improved, no abdominal pain/nausea at this time, no evidence of vomiting    CBC with evidence of hemoglobin 11, white blood cell count 3.60, CMP with mildly elevated alkaline phosphatase, labs consistent with priors, COVID/flu positive for COVID-19, lipase mildly elevated at 95 consistent with priors.    All labs reviewed and  utilized in the medical decision making process  All radiology studies independently viewed by me and interpreted by the radiologist.  I reviewed all testing with the patient.     Upon re-evaluation patient continues to remain hemodynamically stable, has improvement in her symptoms, no abdominal pain nausea or vomiting at this time.    PCP follow-up recommended, return precautions given.      Amount and/or Complexity of Data Reviewed  Labs: ordered. Decision-making details documented in ED Course.  Radiology: ordered.    Risk  OTC drugs.  Prescription drug management.          Disposition  Final diagnoses:   COVID-19     Time reflects when diagnosis was documented in both MDM as applicable and the Disposition within this note       Time User Action Codes Description Comment    8/31/2024  8:31 PM Enrique Oshea Add [U07.1] COVID-19           ED Disposition       ED Disposition   Discharge    Condition   Stable    Date/Time   Sat Aug 31, 2024 2031    Comment   Saloni Moy discharge to home/self care.                   Follow-up Information       Follow up With Specialties Details Why Contact Info Additional Information    [facility], Internal Medicine, Nurse Practitioner Schedule an appointment as soon as possible for a visit in 1 week  [address] [place] [address]  [phone]       [facility] Emergency Department Emergency Medicine Go to  If symptoms worsen [address] [place] [id number]  [phone] [facility] Emergency Department, [address] [place] [address], [address]   [phone]            Discharge Medication List as of 8/31/2024  8:33 PM        START taking these medications    Details   ondansetron (ZOFRAN) 4 mg tablet Take 1 tablet (4 mg total) by mouth every 6 (six) hours, Starting Sat 8/31/2024, Normal           CONTINUE these medications which have NOT CHANGED    Details   ALPRAZolam  (XANAX) 0.25 mg tablet Take 1 tablet (0.25 mg total) by mouth daily at bedtime as needed for anxiety, Starting Thu 8/15/2024, Normal      amLODIPine (NORVASC) 2.5 mg tablet Take 2.5 mg by mouth daily, Historical Med      brompheniramine-pseudoephedrine-DM 30-2-10 MG/5ML syrup Take 5 mL by mouth 4 (four) times a day as needed for allergies, Starting Wed 5/15/2024, Normal      celecoxib (CeleBREX) 100 mg capsule Take 1 capsule (100 mg total) by mouth 2 (two) times a day, Starting Mon 7/29/2024, Normal      Diclofenac Sodium (VOLTAREN) 1 % Apply 2 g topically 4 (four) times a day, Starting Thu 6/1/2023, Normal      docusate sodium (COLACE) 100 mg capsule Take 1 capsule (100 mg total) by mouth in the morning, Starting Thu 1/4/2024, Normal      estradiol (ESTRACE VAGINAL) 0.1 mg/g vaginal cream Insert 1 g into the vagina daily, Starting Mon 6/24/2024, Normal      lisinopril (ZESTRIL) 10 mg tablet Take 1 tablet (10 mg total) by mouth daily, Starting Thu 6/8/2023, Normal      methylPREDNISolone 4 MG tablet therapy pack Use as directed on package, Normal      omeprazole (PriLOSEC) 40 MG capsule Take 1 capsule (40 mg total) by mouth daily, Starting Tue 7/30/2024, Normal           No discharge procedures on file.    PDMP Review         Value Time User    PDMP Reviewed  Yes 8/15/2024  3:29 PM KATIANA Sow             ED Provider  Attending physically available and evaluated Saloni Moy. I managed the patient along with the ED Attending.    Electronically Signed by           Enrique Oshea DO  09/02/24 8205

## 2024-08-31 NOTE — ED ATTENDING ATTESTATION
8/31/2024  I, López Don MD, saw and evaluated the patient. I have discussed the patient with the resident/non-physician practitioner and agree with the resident's/non-physician practitioner's findings, Plan of Care, and MDM as documented in the resident's/non-physician practitioner's note, except where noted. All available labs and Radiology studies were reviewed.  I was present for key portions of any procedure(s) performed by the resident/non-physician practitioner and I was immediately available to provide assistance.       At this point I agree with the current assessment done in the Emergency Department.  I have conducted an independent evaluation of this patient a history and physical is as follows:  Fever several days ago since resolved   Poor appetite now has diarrhea and nausea    No cp  has a cough  non productive   No leg swelling   No abd pain   No sick contacts   Exam vss afebrile    HEENT  congestion   Throat ok    lungs  clear heart rrr no m abd soft pos bs nt nd  ext normal   Imp   Viral illness    Symptomatic treatment IV fluids chest x-ray COVID swab  ED Course         Critical Care Time  Procedures

## 2024-09-01 NOTE — DISCHARGE INSTRUCTIONS
Please return to the emergency department if you develop new or worsening symptoms including fever, chest pain, shortness of breath, nausea and vomiting that does not resolve on its own.     Please follow-up with your primary care provider for additional care and management of your viral symptoms in setting of Covid.    Please continue to take your home medications as prescribed, please take your newly prescribed Zofran as needed for nausea and vomiting, please continue to hydrate well.

## 2024-09-20 ENCOUNTER — OFFICE VISIT (OUTPATIENT)
Dept: FAMILY MEDICINE CLINIC | Facility: CLINIC | Age: 64
End: 2024-09-20
Payer: COMMERCIAL

## 2024-09-20 VITALS
BODY MASS INDEX: 24.17 KG/M2 | WEIGHT: 128 LBS | HEART RATE: 67 BPM | DIASTOLIC BLOOD PRESSURE: 80 MMHG | OXYGEN SATURATION: 97 % | SYSTOLIC BLOOD PRESSURE: 160 MMHG | RESPIRATION RATE: 17 BRPM | TEMPERATURE: 97.9 F | HEIGHT: 61 IN

## 2024-09-20 DIAGNOSIS — S92.215A CLOSED NONDISPLACED FRACTURE OF CUBOID OF LEFT FOOT, INITIAL ENCOUNTER: ICD-10-CM

## 2024-09-20 DIAGNOSIS — Z00.00 ANNUAL PHYSICAL EXAM: Primary | ICD-10-CM

## 2024-09-20 PROCEDURE — 99396 PREV VISIT EST AGE 40-64: CPT | Performed by: NURSE PRACTITIONER

## 2024-09-20 NOTE — PATIENT INSTRUCTIONS
"Patient Education     Routine physical for adults   The Basics   Written by the doctors and editors at [facility]   What is a physical? -- A physical is a routine visit, or \"check-up,\" with your doctor. You might also hear it called a \"wellness visit\" or \"preventive visit.\"  During each visit, the doctor will:   Ask about your physical and mental health   Ask about your habits, behaviors, and lifestyle   Do an exam   Give you vaccines if needed   Talk to you about any medicines you take   Give advice about your health   Answer your questions  Getting regular check-ups is an important part of taking care of your health. It can help your doctor find and treat any problems you have. But it's also important for preventing health problems.  A routine physical is different from a \"sick visit.\" A sick visit is when you see a doctor because of a health concern or problem. Since physicals are scheduled ahead of time, you can think about what you want to ask the doctor.  How often should I get a physical? -- It depends on your age and health. In general, for people age 21 years and older:   If you are younger than 50 years, you might be able to get a physical every 3 years.   If you are 50 years or older, your doctor might recommend a physical every year.  If you have an ongoing health condition, like diabetes or high blood pressure, your doctor will probably want to see you more often.  What happens during a physical? -- In general, each visit will include:   Physical exam - The doctor or nurse will check your height, weight, heart rate, and blood pressure. They will also look at your eyes and ears. They will ask about how you are feeling and whether you have any symptoms that bother you.   Medicines - It's a good idea to bring a list of all the medicines you take to each doctor visit. Your doctor will talk to you about your medicines and answer any questions. Tell them if you are having any side effects that bother you. You " "should also tell them if you are having trouble paying for any of your medicines.   Habits and behaviors - This includes:   Your diet   Your exercise habits   Whether you smoke, drink alcohol, or use drugs   Whether you are sexually active   Whether you feel safe at home  Your doctor will talk to you about things you can do to improve your health and lower your risk of health problems. They will also offer help and support. For example, if you want to quit smoking, they can give you advice and might prescribe medicines. If you want to improve your diet or get more physical activity, they can help you with this, too.   Lab tests, if needed - The tests you get will depend on your age and situation. For example, your doctor might want to check your:   Cholesterol   Blood sugar   Iron level   Vaccines - The recommended vaccines will depend on your age, health, and what vaccines you already had. Vaccines are very important because they can prevent certain serious or deadly infections.   Discussion of screening - \"Screening\" means checking for diseases or other health problems before they cause symptoms. Your doctor can recommend screening based on your age, risk, and preferences. This might include tests to check for:   Cancer, such as breast, prostate, cervical, ovarian, colorectal, prostate, lung, or skin cancer   Sexually transmitted infections, such as chlamydia and gonorrhea   Mental health conditions like depression and anxiety  Your doctor will talk to you about the different types of screening tests. They can help you decide which screenings to have. They can also explain what the results might mean.   Answering questions - The physical is a good time to ask the doctor or nurse questions about your health. If needed, they can refer you to other doctors or specialists, too.  Adults older than 65 years often need other care, too. As you get older, your doctor will talk to you about:   How to prevent falling at " home   Hearing or vision tests   Memory testing   How to take your medicines safely   Making sure that you have the help and support you need at home  All topics are updated as new evidence becomes available and our peer review process is complete.  This topic retrieved from [employer] on: May 02, 2024.  Topic 333940 Version 1.0  Release: 32.4.3 - C32.122  © 2024 UpToDate, Inc. and/or its affiliates. All rights reserved.  Consumer Information Use and Disclaimer   Disclaimer: This generalized information is a limited summary of diagnosis, treatment, and/or medication information. It is not meant to be comprehensive and should be used as a tool to help the user understand and/or assess potential diagnostic and treatment options. It does NOT include all information about conditions, treatments, medications, side effects, or risks that may apply to a specific patient. It is not intended to be medical advice or a substitute for the medical advice, diagnosis, or treatment of a health care provider based on the health care provider's examination and assessment of a patient's specific and unique circumstances. Patients must speak with a health care provider for complete information about their health, medical questions, and treatment options, including any risks or benefits regarding use of medications. This information does not endorse any treatments or medications as safe, effective, or approved for treating a specific patient. UpToDate, Inc. and its affiliates disclaim any warranty or liability relating to this information or the use thereof.The use of this information is governed by the Terms of Use, available at https://www.woltersStippleuwer.com/en/know/clinical-effectiveness-terms. 2024© UpToDate, Inc. and its affiliates and/or licensors. All rights reserved.  Copyright   © 2024 UpToDate, Inc. and/or its affiliates. All rights reserved.

## 2024-09-20 NOTE — PROGRESS NOTES
Adult Annual Physical  Name: Saloni Moy      : 1960      MRN: [id number]  Encounter Provider: KATIANA Sow  Encounter Date: 2024   Encounter department: [facility]    Assessment & Plan  Annual physical exam         Closed nondisplaced fracture of cuboid of left foot, initial encounter    Orders:    DXA bone density spine hip and pelvis; Future    Immunizations and preventive care screenings were discussed with patient today. Appropriate education was printed on patient's after visit summary.    Counseling:  Alcohol/drug use: discussed moderation in alcohol intake, the recommendations for healthy alcohol use, and avoidance of illicit drug use.  Dental Health: discussed importance of regular tooth brushing, flossing, and dental visits.  Injury prevention: discussed safety/seat belts, safety helmets, smoke detectors, carbon dioxide detectors, and smoking near bedding or upholstery.  Sexual health: discussed sexually transmitted diseases, partner selection, use of condoms, avoidance of unintended pregnancy, and contraceptive alternatives.  Exercise: the importance of regular exercise/physical activity was discussed. Recommend exercise 3-5 times per week for at least 30 minutes.       Depression Screening and Follow-up Plan: Patient was screened for depression during today's encounter. They screened negative with a PHQ-2 score of 1.        History of Present Illness     Adult Annual Physical:  Patient presents for annual physical. Wellness  .     Diet and Physical Activity:  - Diet/Nutrition: well balanced diet.  - Exercise: 5-7 times a week on average.    Depression Screening:  - PHQ-2 Score: 1    General Health:  - Sleep: sleeps poorly.  - Hearing: normal hearing right ear and normal hearing left ear.  - Vision: no vision problems.  - Dental: regular dental visits and brushes teeth once daily.    /GYN Health:  - Follows with GYN: yes.   - Menopause: postmenopausal.   -  History of STDs: no    Advanced Care Planning:  - Has an advanced directive?: no    - Has a durable medical POA?: no    - ACP document given to patient?: no      Review of Systems   Constitutional:  Negative for fatigue and fever.   HENT:  Negative for congestion, postnasal drip and rhinorrhea.    Eyes:  Negative for photophobia and visual disturbance.   Respiratory:  Negative for cough, shortness of breath and wheezing.    Cardiovascular:  Negative for chest pain and palpitations.   Gastrointestinal:  Negative for constipation, diarrhea, nausea and vomiting.   Genitourinary:  Negative for dysuria and frequency.   Musculoskeletal:  Negative for arthralgias and myalgias.   Skin:  Negative for rash.   Neurological:  Negative for dizziness, light-headedness and headaches.   Hematological:  Negative for adenopathy.   Psychiatric/Behavioral:  Negative for dysphoric mood and sleep disturbance. The patient is not nervous/anxious.      Pertinent Medical History         Medical History Reviewed by provider this encounter:  Tobacco  Allergies  Meds  Problems  Med Hx  Surg Hx  Fam Hx       Past Medical History   Past Medical History:   Diagnosis Date    Adjustment disorder with anxiety     last assessed - [id number]    Anemia     ((Pt Qnr Sub: no))     Anxiety     ((Pt Qnr Sub: yed))     Constipation 08/04/2021    Depression     Diverticulitis     Diverticulitis 09/02/2021    Diverticulitis of colon 05/2020    ((Pt Qnr Sub: yes))     Diverticulitis of large intestine with abscess without bleeding 05/24/2020    Elevated platelet count 09/13/2022    Elevated vitamin B12 level 09/13/2022    Generalized abdominal pain 08/04/2021    GERD (gastroesophageal reflux disease) 2000    ((Pt Qnr Sub: yes))     History of total hysterectomy     History of transfusion     Hypertension     Hypertension 03/12/2021    Ingrown toenail     last assessed - [id number]    Miscarriage 1985    Thalassemia      Past Surgical History:   Procedure  Laterality Date    APPENDECTOMY      ((Pt Qnr Sub: yes))     BOWEL RESECTION  2021     SECTION  1981    ((Pt Qnr Sub: yes))      SECTION      CHOLECYSTECTOMY      ((Pt Qnr Sub: yes))     COLONOSCOPY      GALLBLADDER SURGERY      OOPHORECTOMY Bilateral     NY ARTHRP INTERPOS INTERCARPAL/METACARPAL JOINTS Right 2024    Procedure: Thumb carpometacarpal joint arthroplasty with TightRope Suspension;  Surgeon: Eric Connolly MD;  Location: [facility];  Service: Orthopedics    NY LAPAROSCOPY COLECTOMY PARTIAL W/ANASTOMOSIS N/A 2021    Procedure: RESECTION COLON SIGMOID LAPAROSCOPIC HAND-ASSISTED;  Surgeon: Kee Mace MD;  Location: [facility];  Service: General    TONSILLECTOMY AND ADENOIDECTOMY      TOTAL ABDOMINAL HYSTERECTOMY      ((Pt Qnr Sub: yes))     UPPER GASTROINTESTINAL ENDOSCOPY       Family History   Problem Relation Age of Onset    Anxiety disorder Mother         Anxiety    Depression Mother     Osteoporosis Mother     Hypertension Father     Anxiety disorder Sister         Anxiety    Depression Sister     Cancer Sister         Melanoma    No Known Problems Sister     No Known Problems Sister     Anxiety disorder Daughter         Anxiety    Depression Daughter     Heart disease Maternal Aunt     Heart disease Maternal Uncle      Current Outpatient Medications on File Prior to Visit   Medication Sig Dispense Refill    ALPRAZolam (XANAX) 0.25 mg tablet Take 1 tablet (0.25 mg total) by mouth daily at bedtime as needed for anxiety 30 tablet 3    amLODIPine (NORVASC) 2.5 mg tablet Take 2.5 mg by mouth daily      celecoxib (CeleBREX) 100 mg capsule Take 1 capsule (100 mg total) by mouth 2 (two) times a day 60 capsule 0    Diclofenac Sodium (VOLTAREN) 1 % Apply 2 g topically 4 (four) times a day 100 g 6    estradiol (ESTRACE VAGINAL) 0.1 mg/g vaginal cream Insert 1 g into the vagina daily 42.5 g 3    omeprazole (PriLOSEC) 40 MG capsule Take 1 capsule (40 mg total) by  mouth daily 90 capsule 1    docusate sodium (COLACE) 100 mg capsule Take 1 capsule (100 mg total) by mouth in the morning (Patient not taking: Reported on 6/11/2024) 10 capsule 0    lisinopril (ZESTRIL) 10 mg tablet Take 1 tablet (10 mg total) by mouth daily (Patient not taking: Reported on 9/20/2024) 90 tablet 3    ondansetron (ZOFRAN) 4 mg tablet Take 1 tablet (4 mg total) by mouth every 6 (six) hours (Patient not taking: Reported on 9/20/2024) 12 tablet 0     No current facility-administered medications on file prior to visit.     Allergies   Allergen Reactions    Benadryl [Diphenhydramine] Tachycardia      Current Outpatient Medications on File Prior to Visit   Medication Sig Dispense Refill    ALPRAZolam (XANAX) 0.25 mg tablet Take 1 tablet (0.25 mg total) by mouth daily at bedtime as needed for anxiety 30 tablet 3    amLODIPine (NORVASC) 2.5 mg tablet Take 2.5 mg by mouth daily      celecoxib (CeleBREX) 100 mg capsule Take 1 capsule (100 mg total) by mouth 2 (two) times a day 60 capsule 0    Diclofenac Sodium (VOLTAREN) 1 % Apply 2 g topically 4 (four) times a day 100 g 6    estradiol (ESTRACE VAGINAL) 0.1 mg/g vaginal cream Insert 1 g into the vagina daily 42.5 g 3    omeprazole (PriLOSEC) 40 MG capsule Take 1 capsule (40 mg total) by mouth daily 90 capsule 1    docusate sodium (COLACE) 100 mg capsule Take 1 capsule (100 mg total) by mouth in the morning (Patient not taking: Reported on 6/11/2024) 10 capsule 0    lisinopril (ZESTRIL) 10 mg tablet Take 1 tablet (10 mg total) by mouth daily (Patient not taking: Reported on 9/20/2024) 90 tablet 3    ondansetron (ZOFRAN) 4 mg tablet Take 1 tablet (4 mg total) by mouth every 6 (six) hours (Patient not taking: Reported on 9/20/2024) 12 tablet 0     No current facility-administered medications on file prior to visit.      Social History     Tobacco Use    Smoking status: Never    Smokeless tobacco: Never   Vaping Use    Vaping status: Never Used   Substance and  "Sexual Activity    Alcohol use: Yes     Alcohol/week: 2.0 standard drinks of alcohol     Types: 2 Glasses of wine per week     Comment: occasional alcohol use    Drug use: Never    Sexual activity: Yes     Partners: Male     Birth control/protection: Post-menopausal, Surgical     Comment: Hysterectomy 1988       Objective     /80 (BP Location: Left arm, Patient Position: Sitting, Cuff Size: Standard)   Pulse 67   Temp 97.9 °F (36.6 °C) (Tympanic)   Resp 17   Ht 5' 0.87\" (1.546 m)   Wt 58.1 kg (128 lb)   SpO2 97%   BMI 24.29 kg/m²     Physical Exam  Vitals and nursing note reviewed.   Constitutional:       Appearance: Normal appearance.   HENT:      Head: Normocephalic and atraumatic.      Right Ear: Tympanic membrane, ear canal and external ear normal.      Left Ear: Tympanic membrane, ear canal and external ear normal.      Nose: Nose normal.      Mouth/Throat:      Mouth: Mucous membranes are moist.   Eyes:      Extraocular Movements: Extraocular movements intact.      Conjunctiva/sclera: Conjunctivae normal.   Cardiovascular:      Rate and Rhythm: Normal rate and regular rhythm.      Heart sounds: Normal heart sounds.   Pulmonary:      Effort: Pulmonary effort is normal.   Abdominal:      General: Bowel sounds are normal.      Palpations: Abdomen is soft.   Musculoskeletal:         General: Normal range of motion.      Cervical back: Normal range of motion and neck supple.   Skin:     General: Skin is warm and dry.      Capillary Refill: Capillary refill takes less than 2 seconds.   Neurological:      General: No focal deficit present.      Mental Status: She is alert and oriented to person, place, and time.   Psychiatric:         Mood and Affect: Mood normal.         Behavior: Behavior normal.         Thought Content: Thought content normal.         Judgment: Judgment normal.       Administrative Statements   I have spent a total time of 25 minutes in caring for this patient on the day of the " visit/encounter including Diagnostic results, Prognosis, Risks and benefits of tx options, Instructions for management, Patient and family education, Importance of tx compliance, Risk factor reductions, Impressions, Counseling / Coordination of care, Documenting in the medical record, Reviewing / ordering tests, medicine, procedures  , and Obtaining or reviewing history  .

## 2024-09-25 ENCOUNTER — OFFICE VISIT (OUTPATIENT)
Dept: PODIATRY | Facility: CLINIC | Age: 64
End: 2024-09-25
Payer: COMMERCIAL

## 2024-09-25 VITALS
SYSTOLIC BLOOD PRESSURE: 151 MMHG | WEIGHT: 128 LBS | HEART RATE: 72 BPM | BODY MASS INDEX: 25.13 KG/M2 | DIASTOLIC BLOOD PRESSURE: 90 MMHG | HEIGHT: 60 IN

## 2024-09-25 DIAGNOSIS — M25.372 ANKLE INSTABILITY, LEFT: ICD-10-CM

## 2024-09-25 DIAGNOSIS — S92.215A CLOSED NONDISPLACED FRACTURE OF CUBOID OF LEFT FOOT, INITIAL ENCOUNTER: Primary | ICD-10-CM

## 2024-09-25 PROCEDURE — 99213 OFFICE O/P EST LOW 20 MIN: CPT | Performed by: PODIATRIST

## 2024-09-25 NOTE — PROGRESS NOTES
Ambulatory Visit  Name: Saloni Moy      : 1960      MRN: [id number]  Encounter Provider: Piero Nicole DPM  Encounter Date: 2024   Encounter department: [facility] PODIAT[facility]    Assessment & Plan  Closed nondisplaced fracture of cuboid of left foot, initial encounter    Orders:    Ambulatory Referral to Physical Therapy; Future    Ankle instability, left    Orders:    Ambulatory Referral to Physical Therapy; Future    MRI reviewed with patient. She has chronically torn ATFL. The cuboid stress fracture is likely secondary to the ankle instability. She does not have a peroneal tendon tear but there is inflammation.     Her cuboid and peroneal pain are resolved, no pain or instability on exam. Highly recommend PT, she is willing to go.       History of Present Illness     Saloni Moy is a 64 y.o. female who presents for followup    2024: Saloni Moy is a 64 y.o. female who presents with left foot pain. She is getting severe pain and swelling. It is slowly feeling better but it isn't gone. She saw Dr. Lachman in April who diagnosed her with peroneal tendonitis and achilles insertional pain. PT was recommended but she didn't think it would help     2024: Patients pain was not getting better. An MRI was ordered which showed a cuboid stress fracture and torn ankle ligaments. She was ordered a CAM boot for 6 weeks and os here for followup. Her pain has gotten much better since she went in the boot.       Review of Systems  As stated in HPI, otherwise normal    Medical History Reviewed by provider this encounter:  Tobacco  Allergies  Meds  Problems  Med Hx  Surg Hx  Fam Hx            Objective     /90 (BP Location: Right arm, Patient Position: Sitting, Cuff Size: Standard)   Pulse 72   Ht 5' (1.524 m)   Wt 58.1 kg (128 lb)   BMI 25.00 kg/m²     Physical Exam  Vitals reviewed.   Cardiovascular:      Rate and Rhythm: Normal rate.      Pulses: Normal  pulses.   Pulmonary:      Effort: Pulmonary effort is normal. No respiratory distress.   Musculoskeletal:         General: Tenderness present. No deformity.   Skin:     General: Skin is warm.      Capillary Refill: Capillary refill takes less than 2 seconds.      Findings: No erythema or rash.   Neurological:      Mental Status: She is alert.      Sensory: No sensory deficit.      Motor: No weakness.     LLE exam normal today. MMT 5/5, no peroneal pain. No cuboid pain. Negative drawer. NO instability with rotational stress. Normal perez test.

## 2024-10-27 PROBLEM — M25.572 SINUS TARSI SYNDROME OF BOTH ANKLES: Status: ACTIVE | Noted: 2024-10-27

## 2024-10-27 PROBLEM — M35.9 UNDIFFERENTIATED CONNECTIVE TISSUE DISEASE (HCC): Status: ACTIVE | Noted: 2024-10-27

## 2024-10-27 PROBLEM — R53.82 CHRONIC FATIGUE: Status: ACTIVE | Noted: 2022-05-26

## 2024-10-27 PROBLEM — R40.0 DAYTIME SOMNOLENCE: Status: ACTIVE | Noted: 2024-10-27

## 2024-10-27 PROBLEM — M18.0 PRIMARY OSTEOARTHRITIS OF BOTH FIRST CARPOMETACARPAL JOINTS: Status: ACTIVE | Noted: 2024-10-27

## 2024-10-27 PROBLEM — M25.571 SINUS TARSI SYNDROME OF BOTH ANKLES: Status: ACTIVE | Noted: 2024-10-27

## 2024-10-27 PROBLEM — M35.00 SJOGREN'S SYNDROME WITHOUT EXTRAGLANDULAR INVOLVEMENT (HCC): Status: ACTIVE | Noted: 2024-10-27

## 2024-10-27 NOTE — ASSESSMENT & PLAN NOTE
Bilateral ankle pain with soft tissue prominence and tenderness bilateral sinus tarsi.  Tenderness noted bilateral peroneal tendons left greater than right.  Questionable Arias's neuromas.  She is to start physical therapy for peroneal tendinitis, and I have added treatment of sinus tarsi as well.  I have suggested metatarsal bars in view of her mechanical foot deformities and I have referred her to [facility].  Continue to monitor.  Follow-up podiatry.

## 2024-10-27 NOTE — ASSESSMENT & PLAN NOTE
Chronic fatigue with sleep disturbance with insomnia and early waking and daytime somnolence.  I have suggested that the patient discuss a sleep consult with primary care.  Continue to monitor.

## 2024-10-27 NOTE — ASSESSMENT & PLAN NOTE
Chronic fatigue with sleep disturbance with insomnia and early awakening.  Rule out myofascial component in view of polyarthralgias.  I have suggested the food elimination/anti-inflammatory diet which likely would be of benefit for some of her symptoms including chronic loose stools in the morning.  Also it would reduce insulin resistance and promote weight reduction.  It may improve her fatigue as well.  She was given a book reference for this.  I did suggest that the patient discuss a formal sleep consult with primary care.  Continue to monitor.

## 2024-10-27 NOTE — ASSESSMENT & PLAN NOTE
Bilateral carpal tunnel symptoms with EMG study from June 23 with no evidence for carpal tunnel syndrome or cervical radiculopathy.  Suggest symptomatic treatment with wrist splints if needed.  Continue to monitor.

## 2024-10-27 NOTE — ASSESSMENT & PLAN NOTE
RADHA positive with chronic sicca symptoms.  Rule out Sjogren syndrome primary versus secondary.  Rule out less likely developing connective tissue disease.  CCP positive with no evidence for inflammatory arthropathy.  Family history of rheumatoid arthritis in her mother.  Refer for lupus [facility] connective tissue disease testing.  Will be in touch with the patient once results are reviewed.  Plan follow-up in 6 months or sooner if needed.  She will continue with symptomatic treatment for her sicca symptoms.

## 2024-10-27 NOTE — ASSESSMENT & PLAN NOTE
History positive RADHA with chronic sicca symptoms with workup from June 2023 significant for low-grade positive CCP of 44 with complements normal, and double-stranded DNA antibodies negative.  Sjogren's antibodies negative.  Rule out Sjogren syndrome primary versus secondary.  Rule out less likely developing connective tissue disease.  No evidence clinically for inflammatory arthropathy.  Will refer for lupus [facility] diagnostic connective tissue disease testing for further evaluation of sicca symptoms, positive CCP, and history of positive RADHA.  Continue symptomatic treatment for sicca symptoms.  Encourage vigilant dental hygiene and follow-up with dentist every 6 months or sooner if needed.  Follow-up 6 months or sooner if needed.  Will notify the patient of results of her workup when available.  Discussed in detail with patient.

## 2024-10-27 NOTE — ASSESSMENT & PLAN NOTE
Chronic sicca symptoms dry eyes treated with refresh eyedrops.  She does note dry mouth but dental exams have been stable with no new decay.  She does have an upper partial.  Encourage vigilant dental hygiene and dental follow-ups every 6 months or sooner if needed.  Sjogren's antibodies negative in the past, however, will repeat serologies with connective tissue disease workup with the lupus [facility] testing.  Continue to monitor.

## 2024-10-27 NOTE — ASSESSMENT & PLAN NOTE
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  No evidence for inflammatory arthropathy.  Patient is status post right CMC arthroplasty in January of this year with good postop course.  She does use Celebrex to treat her joint symptoms.  She certainly could consider paraffin wax bath for symptomatic relief.  She has had formal occupational therapy postop exercise plan upon completion of therapy.  Follow-up hand surgeon if needed.  Continue to monitor.

## 2024-11-11 ENCOUNTER — HOSPITAL ENCOUNTER (OUTPATIENT)
Dept: MAMMOGRAPHY | Facility: MEDICAL CENTER | Age: 64
Discharge: HOME | End: 2024-11-11
Payer: COMMERCIAL

## 2024-11-11 VITALS — HEIGHT: 60 IN | WEIGHT: 128.09 LBS | BODY MASS INDEX: 25.15 KG/M2

## 2024-11-11 DIAGNOSIS — Z12.31 ENCOUNTER FOR SCREENING MAMMOGRAM FOR MALIGNANT NEOPLASM OF BREAST: ICD-10-CM

## 2024-11-11 PROCEDURE — 77067 SCR MAMMO BI INCL CAD: CPT

## 2024-11-11 PROCEDURE — 77063 BREAST TOMOSYNTHESIS BI: CPT

## 2024-11-14 DIAGNOSIS — K21.9 GASTROESOPHAGEAL REFLUX DISEASE WITHOUT ESOPHAGITIS: ICD-10-CM

## 2024-11-15 RX ORDER — OMEPRAZOLE 40 MG/1
40 CAPSULE, DELAYED RELEASE ORAL DAILY
Qty: 90 CAPSULE | Refills: 1 | Status: SHIPPED | OUTPATIENT
Start: 2024-11-15

## 2024-11-17 DIAGNOSIS — F41.9 ANXIETY: ICD-10-CM

## 2024-11-18 NOTE — TELEPHONE ENCOUNTER
Medication:  PDMP   08/15/2024 08/15/2024 ALPRAZolam (Tablet) 30.0 30 0.25 MG NA DANK KRISHNAN     Active agreement on file -Yes

## 2024-11-19 DIAGNOSIS — F41.9 ANXIETY: ICD-10-CM

## 2024-11-19 RX ORDER — ALPRAZOLAM 0.25 MG/1
0.25 TABLET ORAL
Qty: 30 TABLET | Refills: 0 | Status: SHIPPED | OUTPATIENT
Start: 2024-11-19 | End: 2024-11-19 | Stop reason: SDUPTHER

## 2024-11-20 RX ORDER — ALPRAZOLAM 0.25 MG/1
0.25 TABLET ORAL
Qty: 30 TABLET | Refills: 5 | Status: SHIPPED | OUTPATIENT
Start: 2024-11-20

## 2024-12-18 DIAGNOSIS — I10 HYPERTENSION, UNSPECIFIED TYPE: Primary | ICD-10-CM

## 2024-12-19 RX ORDER — AMLODIPINE BESYLATE 2.5 MG/1
2.5 TABLET ORAL DAILY
Qty: 30 TABLET | Refills: 5 | Status: SHIPPED | OUTPATIENT
Start: 2024-12-19

## 2024-12-26 ENCOUNTER — OFFICE VISIT (OUTPATIENT)
Dept: OBGYN CLINIC | Facility: MEDICAL CENTER | Age: 64
End: 2024-12-26

## 2024-12-26 ENCOUNTER — APPOINTMENT (OUTPATIENT)
Dept: RADIOLOGY | Facility: MEDICAL CENTER | Age: 64
End: 2024-12-26
Payer: COMMERCIAL

## 2024-12-26 VITALS — WEIGHT: 127 LBS | HEIGHT: 60 IN | BODY MASS INDEX: 24.94 KG/M2

## 2024-12-26 DIAGNOSIS — M15.2 DEGENERATIVE ARTHRITIS OF PROXIMAL INTERPHALANGEAL JOINT OF INDEX FINGER OF RIGHT HAND: ICD-10-CM

## 2024-12-26 DIAGNOSIS — M18.12 ARTHRITIS OF CARPOMETACARPAL (CMC) JOINT OF LEFT THUMB: Primary | ICD-10-CM

## 2024-12-26 DIAGNOSIS — Z47.89 AFTERCARE FOLLOWING SURGERY OF THE MUSCULOSKELETAL SYSTEM: ICD-10-CM

## 2024-12-26 DIAGNOSIS — M18.11 ARTHRITIS OF CARPOMETACARPAL (CMC) JOINT OF RIGHT THUMB: ICD-10-CM

## 2024-12-26 PROCEDURE — 73130 X-RAY EXAM OF HAND: CPT

## 2024-12-26 RX ORDER — ROPIVACAINE HYDROCHLORIDE 2 MG/ML
0.5 INJECTION, SOLUTION EPIDURAL; INFILTRATION; PERINEURAL
Status: SHIPPED | OUTPATIENT
Start: 2024-12-26

## 2024-12-26 RX ORDER — BETAMETHASONE SODIUM PHOSPHATE AND BETAMETHASONE ACETATE 3; 3 MG/ML; MG/ML
3 INJECTION, SUSPENSION INTRA-ARTICULAR; INTRALESIONAL; INTRAMUSCULAR; SOFT TISSUE
Status: SHIPPED | OUTPATIENT
Start: 2024-12-26

## 2024-12-26 RX ADMIN — BETAMETHASONE SODIUM PHOSPHATE AND BETAMETHASONE ACETATE 3 MG: 3; 3 INJECTION, SUSPENSION INTRA-ARTICULAR; INTRALESIONAL; INTRAMUSCULAR; SOFT TISSUE at 11:15

## 2024-12-26 RX ADMIN — ROPIVACAINE HYDROCHLORIDE 0.5 ML: 2 INJECTION, SOLUTION EPIDURAL; INFILTRATION; PERINEURAL at 11:15

## 2024-12-26 NOTE — PROGRESS NOTES
HAND & UPPER EXTREMITY OFFICE VISIT   Referred By:  No referring provider defined for this encounter.      Chief Complaint:     Right thumb pain    Surgery:  Right thumb carpometacarpal joint arthroplasty with trapeziectomy and Tightrope Suspension, DOS 2024.    History of Present Illness:   Patient presents now about 1 year status post the above surgery. Since last visit she reports doing well. She has some tightness around the base of the thumb in the morning which improves throughout the day.   She does report soreness in the right index finger which has been present for a while, but recently has become more bothersome with the cold weather. She also notes increased pain after typing for a while. She has previously tried applying topical Voltaren gel to the hands.   She also reports the left thumb is becoming more bothersome and she is considering proceeding with surgery for the left side in the future.     Past Medical History:  Past Medical History:   Diagnosis Date    Adjustment disorder with anxiety     last assessed - [id number]    Anemia     ((Pt Qnr Sub: no))     Anxiety     ((Pt Qnr Sub: yed))     Constipation 2021    Depression     Diverticulitis     Diverticulitis 2021    Diverticulitis of colon 2020    ((Pt Qnr Sub: yes))     Diverticulitis of large intestine with abscess without bleeding 2020    Elevated platelet count 2022    Elevated vitamin B12 level 2022    Generalized abdominal pain 2021    GERD (gastroesophageal reflux disease)     ((Pt Qnr Sub: yes))     History of total hysterectomy     History of transfusion     Hypertension     Hypertension 2021    Ingrown toenail     last assessed - [id number]    Miscarriage 1985    Thalassemia      Past Surgical History:   Procedure Laterality Date    APPENDECTOMY      ((Pt Qnr Sub: yes))     BOWEL RESECTION  2021     SECTION      ((Pt Qnr Sub: yes))      SECTION       CHOLECYSTECTOMY      ((Pt Qnr Sub: yes))     COLONOSCOPY      GALLBLADDER SURGERY      OOPHORECTOMY Bilateral     WI ARTHRP INTERPOS INTERCARPAL/METACARPAL JOINTS Right 01/04/2024    Procedure: Thumb carpometacarpal joint arthroplasty with TightRope Suspension;  Surgeon: Eric Connolly MD;  Location: [facility];  Service: Orthopedics    WI LAPAROSCOPY COLECTOMY PARTIAL W/ANASTOMOSIS N/A 11/17/2021    Procedure: RESECTION COLON SIGMOID LAPAROSCOPIC HAND-ASSISTED;  Surgeon: Kee Mace MD;  Location: [facility];  Service: General    TONSILLECTOMY AND ADENOIDECTOMY      TOTAL ABDOMINAL HYSTERECTOMY      ((Pt Qnr Sub: yes))     UPPER GASTROINTESTINAL ENDOSCOPY       Family History   Problem Relation Age of Onset    Anxiety disorder Mother         Anxiety    Depression Mother     Osteoporosis Mother     Hypertension Father     Anxiety disorder Sister         Anxiety    Depression Sister     Melanoma Sister     No Known Problems Sister     No Known Problems Sister     Anxiety disorder Daughter         Anxiety    Depression Daughter     No Known Problems Maternal Grandmother     No Known Problems Maternal Grandfather     No Known Problems Paternal Grandmother     No Known Problems Paternal Grandfather     No Known Problems Son     Heart disease Maternal Aunt     No Known Problems Maternal Aunt     No Known Problems Maternal Aunt     Heart disease Maternal Uncle     No Known Problems Paternal Aunt     No Known Problems Paternal Aunt      Social History     Socioeconomic History    Marital status:      Spouse name: Not on file    Number of children: Not on file    Years of education: Not on file    Highest education level: Not on file   Occupational History    Occupation:  - [employer]   Tobacco Use    Smoking status: Never    Smokeless tobacco: Never   Vaping Use    Vaping status: Never Used   Substance and Sexual Activity    Alcohol use: Yes     Alcohol/week: 2.0 standard drinks of alcohol      Types: 2 Glasses of wine per week     Comment: occasional alcohol use    Drug use: Never    Sexual activity: Yes     Partners: Male     Birth control/protection: Post-menopausal, Surgical     Comment: Hysterectomy 1988   Other Topics Concern    Not on file   Social History Narrative    Coffee     Social Drivers of Health     Financial Resource Strain: Not on file   Food Insecurity: Not on file   Transportation Needs: Not on file   Physical Activity: Not on file   Stress: Not on file   Social Connections: Not on file   Intimate Partner Violence: Not on file   Housing Stability: Not on file     Scheduled Meds:  Continuous Infusions:No current facility-administered medications for this visit.    PRN Meds:.  Allergies   Allergen Reactions    Benadryl [Diphenhydramine] Tachycardia       Physical Examination:    Ht 5' (1.524 m)   Wt 57.6 kg (127 lb)   BMI 24.80 kg/m²     Gen: A&Ox3, NAD      Right Upper Extremity:   Incision well healed without signs of infection   TTP index finger PIP joint  Full thumb oppostion to the base of the small finger  Composite fist  Normal ROM of wrist  Sensation intact to light touch in the axillary median, ulnar, and radial nerve distributions  2+RP    Left Upper Extremity:   Skin CDI  TTP thumb CMC joint  Full digital ROM  Sensation intact to light touch in the axillary median, ulnar, and radial nerve distributions  2+RP      Studies:  12/26/2024: Radiographs of the right thumb, multiple views, demonstrate stable alignment of her thumb metacarpal status post trapeziectomy and tight rope suspension without subsidence.  No signs of hardware failure or fracture. Mild degenerative changes of the index finger PIP joint.       Assessment and Plan:  1. Arthritis of carpometacarpal (CMC) joint of left thumb  Small joint arthrocentesis: L thumb CMC      2. Arthritis of carpometacarpal (CMC) joint of right thumb        3. Aftercare following surgery of the musculoskeletal system  XR hand 3+ vw  right      4. 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 female presents 1 year status post Right thumb carpometacarpal joint arthroplasty with trapeziectomy and Tightrope Suspension, DOS 01/04/2024. Overall the thumb is still doing well since her procedure. Her main concern today is the pain in her right index finger and left thumb.  Consistent with index finger PIP arthritis and thumb CMC arthritis respectively.  We discussed treatment options including continued conservative management, CSI, or surgery if her symptoms fail to improve. She would like to try CSI in the office today for both areas. Risks, benefits and alternative treatments were discussed with the patient. These risks of injection include but are not limited to: bleeding, infection, allergic reaction to agents, possible increase in pain, and/or elevation in blood sugar. Patient understands and would like to proceed with the proposed procedure. Patient tolerated the procedure well without any complications. See procedure note for details. she may take NSAIDs/Tylenol or apply ice to the area as needed for post-injection discomfort. It is recommended she return to the office in 3 months for repeat evaluation.     she expressed understanding of the plan and agreed. We encouraged them to contact our office with any questions or concerns.         Eric Connolly MD  Hand and Upper Extremity Surgery          *This note was dictated using Dragon voice recognition software. Please excuse any word substitutions or errors.*    Small joint arthrocentesis: L thumb CMC  [facility] Protocol:  procedure performed by consultantConsent: Verbal consent obtained.  Risks and benefits: risks, benefits and alternatives were discussed  Consent given by: patient  Patient understanding: patient states understanding of the procedure being performed  Site marked: the  operative site was marked  Radiology Images displayed and confirmed. If images not available, report reviewed: imaging studies available  Required items: required blood products, implants, devices, and special equipment available  Patient identity confirmed: verbally with patient  Supporting Documentation  Indications: pain   Procedure Details  Location: thumb - L thumb CMC  Preparation: Patient was prepped and draped in the usual sterile fashion  Needle size: 25 G  Ultrasound guidance: no  Approach: dorsal  Medications administered: 3 mg betamethasone acetate-betamethasone sodium phosphate 6 (3-3) mg/mL; 0.5 mL ropivacaine 0.2 %    Patient tolerance: patient tolerated the procedure well with no immediate complications  Dressing:  Sterile dressing applied      Small joint arthrocentesis: R index PIP  Universal Protocol:  procedure performed by consultantConsent: Verbal consent obtained.  Risks and benefits: risks, benefits and alternatives were discussed  Consent given by: patient  Patient understanding: patient states understanding of the procedure being performed  Site marked: the operative site was marked  Radiology Images displayed and confirmed. If images not available, report reviewed: imaging studies available  Required items: required blood products, implants, devices, and special equipment available  Patient identity confirmed: verbally with patient  Supporting Documentation  Indications: pain   Procedure Details  Location: index finger - R index PIP  Preparation: Patient was prepped and draped in the usual sterile fashion  Needle size: 25 G  Ultrasound guidance: no  Approach: dorsal  Medications administered: 3 mg betamethasone acetate-betamethasone sodium phosphate 6 (3-3) mg/mL; 0.5 mL ropivacaine 0.2 %    Patient tolerance: patient tolerated the procedure well with no immediate complications  Dressing:  Sterile dressing applied          Scribe Attestation      I,:  Inez Conrad PA-C am acting as a  scribe while in the presence of the attending physician.:       I,:  Eric Connolly MD personally performed the services described in this documentation    as scribed in my presence.:

## 2025-01-22 ENCOUNTER — OFFICE VISIT (OUTPATIENT)
Age: 65
End: 2025-01-22
Payer: COMMERCIAL

## 2025-01-22 ENCOUNTER — APPOINTMENT (OUTPATIENT)
Dept: LAB | Facility: CLINIC | Age: 65
End: 2025-01-22
Payer: COMMERCIAL

## 2025-01-22 ENCOUNTER — OFFICE VISIT (OUTPATIENT)
Dept: PODIATRY | Facility: CLINIC | Age: 65
End: 2025-01-22
Payer: COMMERCIAL

## 2025-01-22 VITALS — BODY MASS INDEX: 24.94 KG/M2 | HEIGHT: 60 IN | WEIGHT: 127 LBS

## 2025-01-22 VITALS — TEMPERATURE: 98 F | BODY MASS INDEX: 24.74 KG/M2 | WEIGHT: 126.7 LBS

## 2025-01-22 DIAGNOSIS — B07.9 VERRUCA VULGARIS: ICD-10-CM

## 2025-01-22 DIAGNOSIS — D22.9 MULTIPLE BENIGN MELANOCYTIC NEVI: Primary | ICD-10-CM

## 2025-01-22 DIAGNOSIS — G57.62 MORTON'S NEUROMA OF SECOND INTERSPACE OF LEFT FOOT: Primary | ICD-10-CM

## 2025-01-22 DIAGNOSIS — L82.1 SEBORRHEIC KERATOSIS: ICD-10-CM

## 2025-01-22 DIAGNOSIS — L81.4 LENTIGO: ICD-10-CM

## 2025-01-22 DIAGNOSIS — R53.82 CHRONIC FATIGUE: ICD-10-CM

## 2025-01-22 DIAGNOSIS — L72.0 EPIDERMAL CYST: ICD-10-CM

## 2025-01-22 DIAGNOSIS — M35.9 UNDIFFERENTIATED CONNECTIVE TISSUE DISEASE (HCC): ICD-10-CM

## 2025-01-22 LAB
ALBUMIN SERPL BCG-MCNC: 4.4 G/DL (ref 3.5–5)
ALP SERPL-CCNC: 117 U/L (ref 34–104)
ALT SERPL W P-5'-P-CCNC: 22 U/L (ref 7–52)
ANION GAP SERPL CALCULATED.3IONS-SCNC: 9 MMOL/L (ref 4–13)
AST SERPL W P-5'-P-CCNC: 19 U/L (ref 13–39)
BACTERIA UR QL AUTO: NORMAL /HPF
BASOPHILS # BLD AUTO: 0.06 THOUSANDS/ΜL (ref 0–0.1)
BASOPHILS NFR BLD AUTO: 1 % (ref 0–1)
BILIRUB SERPL-MCNC: 0.59 MG/DL (ref 0.2–1)
BILIRUB UR QL STRIP: NEGATIVE
BUN SERPL-MCNC: 14 MG/DL (ref 5–25)
CALCIUM SERPL-MCNC: 9.8 MG/DL (ref 8.4–10.2)
CHLORIDE SERPL-SCNC: 103 MMOL/L (ref 96–108)
CLARITY UR: CLEAR
CO2 SERPL-SCNC: 29 MMOL/L (ref 21–32)
COLOR UR: YELLOW
CREAT SERPL-MCNC: 0.89 MG/DL (ref 0.6–1.3)
CRP SERPL QL: 4.1 MG/L
EOSINOPHIL # BLD AUTO: 0.42 THOUSAND/ΜL (ref 0–0.61)
EOSINOPHIL NFR BLD AUTO: 6 % (ref 0–6)
ERYTHROCYTE [DISTWIDTH] IN BLOOD BY AUTOMATED COUNT: 15.2 % (ref 11.6–15.1)
ERYTHROCYTE [SEDIMENTATION RATE] IN BLOOD: 20 MM/HOUR (ref 0–29)
GFR SERPL CREATININE-BSD FRML MDRD: 68 ML/MIN/1.73SQ M
GLUCOSE P FAST SERPL-MCNC: 90 MG/DL (ref 65–99)
GLUCOSE UR STRIP-MCNC: NEGATIVE MG/DL
HCT VFR BLD AUTO: 39 % (ref 34.8–46.1)
HGB BLD-MCNC: 12 G/DL (ref 11.5–15.4)
HGB UR QL STRIP.AUTO: NEGATIVE
IMM GRANULOCYTES # BLD AUTO: 0.04 THOUSAND/UL (ref 0–0.2)
IMM GRANULOCYTES NFR BLD AUTO: 1 % (ref 0–2)
KETONES UR STRIP-MCNC: NEGATIVE MG/DL
LEUKOCYTE ESTERASE UR QL STRIP: NEGATIVE
LYMPHOCYTES # BLD AUTO: 1.4 THOUSANDS/ΜL (ref 0.6–4.47)
LYMPHOCYTES NFR BLD AUTO: 18 % (ref 14–44)
MCH RBC QN AUTO: 20.6 PG (ref 26.8–34.3)
MCHC RBC AUTO-ENTMCNC: 30.8 G/DL (ref 31.4–37.4)
MCV RBC AUTO: 67 FL (ref 82–98)
MONOCYTES # BLD AUTO: 0.73 THOUSAND/ΜL (ref 0.17–1.22)
MONOCYTES NFR BLD AUTO: 10 % (ref 4–12)
NEUTROPHILS # BLD AUTO: 4.98 THOUSANDS/ΜL (ref 1.85–7.62)
NEUTS SEG NFR BLD AUTO: 64 % (ref 43–75)
NITRITE UR QL STRIP: NEGATIVE
NON-SQ EPI CELLS URNS QL MICRO: NORMAL /HPF
NRBC BLD AUTO-RTO: 0 /100 WBCS
PH UR STRIP.AUTO: 7 [PH]
PLATELET # BLD AUTO: 394 THOUSANDS/UL (ref 149–390)
PMV BLD AUTO: 9.5 FL (ref 8.9–12.7)
POTASSIUM SERPL-SCNC: 4.4 MMOL/L (ref 3.5–5.3)
PROT SERPL-MCNC: 7.1 G/DL (ref 6.4–8.4)
PROT UR STRIP-MCNC: NEGATIVE MG/DL
RBC # BLD AUTO: 5.83 MILLION/UL (ref 3.81–5.12)
RBC #/AREA URNS AUTO: NORMAL /HPF
SODIUM SERPL-SCNC: 141 MMOL/L (ref 135–147)
SP GR UR STRIP.AUTO: 1.02 (ref 1–1.03)
TSH SERPL DL<=0.05 MIU/L-ACNC: 1.11 UIU/ML (ref 0.45–4.5)
UROBILINOGEN UR STRIP-ACNC: <2 MG/DL
WBC # BLD AUTO: 7.63 THOUSAND/UL (ref 4.31–10.16)
WBC #/AREA URNS AUTO: NORMAL /HPF

## 2025-01-22 PROCEDURE — 86140 C-REACTIVE PROTEIN: CPT

## 2025-01-22 PROCEDURE — 99213 OFFICE O/P EST LOW 20 MIN: CPT | Performed by: PODIATRIST

## 2025-01-22 PROCEDURE — 36415 COLL VENOUS BLD VENIPUNCTURE: CPT

## 2025-01-22 PROCEDURE — 85652 RBC SED RATE AUTOMATED: CPT

## 2025-01-22 PROCEDURE — 85025 COMPLETE CBC W/AUTO DIFF WBC: CPT

## 2025-01-22 PROCEDURE — 99213 OFFICE O/P EST LOW 20 MIN: CPT | Performed by: REGISTERED NURSE

## 2025-01-22 PROCEDURE — 84443 ASSAY THYROID STIM HORMONE: CPT

## 2025-01-22 PROCEDURE — 80053 COMPREHEN METABOLIC PANEL: CPT

## 2025-01-22 NOTE — PROGRESS NOTES
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 She declined cortisone injection. We could consider excision but she doesn't want surgery.    Her XR from last summer reviewed, no acute findings.            History of Present Illness   HPI  Saloni Moy is a 64 y.o. female who presents forefoot pain. She says she had neuroma for 20 years. She had surgery in  where the doctor tried to \"release them\"      Review of Systems  As stated in HPI, otherwise normal    Medical History Reviewed by provider this encounter:  Tobacco  Allergies  Meds  Problems  Med Hx  Surg Hx  Fam Hx           Objective   Ht 5' (1.524 m)   Wt 57.6 kg (127 lb)   BMI 24.80 kg/m²      Physical Exam  Vitals reviewed.   Constitutional:       Appearance: She is not ill-appearing.   Cardiovascular:      Pulses: Normal pulses.   Musculoskeletal:         General: Tenderness (positive mortons sign left 2nd interspace) present.   Skin:     Capillary Refill: Capillary refill takes less than 2 seconds.      Findings: No bruising.   Neurological:      Mental Status: She is alert and oriented to person, place, and time.      Sensory: No sensory deficit.           "

## 2025-01-22 NOTE — PROGRESS NOTES
"[facility]atology [facility] Note     Patient Name: Saloni Moy  Encounter Date: 1/22/25     Have you been cared for by a [facility] Dermatologist in the last 3 years and, if so, which description applies to you?    Yes.  I have been here within the last 3 years, and my medical history has NOT changed since that time.  I am FEMALE/of child-bearing potential.    REVIEW OF SYSTEMS:  Have you recently had or currently have any of the following? No changes in my recent health.   PAST MEDICAL HISTORY:  Have you personally ever had or currently have any of the following?  If \"YES,\" then please provide more detail. No changes in my medical history.   HISTORY OF IMMUNOSUPPRESSION: Do you have a history of any of the following:  Systemic Immunosuppression such as Diabetes, Biologic or Immunotherapy, Chemotherapy, Organ Transplantation, Bone Marrow Transplantation or Prednisone?  No     Answering \"YES\" requires the addition of the dotphrase \"IMMUNOSUPPRESSED\" as the first diagnosis of the patient's visit.   FAMILY HISTORY:  Any \"first degree relatives\" (parent, brother, sister, or child) with the following?    No changes in my family's known health.   PATIENT EXPERIENCE:    Do you want the Dermatologist to perform a COMPLETE skin exam today including a clinical examination under the \"bra and underwear\" areas?  Yes  If necessary, do we have your permission to call and leave a detailed message on your Preferred Phone number that includes your specific medical information?  Yes      Allergies   Allergen Reactions    Benadryl [Diphenhydramine] Tachycardia      Current Outpatient Medications:     ALPRAZolam (XANAX) 0.25 mg tablet, Take 1 tablet (0.25 mg total) by mouth daily at bedtime as needed for anxiety, Disp: 30 tablet, Rfl: 5    amLODIPine (NORVASC) 2.5 mg tablet, Take 1 tablet (2.5 mg total) by mouth daily, Disp: 30 tablet, Rfl: 5    celecoxib (CeleBREX) 100 mg capsule, Take 1 capsule (100 mg total) by mouth 2 (two) " times a day, Disp: 60 capsule, Rfl: 0    Diclofenac Sodium (VOLTAREN) 1 %, Apply 2 g topically 4 (four) times a day, Disp: 100 g, Rfl: 6    docusate sodium (COLACE) 100 mg capsule, Take 1 capsule (100 mg total) by mouth in the morning (Patient not taking: Reported on 6/11/2024), Disp: 10 capsule, Rfl: 0    estradiol (ESTRACE VAGINAL) 0.1 mg/g vaginal cream, Insert 1 g into the vagina daily, Disp: 42.5 g, Rfl: 3    lisinopril (ZESTRIL) 10 mg tablet, Take 1 tablet (10 mg total) by mouth daily (Patient not taking: Reported on 9/20/2024), Disp: 90 tablet, Rfl: 3    omeprazole (PriLOSEC) 40 MG capsule, Take 1 capsule (40 mg total) by mouth daily, Disp: 90 capsule, Rfl: 1    ondansetron (ZOFRAN) 4 mg tablet, Take 1 tablet (4 mg total) by mouth every 6 (six) hours (Patient not taking: Reported on 9/20/2024), Disp: 12 tablet, Rfl: 0    Current Facility-Administered Medications:     betamethasone acetate-betamethasone sodium phosphate (CELESTONE) injection 3 mg, 3 mg, Intra-articular, , , 3 mg at 12/26/24 1115    betamethasone acetate-betamethasone sodium phosphate (CELESTONE) injection 3 mg, 3 mg, Intra-articular, , , 3 mg at 12/26/24 1115    ropivacaine (NAROPIN) injection 0.5 mL, 0.5 mL, Infiltration, , , 0.5 mL at 12/26/24 1115    ropivacaine (NAROPIN) injection 0.5 mL, 0.5 mL, Infiltration, , , 0.5 mL at 12/26/24 1115          Whom besides the patient is providing clinical information about today's encounter?   NO ADDITIONAL HISTORIAN (patient alone provided history)    Physical Exam and Assessment/Plan by Diagnosis:  EPIDERMAL INCLUSION CYST    Physical Exam:  Anatomic Location Affected:  labia majora   Morphological Description:    Pertinent Positives:  Pertinent Negatives:    Additional History of Present Condition:  Patient states she has a cyst in that area for about 4 years and will like to have it removed.     Assessment and Plan:  Based on a thorough discussion of this condition and the management approach to it  (including a comprehensive discussion of the known risks, side effects and potential benefits of treatment), the patient (family) agrees to implement the following specific plan:  Reassured Benign, she'll however like it to be removed  Recommend to discuss with  OBGYN about removal of cyst     What are epidermal inclusion cysts?  Epidermal inclusion cysts are the most common, benign cutaneous cysts. There are many different names for epidermal inclusion cysts, including epidermoid cyst, epidermal cyst, infundibular cyst, inclusion cyst, and keratin cyst. These cysts can occur anywhere on the body and typically present as nodules directly underneath the skin. There is often a visible pore or opening in the center. The cysts are freely moveable and can range from a few millimeters to several centimeters in diameter. The center of epidermoid cysts almost always contains keratin, which has a cheesy appearance, and not sebum. They also do not originate from sebaceous glands. Therefore, epidermal inclusion cysts are not the same as sebaceous cysts.    Cysts may remain stable or progressively enlarge over time. There are no reliable predictive factors to tell if an epidermal inclusion cyst will enlarge, become inflamed, or remain quiescent. Infected cysts tend to become larger, turn red, and are more noticeable to the patient. There may be accompanying pain and discomfort.     What causes epidermal inclusion cysts?  Epidermal inclusion cysts often appear out of the blue and are not contagious. They are due to a proliferation of epidermal cells within the dermis and are more common in men than women. They occur more frequently in patients in their 20s to 40s. Epidermal inclusion cysts by themselves are usually not inherited, but they can be hereditary in rare syndromes such as Vera syndrome, nodular elastosis with cysts and comedones (Favre-Racouchot syndrome), and basal cell nevus syndrome (Gorlin syndrome). Elderly  "patients with chronic sun-damaged skin areas have a higher likelihood of developing epidermoid cysts. They often occur in areas where hair follicles have been inflamed or repeatedly irritated are more frequent in patients with acne vulgaris. In the  period, they are called milia.     Patients on BRAF inhibitors such as imiquimod and cyclosporine have a higher incidence of epidermoid cysts of the face.    How do we diagnose an epidermal inclusion cyst?  Epidermoid inclusion cysts are often diagnosed by history and physical exam. There is usually no need for biopsy prior to removal.  Radiographic and laboratory exams, such as ultrasound studies, are unnecessary and not typically ordered unless the practitioner suspects a genetic condition.    What is the treatment for an epidermal inclusion cyst?  Inflamed, uninfected epidermal inclusion cysts rarely resolve spontaneously without therapy or surgical intervention. Treatment is not emergent unless desired by the patient.     Definitive treatment is via surgical excision with walls intact. This method will prevent recurrence. This is best done when the cyst is not inflamed, to decrease the probability of rupture during surgery.   A local anesthetic will be injected around the cyst  A small incision is made in the skin overlying the cyst, and contents are expressed  The incision is repaired with sutures    Another option is to use a 4mm punch biopsy with cyst extraction through the defect.    Incision and drainage is often needed if the cyst is infected or inflamed. If there is surrounding cellulitis, oral antibiotic therapy may be necessary. The common agents used target methicillin sensitive Staphylococcal aureus and methicillin resistant S aureus in areas of high prevalence.      VERRUCA VULGARIS (\"Common Warts\")    Physical Exam:  Anatomic Location Affected:  right nostril   Morphological Description:  pink verrucae filiform papule   Pertinent " "Positives:  Pertinent Negatives:    Additional History of Present Condition:  Lesion was previously biopsied and was consistent with a verruca. Patient states that the lesion is coming back Case: [id number]     Assessment and Plan:  Based on a thorough discussion of this condition and the management approach to it (including a comprehensive discussion of the known risks, side effects and potential benefits of treatment), the patient (family) agrees to implement the following specific plan:  Discussed that given where lesion it we don't have the tools to totally remove it, recommended going ENT for removal.     Verruca Vulgaris  A verruca is a common growth of the skin caused by infection by human papilloma virus (HPV). There are many strains of the virus that cause different types of warts on the body. The virus infects the most superficial layers of the skin, causing increased production of skin cells and thickening. Warts can be spread through direct contact with infected skin and may spread to other parts of the body if scratched or picked.     A verruca is more commonly called a \"wart.\" Warts are particularly common in school-aged children but can arise at any age. Patients who have a history of eczema are especially prone due to impaired skin barrier. Those taking immunosuppressive drugs or with HIV infections may experience prolonged symptoms despite treatment.     Warts generally have a rough surface with a tiny black dot sometimes observed in the middle of each scaly spot. They can range in size from a small bump to large scaly growths.  Common warts are often found on the backs of fingers or toes, around the nails, and on the knees. Plantar warts can grow inwardly on the soles of the feet causing pain.    There are many possible ways to treat warts and sometimes several different treatments are needed to get the warts to go away completely. There is no single perfect treatment for warts, and successful " treatment can take many months.     In-office treatments usually require multiple visits, and include:  Cryotherapy. a cold spray with liquid nitrogen will destroy the infected cells but may lead to discomfort and blistering. It may also leave a permanent white marcio or scar.      Electrosurgery (curettage and cautery) can be used for large resistant warts which involves shaving the growth down and burning the base. It is performed under local anesthesia and may leave a permanent scar    Candida (“yeast”) antigen injections. These are extracts of the common yeast (Candida) that cannot cause an infection. The medication is injected into/under the wart. It is thought to stimulate the immune system to recognize the wart virus and attack it. Multiple injections are often needed about one month apart.    There are also several at-home wart treatments:    Soak the warts in warm water for 5 minutes every night followed by gentle filing with a nail file or pumice stone.    Topical salicylic acid or similar compounds work by removing the dead surface skin cells  Apply the medicine directly to the wart, wait for it to dry completely, then cover with duct tape overnight   Repeat until the wart is gone, which can take 2-4 months  Do not use on the face or groin area   If the wart paint makes the skin sore, stop treatment until the discomfort has settled, then recommence as above.  Take care to keep the chemical off normal skin.    Podophyllin is a cytotoxic agent used in some products and must not be used in pregnancy or women considering pregnancy.    Some prescription medications include   Topical retinoids (adapalene, tretinoin, tazarotene), 5-fluorouracil (Efudex) or imiquimod (Aldara) creams are sometimes used to treat flat warts or warts on the face and other sensitive anatomical areas. They are usually applied directly to the warts once a day for 2-4 months and can be irritating.  These treatments should only be used as  directed by your health care provider.  Systemic treatment with oral cimetidine (Tagamet) may help boost the immune system against the wart virus in patients, some of the time.  Initiation of cimetidine therapy should ONLY be done under the supervision of your health care provider, who can discuss possible side effects and drug-to-drug interactions of this specific treatment.    SEBORRHEIC KERATOSES  - Relevant exam: Scattered over the trunk/extremities are waxy brown to black plaques and papules with stuck on appearance and consistent dermoscopy  - Exam and clinical history consistent with seborrheic keratoses  - Counseled that these are benign growths that do not require treatment    MELANOCYTIC NEVI  -Relevant exam: Scattered over the trunk/extremities are homogenously pigmented brown macules and papules. ELM performed and without concerning findings. No outliers unless otherwise noted in today's note  - Exam and clinical history consistent with melanocytic nevi  - Counseled to return to clinic prior to scheduled appointment should any of these lesions change or should any new lesions of concern arise  - Counseled on use of sun protection daily. Reviewed latest FDA sunscreen guidelines, including use of broad spectrum (UVA and UVB blocking) sunscreen or sun protective clothing with SPF 30-50 every 2-3 hours and reapplied after exposure to water    LENTIGINES  OTHER SKIN CHANGES DUE TO CHRONIC EXPOSURE TO NONIONIZING RADIATION  - Relevant exam: Over sun exposed areas are brown macules. ELM performed and without concerning findings.  - Exam and clinical history consistent with lentigines.  - Counseled to return to clinic prior to scheduled appointment should any of these lesions change or should any new lesions of concern arise.  - Recommended use of sunscreen as above and below.    Scribe Attestation      I,:  Roberta Rodriguez MA am acting as a scribe while in the presence of the attending physician.:       I,:   Chinmay Berrios MD personally performed the services described in this documentation    as scribed in my presence.:

## 2025-02-06 ENCOUNTER — OFFICE VISIT (OUTPATIENT)
Dept: URGENT CARE | Age: 65
End: 2025-02-06
Payer: COMMERCIAL

## 2025-02-06 VITALS
DIASTOLIC BLOOD PRESSURE: 82 MMHG | SYSTOLIC BLOOD PRESSURE: 134 MMHG | TEMPERATURE: 98.6 F | HEART RATE: 74 BPM | RESPIRATION RATE: 18 BRPM | OXYGEN SATURATION: 99 %

## 2025-02-06 DIAGNOSIS — J01.41 ACUTE RECURRENT PANSINUSITIS: Primary | ICD-10-CM

## 2025-02-06 PROCEDURE — 99213 OFFICE O/P EST LOW 20 MIN: CPT | Performed by: EMERGENCY MEDICINE

## 2025-02-06 RX ORDER — METHYLPREDNISOLONE 4 MG/1
TABLET ORAL
Qty: 1 EACH | Refills: 0 | Status: SHIPPED | OUTPATIENT
Start: 2025-02-06

## 2025-02-06 RX ORDER — FLUTICASONE PROPIONATE 50 MCG
2 SPRAY, SUSPENSION (ML) NASAL DAILY
Qty: 15.8 ML | Refills: 3 | Status: SHIPPED | OUTPATIENT
Start: 2025-02-06 | End: 2025-03-08

## 2025-02-06 RX ORDER — DEXLANSOPRAZOLE 30 MG/1
30 CAPSULE, DELAYED RELEASE ORAL DAILY
COMMUNITY

## 2025-02-06 RX ORDER — DOXYCYCLINE 100 MG/1
100 TABLET ORAL 2 TIMES DAILY
Qty: 14 TABLET | Refills: 0 | Status: SHIPPED | OUTPATIENT
Start: 2025-02-06 | End: 2025-02-13

## 2025-02-06 NOTE — PROGRESS NOTES
[facility]        NAME: Saloni Moy is a 64 y.o. female  : 1960    MRN: [id number]  DATE: 2025  TIME: 7:35 PM    Assessment and Plan   Acute recurrent pansinusitis [J01.41]  1. Acute recurrent pansinusitis  methylPREDNISolone 4 MG tablet therapy pack    fluticasone (FLONASE) 50 mcg/act nasal spray    doxycycline (ADOXA) 100 MG tablet            Patient Instructions       Follow up with PCP in 3-5 days.  Proceed to  ER if symptoms worsen.    If tests have been performed at [facility], our office will contact you with results if changes need to be made to the care plan discussed with you at the visit.  You can review your full results on [facility].    Chief Complaint     Chief Complaint   Patient presents with    Cold Like Symptoms     Ongoing for about two days  Headaches, nasal pain, eye pain  Not relieved with over the counter medications          History of Present Illness       Sinusitis  This is a recurrent problem. The current episode started in the past 7 days. The problem has been gradually worsening since onset. Her pain is at a severity of 5/10. The pain is moderate. Associated symptoms include chills, congestion, coughing, sinus pressure, sneezing, a sore throat and swollen glands. Pertinent negatives include no diaphoresis, ear pain, headaches, hoarse voice, neck pain or shortness of breath. Past treatments include acetaminophen, nasal decongestants and oral decongestants. The treatment provided no relief.       Review of Systems   Review of Systems   Constitutional:  Positive for chills, fatigue and fever. Negative for activity change, appetite change, diaphoresis and unexpected weight change.   HENT:  Positive for congestion, postnasal drip, rhinorrhea, sinus pressure, sinus pain, sneezing and sore throat. Negative for dental problem, drooling, ear discharge, ear pain, facial swelling, hearing loss, hoarse voice, mouth sores, nosebleeds, tinnitus, trouble  swallowing and voice change.    Eyes:  Negative for photophobia, pain, discharge, redness, itching and visual disturbance.   Respiratory:  Positive for cough. Negative for apnea, choking, chest tightness, shortness of breath, wheezing and stridor.    Cardiovascular:  Negative for chest pain and palpitations.   Gastrointestinal:  Negative for abdominal distention, abdominal pain, anal bleeding, blood in stool, constipation, diarrhea, nausea, rectal pain and vomiting.   Genitourinary:  Negative for dysuria and hematuria.   Musculoskeletal:  Negative for arthralgias, back pain, gait problem, joint swelling, myalgias, neck pain and neck stiffness.   Skin:  Negative for color change, pallor, rash and wound.   Neurological:  Negative for dizziness, tremors, seizures, syncope, facial asymmetry, speech difficulty, weakness, light-headedness, numbness and headaches.   All other systems reviewed and are negative.        Current Medications       Current Outpatient Medications:     ALPRAZolam (XANAX) 0.25 mg tablet, Take 1 tablet (0.25 mg total) by mouth daily at bedtime as needed for anxiety, Disp: 30 tablet, Rfl: 5    amLODIPine (NORVASC) 2.5 mg tablet, Take 1 tablet (2.5 mg total) by mouth daily, Disp: 30 tablet, Rfl: 5    celecoxib (CeleBREX) 100 mg capsule, Take 1 capsule (100 mg total) by mouth 2 (two) times a day, Disp: 60 capsule, Rfl: 0    dexlansoprazole (Dexilant) 30 MG capsule, Take 30 mg by mouth daily, Disp: , Rfl:     Diclofenac Sodium (VOLTAREN) 1 %, Apply 2 g topically 4 (four) times a day, Disp: 100 g, Rfl: 6    doxycycline (ADOXA) 100 MG tablet, Take 1 tablet (100 mg total) by mouth 2 (two) times a day for 7 days, Disp: 14 tablet, Rfl: 0    estradiol (ESTRACE VAGINAL) 0.1 mg/g vaginal cream, Insert 1 g into the vagina daily, Disp: 42.5 g, Rfl: 3    fluticasone (FLONASE) 50 mcg/act nasal spray, 2 sprays into each nostril daily, Disp: 15.8 mL, Rfl: 3    methylPREDNISolone 4 MG tablet therapy pack, Use as  directed on package, Disp: 1 each, Rfl: 0    omeprazole (PriLOSEC) 40 MG capsule, Take 1 capsule (40 mg total) by mouth daily, Disp: 90 capsule, Rfl: 1    docusate sodium (COLACE) 100 mg capsule, Take 1 capsule (100 mg total) by mouth in the morning (Patient not taking: Reported on 6/11/2024), Disp: 10 capsule, Rfl: 0    lisinopril (ZESTRIL) 10 mg tablet, Take 1 tablet (10 mg total) by mouth daily (Patient not taking: Reported on 9/20/2024), Disp: 90 tablet, Rfl: 3    ondansetron (ZOFRAN) 4 mg tablet, Take 1 tablet (4 mg total) by mouth every 6 (six) hours (Patient not taking: Reported on 9/20/2024), Disp: 12 tablet, Rfl: 0    Current Facility-Administered Medications:     betamethasone acetate-betamethasone sodium phosphate (CELESTONE) injection 3 mg, 3 mg, Intra-articular, , , 3 mg at 12/26/24 1115    betamethasone acetate-betamethasone sodium phosphate (CELESTONE) injection 3 mg, 3 mg, Intra-articular, , , 3 mg at 12/26/24 1115    ropivacaine (NAROPIN) injection 0.5 mL, 0.5 mL, Infiltration, , , 0.5 mL at 12/26/24 1115    ropivacaine (NAROPIN) injection 0.5 mL, 0.5 mL, Infiltration, , , 0.5 mL at 12/26/24 1115    Current Allergies     Allergies as of 02/06/2025 - Reviewed 02/06/2025   Allergen Reaction Noted    Benadryl [diphenhydramine] Tachycardia 07/23/2024            The following portions of the patient's history were reviewed and updated as appropriate: allergies, current medications, past family history, past medical history, past social history, past surgical history and problem list.     Past Medical History:   Diagnosis Date    Adjustment disorder with anxiety     last assessed - [id number]    Anemia     ((Pt Qnr Sub: no))     Anxiety     ((Pt Qnr Sub: yed))     Constipation 08/04/2021    Depression     Diverticulitis     Diverticulitis 09/02/2021    Diverticulitis of colon 05/2020    ((Pt Qnr Sub: yes))     Diverticulitis of large intestine with abscess without bleeding 05/24/2020    Elevated platelet  count 2022    Elevated vitamin B12 level 2022    Generalized abdominal pain 2021    GERD (gastroesophageal reflux disease)     ((Pt Qnr Sub: yes))     History of total hysterectomy     History of transfusion     Hypertension     Hypertension 2021    Ingrown toenail     last assessed - [id number]    Miscarriage 1985    Thalassemia        Past Surgical History:   Procedure Laterality Date    APPENDECTOMY      ((Pt Qnr Sub: yes))     BOWEL RESECTION  2021     SECTION      ((Pt Qnr Sub: yes))      SECTION      CHOLECYSTECTOMY      ((Pt Qnr Sub: yes))     COLONOSCOPY      GALLBLADDER SURGERY      OOPHORECTOMY Bilateral     WA ARTHRP INTERPOS INTERCARPAL/METACARPAL JOINTS Right 2024    Procedure: Thumb carpometacarpal joint arthroplasty with TightRope Suspension;  Surgeon: Eric Connolly MD;  [facility];  Service: Orthopedics    WA LAPAROSCOPY COLECTOMY PARTIAL W/ANASTOMOSIS N/A 2021    Procedure: RESECTION COLON SIGMOID LAPAROSCOPIC HAND-ASSISTED;  Surgeon: Kee Mace MD;  [facility];  Service: General    TONSILLECTOMY AND ADENOIDECTOMY      TOTAL ABDOMINAL HYSTERECTOMY      ((Pt Qnr Sub: yes))     UPPER GASTROINTESTINAL ENDOSCOPY         Family History   Problem Relation Age of Onset    Anxiety disorder Mother         Anxiety    Depression Mother     Osteoporosis Mother     Hypertension Father     Anxiety disorder Sister         Anxiety    Depression Sister     Melanoma Sister     No Known Problems Sister     No Known Problems Sister     Anxiety disorder Daughter         Anxiety    Depression Daughter     No Known Problems Maternal Grandmother     No Known Problems Maternal Grandfather     No Known Problems Paternal Grandmother     No Known Problems Paternal Grandfather     No Known Problems Son     Heart disease Maternal Aunt     No Known Problems Maternal Aunt     No Known Problems Maternal Aunt     Heart disease Maternal  Uncle     No Known Problems Paternal Aunt     No Known Problems Paternal Aunt          Medications have been verified.        Objective   /82   Pulse 74   Temp 98.6 °F (37 °C)   Resp 18   SpO2 99%   No LMP recorded. Patient has had a hysterectomy.       Physical Exam     Physical Exam  Vitals and nursing note reviewed.   Constitutional:       General: She is not in acute distress.     Appearance: Normal appearance. She is obese. She is not ill-appearing, toxic-appearing or diaphoretic.   HENT:      Head: Normocephalic and atraumatic.      Right Ear: Tympanic membrane, ear canal and external ear normal. There is no impacted cerumen.      Left Ear: Tympanic membrane, ear canal and external ear normal. There is no impacted cerumen.      Nose: Mucosal edema, congestion and rhinorrhea present. No nasal tenderness.      Right Nostril: No foreign body, epistaxis, septal hematoma or occlusion.      Left Nostril: No foreign body, epistaxis, septal hematoma or occlusion.      Right Turbinates: Enlarged and swollen.      Left Turbinates: Enlarged and swollen.      Right Sinus: Maxillary sinus tenderness and frontal sinus tenderness present.      Left Sinus: Maxillary sinus tenderness and frontal sinus tenderness present.      Mouth/Throat:      Mouth: Mucous membranes are moist.      Pharynx: Posterior oropharyngeal erythema present. No oropharyngeal exudate.   Eyes:      General: No scleral icterus.        Right eye: No discharge.         Left eye: No discharge.      Extraocular Movements: Extraocular movements intact.      Conjunctiva/sclera: Conjunctivae normal.      Pupils: Pupils are equal, round, and reactive to light.   Neck:      Vascular: No carotid bruit.   Cardiovascular:      Rate and Rhythm: Normal rate and regular rhythm.      Pulses: Normal pulses.      Heart sounds: Normal heart sounds. No murmur heard.     No friction rub. No gallop.   Pulmonary:      Effort: Pulmonary effort is normal. No  respiratory distress.      Breath sounds: Normal breath sounds. No stridor. No wheezing, rhonchi or rales.   Chest:      Chest wall: No tenderness.   Abdominal:      General: There is no distension.      Palpations: There is no mass.      Tenderness: There is no abdominal tenderness. There is no right CVA tenderness, left CVA tenderness, guarding or rebound.      Hernia: No hernia is present.   Musculoskeletal:      Cervical back: Normal range of motion and neck supple. No rigidity or tenderness.   Lymphadenopathy:      Cervical: Cervical adenopathy present.   Skin:     Capillary Refill: Capillary refill takes less than 2 seconds.      Coloration: Skin is not jaundiced or pale.      Findings: No bruising, erythema, lesion or rash.   Neurological:      General: No focal deficit present.      Mental Status: She is alert and oriented to person, place, and time.      Cranial Nerves: No cranial nerve deficit.      Sensory: No sensory deficit.      Motor: No weakness.      Coordination: Coordination normal.      Gait: Gait normal.   Psychiatric:         Mood and Affect: Mood normal.         Behavior: Behavior normal.

## 2025-02-23 DIAGNOSIS — F41.9 ANXIETY: ICD-10-CM

## 2025-02-23 DIAGNOSIS — K21.9 GASTROESOPHAGEAL REFLUX DISEASE WITHOUT ESOPHAGITIS: ICD-10-CM

## 2025-02-23 RX ORDER — OMEPRAZOLE 40 MG/1
40 CAPSULE, DELAYED RELEASE ORAL DAILY
Qty: 90 CAPSULE | Refills: 1 | Status: SHIPPED | OUTPATIENT
Start: 2025-02-23 | End: 2025-03-03

## 2025-02-24 ENCOUNTER — NURSE TRIAGE (OUTPATIENT)
Age: 65
End: 2025-02-24

## 2025-02-24 RX ORDER — ALPRAZOLAM 0.25 MG
0.25 TABLET ORAL
Qty: 30 TABLET | Refills: 5 | Status: SHIPPED | OUTPATIENT
Start: 2025-02-24

## 2025-02-24 NOTE — TELEPHONE ENCOUNTER
"Patient states she has had cyst for apporox. 2 years,  external right side labial cyst. Derm wont touch it per patient. No drainage, no redness, no fever. No bleeding. She states it may be getting larger, no pain but rubs at times.   Appointment offered today with Dr. Erwin , patient declined, unable to get off work.  No other available appts within the next few weeks, offered to transfer to office for sooner scheduling - patient declined. Patient only wants to be scheduled with Dr. Erwin.  Patient okay for clerical team to call her back.       Reason for Disposition   Patient wants to be seen    Answer Assessment - Initial Assessment Questions  1. SYMPTOM: \"What's the main symptom you're concerned about?\" (e.g., rash, itching, swelling, dryness)      Possible Bartholin cyst   2. LOCATION: \"Where is the cyst located?\" (e.g., inside/outside, left/right)      External labia, Right side   3. ONSET: \"When did the cyst  start?\"      Had it for awhile but growing   4. PAIN: \"Is there any pain?\" If Yes, ask: \"How bad is it?\" (Scale: 1-10; mild, moderate, severe)      At times when it rubs   5. CAUSE: \"What do you think is causing the symptoms?\"      Cyst   6. OTHER SYMPTOMS: \"Do you have any other symptoms?\" (e.g., fever, vaginal bleeding, pain with urination)      Denies    Protocols used: Vulvar Symptoms-Adult-[place]    "

## 2025-02-24 NOTE — TELEPHONE ENCOUNTER
Medication:  PDMP   02/23/2025 11/20/2024 ALPRAZolam (Tablet) 30.0 30 0.25 MG NA DANK KRISHNAN     Active agreement on file -No

## 2025-03-03 ENCOUNTER — OFFICE VISIT (OUTPATIENT)
Dept: OBGYN CLINIC | Facility: CLINIC | Age: 65
End: 2025-03-03
Payer: COMMERCIAL

## 2025-03-03 VITALS
SYSTOLIC BLOOD PRESSURE: 124 MMHG | WEIGHT: 129 LBS | HEIGHT: 60 IN | DIASTOLIC BLOOD PRESSURE: 82 MMHG | BODY MASS INDEX: 25.32 KG/M2

## 2025-03-03 DIAGNOSIS — L70.0 COMEDONE: Primary | ICD-10-CM

## 2025-03-03 PROCEDURE — 10060 I&D ABSCESS SIMPLE/SINGLE: CPT | Performed by: OBSTETRICS & GYNECOLOGY

## 2025-03-03 NOTE — PROGRESS NOTES
Incision and drain    Date/Time: 3/3/2025 8:30 AM    Performed by: Tressa Erwin MD  Authorized by: Tressa Erwin MD  Universal Protocol:  Consent: Verbal consent obtained.  Risks and benefits: risks, benefits and alternatives were discussed  Consent given by: patient  Patient understanding: patient states understanding of the procedure being performed  Patient identity confirmed: verbally with patient    Location:     Type:  Cyst    Size:  1cm    Location:  Anogenital    Anogenital location: mons pubis.  Pre-procedure details:     Skin preparation:  Betadine  Anesthesia (see MAR for exact dosages):     Anesthesia method:  Local infiltration    Local anesthetic:  Lidocaine 1% WITH epi  Procedure details:     Complexity:  Simple    Incision types:  Stab incision    Scalpel blade:  11    Incision depth:  Subcutaneous    Drainage characteristics: caseous.    Wound treatment:  Wound left open  Post-procedure details:     Patient tolerance of procedure:  Tolerated well, no immediate complications

## 2025-03-14 PROCEDURE — 88305 TISSUE EXAM BY PATHOLOGIST: CPT | Performed by: PATHOLOGY

## 2025-03-19 PROCEDURE — 88305 TISSUE EXAM BY PATHOLOGIST: CPT | Performed by: PATHOLOGY

## 2025-03-21 ENCOUNTER — OFFICE VISIT (OUTPATIENT)
Dept: FAMILY MEDICINE CLINIC | Facility: CLINIC | Age: 65
End: 2025-03-21
Payer: COMMERCIAL

## 2025-03-21 VITALS
SYSTOLIC BLOOD PRESSURE: 140 MMHG | DIASTOLIC BLOOD PRESSURE: 80 MMHG | BODY MASS INDEX: 25.91 KG/M2 | RESPIRATION RATE: 17 BRPM | WEIGHT: 132 LBS | OXYGEN SATURATION: 99 % | HEART RATE: 75 BPM | HEIGHT: 60 IN | TEMPERATURE: 97.9 F

## 2025-03-21 DIAGNOSIS — Z23 ENCOUNTER FOR IMMUNIZATION: Primary | ICD-10-CM

## 2025-03-21 DIAGNOSIS — R00.2 PALPITATION: ICD-10-CM

## 2025-03-21 DIAGNOSIS — Z78.0 ENCOUNTER FOR OSTEOPOROSIS SCREENING IN ASYMPTOMATIC POSTMENOPAUSAL PATIENT: ICD-10-CM

## 2025-03-21 DIAGNOSIS — Z13.820 ENCOUNTER FOR OSTEOPOROSIS SCREENING IN ASYMPTOMATIC POSTMENOPAUSAL PATIENT: ICD-10-CM

## 2025-03-21 DIAGNOSIS — R42 LIGHTHEADEDNESS: ICD-10-CM

## 2025-03-21 PROBLEM — I10 ESSENTIAL HYPERTENSION: Status: ACTIVE | Noted: 2025-03-21

## 2025-03-21 PROCEDURE — 99214 OFFICE O/P EST MOD 30 MIN: CPT | Performed by: NURSE PRACTITIONER

## 2025-03-21 PROCEDURE — 90471 IMMUNIZATION ADMIN: CPT

## 2025-03-21 PROCEDURE — 90677 PCV20 VACCINE IM: CPT

## 2025-03-21 PROCEDURE — 93000 ELECTROCARDIOGRAM COMPLETE: CPT | Performed by: NURSE PRACTITIONER

## 2025-03-21 NOTE — PROGRESS NOTES
Name: Saloni Moy      : 1960      MRN: [id number]  Encounter Provider: KATIANA Sow  Encounter Date: 3/21/2025   Encounter department: [facility]    Assessment & Plan  Encounter for immunization    Orders:    Pneumococcal Conjugate Vaccine 20-valent (Pcv20)    Encounter for osteoporosis screening in asymptomatic postmenopausal patient    Orders:    DXA bone density spine hip and pelvis; Future    Palpitation    Orders:    Ambulatory Referral to Cardiology; Future    POCT ECG    Lightheadedness    Orders:    Ambulatory Referral to Cardiology; Future    POCT ECG      Depression Screening and Follow-up Plan: Patient was screened for depression during today's encounter. They screened negative with a PHQ-2 score of 0.          History of Present Illness     Here for f/u to chronic medical conditions  Feeling well  Only using xamax as needed  Not daily   Feeling lightheaded more frequently  Worse in the am  Then gets shaky  Having intermittent LLQ abd pain and generalized abd pain  To see dr twin RODRIGUEZ  To see dr alicea rheumatology next week  Saw heme/onc in 2023 for elevated b12 and abnormal cbc  All testing was stable and ok, see report  Pdmp checked and appropriate          Review of Systems   Constitutional:  Positive for fatigue. Negative for activity change, appetite change and fever.   HENT:  Negative for congestion, postnasal drip and rhinorrhea.    Eyes:  Negative for photophobia and visual disturbance.   Respiratory:  Negative for cough, shortness of breath and wheezing.    Cardiovascular:  Positive for palpitations. Negative for chest pain.   Gastrointestinal:  Positive for abdominal pain. Negative for constipation, diarrhea, nausea and vomiting.   Genitourinary:  Negative for dysuria and frequency.   Musculoskeletal:  Negative for arthralgias and myalgias.   Skin:  Negative for rash.   Neurological:  Positive for light-headedness. Negative for headaches.    Hematological:  Negative for adenopathy.   Psychiatric/Behavioral:  Positive for sleep disturbance. Negative for dysphoric mood and suicidal ideas. The patient is nervous/anxious.      Past Medical History:   Diagnosis Date    Adjustment disorder with anxiety     last assessed - [id number]    Anemia     ((Pt Qnr Sub: no))     Anxiety     ((Pt Qnr Sub: yed))     Arthritis 2021    Constipation 2021    Depression     Diverticulitis     Diverticulitis 2021    Diverticulitis of colon 2020    ((Pt Qnr Sub: yes))     Diverticulitis of large intestine with abscess without bleeding 2020    Ear problems     Elevated platelet count 2022    Elevated vitamin B12 level 2022    Fatigue     Generalized abdominal pain 2021    GERD (gastroesophageal reflux disease)     ((Pt Qnr Sub: yes))     Headache(784.0)     History of total hysterectomy     History of transfusion     Hypertension 3/12/2021    Hypertension 2021    Ingrown toenail     last assessed - [id number]    Migraine     Miscarriage     Otitis media     Shingles 2021    Skin tag     Stomach disorder     Thalassemia     Tonsillitis      Past Surgical History:   Procedure Laterality Date    APPENDECTOMY      ((Pt Qnr Sub: yes))     BOWEL RESECTION  2021     SECTION      ((Pt Qnr Sub: yes))      SECTION      CHOLECYSTECTOMY      ((Pt Qnr Sub: yes))     COLONOSCOPY      FOOT SURGERY  2011    Neuroma both feet    GALLBLADDER SURGERY      OOPHORECTOMY Bilateral     WI ARTHRP INTERCARPAL/CARP/MTCRPL JT INTERPOSITION Right 2024    Procedure: Thumb carpometacarpal joint arthroplasty with TightRope Suspension;  Surgeon: Eric Connolly MD;  [facility];  Service: Orthopedics    WI LAPAROSCOPY COLECTOMY PARTIAL W/ANASTOMOSIS N/A 2021    Procedure: RESECTION COLON SIGMOID LAPAROSCOPIC HAND-ASSISTED;  Surgeon: Kee Mace MD;  [facility];  Service:  General    TONSILLECTOMY AND ADENOIDECTOMY      TOTAL ABDOMINAL HYSTERECTOMY      ((Pt Qnr Sub: yes))     UPPER GASTROINTESTINAL ENDOSCOPY       Family History   Problem Relation Age of Onset    Anxiety disorder Mother         Anxiety    Depression Mother     Osteoporosis Mother     Hypertension Father     Anxiety disorder Sister         Anxiety    Depression Sister     Melanoma Sister     Cancer Sister     No Known Problems Sister     No Known Problems Sister     Anxiety disorder Daughter         Anxiety    Depression Daughter     No Known Problems Maternal Grandmother     No Known Problems Maternal Grandfather     No Known Problems Paternal Grandmother     No Known Problems Paternal Grandfather     No Known Problems Son     Heart disease Maternal Aunt     No Known Problems Maternal Aunt     No Known Problems Maternal Aunt     Heart disease Maternal Uncle     No Known Problems Paternal Aunt     No Known Problems Paternal Aunt      Social History     Tobacco Use    Smoking status: Never    Smokeless tobacco: Never   Vaping Use    Vaping status: Never Used   Substance and Sexual Activity    Alcohol use: Yes     Alcohol/week: 1.0 standard drink of alcohol     Types: 1 Glasses of wine per week     Comment: occasional alcohol use    Drug use: No    Sexual activity: Not Currently     Partners: Male     Birth control/protection: Post-menopausal, Surgical     Comment: Hysterectomy 1988     Current Outpatient Medications on File Prior to Visit   Medication Sig    ALPRAZolam (XANAX) 0.25 mg tablet Take 1 tablet (0.25 mg total) by mouth daily at bedtime as needed for anxiety    amLODIPine (NORVASC) 2.5 mg tablet Take 1 tablet (2.5 mg total) by mouth daily    Diclofenac Sodium (VOLTAREN) 1 % Apply 2 g topically 4 (four) times a day    estradiol (ESTRACE VAGINAL) 0.1 mg/g vaginal cream Insert 1 g into the vagina daily    fluticasone (FLONASE) 50 mcg/act nasal spray 2 sprays into each nostril daily    [DISCONTINUED] celecoxib  (CeleBREX) 100 mg capsule Take 1 capsule (100 mg total) by mouth 2 (two) times a day    [DISCONTINUED] dexlansoprazole (Dexilant) 30 MG capsule Take 30 mg by mouth daily    [DISCONTINUED] methylPREDNISolone 4 MG tablet therapy pack Use as directed on package     Allergies   Allergen Reactions    Benadryl [Diphenhydramine] Tachycardia     Immunization History   Administered Date(s) Administered    COVID-19 PFIZER VACCINE 0.3 ML IM 12/23/2020, 12/23/2020, 01/14/2021, 01/14/2021, 01/08/2022, 01/08/2022    INFLUENZA 10/01/2024    Influenza, recombinant, quadrivalent,injectable, preservative free 10/05/2018    Pneumococcal Conjugate Vaccine 20-valent (Pcv20), Polysace 03/21/2025    Tdap 08/14/2023, 08/14/2023     Objective   /80 (BP Location: Left arm, Patient Position: Sitting, Cuff Size: Standard)   Pulse 75   Temp 97.9 °F (36.6 °C) (Tympanic)   Resp 17   Ht 5' (1.524 m)   Wt 59.9 kg (132 lb)   SpO2 99%   BMI 25.78 kg/m²     Physical Exam  Vitals and nursing note reviewed.   Constitutional:       Appearance: Normal appearance.   HENT:      Head: Normocephalic and atraumatic.      Right Ear: Tympanic membrane, ear canal and external ear normal.      Left Ear: Tympanic membrane, ear canal and external ear normal.      Nose: Nose normal.      Mouth/Throat:      Mouth: Mucous membranes are moist.   Eyes:      Conjunctiva/sclera: Conjunctivae normal.   Cardiovascular:      Rate and Rhythm: Normal rate and regular rhythm.      Heart sounds: Normal heart sounds.   Pulmonary:      Effort: Pulmonary effort is normal.      Breath sounds: Normal breath sounds.   Abdominal:      General: Abdomen is flat.      Tenderness: There is abdominal tenderness in the left lower quadrant.      Comments: Mild tenderness LLQ     Musculoskeletal:         General: Normal range of motion.      Cervical back: Normal range of motion and neck supple.   Skin:     General: Skin is warm and dry.      Capillary Refill: Capillary refill  takes less than 2 seconds.   Neurological:      General: No focal deficit present.      Mental Status: She is alert and oriented to person, place, and time.   Psychiatric:         Mood and Affect: Mood normal.         Behavior: Behavior normal.         Thought Content: Thought content normal.         Judgment: Judgment normal.     BMI Counseling: Body mass index is 25.78 kg/m². The BMI is above normal. Nutrition recommendations include reducing portion sizes, decreasing overall calorie intake, 3-5 servings of fruits/vegetables daily, reducing fast food intake, consuming healthier snacks, decreasing soda and/or juice intake, moderation in carbohydrate intake, increasing intake of lean protein, reducing intake of saturated fat and trans fat, and reducing intake of cholesterol. Exercise recommendations include moderate aerobic physical activity for 150 minutes/week, exercising 3-5 times per week, and strength training exercises.

## 2025-03-24 ENCOUNTER — OFFICE VISIT (OUTPATIENT)
Age: 65
End: 2025-03-24
Payer: COMMERCIAL

## 2025-03-24 VITALS
SYSTOLIC BLOOD PRESSURE: 130 MMHG | HEART RATE: 70 BPM | BODY MASS INDEX: 27.01 KG/M2 | OXYGEN SATURATION: 100 % | HEIGHT: 60 IN | WEIGHT: 137.6 LBS | DIASTOLIC BLOOD PRESSURE: 74 MMHG

## 2025-03-24 DIAGNOSIS — M35.00 SJOGREN'S SYNDROME WITHOUT EXTRAGLANDULAR INVOLVEMENT (HCC): ICD-10-CM

## 2025-03-24 DIAGNOSIS — R53.82 CHRONIC FATIGUE: ICD-10-CM

## 2025-03-24 DIAGNOSIS — M25.571 SINUS TARSI SYNDROME OF BOTH ANKLES: ICD-10-CM

## 2025-03-24 DIAGNOSIS — R76.8 ANA POSITIVE: ICD-10-CM

## 2025-03-24 DIAGNOSIS — R10.32 LEFT LOWER QUADRANT ABDOMINAL PAIN: ICD-10-CM

## 2025-03-24 DIAGNOSIS — M79.18 DIFFUSE MYOFASCIAL PAIN SYNDROME: ICD-10-CM

## 2025-03-24 DIAGNOSIS — M18.0 PRIMARY OSTEOARTHRITIS OF BOTH FIRST CARPOMETACARPAL JOINTS: ICD-10-CM

## 2025-03-24 DIAGNOSIS — M25.572 SINUS TARSI SYNDROME OF BOTH ANKLES: ICD-10-CM

## 2025-03-24 DIAGNOSIS — M35.9 UNDIFFERENTIATED CONNECTIVE TISSUE DISEASE (HCC): Primary | ICD-10-CM

## 2025-03-24 PROCEDURE — 99214 OFFICE O/P EST MOD 30 MIN: CPT | Performed by: INTERNAL MEDICINE

## 2025-03-24 RX ORDER — DEXLANSOPRAZOLE 30 MG/1
30 CAPSULE, DELAYED RELEASE ORAL DAILY
COMMUNITY

## 2025-03-24 NOTE — PATIENT INSTRUCTIONS
Start the physical therapy at [facility] because they have a therapeutic pool with a temperature 93 degrees.  I did ask them to do both aqua therapy and land therapy.  You should look for a warm water exercise program that you can continue after discharge.  The local [facility]'s do have a water exercise program for arthritis and fibromyalgia, however the water is 84 degrees.  [facility] in [place] does have a water fitness program for fibromyalgia and arthritis that is $42 a month.  You can go 2 or 3 times a week however.  Since you are having some GI symptoms, I do not want to give you medication until we have worked out what is going on with your GI tract.  Call Mel to ask her if she thinks you need a CAT scan.  I would go on a very strict anti-inflammatory diet at this time.  That may help your GI symptoms as well.  You may have irritable bowel, and the full anti-inflammatory diet really helps to take all of the symptoms away.  It also helps with generalized aches and pains and fatigue.  Get the lab work that I ordered completed.  I will notify you of the results.  Call the office in about 4 to 6 weeks and let me know how you are feeling.  If you are still having total body pain, I will start low-dose Cymbalta which does block pain pathways and can be of great benefit.

## 2025-03-24 NOTE — PROGRESS NOTES
Assessment/Plan:    Undifferentiated connective tissue disease (HCC)  History positive RADHA with chronic sicca symptoms with workup from June 2023 significant for low-grade positive CCP of 44 with complements normal, and double-stranded DNA antibodies negative.  Sjogren's antibodies negative.  Rule out Sjogren syndrome primary versus secondary.  Rule out less likely developing connective tissue disease.  No evidence clinically for inflammatory arthropathy.  I ordered lupus Avise diagnostic connective tissue disease testing for further evaluation of sicca symptoms, positive CCP, and history of positive RADHA, at the last office visit, however, it was not yet drawn.  I did encourage her to have this done in the next few weeks.  Will continue symptomatic treatment for sicca symptoms.  Encourage vigilant dental hygiene and follow-up with dentist every 6 months or sooner if needed.  Follow-up 4 months or sooner if needed.  Will notify the patient of results of her workup when available.  Discussed in detail with patient.    Sjogren's syndrome without extraglandular involvement (HCC)  Chronic sicca symptoms with dry eyes treated with refresh eyedrops.  She does note dry mouth but dental exams have been stable with no new decay.  She has an upper partial.  Encourage vigilant dental hygiene and dental follow-ups every 6 months or sooner if needed.  Sjogren's antibodies were negative in the past, which will be repeated with serologies with the lupus Avise testing.  Continue to monitor.    Primary osteoarthritis of both first carpometacarpal joints  Bilateral first CMC osteoarthritis, status post right first CMC arthroplasty last January with benefit, with interphalangeal osteoarthritis and scattered DIP and PIP joints.  No evidence for inflammatory arthropathy. She does use Celebrex to treat her joint symptoms.  I have suggested paraffin wax bath for symptomatic relief.  She has had formal occupational therapy postop and has  been given an exercise and stretching program by the occupational therapist.  She had recent left first CMC joint injection on 12/26/2024 and is being considered for left first CMC arthroplasty in the future.  Follow-up hand surgeon.  Continue to monitor.    Sinus tarsi syndrome of both ankles  History of bilateral ankle pain with tenderness bilateral sinus tarsi.  Tenderness noted bilateral peroneal tendons left greater than right.  Questionable Arias's neuromas.  She was to start physical therapy for peroneal tendinitis after the last office visit, however, she was treated for a closed nondisplaced fracture of the left cuboid with a cam walker, and never went for physical therapy as ordered.  She [facility] not [facility] gone to [facility]. I have again suggested metatarsal bars in view of her mechanical foot deformities and I have referred [facility] to [facility].  Continue to monitor.  Follow-up podiatry.    Diffuse myofascial pain syndrome  Widespread musculoskeletal pain, with sleep disturbance, and chronic fatigue.  Suspect myofascial pain syndrome.  I have referred her for physical therapy at [facility] in view of the therapeutic pool, and I also suggested that she have land therapy.  I told her she should look for a water exercise program that she can continue after discharge from formal physical therapy.  I suggested good [facility] in view of their excellent program.  I am not going to give her any medication at this time in view of her current GI symptoms, but she may be a candidate for Cymbalta in the future to help with chronic pain.  I did encourage her to be completely consistent with the full anti-inflammatory diet, as I suspect it will help her GI symptoms as well.  I told her to call the office in 4 to 6 weeks, and if she is still having myofascial symptoms, I will consider low-dose Cymbalta as long as she has no symptoms referable to diverticulitis.  Follow-up 4 months or sooner if  needed.    Chronic fatigue  Chronic fatigue with sleep disturbance with insomnia and early awakening.  Probable myofascial component in view of polyarthralgias.  I encouraged her to be consistent with the full food elimination/anti-inflammatory diet which likely would be of benefit for myofascial symptoms as well as her GI symptoms.  It would also reduce insulin resistance and promote weight reduction.  It may improve her fatigue as well.  She does have a book reference for this.  I encouraged the patient to discuss a formal sleep consult with primary care.  Continue to monitor.    Abdominal pain  Recent left lower abdominal pain.  In view of her history of diverticulitis with prior sigmoid resection in 2021, I suggested that she contact primary care to see if she should have a CAT scan to rule out diverticulitis.  Continue to monitor.         Problem List Items Addressed This Visit     Abdominal pain    Recent left lower abdominal pain.  In view of her history of diverticulitis with prior sigmoid resection in 2021, I suggested that she contact primary care to see if she should have a CAT scan to rule out diverticulitis.  Continue to monitor.         Chronic fatigue    Chronic fatigue with sleep disturbance with insomnia and early awakening.  Probable myofascial component in view of polyarthralgias.  I encouraged her to be consistent with the full food elimination/anti-inflammatory diet which likely would be of benefit for myofascial symptoms as well as her GI symptoms.  It would also reduce insulin resistance and promote weight reduction.  It may improve her fatigue as well.  She does have a book reference for this.  I encouraged the patient to discuss a formal sleep consult with primary care.  Continue to monitor.         RADHA positive    Undifferentiated connective tissue disease (HCC) - Primary    History positive RADHA with chronic sicca symptoms with workup from June 2023 significant for low-grade positive CCP of  44 with complements normal, and double-stranded DNA antibodies negative.  Sjogren's antibodies negative.  Rule out Sjogren syndrome primary versus secondary.  Rule out less likely developing connective tissue disease.  No evidence clinically for inflammatory arthropathy.  I ordered lupus Avise diagnostic connective tissue disease testing for further evaluation of sicca symptoms, positive CCP, and history of positive RADHA, at the last office visit, however, it was not yet drawn.  I did encourage her to have this done in the next few weeks.  Will continue symptomatic treatment for sicca symptoms.  Encourage vigilant dental hygiene and follow-up with dentist every 6 months or sooner if needed.  Follow-up 4 months or sooner if needed.  Will notify the patient of results of her workup when available.  Discussed in detail with patient.         Relevant Orders    MISCELLANEOUS LAB TEST    C-reactive protein    Sedimentation rate, automated    CBC and differential    Comprehensive metabolic panel    Urinalysis with microscopic    Primary osteoarthritis of both first carpometacarpal joints    Bilateral first CMC osteoarthritis, status post right first CMC arthroplasty last January with benefit, with interphalangeal osteoarthritis and scattered DIP and PIP joints.  No evidence for inflammatory arthropathy. She does use Celebrex to treat her joint symptoms.  I have suggested paraffin wax bath for symptomatic relief.  She has had formal occupational therapy postop and has been given an exercise and stretching program by the occupational therapist.  She had recent left first CMC joint injection on 12/26/2024 and is being considered for left first CMC arthroplasty in the future.  Follow-up hand surgeon.  Continue to monitor.         Sinus tarsi syndrome of both ankles    History of bilateral ankle pain with tenderness bilateral sinus tarsi.  Tenderness noted bilateral peroneal tendons left greater than right.  Questionable  Arias's neuromas.  She was to start physical therapy for peroneal tendinitis after the last office visit, however, she was treated for a closed nondisplaced fracture of the left cuboid with a cam walker, and never went for physical therapy as ordered.  She [facility] not [facility]. I have again suggested metatarsal bars in view of her mechanical foot deformities and I have [facility].  Continue to monitor.  Follow-up podiatry.         Sjogren's syndrome without extraglandular involvement (HCC)    Chronic sicca symptoms with dry eyes treated with refresh eyedrops.  She does note dry mouth but dental exams have been stable with no new decay.  She has an upper partial.  Encourage vigilant dental hygiene and dental follow-ups every 6 months or sooner if needed.  Sjogren's antibodies were negative in the past, which will be repeated with serologies with the lupus Avise testing.  Continue to monitor.         Diffuse myofascial pain syndrome    Widespread musculoskeletal pain, with sleep disturbance, and chronic fatigue.  Suspect myofascial pain syndrome.  I have referred her for physical therapy at [facility] in view of the therapeutic pool, and I also suggested that she have land therapy.  I told her she should look for a water exercise program that she can continue after discharge from formal physical therapy.  I suggested [facility] in view of their excellent program.  I am not going to give her any medication at this time in view of her current GI symptoms, but she may be a candidate for Cymbalta in the future to help with chronic pain.  I did encourage her to be completely consistent with the full anti-inflammatory diet, as I suspect it will help her GI symptoms as well.  I told her to call the office in 4 to 6 weeks, and if she is still having myofascial symptoms, I will consider low-dose Cymbalta as long as she has no symptoms referable to diverticulitis.  Follow-up 4  months or sooner if needed.         Relevant Orders    MISCELLANEOUS LAB TEST    C-reactive protein    Sedimentation rate, automated    CBC and differential    Comprehensive metabolic panel    Urinalysis with microscopic    Ambulatory Referral to Physical Therapy             Reviewed records, labs, and imaging with the patient in detail.  Counseled patient.  Discussion regarding my findings and recommendations.  Office visit with documentation 35 min.    Subjective:      Patient ID: Saloni Moy is a 64 y.o. female.    64-year-old female, who had been evaluated by [facility] rheumatologist for history of positive RADHA last year, who presents for transfer of care for ongoing evaluation of positive RADHA, history of sicca symptoms, and ongoing joint symptoms, with pain first CMC joints, history of carpal tunnel symptoms, and bilateral foot pain left greater than right, with ankle swelling.  She has been evaluated by orthopedic foot and ankle surgeon who did diagnose tendinitis, and follow up evaluation by podiatrist who diagnosed left peroneal tendinitis and referred her for physical therapy.  Subsequent to that, the patient was diagnosed with closed nondisplaced fracture of the left cuboid and was treated with cam walker. Patient is status post right thumb CMC arthroplasty with trapeziectomy and tight rope suspension in January 2024 with good post op benefit. She does note weather related arthralgias in multiple of her joints including hands, elbows, knees, and ankles.  She has been using Celebrex 200 mg daily.  Patient does have chronic sicca symptoms with dry eyes for which she uses refresh eyedrops.  Dry mouth is not significantly bothersome and dental exams have been good with no new dental decay.  She does have a history of a partial on the top years ago.  She has history of frontal headaches with photophobia and nausea following the headache.  She has noted floaters at work.  Questionable ocular migraines.  She  has a history of congestion and runny nose.  She does have seasonal allergies and in the past had allergy shots.  She has had loose BMs and abdominal bloating.  She has urinary stress incontinence.  She does have sleep disturbance with early awakening and insomnia as well as chronic fatigue.  She notes some daytime somnolence.  She has tried gabapentin in the past but was intolerant due to confusion.  She is  3 para 2 with 1 first trimester miscarriage.  Significant past medical history includes bowel resection for history diverticulitis, GERD, total hysterectomy, and hypertension.    Interval medical history significant for widespread musculoskeletal pain with increasing fatigue and generalized malaise.  She never went to physical therapy as suggested at the initial office visit with me last August.  She did try the anti-inflammatory diet, with elimination of many inflammatory foods, and has noted that she no longer has loose stools, in fact, she has some difficulty having a bowel movement on a daily basis.  Bloating has improved as well.  She did have left lower abdominal pain in the recent past.  Weight has fluctuated and has not been consistent.  She is not drinking a lot of water.  She feels that she shakes frequently.  She chronically feels sick.  Patient tested positive for COVID on 2024.  She has noted tachycardia since having COVID in addition to feeling lightheaded, with vertiginous symptoms, and has noticed slight chest pain.  She was just seen by primary care on 3/23/2025 and was referred to cardiology for the symptoms.  There is a question of ocular migraines.  She continues to note sleep disturbance with early awakening and chronic fatigue, but states that she has less daytime somnolence.  Patient had treatment for sinusitis in February.  Prior to that she had gastroenteritis.  She had recent nasal papilloma excision with biopsy inconclusive.  She has been using refresh for dry eye  symptoms.  She is intolerant to gabapentin and Lyrica which were tried in the past.  She did stop Xanax.  Patient did have injection of her left first CMC and right index finger PIP joints in December per orthopedic surgeon and is being considered for a left thumb CMC arthroplasty.    Lab work dated 1/22/2025 significant for CBC remarkable for hypochromic microcytic indices secondary to beta thalassemia minor.  Platelet count 394.  CRP elevated at 4.1.  Sed rate 20.  Calcium 9.8.  Estimated GFR 68.  Alk phos 117.  TSH 1.110.  Lupus Avise ordered but not done.  Review of lab work dated 6/2/2023 significant for Sjogren's antibodies negative.  CCP positive at 44.  C3 and C4 complements normal.  Double-stranded DNA antibodies negative.  Sed rate 13.  CRP 3.3.  Hemoglobin 11.7 with hematocrit 38.6 with hypochromic microcytic indices secondary to beta thalassemia minor.  Creatinine 0.82 with estimated GFR 75.  Calcium 9.4.  LDH slightly low at 138.  Urine protein to creatinine ratio normal.    Hand films from 2/13/2023 significant for first CMC osteoarthritis with OA PIP and DIP joints.  These were personally reviewed by me.        Allergies  Allergies   Allergen Reactions   • Benadryl [Diphenhydramine] Tachycardia       Home Medications    Current Outpatient Medications:   •  ALPRAZolam (XANAX) 0.25 mg tablet, Take 1 tablet (0.25 mg total) by mouth daily at bedtime as needed for anxiety, Disp: 30 tablet, Rfl: 5  •  amLODIPine (NORVASC) 2.5 mg tablet, Take 1 tablet (2.5 mg total) by mouth daily, Disp: 30 tablet, Rfl: 5  •  dexlansoprazole (DEXILANT) 30 MG capsule, Take 30 mg by mouth daily, Disp: , Rfl:   •  Diclofenac Sodium (VOLTAREN) 1 %, Apply 2 g topically 4 (four) times a day, Disp: 100 g, Rfl: 6  •  estradiol (ESTRACE VAGINAL) 0.1 mg/g vaginal cream, Insert 1 g into the vagina daily, Disp: 42.5 g, Rfl: 3  •  fluticasone (FLONASE) 50 mcg/act nasal spray, 2 sprays into each nostril daily, Disp: 15.8 mL, Rfl:  3    Current Facility-Administered Medications:   •  betamethasone acetate-betamethasone sodium phosphate (CELESTONE) injection 3 mg, 3 mg, Intra-articular, , , 3 mg at 24 1115  •  betamethasone acetate-betamethasone sodium phosphate (CELESTONE) injection 3 mg, 3 mg, Intra-articular, , , 3 mg at 24 1115  •  ropivacaine (NAROPIN) injection 0.5 mL, 0.5 mL, Infiltration, , , 0.5 mL at 24 1115  •  ropivacaine (NAROPIN) injection 0.5 mL, 0.5 mL, Infiltration, , , 0.5 mL at 24 1115    Past Medical History  Past Medical History:   Diagnosis Date   • Adjustment disorder with anxiety     last assessed - 2016   • Anemia     ((Pt Qnr Sub: no))    • Anxiety     ((Pt Qnr Sub: yed))    • Arthritis 2021   • Constipation 2021   • Depression    • Diverticulitis    • Diverticulitis 2021   • Diverticulitis of colon 2020    ((Pt Qnr Sub: yes))    • Diverticulitis of large intestine with abscess without bleeding 2020   • Ear problems    • Elevated platelet count 2022   • Elevated vitamin B12 level 2022   • Fatigue    • Generalized abdominal pain 2021   • GERD (gastroesophageal reflux disease)     ((Pt Qnr Sub: yes))    • Headache(784.0)    • History of total hysterectomy    • History of transfusion    • Hypertension 3/12/2021   • Hypertension 2021   • Ingrown toenail     last assessed - [id number]   • Migraine    • Miscarriage    • Otitis media    • Shingles 2021   • Skin tag    • Stomach disorder    • Thalassemia    • Tonsillitis        Past Surgical History   Past Surgical History:   Procedure Laterality Date   • APPENDECTOMY      ((Pt Qnr Sub: yes))    • BOWEL RESECTION  2021   •  SECTION      ((Pt Qnr Sub: yes))    •  SECTION     • CHOLECYSTECTOMY      ((Pt Qnr Sub: yes))    • COLONOSCOPY     • FOOT SURGERY      Neuroma both feet   • GALLBLADDER SURGERY     • OOPHORECTOMY Bilateral    • AL ARTHRP  INTERCARPAL/CARP/MTCRPL JT INTERPOSITION Right 01/04/2024    Procedure: Thumb carpometacarpal joint arthroplasty with TightRope Suspension;  Surgeon: Eric Connolly MD;  Location: [facility];  Service: Orthopedics   • HI LAPAROSCOPY COLECTOMY PARTIAL W/ANASTOMOSIS N/A 11/17/2021    Procedure: RESECTION COLON SIGMOID LAPAROSCOPIC HAND-ASSISTED;  Surgeon: Kee Mace MD;  Location: [facility];  Service: General   • TONSILLECTOMY AND ADENOIDECTOMY     • TOTAL ABDOMINAL HYSTERECTOMY      ((Pt Qnr Sub: yes))    • UPPER GASTROINTESTINAL ENDOSCOPY         Family History   Family History   Problem Relation Age of Onset   • Anxiety disorder Mother         Anxiety   • Depression Mother    • Osteoporosis Mother    • Hypertension Father    • Anxiety disorder Sister         Anxiety   • Depression Sister    • Melanoma Sister    • Cancer Sister    • No Known Problems Sister    • No Known Problems Sister    • Anxiety disorder Daughter         Anxiety   • Depression Daughter    • No Known Problems Maternal Grandmother    • No Known Problems Maternal Grandfather    • No Known Problems Paternal Grandmother    • No Known Problems Paternal Grandfather    • No Known Problems Son    • Heart disease Maternal Aunt    • No Known Problems Maternal Aunt    • No Known Problems Maternal Aunt    • Heart disease Maternal Uncle    • No Known Problems Paternal Aunt    • No Known Problems Paternal Aunt        The following portions of the patient's history were reviewed and updated as appropriate: allergies, current medications, past family history, past medical history, past social history, past surgical history, and problem list.    Review of Systems   Constitutional:  Positive for fatigue. Negative for chills and fever.   HENT:  Positive for congestion and rhinorrhea. Negative for ear pain and sore throat.         Dry mouth   Eyes:  Positive for photophobia. Negative for pain and visual disturbance.        Dry eyes using Refresh eye  drops   Respiratory:  Negative for cough and shortness of breath.    Cardiovascular:  Negative for chest pain and palpitations.   Gastrointestinal:  Positive for diarrhea (resolved). Negative for abdominal pain and vomiting.        Abd bloating   Genitourinary:  Negative for dysuria and hematuria.        Urinary stress incontinence   Musculoskeletal:  Positive for arthralgias and joint swelling. Negative for back pain.   Skin:  Negative for color change and rash.   Neurological:  Positive for headaches. Negative for seizures and syncope.   Psychiatric/Behavioral:  Positive for sleep disturbance.    All other systems reviewed and are negative.        Objective:      /74   Pulse 70   Ht 5' (1.524 m)   Wt 62.4 kg (137 lb 9.6 oz)   SpO2 100%   BMI 26.87 kg/m²          Physical Exam  Vitals reviewed.   Constitutional:       Appearance: Normal appearance.   HENT:      Head: Normocephalic.      Nose:      Comments: Nose and throat unremarkable.  Eyes:      Extraocular Movements: Extraocular movements intact.   Neck:      Comments: Without masses, thyromegaly, lymphadenopathy  Cardiovascular:      Rate and Rhythm: Regular rhythm.   Pulmonary:      Breath sounds: Normal breath sounds.   Abdominal:      Palpations: Abdomen is soft.   Musculoskeletal:      Cervical back: Neck supple.      Comments: Neck mildly decreased lateral flexion.  Shoulders elbows and wrists full range of motion.  Tinel's positive bilaterally.  Hands right first CMC surgical scar noted.  Left hand squaring first carpometacarpal joint.  Bilateral scattered Heberden's with questionable early Sergio's nodes.  No synovitis appreciated.  Straight leg raising negative bilaterally.  Schober's negative.  No SI joint tenderness appreciated.  Hips full range of motion.  Knees full range of motion with patellofemoral crepitus.  Ankles slight tenderness bilateral sinus tarsi.  Feet rearfoot hyperpronation with early hallux valgus deformities and  cock-up toe deformities.  Tenderness along peroneal tendons left greater than right.  No tenderness plantar fascia or Achilles tendon insertions.  Questionable Praful sign's with tenderness left foot webspace between second and third toes.  No synovitis appreciated.   Skin:     General: Skin is warm.   Neurological:      General: No focal deficit present.               This note was written in part using the assistance of the [employer] [id number] microphone system. Those portions using this system have been dictated and not read.

## 2025-03-26 PROBLEM — M79.18 DIFFUSE MYOFASCIAL PAIN SYNDROME: Status: ACTIVE | Noted: 2025-03-26

## 2025-03-26 NOTE — ASSESSMENT & PLAN NOTE
Chronic fatigue with sleep disturbance with insomnia and early awakening.  Probable myofascial component in view of polyarthralgias.  I encouraged her to be consistent with the full food elimination/anti-inflammatory diet which likely would be of benefit for myofascial symptoms as well as her GI symptoms.  It would also reduce insulin resistance and promote weight reduction.  It may improve her fatigue as well.  She does have a book reference for this.  I encouraged the patient to discuss a formal sleep consult with primary care.  Continue to monitor.

## 2025-03-26 NOTE — ASSESSMENT & PLAN NOTE
Recent left lower abdominal pain.  In view of her history of diverticulitis with prior sigmoid resection in 2021, I suggested that she contact primary care to see if she should have a CAT scan to rule out diverticulitis.  Continue to monitor.

## 2025-03-26 NOTE — ASSESSMENT & PLAN NOTE
Chronic sicca symptoms with dry eyes treated with refresh eyedrops.  She does note dry mouth but dental exams have been stable with no new decay.  She has an upper partial.  Encourage vigilant dental hygiene and dental follow-ups every 6 months or sooner if needed.  Sjogren's antibodies were negative in the past, which will be repeated with serologies with the lupus Avise testing.  Continue to monitor.

## 2025-03-26 NOTE — ASSESSMENT & PLAN NOTE
History of bilateral ankle pain with tenderness bilateral sinus tarsi.  Tenderness noted bilateral peroneal tendons left greater than right.  Questionable Arias's neuromas.  She was to start physical therapy for peroneal tendinitis after the last office visit, however, she was treated for a closed nondisplaced fracture of the left cuboid with a cam walker, and never went for physical therapy as ordered.  She [facility] not [facility] to [facility]. I have again suggested metatarsal bars in view of her mechanical foot deformities and I have referred [facility] to [facility].  Continue to monitor.  Follow-up podiatry.

## 2025-03-26 NOTE — ASSESSMENT & PLAN NOTE
History positive RADHA with chronic sicca symptoms with workup from June 2023 significant for low-grade positive CCP of 44 with complements normal, and double-stranded DNA antibodies negative.  Sjogren's antibodies negative.  Rule out Sjogren syndrome primary versus secondary.  Rule out less likely developing connective tissue disease.  No evidence clinically for inflammatory arthropathy.  I ordered lupus [facility] diagnostic connective tissue disease testing for further evaluation of sicca symptoms, positive CCP, and history of positive RADHA, at the last office visit, however, it was not yet drawn.  I did encourage her to have this done in the next few weeks.  Will continue symptomatic treatment for sicca symptoms.  Encourage vigilant dental hygiene and follow-up with dentist every 6 months or sooner if needed.  Follow-up 4 months or sooner if needed.  Will notify the patient of results of her workup when available.  Discussed in detail with patient.

## 2025-03-26 NOTE — ASSESSMENT & PLAN NOTE
Widespread musculoskeletal pain, with sleep disturbance, and chronic fatigue.  Suspect myofascial pain syndrome.  I have referred her for physical therapy at [facility] in view of the therapeutic pool, and I also suggested that she have land therapy.  I told her she should look for a water exercise program that she can continue after discharge from formal physical therapy.  I suggested good [facility] in view of their excellent program.  I am not going to give her any medication at this time in view of her current GI symptoms, but she may be a candidate for Cymbalta in the future to help with chronic pain.  I did encourage her to be completely consistent with the full anti-inflammatory diet, as I suspect it will help her GI symptoms as well.  I told her to call the office in 4 to 6 weeks, and if she is still having myofascial symptoms, I will consider low-dose Cymbalta as long as she has no symptoms referable to diverticulitis.  Follow-up 4 months or sooner if needed.

## 2025-03-26 NOTE — ASSESSMENT & PLAN NOTE
Bilateral first CMC osteoarthritis, status post right first CMC arthroplasty last January with benefit, with interphalangeal osteoarthritis and scattered DIP and PIP joints.  No evidence for inflammatory arthropathy. She does use Celebrex to treat her joint symptoms.  I have suggested paraffin wax bath for symptomatic relief.  She has had formal occupational therapy postop and has been given an exercise and stretching program by the occupational therapist.  She had recent left first CMC joint injection on 12/26/2024 and is being considered for left first CMC arthroplasty in the future.  Follow-up hand surgeon.  Continue to monitor.

## 2025-04-01 ENCOUNTER — OFFICE VISIT (OUTPATIENT)
Dept: OBGYN CLINIC | Facility: MEDICAL CENTER | Age: 65
End: 2025-04-01
Payer: COMMERCIAL

## 2025-04-01 VITALS — HEIGHT: 60 IN | WEIGHT: 133 LBS | BODY MASS INDEX: 26.11 KG/M2

## 2025-04-01 DIAGNOSIS — M18.12 ARTHRITIS OF CARPOMETACARPAL (CMC) JOINT OF LEFT THUMB: Primary | ICD-10-CM

## 2025-04-01 PROCEDURE — 99213 OFFICE O/P EST LOW 20 MIN: CPT | Performed by: ORTHOPAEDIC SURGERY

## 2025-04-01 NOTE — PROGRESS NOTES
HAND & UPPER EXTREMITY OFFICE VISIT   Referred By:  No referring provider defined for this encounter.      Chief Complaint:     Left thumb pain    Surgery:  Right thumb carpometacarpal joint arthroplasty with trapeziectomy and Tightrope Suspension, DOS 01/04/2024.    History of Present Illness:   Last seen on 12/26/24 and had left thumb CMC CSI and right index PIP CSI.   She reports the right index finger and thumb are doing well without issues.   Her main concern today is continued pain in the left thumb. She denies any relief from the last injection. She has been applying topical Voltaren gel and oral ibuprofen as needed for pain control. She is ultimately interested in surgery but cannot more forward with surgery at this time due to financial limitations. She has been wearing a brace on the left hand but feels that the brace is too big and not providing adequate support.     Past Medical History:  Past Medical History:   Diagnosis Date    Adjustment disorder with anxiety     last assessed - [id number]    Anemia     ((Pt Qnr Sub: no))     Anxiety     ((Pt Qnr Sub: yed))     Arthritis July 2021    Constipation 08/04/2021    Depression     Diverticulitis     Diverticulitis 09/02/2021    Diverticulitis of colon 05/2020    ((Pt Qnr Sub: yes))     Diverticulitis of large intestine with abscess without bleeding 05/24/2020    Ear problems     Elevated platelet count 09/13/2022    Elevated vitamin B12 level 09/13/2022    Fatigue     Generalized abdominal pain 08/04/2021    GERD (gastroesophageal reflux disease) 2000    ((Pt Qnr Sub: yes))     Headache(784.0)     History of total hysterectomy     History of transfusion     Hypertension 3/12/2021    Hypertension 03/12/2021    Ingrown toenail     last assessed - [id number]    Migraine     Miscarriage 1985    Otitis media     Shingles August 2021    Skin tag     Stomach disorder 1989    Thalassemia     Tonsillitis      Past Surgical History:   Procedure Laterality Date     APPENDECTOMY      ((Pt Qnr Sub: yes))     BOWEL RESECTION  2021     SECTION  1981    ((Pt Qnr Sub: yes))      SECTION      CHOLECYSTECTOMY      ((Pt Qnr Sub: yes))     COLONOSCOPY      FOOT SURGERY  2011    Neuroma both feet    GALLBLADDER SURGERY      OOPHORECTOMY Bilateral     NJ ARTHRP INTERCARPAL/CARP/MTCRPL JT INTERPOSITION Right 2024    Procedure: Thumb carpometacarpal joint arthroplasty with TightRope Suspension;  Surgeon: Eric Connolly MD;  [facility];  Service: Orthopedics    NJ LAPAROSCOPY COLECTOMY PARTIAL W/ANASTOMOSIS N/A 2021    Procedure: RESECTION COLON SIGMOID LAPAROSCOPIC HAND-ASSISTED;  Surgeon: Kee Mace MD;  [facility];  Service: General    TONSILLECTOMY AND ADENOIDECTOMY      TOTAL ABDOMINAL HYSTERECTOMY      ((Pt Qnr Sub: yes))     UPPER GASTROINTESTINAL ENDOSCOPY       Family History   Problem Relation Age of Onset    Anxiety disorder Mother         Anxiety    Depression Mother     Osteoporosis Mother     Hypertension Father     Anxiety disorder Sister         Anxiety    Depression Sister     Melanoma Sister     Cancer Sister     No Known Problems Sister     No Known Problems Sister     Anxiety disorder Daughter         Anxiety    Depression Daughter     No Known Problems Maternal Grandmother     No Known Problems Maternal Grandfather     No Known Problems Paternal Grandmother     No Known Problems Paternal Grandfather     No Known Problems Son     Heart disease Maternal Aunt     No Known Problems Maternal Aunt     No Known Problems Maternal Aunt     Heart disease Maternal Uncle     No Known Problems Paternal Aunt     No Known Problems Paternal Aunt      Social History     Socioeconomic History    Marital status:      Spouse name: Not on file    Number of children: Not on file    Years of education: Not on file    Highest education level: Not on file   Occupational History    Occupation:  - [employer]    Tobacco Use    Smoking status: Never    Smokeless tobacco: Never   Vaping Use    Vaping status: Never Used   Substance and Sexual Activity    Alcohol use: Yes     Alcohol/week: 1.0 standard drink of alcohol     Types: 1 Glasses of wine per week     Comment: occasional alcohol use    Drug use: No    Sexual activity: Not Currently     Partners: Male     Birth control/protection: Post-menopausal, Surgical     Comment: Hysterectomy 1988   Other Topics Concern    Not on file   Social History Narrative    Coffee     Social Drivers of Health     Financial Resource Strain: Not on file   Food Insecurity: Not on file   Transportation Needs: Not on file   Physical Activity: Not on file   Stress: Not on file   Social Connections: Not on file   Intimate Partner Violence: Not on file   Housing Stability: Not on file     Scheduled Meds:  Current Facility-Administered Medications   Medication Dose Route Frequency Provider Last Rate    betamethasone acetate-betamethasone sodium phosphate  3 mg Intra-articular        betamethasone acetate-betamethasone sodium phosphate  3 mg Intra-articular        ropivacaine  0.5 mL Infiltration        ropivacaine  0.5 mL Infiltration         Continuous Infusions:     PRN Meds:.  betamethasone acetate-betamethasone sodium phosphate    betamethasone acetate-betamethasone sodium phosphate    ropivacaine    ropivacaine  Allergies   Allergen Reactions    Benadryl [Diphenhydramine] Tachycardia       Physical Examination:    Ht 5' (1.524 m)   Wt 60.3 kg (133 lb)   BMI 25.97 kg/m²     Gen: A&Ox3, NAD      Right Upper Extremity:   Incision well healed without signs of infection   Non-tender index finger PIP joint or thumb CMC  Full thumb oppostion to the base of the small finger  Composite fist  Normal ROM of wrist  Sensation intact to light touch in the axillary median, ulnar, and radial nerve distributions  2+RP    Left Upper Extremity:   Skin CDI  TTP thumb CMC joint  +pressure shear test  Full  digital ROM  Sensation intact to light touch in the axillary median, ulnar, and radial nerve distributions  2+RP      Studies:  No new imaging to review      Assessment & Plan  Arthritis of carpometacarpal (CMC) joint of left thumb  Unfortunately she has been unable to get adequate relief from CSI and bracing for her left thumb pain. She is interested in proceeding with surgery in the future. In the meantime, we discussed alternative bracing options such as the Push MetaGrip or thumb spica brace. She may also apply other topical medications such as CBD cream. It is recommended she return to the office as needed for repeat evaluation, or when she would like to proceed with surgery for her left hand.            she expressed understanding of the plan and agreed. We encouraged them to contact our office with any questions or concerns.         Eric Connolly MD  Hand and Upper Extremity Surgery          *This note was dictated using Dragon voice recognition software. Please excuse any word substitutions or errors.*        Scribe Attestation      I,:  Inez Conrad PA-C am acting as a scribe while in the presence of the attending physician.:       I,:  Eric Connolly MD personally performed the services described in this documentation    as scribed in my presence.:

## 2025-04-08 DIAGNOSIS — I10 HYPERTENSION, UNSPECIFIED TYPE: ICD-10-CM

## 2025-04-08 RX ORDER — AMLODIPINE BESYLATE 2.5 MG/1
2.5 TABLET ORAL DAILY
Qty: 90 TABLET | Refills: 1 | Status: SHIPPED | OUTPATIENT
Start: 2025-04-08

## 2025-04-18 ENCOUNTER — TELEPHONE (OUTPATIENT)
Age: 65
End: 2025-04-18

## 2025-04-18 DIAGNOSIS — J30.1 SEASONAL ALLERGIC RHINITIS DUE TO POLLEN: Primary | ICD-10-CM

## 2025-04-18 RX ORDER — FEXOFENADINE HCL AND PSEUDOEPHEDRINE HCI 60; 120 MG/1; MG/1
1 TABLET, EXTENDED RELEASE ORAL 2 TIMES DAILY
Qty: 180 TABLET | Refills: 1 | Status: SHIPPED | OUTPATIENT
Start: 2025-04-18

## 2025-04-18 NOTE — TELEPHONE ENCOUNTER
PT called asking if PCP can put in an order for   Allegra D for her and send to [facility] [place].

## 2025-05-02 ENCOUNTER — OFFICE VISIT (OUTPATIENT)
Dept: GASTROENTEROLOGY | Facility: AMBULARY SURGERY CENTER | Age: 65
End: 2025-05-02
Payer: COMMERCIAL

## 2025-05-02 ENCOUNTER — HOSPITAL ENCOUNTER (OUTPATIENT)
Dept: RADIOLOGY | Facility: HOSPITAL | Age: 65
Discharge: HOME | End: 2025-05-02
Payer: COMMERCIAL

## 2025-05-02 VITALS
HEART RATE: 65 BPM | OXYGEN SATURATION: 98 % | DIASTOLIC BLOOD PRESSURE: 80 MMHG | HEIGHT: 60 IN | BODY MASS INDEX: 25.48 KG/M2 | SYSTOLIC BLOOD PRESSURE: 134 MMHG | WEIGHT: 129.8 LBS

## 2025-05-02 DIAGNOSIS — J30.1 SEASONAL ALLERGIC RHINITIS DUE TO POLLEN: ICD-10-CM

## 2025-05-02 DIAGNOSIS — F41.9 ANXIETY: ICD-10-CM

## 2025-05-02 DIAGNOSIS — R10.32 LEFT LOWER QUADRANT ABDOMINAL PAIN: ICD-10-CM

## 2025-05-02 DIAGNOSIS — R14.2 BELCHING: ICD-10-CM

## 2025-05-02 DIAGNOSIS — K21.9 GASTROESOPHAGEAL REFLUX DISEASE WITHOUT ESOPHAGITIS: Primary | ICD-10-CM

## 2025-05-02 PROCEDURE — 99244 OFF/OP CNSLTJ NEW/EST MOD 40: CPT | Performed by: INTERNAL MEDICINE

## 2025-05-02 PROCEDURE — 74018 RADEX ABDOMEN 1 VIEW: CPT

## 2025-05-02 RX ORDER — ALPRAZOLAM 0.25 MG
0.25 TABLET ORAL
Qty: 30 TABLET | Refills: 0 | Status: SHIPPED | OUTPATIENT
Start: 2025-05-02

## 2025-05-02 RX ORDER — SODIUM CHLORIDE, SODIUM LACTATE, POTASSIUM CHLORIDE, CALCIUM CHLORIDE 600; 310; 30; 20 MG/100ML; MG/100ML; MG/100ML; MG/100ML
125 INJECTION, SOLUTION INTRAVENOUS CONTINUOUS
OUTPATIENT
Start: 2025-05-02

## 2025-05-02 RX ORDER — FEXOFENADINE HCL AND PSEUDOEPHEDRINE HCI 60; 120 MG/1; MG/1
1 TABLET, EXTENDED RELEASE ORAL 2 TIMES DAILY
Qty: 180 TABLET | Refills: 1 | Status: SHIPPED | OUTPATIENT
Start: 2025-05-02

## 2025-05-02 NOTE — PROGRESS NOTES
Name: Saloni Moy      : 1960      MRN: [id number]  Encounter Provider: Bennie Newell MD  Encounter Date: 2025   Encounter department: [facility]  :  65-year-old female with a history of complicated diverticulitis status post sigmoid resection, chronic constipation, now with epigastric pain, with chronic NSAID use.  Assessment & Plan  Gastroesophageal reflux disease without esophagitis  -Most likely secondary to chronic NSAID use  - Continue Dexilant  - Recommend scheduling upper endoscopy to further evaluate, she has a component of postprandial belching which is consistent with dyspepsia most likely peptic ulcer disease  - Pending results, consider additional testing such as gastric emptying scan or biliary testing  Orders:    EGD; Future    Left lower quadrant abdominal pain  -Suspect that this is due to constipation, no evidence of acute diverticulitis on physical examination today  - Will check a KUB to evaluate for fecal loading  - She does have frequent constipation, recommended she take 2 capfuls of MiraLAX daily, in addition to a fiber supplement  - If there is significant fecal loading on x-ray, consider bowel prep  - Will schedule the patient for colonoscopy since it has been 5 years since her last examination she has no change in bowel movements  - Discussed with the risks of procedure including bleeding, surgery, perforation, missed Pap detection  -  Orders:    XR abdomen 1 view kub; Future    Colonoscopy; Future    Belching  -Abdominal imaging ordered as noted above  - Most likely secondary to constipation versus peptic ulcer disease  - Further recommendations after endoscopic evaluation           History of Present Illness   HPI  Saloni Moy is a 65 y.o. female who presents for epigastric and left-sided abdominal pain.  This is a 65-year-old female, we had seen her approximately 3 to 4 years ago when she presented with acute complicated  diverticulitis, ultimately went sigmoid resection at that time.  She notes that she has had 6 months of continued constipation which she has suffered for in the past.  Recently she has had worsening left lower quadrant pain, upper abdominal pain and discomfort with postprandial belching which is new for the patient.  She reports occasional nausea.  She has a daily bowel movement, she takes MiraLAX regularly which seems to help with her constipation.  She denies any melena or rectal bleeding.  She notes that when she takes MiraLAX you frequently has the urge to have a bowel movement without any bowel movement.  She denies any warren melena or rectal bleeding.  She denies any dysphagia.  Reports she infrequently takes ibuprofen for her arthritis but she has been on longstanding Dexilant.    She notes that the symptoms are somewhat similar to just before her last presentation which presented with complicated diverticulitis.    Medical history is notable for arthritis, hypertension.    Surgical history is notable for sigmoid colon resection, hand surgery.    Denies any tobacco, rarely drinks.  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    Family history is notable for sister with melanoma.      Review of Systems  Review of systems was negative except for pertinent positive mentioned in HPI         Objective   /80 (BP Location: Left arm, Patient Position: Sitting, Cuff Size: Standard)   Pulse 65   Ht 5' (1.524 m)   Wt 58.9 kg (129 lb 12.8 oz)   SpO2 98%   BMI 25.35 kg/m²      Physical Exam  GEN: WN/WD, no acute distress  HEENT: anicteric, MMM, no cervical lymphadenopathy  CV: RRR, no m/r/g  CHEST: CTA b/l, no WRR  ABD: +BS, soft, mild epigastric and left lower quadrant tenderness, no rebound or guarding, no hepatosplenomegaly  EXT: no C/C/E  NEURO: AAOx3  SKIN: no rashes

## 2025-05-02 NOTE — TELEPHONE ENCOUNTER
Medication:  PDMP     02/23/2025 11/20/2024 ALPRAZolam (Tablet) 30.0 30 0.25 MG NA DANK KRISHNAN        Active agreement on file -Yes

## 2025-05-02 NOTE — PATIENT INSTRUCTIONS
Scheduled date of EGD/colonoscopy (as of today): 6/26/2025  Physician performing EGD/colonoscopy: Dr. Newell  Location of EGD[facility]colonoscopy: [facility]  Desired bowel prep reviewed with patient: Miralax/Dulcolax  Instructions reviewed with patient by: Yajaira GOLD  Clearances: N/A    2 capfuls of miralax  1 tablespoon of benefiber daily

## 2025-05-02 NOTE — ASSESSMENT & PLAN NOTE
-Most likely secondary to chronic NSAID use  - Continue Dexilant  - Recommend scheduling upper endoscopy to further evaluate, she has a component of postprandial belching which is consistent with dyspepsia most likely peptic ulcer disease  - Pending results, consider additional testing such as gastric emptying scan or biliary testing  Orders:    EGD; Future

## 2025-05-02 NOTE — ASSESSMENT & PLAN NOTE
-Abdominal imaging ordered as noted above  - Most likely secondary to constipation versus peptic ulcer disease  - Further recommendations after endoscopic evaluation

## 2025-05-02 NOTE — ASSESSMENT & PLAN NOTE
-Suspect that this is due to constipation, no evidence of acute diverticulitis on physical examination today  - Will check a KUB to evaluate for fecal loading  - She does have frequent constipation, recommended she take 2 capfuls of MiraLAX daily, in addition to a fiber supplement  - If there is significant fecal loading on x-ray, consider bowel prep  - Will schedule the patient for colonoscopy since it has been 5 years since her last examination she has no change in bowel movements  - Discussed with the risks of procedure including bleeding, surgery, perforation, missed Pap detection  -  Orders:    XR abdomen 1 view kub; Future    Colonoscopy; Future

## 2025-05-06 ENCOUNTER — DOCUMENTATION (OUTPATIENT)
Dept: ADMINISTRATIVE | Facility: OTHER | Age: 65
End: 2025-05-06

## 2025-05-06 NOTE — PROGRESS NOTES
05/06/25 9:01 AM    Osteoporosis Management Post Fracture outreach is not required; the patient has already been scheduled for a DEXA scan .    Thank you.  Roseline Zavala MA  [facility]

## 2025-05-09 ENCOUNTER — RESULTS FOLLOW-UP (OUTPATIENT)
Dept: GASTROENTEROLOGY | Facility: AMBULARY SURGERY CENTER | Age: 65
End: 2025-05-09

## 2025-05-30 ENCOUNTER — TELEPHONE (OUTPATIENT)
Dept: FAMILY MEDICINE CLINIC | Facility: CLINIC | Age: 65
End: 2025-05-30

## 2025-05-30 DIAGNOSIS — K21.9 GASTROESOPHAGEAL REFLUX DISEASE WITHOUT ESOPHAGITIS: ICD-10-CM

## 2025-05-30 RX ORDER — OMEPRAZOLE 40 MG/1
40 CAPSULE, DELAYED RELEASE ORAL DAILY
Qty: 90 CAPSULE | Refills: 0 | Status: SHIPPED | OUTPATIENT
Start: 2025-05-30

## 2025-05-30 NOTE — TELEPHONE ENCOUNTER
Patient called the RX Refill Line. Message is being forwarded to the office.     Patient is requesting a refill for omeprazole 40 mg no longer active on med list. Pt is completely out. Please review,thank you.      [facility] - [place], [place] - [phone] [phone]

## 2025-06-02 ENCOUNTER — HOSPITAL ENCOUNTER (OUTPATIENT)
Dept: BONE DENSITY | Facility: MEDICAL CENTER | Age: 65
Discharge: HOME | End: 2025-06-02
Payer: COMMERCIAL

## 2025-06-02 VITALS — WEIGHT: 129 LBS | BODY MASS INDEX: 25.32 KG/M2 | HEIGHT: 60 IN

## 2025-06-02 DIAGNOSIS — Z13.820 ENCOUNTER FOR OSTEOPOROSIS SCREENING IN ASYMPTOMATIC POSTMENOPAUSAL PATIENT: ICD-10-CM

## 2025-06-02 DIAGNOSIS — Z78.0 ENCOUNTER FOR OSTEOPOROSIS SCREENING IN ASYMPTOMATIC POSTMENOPAUSAL PATIENT: ICD-10-CM

## 2025-06-02 PROCEDURE — 77080 DXA BONE DENSITY AXIAL: CPT

## 2025-06-12 ENCOUNTER — ANESTHESIA EVENT (OUTPATIENT)
Dept: ANESTHESIOLOGY | Facility: HOSPITAL | Age: 65
End: 2025-06-12

## 2025-06-12 ENCOUNTER — ANESTHESIA (OUTPATIENT)
Dept: ANESTHESIOLOGY | Facility: HOSPITAL | Age: 65
End: 2025-06-12

## 2025-06-26 ENCOUNTER — ANESTHESIA EVENT (OUTPATIENT)
Dept: GASTROENTEROLOGY | Facility: AMBULARY SURGERY CENTER | Age: 65
End: 2025-06-26
Payer: COMMERCIAL

## 2025-06-26 ENCOUNTER — HOSPITAL ENCOUNTER (OUTPATIENT)
Dept: GASTROENTEROLOGY | Facility: AMBULARY SURGERY CENTER | Age: 65
Setting detail: OUTPATIENT SURGERY
End: 2025-06-26
Attending: INTERNAL MEDICINE
Payer: COMMERCIAL

## 2025-06-26 VITALS
DIASTOLIC BLOOD PRESSURE: 69 MMHG | OXYGEN SATURATION: 98 % | RESPIRATION RATE: 18 BRPM | HEIGHT: 60 IN | WEIGHT: 119 LBS | HEART RATE: 61 BPM | BODY MASS INDEX: 23.36 KG/M2 | SYSTOLIC BLOOD PRESSURE: 171 MMHG | TEMPERATURE: 98.6 F

## 2025-06-26 DIAGNOSIS — K21.9 GASTROESOPHAGEAL REFLUX DISEASE WITHOUT ESOPHAGITIS: ICD-10-CM

## 2025-06-26 DIAGNOSIS — R10.32 LEFT LOWER QUADRANT ABDOMINAL PAIN: ICD-10-CM

## 2025-06-26 PROCEDURE — 43239 EGD BIOPSY SINGLE/MULTIPLE: CPT | Performed by: INTERNAL MEDICINE

## 2025-06-26 PROCEDURE — 88305 TISSUE EXAM BY PATHOLOGIST: CPT | Performed by: PATHOLOGY

## 2025-06-26 PROCEDURE — 45385 COLONOSCOPY W/LESION REMOVAL: CPT | Performed by: INTERNAL MEDICINE

## 2025-06-26 RX ORDER — FENTANYL CITRATE 50 UG/ML
INJECTION, SOLUTION INTRAMUSCULAR; INTRAVENOUS AS NEEDED
Status: DISCONTINUED | OUTPATIENT
Start: 2025-06-26 | End: 2025-06-26

## 2025-06-26 RX ORDER — LIDOCAINE HYDROCHLORIDE 20 MG/ML
INJECTION, SOLUTION EPIDURAL; INFILTRATION; INTRACAUDAL; PERINEURAL AS NEEDED
Status: DISCONTINUED | OUTPATIENT
Start: 2025-06-26 | End: 2025-06-26

## 2025-06-26 RX ORDER — SODIUM CHLORIDE, SODIUM LACTATE, POTASSIUM CHLORIDE, CALCIUM CHLORIDE 600; 310; 30; 20 MG/100ML; MG/100ML; MG/100ML; MG/100ML
125 INJECTION, SOLUTION INTRAVENOUS CONTINUOUS
Status: DISPENSED | OUTPATIENT
Start: 2025-06-26

## 2025-06-26 RX ORDER — SODIUM CHLORIDE, SODIUM LACTATE, POTASSIUM CHLORIDE, CALCIUM CHLORIDE 600; 310; 30; 20 MG/100ML; MG/100ML; MG/100ML; MG/100ML
100 INJECTION, SOLUTION INTRAVENOUS CONTINUOUS
Status: DISCONTINUED | OUTPATIENT
Start: 2025-06-26 | End: 2025-06-26

## 2025-06-26 RX ORDER — PROPOFOL 10 MG/ML
INJECTION, EMULSION INTRAVENOUS AS NEEDED
Status: DISCONTINUED | OUTPATIENT
Start: 2025-06-26 | End: 2025-06-26

## 2025-06-26 RX ORDER — SODIUM CHLORIDE, SODIUM LACTATE, POTASSIUM CHLORIDE, CALCIUM CHLORIDE 600; 310; 30; 20 MG/100ML; MG/100ML; MG/100ML; MG/100ML
INJECTION, SOLUTION INTRAVENOUS CONTINUOUS PRN
Status: DISCONTINUED | OUTPATIENT
Start: 2025-06-26 | End: 2025-06-26

## 2025-06-26 RX ORDER — SODIUM CHLORIDE, SODIUM LACTATE, POTASSIUM CHLORIDE, CALCIUM CHLORIDE 600; 310; 30; 20 MG/100ML; MG/100ML; MG/100ML; MG/100ML
100 INJECTION, SOLUTION INTRAVENOUS CONTINUOUS
Status: CANCELLED | OUTPATIENT
Start: 2025-06-26

## 2025-06-26 RX ADMIN — SODIUM CHLORIDE, SODIUM LACTATE, POTASSIUM CHLORIDE, AND CALCIUM CHLORIDE: .6; .31; .03; .02 INJECTION, SOLUTION INTRAVENOUS at 10:44

## 2025-06-26 RX ADMIN — Medication 40 MG: at 11:06

## 2025-06-26 RX ADMIN — FENTANYL CITRATE 50 MCG: 50 INJECTION INTRAMUSCULAR; INTRAVENOUS at 10:50

## 2025-06-26 RX ADMIN — PROPOFOL 140 MG: 10 INJECTION, EMULSION INTRAVENOUS at 10:52

## 2025-06-26 RX ADMIN — PROPOFOL 140 MCG/KG/MIN: 10 INJECTION, EMULSION INTRAVENOUS at 11:00

## 2025-06-26 RX ADMIN — PROPOFOL 20 MG: 10 INJECTION, EMULSION INTRAVENOUS at 10:55

## 2025-06-26 RX ADMIN — FENTANYL CITRATE 50 MCG: 50 INJECTION INTRAMUSCULAR; INTRAVENOUS at 10:59

## 2025-06-26 RX ADMIN — LIDOCAINE HYDROCHLORIDE 100 MG: 20 INJECTION, SOLUTION EPIDURAL; INFILTRATION; INTRACAUDAL at 10:52

## 2025-06-26 RX ADMIN — PROPOFOL 30 MG: 10 INJECTION, EMULSION INTRAVENOUS at 10:58

## 2025-06-26 NOTE — ANESTHESIA PREPROCEDURE EVALUATION
Procedure:  COLONOSCOPY  EGD    Relevant Problems   ANESTHESIA (within normal limits)      CARDIO   (+) Essential hypertension   (+) Hypertension      GI/HEPATIC   (+) Gastroesophageal reflux disease      GYN   (+) History of hysterectomy      HEMATOLOGY   (+) Beta thalassemia trait   (+) Iron deficiency anemia      MUSCULOSKELETAL   (+) Acute left-sided low back pain without sciatica   (+) Arthritis of carpometacarpal (CMC) joint of right thumb   (+) Primary osteoarthritis of both first carpometacarpal joints      NEURO/PSYCH   (+) Anxiety        Physical Exam    Airway     Mallampati score: II  TM Distance: >3 FB  Neck ROM: full      Cardiovascular  Rate: normal    Dental   No notable dental hx     Pulmonary      Neurological      Other Findings  post-pubertal.      Anesthesia Plan  ASA Score- 3     Anesthesia Type- IV sedation with anesthesia with ASA Monitors.         Additional Monitors:     Airway Plan:            Plan Factors-Exercise tolerance (METS): >4 METS.    Chart reviewed.    Patient summary reviewed.    Patient is not a current smoker.              Induction-     Postoperative Plan- .   Monitoring Plan - Monitoring plan - standard ASA monitoring  Post Operative Pain Plan - non-opiod analgesics        Informed Consent- Anesthetic plan and risks discussed with patient.  I personally reviewed this patient with the CRNA. Discussed and agreed on the Anesthesia Plan with the CRNA..      NPO Status:  Vitals Value Taken Time   Date of last liquid 06/26/25 06/26/25 09:48   Time of last liquid 0430 06/26/25 09:48   Date of last solid 06/24/25 06/26/25 09:48   Time of last solid 1900 06/26/25 09:48

## 2025-06-26 NOTE — H&P
History and Physical -  [facility]  Saloni Moy 65 y.o. female MRN: [id number]    HPI: Saloni Moy is a 65 y.o. year old female who presents with GERD, LLQ pain.       Review of Systems    Historical Information   Past Medical History[1]  Past Surgical History[2]  Social History   Social History     Substance and Sexual Activity   Alcohol Use Yes    Alcohol/week: 1.0 standard drink of alcohol    Types: 1 Glasses of wine per week    Comment: occasional alcohol use     Social History     Substance and Sexual Activity   Drug Use No     Tobacco Use History[3]  Family History[4]    Meds/Allergies     Not in a hospital admission.    Allergies[5]    Objective     /70   Pulse 69   Temp (!) 96.4 °F (35.8 °C) (Temporal)   Resp 16   Ht 5' (1.524 m)   Wt 54 kg (119 lb)   SpO2 98%   BMI 23.24 kg/m²       PHYSICAL EXAM    Gen: NAD  CV: RRR  CHEST: Clear  ABD: soft, NT/ND  EXT: no edema  Neuro: AAO      ASSESSMENT/PLAN:  This is a 65 y.o. year old female here for GERD, LLQ pain.     PLAN:   Procedure: egd/colonoscopy           [1]   Past Medical History:  Diagnosis Date    Adjustment disorder with anxiety     last assessed - [id number]    Anemia     ((Pt Qnr Sub: no))     Anxiety     ((Pt Qnr Sub: yed))     Arthritis July 2021    Constipation 08/04/2021    Depression     Diverticulitis     Diverticulitis 09/02/2021    Diverticulitis of colon 05/2020    ((Pt Qnr Sub: yes))     Diverticulitis of large intestine with abscess without bleeding 05/24/2020    Ear problems     Elevated platelet count 09/13/2022    Elevated vitamin B12 level 09/13/2022    Fatigue     Generalized abdominal pain 08/04/2021    GERD (gastroesophageal reflux disease) 2000    ((Pt Qnr Sub: yes))     Headache(784.0)     History of total hysterectomy     History of transfusion     Hypertension 3/12/2021    Hypertension 03/12/2021    Ingrown toenail     last assessed - [id number]    Migraine     Miscarriage 1985    Otitis media      Shingles 2021    Skin tag     Stomach disorder     Thalassemia     Tonsillitis    [2]   Past Surgical History:  Procedure Laterality Date    APPENDECTOMY      ((Pt Qnr Sub: yes))     BOWEL RESECTION  2021     SECTION      ((Pt Qnr Sub: yes))      SECTION      CHOLECYSTECTOMY      ((Pt Qnr Sub: yes))     COLONOSCOPY      FOOT SURGERY      Neuroma both feet    GALLBLADDER SURGERY      OOPHORECTOMY Bilateral     MS ARTHRP INTERCARPAL/CARP/MTCRPL JT INTERPOSITION Right 2024    Procedure: Thumb carpometacarpal joint arthroplasty with TightRope Suspension;  Surgeon: Eric Connolly MD;  [facility];  Service: Orthopedics    MS LAPAROSCOPY COLECTOMY PARTIAL W/ANASTOMOSIS N/A 2021    Procedure: RESECTION COLON SIGMOID LAPAROSCOPIC HAND-ASSISTED;  Surgeon: Kee Mace MD;  [facility];  Service: General    TONSILLECTOMY AND ADENOIDECTOMY      TOTAL ABDOMINAL HYSTERECTOMY      ((Pt Qnr Sub: yes))     UPPER GASTROINTESTINAL ENDOSCOPY     [3]   Social History  Tobacco Use   Smoking Status Never   Smokeless Tobacco Never   [4]   Family History  Problem Relation Name Age of Onset    Anxiety disorder Mother Padminiledy Ruano         Anxiety    Depression Mother Padmini Alden     Osteoporosis Mother Padmini Alden     Hypertension Father Art Alden     Anxiety disorder Sister          Anxiety    Depression Sister      Melanoma Sister Nelida     Cancer Sister Nelida     Other (MELANOMA) Sister Nelida     No Known Problems Sister      No Known Problems Sister      Anxiety disorder Daughter          Anxiety    Depression Daughter      No Known Problems Son      No Known Problems Maternal Grandmother      No Known Problems Maternal Grandfather      No Known Problems Paternal Grandmother      No Known Problems Paternal Grandfather      Heart disease Maternal Aunt      No Known Problems Maternal Aunt      No Known Problems Maternal Aunt       Heart disease Maternal Uncle      No Known Problems Paternal Aunt      No Known Problems Paternal Aunt     [5]   Allergies  Allergen Reactions    Benadryl [Diphenhydramine] Tachycardia

## 2025-06-27 DIAGNOSIS — F41.9 ANXIETY: ICD-10-CM

## 2025-06-27 RX ORDER — ALPRAZOLAM 0.25 MG
0.25 TABLET ORAL
Qty: 30 TABLET | Refills: 0 | Status: SHIPPED | OUTPATIENT
Start: 2025-06-27

## 2025-06-27 NOTE — TELEPHONE ENCOUNTER
Medication:  PDMP   05/02/2025 05/02/2025 ALPRAZolam (Tablet) 30.0 30 0.25 MG NA ESTHER HUNT     Active agreement on file -Yes

## 2025-06-30 ENCOUNTER — OFFICE VISIT (OUTPATIENT)
Dept: FAMILY MEDICINE CLINIC | Facility: CLINIC | Age: 65
End: 2025-06-30
Payer: COMMERCIAL

## 2025-06-30 VITALS
DIASTOLIC BLOOD PRESSURE: 64 MMHG | SYSTOLIC BLOOD PRESSURE: 120 MMHG | RESPIRATION RATE: 17 BRPM | BODY MASS INDEX: 25.13 KG/M2 | OXYGEN SATURATION: 99 % | WEIGHT: 128 LBS | HEIGHT: 60 IN | TEMPERATURE: 96.9 F | HEART RATE: 67 BPM

## 2025-06-30 DIAGNOSIS — I10 ESSENTIAL HYPERTENSION: Primary | ICD-10-CM

## 2025-06-30 DIAGNOSIS — M35.00 SJOGREN'S SYNDROME WITHOUT EXTRAGLANDULAR INVOLVEMENT (HCC): ICD-10-CM

## 2025-06-30 DIAGNOSIS — M35.9 UNDIFFERENTIATED CONNECTIVE TISSUE DISEASE (HCC): ICD-10-CM

## 2025-06-30 DIAGNOSIS — K21.9 GASTROESOPHAGEAL REFLUX DISEASE WITHOUT ESOPHAGITIS: ICD-10-CM

## 2025-06-30 PROBLEM — R53.82 CHRONIC FATIGUE: Status: RESOLVED | Noted: 2022-05-26 | Resolved: 2025-06-30

## 2025-06-30 PROBLEM — R40.0 DAYTIME SOMNOLENCE: Status: RESOLVED | Noted: 2024-10-27 | Resolved: 2025-06-30

## 2025-06-30 PROBLEM — R14.2 BELCHING: Status: RESOLVED | Noted: 2025-05-02 | Resolved: 2025-06-30

## 2025-06-30 PROBLEM — R10.9 ABDOMINAL PAIN: Status: RESOLVED | Noted: 2021-08-04 | Resolved: 2025-06-30

## 2025-06-30 PROBLEM — M79.672 PAIN OF LEFT HEEL: Status: RESOLVED | Noted: 2024-01-22 | Resolved: 2025-06-30

## 2025-06-30 PROBLEM — S39.012A ACUTE LUMBAR MYOFASCIAL STRAIN: Status: RESOLVED | Noted: 2023-01-21 | Resolved: 2025-06-30

## 2025-06-30 PROBLEM — M54.50 ACUTE LEFT-SIDED LOW BACK PAIN WITHOUT SCIATICA: Status: RESOLVED | Noted: 2023-06-09 | Resolved: 2025-06-30

## 2025-06-30 PROBLEM — S92.215A CLOSED NONDISPLACED FRACTURE OF CUBOID OF LEFT FOOT, INITIAL ENCOUNTER: Status: RESOLVED | Noted: 2024-08-14 | Resolved: 2025-06-30

## 2025-06-30 PROCEDURE — 99214 OFFICE O/P EST MOD 30 MIN: CPT | Performed by: NURSE PRACTITIONER

## 2025-06-30 NOTE — PROGRESS NOTES
Name: Saloni Moy      : 1960      MRN: [id number]  Encounter Provider: KATIANA Sow  Encounter Date: 2025   Encounter department: [facility]    Assessment & Plan  Essential hypertension  Stable  Cont meds           Gastroesophageal reflux disease without esophagitis  Stable  Cont meds           Undifferentiated connective tissue disease (HCC)  Stable  Cont f/u with rheumatology           Sjogren's syndrome without extraglandular involvement (HCC)  Stable  Cont f/u with rheumatology             Falls Plan of Care: balance, strength, and gait training instructions were provided.         History of Present Illness     Here for f/u to chronic medical conditions  Had egd and colonoscopy last week  Continues to have trouble with eating food  Feels like she wants to come back up per patient  Feels like food gets stuck  May need gastric emptying study or upper gi series        Review of Systems   Constitutional:  Negative for fatigue and fever.   HENT:  Negative for congestion, postnasal drip and rhinorrhea.    Eyes:  Negative for photophobia and visual disturbance.   Respiratory:  Negative for cough, shortness of breath and wheezing.    Cardiovascular:  Negative for chest pain and palpitations.   Gastrointestinal:  Negative for constipation, diarrhea, nausea and vomiting.   Genitourinary:  Negative for dysuria and frequency.   Musculoskeletal:  Negative for arthralgias and myalgias.   Skin:  Negative for rash.   Neurological:  Negative for dizziness, light-headedness and headaches.   Hematological:  Negative for adenopathy.   Psychiatric/Behavioral:  Negative for dysphoric mood and sleep disturbance. The patient is not nervous/anxious.      Past Medical History[1]  Past Surgical History[2]  Family History[3]  Social History[4]  Medications[5]  Allergies   Allergen Reactions    Benadryl [Diphenhydramine] Tachycardia     Immunization History   Administered Date(s) Administered     COVID-19 PFIZER VACCINE 0.3 ML IM 12/23/2020, 12/23/2020, 01/14/2021, 01/14/2021, 01/08/2022, 01/08/2022    INFLUENZA 10/01/2024    Influenza, recombinant, quadrivalent,injectable, preservative free 10/05/2018    Pneumococcal Conjugate Vaccine 20-valent (Pcv20), Polysace 03/21/2025    Tdap 08/14/2023, 08/14/2023     Objective   /64 (BP Location: Left arm, Patient Position: Sitting, Cuff Size: Standard)   Pulse 67   Temp (!) 96.9 °F (36.1 °C) (Tympanic)   Resp 17   Ht 5' (1.524 m)   Wt 58.1 kg (128 lb)   SpO2 99%   BMI 25.00 kg/m²     Physical Exam  Vitals and nursing note reviewed.   Constitutional:       Appearance: Normal appearance.   HENT:      Head: Normocephalic and atraumatic.      Right Ear: Tympanic membrane, ear canal and external ear normal.      Left Ear: Tympanic membrane, ear canal and external ear normal.      Nose: Nose normal.      Mouth/Throat:      Mouth: Mucous membranes are moist.     Eyes:      Conjunctiva/sclera: Conjunctivae normal.       Cardiovascular:      Rate and Rhythm: Normal rate and regular rhythm.      Heart sounds: Normal heart sounds.   Pulmonary:      Effort: Pulmonary effort is normal.      Breath sounds: Normal breath sounds.   Abdominal:      Palpations: Abdomen is soft.     Musculoskeletal:         General: Normal range of motion.      Cervical back: Normal range of motion and neck supple.     Skin:     General: Skin is warm and dry.      Capillary Refill: Capillary refill takes less than 2 seconds.     Neurological:      General: No focal deficit present.      Mental Status: She is alert and oriented to person, place, and time.     Psychiatric:         Mood and Affect: Mood normal.         Behavior: Behavior normal.         Thought Content: Thought content normal.         Judgment: Judgment normal.                [1]   Past Medical History:  Diagnosis Date    Adjustment disorder with anxiety     last assessed - 01Sep2016    Anemia     ((Pt Qnr Sub: no))      Anxiety     ((Pt Qnr Sub: yed))     Arthritis 2021    Constipation 2021    Depression     Diverticulitis     Diverticulitis 2021    Diverticulitis of colon 2020    ((Pt Qnr Sub: yes))     Diverticulitis of large intestine with abscess without bleeding 2020    Ear problems     Elevated platelet count 2022    Elevated vitamin B12 level 2022    Fatigue     Generalized abdominal pain 2021    GERD (gastroesophageal reflux disease)     ((Pt Qnr Sub: yes))     Headache(784.0)     History of total hysterectomy     History of transfusion     Hypertension 3/12/2021    Hypertension 2021    Ingrown toenail     last assessed - [id number]    Migraine     Miscarriage     Otitis media     Shingles 2021    Skin tag     Stomach disorder     Thalassemia     Tonsillitis    [2]   Past Surgical History:  Procedure Laterality Date    APPENDECTOMY      ((Pt Qnr Sub: yes))     BOWEL RESECTION  2021     SECTION      ((Pt Qnr Sub: yes))      SECTION      CHOLECYSTECTOMY      ((Pt Qnr Sub: yes))     COLONOSCOPY      FOOT SURGERY      Neuroma both feet    GALLBLADDER SURGERY      OOPHORECTOMY Bilateral     MA ARTHRP INTERCARPAL/CARP/MTCRPL JT INTERPOSITION Right 2024    Procedure: Thumb carpometacarpal joint arthroplasty with TightRope Suspension;  Surgeon: Eric Connolly MD;  Location: [facility];  Service: Orthopedics    MA LAPAROSCOPY COLECTOMY PARTIAL W/ANASTOMOSIS N/A 2021    Procedure: RESECTION COLON SIGMOID LAPAROSCOPIC HAND-ASSISTED;  Surgeon: Kee Mace MD;  Location: [facility];  Service: General    TONSILLECTOMY AND ADENOIDECTOMY      TOTAL ABDOMINAL HYSTERECTOMY      ((Pt Qnr Sub: yes))     UPPER GASTROINTESTINAL ENDOSCOPY     [3]   Family History  Problem Relation Name Age of Onset    Anxiety disorder Mother Padmini Ruano         Anxiety    Depression Mother Padmini Ruano     Osteoporosis Mother Padmini  Alden     Hypertension Father Art Ruano     Anxiety disorder Sister          Anxiety    Depression Sister      Melanoma Sister Nelida     Cancer Sister Nelida     Other (MELANOMA) Sister Nelida     No Known Problems Sister      No Known Problems Sister      Anxiety disorder Daughter          Anxiety    Depression Daughter      No Known Problems Son      No Known Problems Maternal Grandmother      No Known Problems Maternal Grandfather      No Known Problems Paternal Grandmother      No Known Problems Paternal Grandfather      Heart disease Maternal Aunt      No Known Problems Maternal Aunt      No Known Problems Maternal Aunt      Heart disease Maternal Uncle      No Known Problems Paternal Aunt      No Known Problems Paternal Aunt     [4]   Social History  Tobacco Use    Smoking status: Never    Smokeless tobacco: Never   Vaping Use    Vaping status: Never Used   Substance and Sexual Activity    Alcohol use: Yes     Alcohol/week: 1.0 standard drink of alcohol     Types: 1 Glasses of wine per week     Comment: occasional alcohol use    Drug use: No    Sexual activity: Not Currently     Partners: Male     Birth control/protection: Post-menopausal, Surgical     Comment: Hysterectomy 1988   [5]   Current Outpatient Medications on File Prior to Visit   Medication Sig    ALPRAZolam (XANAX) 0.25 mg tablet Take 1 tablet (0.25 mg total) by mouth daily at bedtime as needed for anxiety    amLODIPine (NORVASC) 2.5 mg tablet Take 1 tablet (2.5 mg total) by mouth daily    Diclofenac Sodium (VOLTAREN) 1 % Apply 2 g topically 4 (four) times a day    estradiol (ESTRACE VAGINAL) 0.1 mg/g vaginal cream Insert 1 g into the vagina daily    fexofenadine-pseudoephedrine (ALLEGRA-D)  MG per tablet Take 1 tablet by mouth 2 (two) times a day    omeprazole (PriLOSEC) 40 MG capsule Take 1 capsule (40 mg total) by mouth daily    fluticasone (FLONASE) 50 mcg/act nasal spray 2 sprays into each nostril daily

## 2025-07-01 PROCEDURE — 88305 TISSUE EXAM BY PATHOLOGIST: CPT | Performed by: PATHOLOGY

## 2025-07-19 ENCOUNTER — APPOINTMENT (OUTPATIENT)
Dept: LAB | Facility: CLINIC | Age: 65
End: 2025-07-19
Attending: PREVENTIVE MEDICINE

## 2025-07-19 ENCOUNTER — APPOINTMENT (OUTPATIENT)
Dept: LAB | Facility: CLINIC | Age: 65
End: 2025-07-19

## 2025-07-19 DIAGNOSIS — Z00.8 HEALTH EXAMINATION IN POPULATION SURVEY: ICD-10-CM

## 2025-07-19 LAB
CHOLEST SERPL-MCNC: 200 MG/DL (ref ?–200)
EST. AVERAGE GLUCOSE BLD GHB EST-MCNC: 126 MG/DL
HBA1C MFR BLD: 6 %
HDLC SERPL-MCNC: 64 MG/DL
LDLC SERPL CALC-MCNC: 104 MG/DL (ref 0–100)
NONHDLC SERPL-MCNC: 136 MG/DL
TRIGL SERPL-MCNC: 159 MG/DL (ref ?–150)

## 2025-07-19 PROCEDURE — 83036 HEMOGLOBIN GLYCOSYLATED A1C: CPT

## 2025-07-19 PROCEDURE — 80061 LIPID PANEL: CPT

## 2025-07-19 PROCEDURE — 36415 COLL VENOUS BLD VENIPUNCTURE: CPT

## 2025-08-25 PROBLEM — Z82.49 FAMILY HISTORY OF CORONARY ARTERIOSCLEROSIS: Status: ACTIVE | Noted: 2025-08-25

## 2025-08-25 PROBLEM — R42 LIGHTHEADEDNESS: Status: ACTIVE | Noted: 2025-08-25

## 2025-08-25 PROBLEM — R00.2 PALPITATION: Status: ACTIVE | Noted: 2025-08-25

## (undated) DEVICE — INTENDED FOR TISSUE SEPARATION, AND OTHER PROCEDURES THAT REQUIRE A SHARP SURGICAL BLADE TO PUNCTURE OR CUT.: Brand: BARD-PARKER SAFETY BLADES SIZE 11, STERILE

## (undated) DEVICE — SUT VICRYL 2-0 REEL 54 IN J286G

## (undated) DEVICE — PADDING CAST 4 IN  COTTON STRL

## (undated) DEVICE — ENSEAL LAPAROSCOPIC TISSUE SEALER G2 ARTICULATING  CURVED JAW FOR USE WITH G2 GENERATOR 5MM DIAMETER 35CM SHAFT LENGTH: Brand: ENSEAL

## (undated) DEVICE — EXIDINE 4 PCT

## (undated) DEVICE — 1820 FOAM BLOCK NEEDLE COUNTER: Brand: DEVON

## (undated) DEVICE — GLOVE INDICATOR PI UNDERGLOVE SZ 8 BLUE

## (undated) DEVICE — PAD GROUNDING ADULT

## (undated) DEVICE — TRAY FOLEY 16FR URIMETER SURESTEP

## (undated) DEVICE — TROCAR: Brand: KII FIOS FIRST ENTRY

## (undated) DEVICE — TUBING SMOKE EVAC W/FILTRATION DEVICE PLUMEPORT ACTIV

## (undated) DEVICE — 3M™ TEGADERM™ TRANSPARENT FILM DRESSING FRAME STYLE, 1626W, 4 IN X 4-3/4 IN (10 CM X 12 CM), 50/CT 4CT/CASE: Brand: 3M™ TEGADERM™

## (undated) DEVICE — GAUZE SPONGES,16 PLY: Brand: CURITY

## (undated) DEVICE — PAD CAST 3 IN COTTON NON STRL

## (undated) DEVICE — VIOLET BRAIDED (POLYGLACTIN 910), SYNTHETIC ABSORBABLE SUTURE: Brand: COATED VICRYL

## (undated) DEVICE — OCCLUSIVE GAUZE STRIP,3% BISMUTH TRIBROMOPHENATE IN PETROLATUM BLEND: Brand: XEROFORM

## (undated) DEVICE — SUT VICRYL 3-0 PS-2 27 IN J427H

## (undated) DEVICE — SYRINGE 10ML LL

## (undated) DEVICE — IRRIG ENDO FLO TUBING

## (undated) DEVICE — GLOVE PI ULTRA TOUCH SZ.8.0

## (undated) DEVICE — CONTOUR CURVED CUTTER STAPLER: Brand: CONTOUR

## (undated) DEVICE — ANTI-FOG SOLUTION WITH FOAM PAD: Brand: DEVON

## (undated) DEVICE — CUFF TOURNIQUET 18 X 4 IN QUICK CONNECT DISP 1 BLADDER

## (undated) DEVICE — INSUFLATION TUBING INSUFLOW (LEXION)

## (undated) DEVICE — GLOVE SRG BIOGEL 6.5

## (undated) DEVICE — TROCAR: Brand: KII® SLEEVE

## (undated) DEVICE — PREMIUM DRY TRAY LF: Brand: MEDLINE INDUSTRIES, INC.

## (undated) DEVICE — STERILE BETHLEHEM PLASTIC HAND: Brand: CARDINAL HEALTH

## (undated) DEVICE — TUBING SUCTION 5MM X 12 FT

## (undated) DEVICE — LIGHT HANDLE COVER SLEEVE DISP BLUE STELLAR

## (undated) DEVICE — TOWEL SURG XR DETECT GREEN STRL RFD

## (undated) DEVICE — GLOVE SRG BIOGEL ECLIPSE 7

## (undated) DEVICE — SPLINT 4 X 15 IN FAST SET PLASTER

## (undated) DEVICE — GLOVE INDICATOR PI UNDERGLOVE SZ 6.5 BLUE

## (undated) DEVICE — ABDOMINAL PAD: Brand: DERMACEA

## (undated) DEVICE — CIRCULAR MECH XL SEAL 29MM

## (undated) DEVICE — NEEDLE 22 G X 1 1/2 SAFETY

## (undated) DEVICE — UNDYED BRAIDED (POLYGLACTIN 910), SYNTHETIC ABSORBABLE SUTURE: Brand: COATED VICRYL

## (undated) DEVICE — ACE WRAP 3 IN STERILE

## (undated) DEVICE — VIAL DECANTER

## (undated) DEVICE — PLUMEPEN PRO 10FT

## (undated) DEVICE — POOLE SUCTION HANDLE: Brand: CARDINAL HEALTH

## (undated) DEVICE — ACCESS PLATFORM FOR MINIMALLY INVASIVE SURGERY.: Brand: GELPORT® LAPAROSCOPIC  SYSTEM

## (undated) DEVICE — DRAPE C-ARM X-RAY

## (undated) DEVICE — MEDI-VAC YANK SUCT HNDL W/TPRD BULBOUS TIP: Brand: CARDINAL HEALTH

## (undated) DEVICE — SUT VICRYL 0 REEL 54 IN J287G

## (undated) DEVICE — ADHESIVE SKIN HIGH VISCOSITY EXOFIN 1ML

## (undated) DEVICE — SUT PDS II 1 CTX 36 IN Z371T

## (undated) DEVICE — PROXIMATE LINEAR CUTTER RELOAD, BLUE, 75MM: Brand: PROXIMATE

## (undated) DEVICE — CHLORAPREP HI-LITE 26ML ORANGE

## (undated) DEVICE — BULB SYRINGE,IRRIGATION WITH PROTECTIVE CAP: Brand: DOVER

## (undated) DEVICE — PROXIMATE RELOADABLE LINEAR CUTTER WITH SAFETY LOCK-OUT, 75MM: Brand: PROXIMATE

## (undated) DEVICE — DRAPE EQUIPMENT RF WAND

## (undated) DEVICE — SUT MONOCRYL 4-0 PS-2 18 IN Y496G

## (undated) DEVICE — BOWL: 16OZ PEELPOUCH 75/CS 16/PLT: Brand: MEDEGEN MEDICAL PRODUCTS, LLC

## (undated) DEVICE — SUT PROLENE 2-0 KS 30 IN 8623H

## (undated) DEVICE — PACK PBDS STERILE LAP LITHOTOMY RF

## (undated) DEVICE — SUT NYLON 4-0 P-3 18 IN 699G